# Patient Record
Sex: FEMALE | Race: WHITE | Employment: UNEMPLOYED | URBAN - METROPOLITAN AREA
[De-identification: names, ages, dates, MRNs, and addresses within clinical notes are randomized per-mention and may not be internally consistent; named-entity substitution may affect disease eponyms.]

---

## 2017-12-25 ENCOUNTER — APPOINTMENT (OUTPATIENT)
Dept: GENERAL RADIOLOGY | Age: 47
End: 2017-12-25
Attending: EMERGENCY MEDICINE
Payer: SELF-PAY

## 2017-12-25 ENCOUNTER — HOSPITAL ENCOUNTER (EMERGENCY)
Age: 47
Discharge: HOME | End: 2017-12-25
Attending: EMERGENCY MEDICINE
Payer: SELF-PAY

## 2017-12-25 VITALS
WEIGHT: 165 LBS | SYSTOLIC BLOOD PRESSURE: 135 MMHG | HEIGHT: 61 IN | RESPIRATION RATE: 18 BRPM | OXYGEN SATURATION: 97 % | HEART RATE: 69 BPM | BODY MASS INDEX: 31.15 KG/M2 | TEMPERATURE: 98 F | DIASTOLIC BLOOD PRESSURE: 95 MMHG

## 2017-12-25 DIAGNOSIS — J20.9 ACUTE BRONCHITIS, UNSPECIFIED ORGANISM: Primary | ICD-10-CM

## 2017-12-25 LAB
APPEARANCE UR: CLEAR
BILIRUB UR QL: NEGATIVE
COLOR UR: YELLOW
GLUCOSE UR STRIP.AUTO-MCNC: NEGATIVE MG/DL
HCG UR QL: NEGATIVE
HGB UR QL STRIP: NEGATIVE
KETONES UR QL STRIP.AUTO: NEGATIVE MG/DL
LEUKOCYTE ESTERASE UR QL STRIP.AUTO: NEGATIVE
NITRITE UR QL STRIP.AUTO: NEGATIVE
PH UR STRIP: 7 [PH] (ref 5–8)
PROT UR STRIP-MCNC: NEGATIVE MG/DL
SP GR UR REFRACTOMETRY: 1.01 (ref 1–1.03)
UROBILINOGEN UR QL STRIP.AUTO: 0.2 EU/DL (ref 0.2–1)

## 2017-12-25 PROCEDURE — 71020 XR CHEST PA LAT: CPT

## 2017-12-25 PROCEDURE — 81025 URINE PREGNANCY TEST: CPT | Performed by: PHYSICIAN ASSISTANT

## 2017-12-25 PROCEDURE — 74011000250 HC RX REV CODE- 250: Performed by: PHYSICIAN ASSISTANT

## 2017-12-25 PROCEDURE — 94640 AIRWAY INHALATION TREATMENT: CPT

## 2017-12-25 PROCEDURE — 74011636637 HC RX REV CODE- 636/637: Performed by: PHYSICIAN ASSISTANT

## 2017-12-25 PROCEDURE — 74011250637 HC RX REV CODE- 250/637: Performed by: PHYSICIAN ASSISTANT

## 2017-12-25 PROCEDURE — 99283 EMERGENCY DEPT VISIT LOW MDM: CPT

## 2017-12-25 PROCEDURE — 81003 URINALYSIS AUTO W/O SCOPE: CPT | Performed by: PHYSICIAN ASSISTANT

## 2017-12-25 PROCEDURE — 77030029684 HC NEB SM VOL KT MONA -A

## 2017-12-25 RX ORDER — IPRATROPIUM BROMIDE AND ALBUTEROL SULFATE 2.5; .5 MG/3ML; MG/3ML
3 SOLUTION RESPIRATORY (INHALATION) ONCE
Status: COMPLETED | OUTPATIENT
Start: 2017-12-25 | End: 2017-12-25

## 2017-12-25 RX ORDER — ALBUTEROL SULFATE 90 UG/1
1 AEROSOL, METERED RESPIRATORY (INHALATION)
Status: COMPLETED | OUTPATIENT
Start: 2017-12-25 | End: 2017-12-25

## 2017-12-25 RX ORDER — AZITHROMYCIN 250 MG/1
500 TABLET, FILM COATED ORAL
Status: COMPLETED | OUTPATIENT
Start: 2017-12-25 | End: 2017-12-25

## 2017-12-25 RX ORDER — AZITHROMYCIN 250 MG/1
TABLET, FILM COATED ORAL
Qty: 4 TAB | Refills: 0 | Status: SHIPPED | OUTPATIENT
Start: 2017-12-25 | End: 2018-05-09

## 2017-12-25 RX ORDER — PREDNISONE 20 MG/1
60 TABLET ORAL
Status: COMPLETED | OUTPATIENT
Start: 2017-12-25 | End: 2017-12-25

## 2017-12-25 RX ORDER — ONDANSETRON 4 MG/1
4 TABLET, ORALLY DISINTEGRATING ORAL
Status: COMPLETED | OUTPATIENT
Start: 2017-12-25 | End: 2017-12-25

## 2017-12-25 RX ORDER — HYDROCODONE POLISTIREX AND CHLORPHENIRAMINE POLISTIREX 10; 8 MG/5ML; MG/5ML
5 SUSPENSION, EXTENDED RELEASE ORAL
Qty: 115 ML | Refills: 0 | Status: SHIPPED | OUTPATIENT
Start: 2017-12-25 | End: 2018-05-04

## 2017-12-25 RX ADMIN — IPRATROPIUM BROMIDE AND ALBUTEROL SULFATE 3 ML: .5; 3 SOLUTION RESPIRATORY (INHALATION) at 16:05

## 2017-12-25 RX ADMIN — AZITHROMYCIN 500 MG: 250 TABLET, FILM COATED ORAL at 16:05

## 2017-12-25 RX ADMIN — ONDANSETRON 4 MG: 4 TABLET, ORALLY DISINTEGRATING ORAL at 14:29

## 2017-12-25 RX ADMIN — IPRATROPIUM BROMIDE AND ALBUTEROL SULFATE 3 ML: 2.5; .5 SOLUTION RESPIRATORY (INHALATION) at 14:21

## 2017-12-25 RX ADMIN — PREDNISONE 60 MG: 20 TABLET ORAL at 14:21

## 2017-12-25 RX ADMIN — ALBUTEROL SULFATE 1 PUFF: 90 AEROSOL, METERED RESPIRATORY (INHALATION) at 16:05

## 2017-12-25 NOTE — ED PROVIDER NOTES
Patient is a 56 y.o. female presenting with cough. The history is provided by the patient. No  was used. Cough   This is a new problem. Episode onset: 10 days ago. The problem occurs constantly. The problem has not changed since onset. The cough is productive of sputum (no blood). There has been no fever. Associated symptoms include myalgias and wheezing. Pertinent negatives include no chest pain, no chills, no sweats, no weight loss, no eye redness, no ear congestion, no ear pain, no headaches, no rhinorrhea, no sore throat, no shortness of breath, no nausea, no vomiting and no confusion. She has tried nothing for the symptoms. Risk factors: no RF  She is not a smoker. Her past medical history is significant for pneumonia and asthma. Her past medical history does not include bronchitis, bronchiectasis, COPD, emphysema, cancer, heart failure or CHF. 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 No pertinent family history. Social History     Social History    Marital status:      Spouse name: N/A    Number of children: N/A    Years of education: N/A     Occupational History    Not on file.      Social History Main Topics    Smoking status: Passive Smoke Exposure - Never Smoker     Years: 6.00    Smokeless tobacco: Never Used      Comment: last attempt to quit 1/1/2003    Alcohol use 0.5 oz/week     1 Cans of beer per week    Drug use: No    Sexual activity: Not on file     Other Topics Concern    Not on file     Social History Narrative         ALLERGIES: Apple and Cephalexin    Review of Systems   Constitutional: Negative for chills and weight loss. HENT: Negative for ear pain, rhinorrhea and sore throat. Eyes: Negative for redness. Respiratory: Positive for cough and wheezing. Negative for shortness of breath. Cardiovascular: Negative for chest pain. Gastrointestinal: Negative for nausea and vomiting. Musculoskeletal: Positive for myalgias. Neurological: Negative for headaches. Psychiatric/Behavioral: Negative for confusion. Vitals:    12/25/17 1403   BP: (!) 135/95   Pulse: 69   Resp: 18   Temp: 98 °F (36.7 °C)   SpO2: 97%   Weight: 74.8 kg (165 lb)   Height: 5' 1\" (1.549 m)            Physical Exam   Constitutional: She is oriented to person, place, and time. She appears well-developed and well-nourished. No distress. HENT:   Head: Normocephalic and atraumatic. Mouth/Throat: Oropharynx is clear and moist.   Eyes: Conjunctivae are normal.   Neck: Normal range of motion. Neck supple. Cardiovascular: Normal rate, regular rhythm, normal heart sounds and intact distal pulses. Pulmonary/Chest: Effort normal. No respiratory distress. She has wheezes. She exhibits no tenderness. Occasional cough, no resp distress, no retractions, no flaring   Abdominal: Soft. She exhibits no distension. There is no tenderness. Musculoskeletal: Normal range of motion. Neurological: She is alert and oriented to person, place, and time. No cranial nerve deficit. Coordination normal.   Skin: Skin is warm and dry. No rash noted. She is not diaphoretic. Psychiatric: She has a normal mood and affect. Nursing note and vitals reviewed. MDM  Number of Diagnoses or Management Options  Acute bronchitis, unspecified organism:   Diagnosis management comments: CXR reviewed by self. NAP appreciated however will d/c home with azithromycin, inhaler, tussonex and free clinic f/u. No resp distress, VSS, lungs improved after duoneb tx.   No indication for further work up or admission. Amount and/or Complexity of Data Reviewed  Tests in the radiology section of CPT®: ordered and reviewed      ED Course       Procedures  Medications ordered:   Medications   albuterol-ipratropium (DUO-NEB) 2.5 MG-0.5 MG/3 ML (3 mL Nebulization Given 12/25/17 1421)   predniSONE (DELTASONE) tablet 60 mg (60 mg Oral Given 12/25/17 1421)   ondansetron (ZOFRAN ODT) tablet 4 mg (4 mg Oral Given 12/25/17 1429)   albuterol-ipratropium (DUO-NEB) 2.5 MG-0.5 MG/3 ML (3 mL Nebulization Given 12/25/17 1605)   azithromycin (ZITHROMAX) tablet 500 mg (500 mg Oral Given 12/25/17 1605)   albuterol (PROVENTIL HFA, VENTOLIN HFA, PROAIR HFA) inhaler 1 Puff (1 Puff Inhalation Given 12/25/17 1605)         Lab findings:  Recent Results (from the past 12 hour(s))   URINALYSIS W/ RFLX MICROSCOPIC    Collection Time: 12/25/17  2:15 PM   Result Value Ref Range    Color YELLOW      Appearance CLEAR      Specific gravity 1.013 1.005 - 1.030      pH (UA) 7.0 5.0 - 8.0      Protein NEGATIVE  NEG mg/dL    Glucose NEGATIVE  NEG mg/dL    Ketone NEGATIVE  NEG mg/dL    Bilirubin NEGATIVE  NEG      Blood NEGATIVE  NEG      Urobilinogen 0.2 0.2 - 1.0 EU/dL    Nitrites NEGATIVE  NEG      Leukocyte Esterase NEGATIVE  NEG     HCG URINE, QL    Collection Time: 12/25/17  2:15 PM   Result Value Ref Range    HCG urine, Ql. NEGATIVE  NEG               Dispo:  Patient was dc in stable condition. Return to the ER if you are unable to obtain referral as directed.        Cong Lacey 's  results have been reviewed with her. She has been counseled regarding her diagnosis, treatment, and plan. She verbally conveys understanding and agreement of the signs, symptoms, diagnosis, treatment and prognosis and additionally agrees to follow up as discussed. She also agrees with the care-plan and conveys that all of her questions have been answered.   I have also provided discharge instructions for her that include: educational information regarding their diagnosis and treatment, and list of reasons why they would want to return to the ED prior to their follow-up appointment, should her condition change. Roldan Wright PA-C    Diagnosis:   1. Acute bronchitis, unspecified organism        Follow-up Information     Follow up With Details Comments [facility] Schedule an appointment as soon as possible for a visit in 1 week  [address] [facility] [address]    [facility] EMERGENCY DEPT  As needed, If symptoms worsen [address] [place] [id number] [phone]           Patient's Medications   Start Taking    AZITHROMYCIN (ZITHROMAX Z-MEDHAT) 250 MG TABLET    Take 1st dose 24 hours after initial dose that was given in the ER. Then take 1 tablet daily until finished. CHLORPHENIRAMINE-HYDROCODONE (TUSSIONEX PENNKINETIC ER) 10-8 MG/5 ML SUSPENSION    Take 5 mL by mouth nightly as needed for Cough. Max Daily Amount: 5 mL. Indications: Cough   Continue Taking    SERTRALINE (ZOLOFT) 100 MG TABLET    Take  by mouth daily. These Medications have changed    No medications on file   Stop Taking    ACYCLOVIR (ZOVIRAX) 800 MG TABLET    Take 800 mg by mouth two (2) times a day. ALPRAZOLAM (XANAX) 1 MG TABLET    Take 1 mg by mouth nightly as needed. AMOXICILLIN 500 MG TAB    Take  by mouth. CYCLOBENZAPRINE (FLEXERIL) 10 MG TABLET    Take  by mouth three (3) times daily as needed. HYDROCODONE-ACETAMINOPHEN (VICODIN) 5-500 MG PER TABLET    Take  by mouth. INTRAUTERINE DEVICE, IUD, IUD    by IntraUTERine route. ONDANSETRON HCL (ZOFRAN) 4 MG TABLET    Take 4 mg by mouth every eight (8) hours as needed. OXYCODONE-ACETAMINOPHEN (PERCOCET) 5-325 MG PER TABLET    Take 1 Tab by mouth every four (4) hours as needed. PREDNISONE (DELTASONE) 10 MG TABLET    Take  by mouth daily (with breakfast).       PROMETHAZINE (PHENERGAN) 12.5 MG TABLET    Take  by mouth every six (6) hours as needed.

## 2017-12-25 NOTE — ED TRIAGE NOTES
Pt cough chest congestion times 2 weeks, pt aao*4 no s/s of acute cardiac or respiratory distress, pt ambulatory strong steady gait

## 2017-12-25 NOTE — ED NOTES
PT resting in bed, family at bedside, no needs at this time, safety intact, will continue to monitor

## 2017-12-25 NOTE — DISCHARGE INSTRUCTIONS
Bronchitis: Care Instructions  Your Care Instructions    Bronchitis is inflammation of the bronchial tubes, which carry air to the lungs. The tubes swell and produce mucus, or phlegm. The mucus and inflamed bronchial tubes make you cough. You may have trouble breathing. Most cases of bronchitis are caused by viruses like those that cause colds. Antibiotics usually do not help and they may be harmful. Bronchitis usually develops rapidly and lasts about 2 to 3 weeks in otherwise healthy people. Follow-up care is a key part of your treatment and safety. Be sure to make and go to all appointments, and call your doctor if you are having problems. It's also a good idea to know your test results and keep a list of the medicines you take. How can you care for yourself at home? · Take all medicines exactly as prescribed. Call your doctor if you think you are having a problem with your medicine. · Get some extra rest.  · Take an over-the-counter pain medicine, such as acetaminophen (Tylenol), ibuprofen (Advil, Motrin), or naproxen (Aleve) to reduce fever and relieve body aches. Read and follow all instructions on the label. · Do not take two or more pain medicines at the same time unless the doctor told you to. Many pain medicines have acetaminophen, which is Tylenol. Too much acetaminophen (Tylenol) can be harmful. · Take an over-the-counter cough medicine that contains dextromethorphan to help quiet a dry, hacking cough so that you can sleep. Avoid cough medicines that have more than one active ingredient. Read and follow all instructions on the label. · Breathe moist air from a humidifier, hot shower, or sink filled with hot water. The heat and moisture will thin mucus so you can cough it out. · Do not smoke. Smoking can make bronchitis worse. If you need help quitting, talk to your doctor about stop-smoking programs and medicines. These can increase your chances of quitting for good.   When should you call for help? Call 911 anytime you think you may need emergency care. For example, call if:  ? · You have severe trouble breathing. ?Call your doctor now or seek immediate medical care if:  ? · You have new or worse trouble breathing. ? · You cough up dark brown or bloody mucus (sputum). ? · You have a new or higher fever. ? · You have a new rash. ? Watch closely for changes in your health, and be sure to contact your doctor if:  ? · You cough more deeply or more often, especially if you notice more mucus or a change in the color of your mucus. ? · You are not getting better as expected. Where can you learn more? Go to http://steven-alexandru.info/. Enter H333 in the search box to learn more about \"Bronchitis: Care Instructions. \"  Current as of: May 12, 2017  Content Version: 11.4  © 9532-7295 [employer]. Care instructions adapted under license by [employer] (which disclaims liability or warranty for this information). If you have questions about a medical condition or this instruction, always ask your healthcare professional. [address] any warranty or liability for your use of this information.

## 2018-01-18 ENCOUNTER — APPOINTMENT (OUTPATIENT)
Dept: GENERAL RADIOLOGY | Age: 48
End: 2018-01-18
Attending: PHYSICIAN ASSISTANT
Payer: SELF-PAY

## 2018-01-18 ENCOUNTER — HOSPITAL ENCOUNTER (EMERGENCY)
Age: 48
Discharge: HOME | End: 2018-01-18
Attending: EMERGENCY MEDICINE
Payer: SELF-PAY

## 2018-01-18 VITALS
RESPIRATION RATE: 19 BRPM | DIASTOLIC BLOOD PRESSURE: 101 MMHG | BODY MASS INDEX: 35.75 KG/M2 | HEIGHT: 62 IN | HEART RATE: 85 BPM | SYSTOLIC BLOOD PRESSURE: 150 MMHG | WEIGHT: 194.25 LBS | OXYGEN SATURATION: 97 % | TEMPERATURE: 98.3 F

## 2018-01-18 DIAGNOSIS — M54.31 SCIATICA OF RIGHT SIDE: Primary | ICD-10-CM

## 2018-01-18 LAB
APPEARANCE UR: CLEAR
BILIRUB UR QL: NEGATIVE
COLOR UR: YELLOW
GLUCOSE UR STRIP.AUTO-MCNC: NEGATIVE MG/DL
HGB UR QL STRIP: NEGATIVE
KETONES UR QL STRIP.AUTO: NEGATIVE MG/DL
LEUKOCYTE ESTERASE UR QL STRIP.AUTO: NEGATIVE
NITRITE UR QL STRIP.AUTO: NEGATIVE
PH UR STRIP: 6 [PH] (ref 5–8)
PROT UR STRIP-MCNC: NEGATIVE MG/DL
SP GR UR REFRACTOMETRY: 1.02 (ref 1–1.03)
UROBILINOGEN UR QL STRIP.AUTO: 0.2 EU/DL (ref 0.2–1)

## 2018-01-18 PROCEDURE — 72110 X-RAY EXAM L-2 SPINE 4/>VWS: CPT

## 2018-01-18 PROCEDURE — 99284 EMERGENCY DEPT VISIT MOD MDM: CPT

## 2018-01-18 PROCEDURE — 81003 URINALYSIS AUTO W/O SCOPE: CPT | Performed by: PHYSICIAN ASSISTANT

## 2018-01-18 PROCEDURE — 74011636637 HC RX REV CODE- 636/637: Performed by: PHYSICIAN ASSISTANT

## 2018-01-18 PROCEDURE — 74011250637 HC RX REV CODE- 250/637: Performed by: PHYSICIAN ASSISTANT

## 2018-01-18 RX ORDER — PREDNISONE 20 MG/1
60 TABLET ORAL
Status: COMPLETED | OUTPATIENT
Start: 2018-01-18 | End: 2018-01-18

## 2018-01-18 RX ORDER — METHYLPREDNISOLONE 4 MG/1
TABLET ORAL
Qty: 1 DOSE PACK | Refills: 0 | Status: SHIPPED | OUTPATIENT
Start: 2018-01-18 | End: 2018-05-09

## 2018-01-18 RX ORDER — DIAZEPAM 10 MG/1
5 TABLET ORAL EVERY 8 HOURS
Qty: 15 TAB | Refills: 0 | Status: SHIPPED | OUTPATIENT
Start: 2018-01-18 | End: 2018-01-23

## 2018-01-18 RX ORDER — DIAZEPAM 5 MG/1
5 TABLET ORAL
Status: COMPLETED | OUTPATIENT
Start: 2018-01-18 | End: 2018-01-18

## 2018-01-18 RX ORDER — HYDROCODONE BITARTRATE AND ACETAMINOPHEN 5; 325 MG/1; MG/1
1 TABLET ORAL
Status: COMPLETED | OUTPATIENT
Start: 2018-01-18 | End: 2018-01-18

## 2018-01-18 RX ORDER — ONDANSETRON 4 MG/1
4 TABLET, ORALLY DISINTEGRATING ORAL
Status: DISCONTINUED | OUTPATIENT
Start: 2018-01-18 | End: 2018-01-18 | Stop reason: HOSPADM

## 2018-01-18 RX ORDER — HYDROCODONE BITARTRATE AND ACETAMINOPHEN 5; 325 MG/1; MG/1
TABLET ORAL
Qty: 12 TAB | Refills: 0 | Status: SHIPPED | OUTPATIENT
Start: 2018-01-18 | End: 2018-01-30

## 2018-01-18 RX ADMIN — DIAZEPAM 5 MG: 5 TABLET ORAL at 18:23

## 2018-01-18 RX ADMIN — PREDNISONE 60 MG: 20 TABLET ORAL at 18:23

## 2018-01-18 RX ADMIN — HYDROCODONE BITARTRATE AND ACETAMINOPHEN 1 TABLET: 5; 325 TABLET ORAL at 18:23

## 2018-01-18 RX ADMIN — ONDANSETRON 4 MG: 4 TABLET, ORALLY DISINTEGRATING ORAL at 18:23

## 2018-01-18 NOTE — ED PROVIDER NOTES
EMERGENCY DEPARTMENT HISTORY AND PHYSICAL EXAM    6:00 PM      Date: 1/18/2018  Patient Name: Brian Clay    History of Presenting Illness     Chief Complaint   Patient presents with    Back Pain    Rash         History Provided By: Patient    Chief Complaint: Back pain  Duration:  Hours  Timing:  Acute on chronic  Location: Right sided lower back  Quality: Sharp and shooting  Severity: 8 out of 10  Modifying Factors: Pain worsens with getting up and sitting down  Associated Symptoms: denies any other associated signs or symptoms      Additional History (Context):     Brian Clay is a 56 y.o. female with a pertinent history of sciatica presents to the ED c/o acute on chronic, right sided lower back pain radiating down to right thigh, starting this morning. Pain worsens with getting up and sitting down. Took 800mg ibuprofen PTA with little to no relief. Pt denies vaginal d/c, vaginal bleeding, dysuria, hematuria, abd pain. Also c/o rash on mid lower back. States she has had shingles in the past, notes this rash is difference. No other acute symptoms or complaints were noted. PCP: None    Current Facility-Administered Medications   Medication Dose Route Frequency Provider Last Rate Last Dose    ondansetron (ZOFRAN ODT) tablet 4 mg  4 mg Oral Q8H PRN Connie Ibanez PA-C   4 mg at 01/18/18 1826     Current Outpatient Prescriptions   Medication Sig Dispense Refill    HYDROcodone-acetaminophen (NORCO) 5-325 mg per tablet Take 1-2 tablets PO every 4-6 hours as needed for pain control. If over the counter ibuprofen or acetaminophen was suggested, then only take the vicodin for pain not well controlled with the over the counter medication. 12 Tab 0    diazePAM (VALIUM) 10 mg tablet Take 0.5 Tabs by mouth every eight (8) hours for 5 days.  Max Daily Amount: 15 mg. 15 Tab 0    methylPREDNISolone (MEDROL, MEDHAT,) 4 mg tablet Per dose pack instructions 1 Dose Pack 0    azithromycin (ZITHROMAX Z-MEDHAT) 250 mg tablet Take 1st dose 24 hours after initial dose that was given in the ER. Then take 1 tablet daily until finished. 4 Tab 0    chlorpheniramine-HYDROcodone (TUSSIONEX PENNKINETIC ER) 10-8 mg/5 mL suspension Take 5 mL by mouth nightly as needed for Cough. Max Daily Amount: 5 mL. Indications: Cough 115 mL 0    sertraline (ZOLOFT) 100 mg tablet Take  by mouth daily. 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 No pertinent family history. Social History:  Social History   Substance Use Topics    Smoking status: Passive Smoke Exposure - Never Smoker     Years: 6.00    Smokeless tobacco: Never Used      Comment: last attempt to quit 1/1/2003    Alcohol use 0.5 oz/week     1 Cans of beer per week       Allergies: Allergies   Allergen Reactions    Apple Swelling    Cephalexin Hives         Review of Systems       Review of Systems   Constitutional: Negative for chills and fever. HENT: Negative. Negative for congestion and facial swelling. Eyes: Negative for discharge and redness. Respiratory: Negative for cough and shortness of breath. Cardiovascular: Negative for chest pain. Gastrointestinal: Negative for abdominal pain, nausea and vomiting. Endocrine: Negative. Genitourinary: Negative. Negative for dysuria, hematuria, vaginal bleeding and vaginal discharge. Musculoskeletal: Positive for back pain. Negative for gait problem and neck pain. Skin: Positive for rash. Negative for wound. Allergic/Immunologic: Negative. Neurological: Negative for dizziness, light-headedness and headaches. Hematological: Negative. Psychiatric/Behavioral: Negative. All other systems reviewed and are negative. Physical Exam     Visit Vitals    BP (!) 150/101 (BP 1 Location: Left arm, BP Patient Position: At rest)    Pulse 85    Temp 98.3 °F (36.8 °C)    Resp 19    Ht 5' 2\" (1.575 m)    Wt 88.1 kg (194 lb 4 oz)    SpO2 97%    BMI 35.53 kg/m2         Physical Exam  Nursing note and vitals reviewed. CONSTITUTIONAL: Alert, in no apparent distress; well-developed; well-nourished. HEAD:  Normocephalic, atraumatic. RESP: Chest clear, equal breath sounds. CV: S1 and S2 WNL; No murmurs, gallops or rubs. GI: Abdomen soft and non-tender. No masses or organomegaly. BACK:FROM, 5/5 strength, 2+DTR's, right lumbar paraspinal tenderness no erythema, no edema, no ecchymosis,(-) deformity, swelling, skin changes, midline tenderness or crepitus. (-) step offs. +SLR right. Full sensation to deep and light palpation bilaterally. (-) foot drop  UPPER EXT:  Normal inspection. LOWER EXT: Normal inspection  NEURO:  strength 5/5 and sym, sensation intact. SKIN: No rashes; Normal for age and stage. PSYCH:  Alert and oriented, normal affect. Diagnostic Study Results     Labs -  Recent Results (from the past 12 hour(s))   URINALYSIS W/ RFLX MICROSCOPIC    Collection Time: 01/18/18  6:25 PM   Result Value Ref Range    Color YELLOW      Appearance CLEAR      Specific gravity 1.016 1.005 - 1.030      pH (UA) 6.0 5.0 - 8.0      Protein NEGATIVE  NEG mg/dL    Glucose NEGATIVE  NEG mg/dL    Ketone NEGATIVE  NEG mg/dL    Bilirubin NEGATIVE  NEG      Blood NEGATIVE  NEG      Urobilinogen 0.2 0.2 - 1.0 EU/dL    Nitrites NEGATIVE  NEG      Leukocyte Esterase NEGATIVE  NEG         Radiologic Studies -   XR SPINE LUMB MIN 4 V   Final Result  IMPRESSION:  Mild degenerative disc disease from L2 through S1.  As read by the radiologist.       Medical Decision Making   I am the first provider for this patient. I reviewed the vital signs, available nursing notes, past medical history, past surgical history, family history and social history. Vital Signs-Reviewed the patient's vital signs. Pulse Oximetry Analysis -  97% on room air     Records Reviewed: Nursing Notes and Old Medical Records (Time of Review: 6:00 PM)    ED Course: Progress Notes, Reevaluation, and Consults:      Provider Notes (Medical Decision Making): Red flags for low back pain including history of HIV, unexplained weight loss, unexplained fevers, inability control bowel or bladder, prolonged steroid use, IV drug use, age greater than 76, history of cancer were all addressed and all are negative. S/s c/w sciatica. Xray NAP, ua negative for blood or infection. Pt has significant resolution of pain with PO meds (valium, norco and prednisone). Will give as Rx and refer to [facility] clinic to be seen by ortho. BP reading elevated while in ED with no previous or mentioned hx. Pt is made aware and will f/u with PCP. She understands risks of prolonged elevated BP including end organ disease. Pamla Schirmer, PA-C     Procedures:     Diagnosis     Clinical Impression:   1. Sciatica of right side    2. Elevated BP    Disposition: Discharged     Follow-up Information     None           Patient's Medications   Start Taking    DIAZEPAM (VALIUM) 10 MG TABLET    Take 0.5 Tabs by mouth every eight (8) hours for 5 days. Max Daily Amount: 15 mg. HYDROCODONE-ACETAMINOPHEN (NORCO) 5-325 MG PER TABLET    Take 1-2 tablets PO every 4-6 hours as needed for pain control. If over the counter ibuprofen or acetaminophen was suggested, then only take the vicodin for pain not well controlled with the over the counter medication.     METHYLPREDNISOLONE (MEDROL, MEDHAT,) 4 MG TABLET    Per dose pack instructions   Continue Taking    AZITHROMYCIN (ZITHROMAX Z-MEDHAT) 250 MG TABLET    Take 1st dose 24 hours after initial dose that was given in the ER. Then take 1 tablet daily until finished. CHLORPHENIRAMINE-HYDROCODONE (TUSSIONEX PENNKINETIC ER) 10-8 MG/5 ML SUSPENSION    Take 5 mL by mouth nightly as needed for Cough. Max Daily Amount: 5 mL. Indications: Cough    SERTRALINE (ZOLOFT) 100 MG TABLET    Take  by mouth daily. These Medications have changed    No medications on file   Stop Taking    No medications on file     _______________________________    Attestations:  Scribe Attestation     Nasario Soulier acting as a scribe for and in the presence of Tray Kelly    January 18, 2018 at 6:24 PM       Provider Attestation:      I personally performed the services described in the documentation, reviewed the documentation, as recorded by the scribe in my presence, and it accurately and completely records my words and actions.  January 18, 2018 at 6:24 PM - Sarah Younger PA-C    _______________________________

## 2018-01-19 NOTE — DISCHARGE INSTRUCTIONS
Back Pain: Care Instructions  Your Care Instructions    Back pain has many possible causes. It is often related to problems with muscles and ligaments of the back. It may also be related to problems with the nerves, discs, or bones of the back. Moving, lifting, standing, sitting, or sleeping in an awkward way can strain the back. Sometimes you don't notice the injury until later. Arthritis is another common cause of back pain. Although it may hurt a lot, back pain usually improves on its own within several weeks. Most people recover in 12 weeks or less. Using good home treatment and being careful not to stress your back can help you feel better sooner. Follow-up care is a key part of your treatment and safety. Be sure to make and go to all appointments, and call your doctor if you are having problems. It's also a good idea to know your test results and keep a list of the medicines you take. How can you care for yourself at home? · Sit or lie in positions that are most comfortable and reduce your pain. Try one of these positions when you lie down:  ¨ Lie on your back with your knees bent and supported by large pillows. ¨ Lie on the floor with your legs on the seat of a sofa or chair. Denice Pert on your side with your knees and hips bent and a pillow between your legs. ¨ Lie on your stomach if it does not make pain worse. · Do not sit up in bed, and avoid soft couches and twisted positions. Bed rest can help relieve pain at first, but it delays healing. Avoid bed rest after the first day of back pain. · Change positions every 30 minutes. If you must sit for long periods of time, take breaks from sitting. Get up and walk around, or lie in a comfortable position. · Try using a heating pad on a low or medium setting for 15 to 20 minutes every 2 or 3 hours. Try a warm shower in place of one session with the heating pad. · You can also try an ice pack for 10 to 15 minutes every 2 to 3 hours.  Put a thin cloth between the ice pack and your skin. · Take pain medicines exactly as directed. ¨ If the doctor gave you a prescription medicine for pain, take it as prescribed. ¨ If you are not taking a prescription pain medicine, ask your doctor if you can take an over-the-counter medicine. · Take short walks several times a day. You can start with 5 to 10 minutes, 3 or 4 times a day, and work up to longer walks. Walk on level surfaces and avoid hills and stairs until your back is better. · Return to work and other activities as soon as you can. Continued rest without activity is usually not good for your back. · To prevent future back pain, do exercises to stretch and strengthen your back and stomach. Learn how to use good posture, safe lifting techniques, and proper body mechanics. When should you call for help? Call your doctor now or seek immediate medical care if:  ? · You have new or worsening numbness in your legs. ? · You have new or worsening weakness in your legs. (This could make it hard to stand up.)   ? · You lose control of your bladder or bowels. ? Watch closely for changes in your health, and be sure to contact your doctor if:  ? · Your pain gets worse. ? · You are not getting better after 2 weeks. Where can you learn more? Go to http://steven-alexandru.info/. Enter W756 in the search box to learn more about \"Back Pain: Care Instructions. \"  Current as of: March 21, 2017  Content Version: 11.4  © 7103-9288 [employer]. Care instructions adapted under license by [employer] (which disclaims liability or warranty for this information). If you have questions about a medical condition or this instruction, always ask your healthcare professional. [place] [address] any warranty or liability for your use of this information.          Sciatica: Exercises  Your Care Instructions  Here are some examples of typical rehabilitation exercises for your condition. Start each exercise slowly. Ease off the exercise if you start to have pain. Your doctor or physical therapist will tell you when you can start these exercises and which ones will work best for you. When you are not being active, find a comfortable position for rest. Some people are comfortable on the floor or a medium-firm bed with a small pillow under their head and another under their knees. Some people prefer to lie on their side with a pillow between their knees. Don't stay in one position for too long. Take short walks (10 to 20 minutes) every 2 to 3 hours. Avoid slopes, hills, and stairs until you feel better. Walk only distances you can manage without pain, especially leg pain. How to do the exercises  Back stretches    1. Get down on your hands and knees on the floor. 2. Relax your head and allow it to droop. Round your back up toward the ceiling until you feel a nice stretch in your upper, middle, and lower back. Hold this stretch for as long as it feels comfortable, or about 15 to 30 seconds. 3. Return to the starting position with a flat back while you are on your hands and knees. 4. Let your back sway by pressing your stomach toward the floor. Lift your buttocks toward the ceiling. 5. Hold this position for 15 to 30 seconds. 6. Repeat 2 to 4 times. Follow-up care is a key part of your treatment and safety. Be sure to make and go to all appointments, and call your doctor if you are having problems. It's also a good idea to know your test results and keep a list of the medicines you take. Where can you learn more? Go to http://steven-alexandru.info/. Enter T758 in the search box to learn more about \"Sciatica: Exercises. \"  Current as of: March 21, 2017  Content Version: 11.4  © 4382-8941 [employer]. Care instructions adapted under license by [employer] (which disclaims liability or warranty for this information).  If you have questions about a medical condition or this instruction, always ask your healthcare professional. [address] any warranty or liability for your use of this information. Sciatica: Care Instructions  Your Care Instructions    Sciatica (say \"kkl-LO-zg-kuh\") is an irritation of one of the sciatic nerves, which come from the spinal cord in the lower back. The sciatic nerves and their branches extend down through the buttock to the foot. Sciatica can develop when an injured disc in the back presses against a spinal nerve root. Its main symptom is pain, numbness, or weakness that is often worse in the leg or foot than in the back. Sciatica often will improve and go away with time. Early treatment usually includes medicines and exercises to relieve pain. Follow-up care is a key part of your treatment and safety. Be sure to make and go to all appointments, and call your doctor if you are having problems. It's also a good idea to know your test results and keep a list of the medicines you take. How can you care for yourself at home? · Take pain medicines exactly as directed. ¨ If the doctor gave you a prescription medicine for pain, take it as prescribed. ¨ If you are not taking a prescription pain medicine, ask your doctor if you can take an over-the-counter medicine. · Use heat or ice to relieve pain. ¨ To apply heat, put a warm water bottle, heating pad set on low, or warm cloth on your back. Do not go to sleep with a heating pad on your skin. ¨ To use ice, put ice or a cold pack on the area for 10 to 20 minutes at a time. Put a thin cloth between the ice and your skin. · Avoid sitting if possible, unless it feels better than standing. · Alternate lying down with short walks. Increase your walking distance as you are able to without making your symptoms worse. · Do not do anything that makes your symptoms worse. When should you call for help?   Call 911 anytime you think you may need emergency care. For example, call if:  · You are unable to move a leg at all. Call your doctor now or seek immediate medical care if:  · You have new or worse symptoms in your legs or buttocks. Symptoms may include:  ¨ Numbness or tingling. ¨ Weakness. ¨ Pain. · You lose bladder or bowel control. Watch closely for changes in your health, and be sure to contact your doctor if:  · You are not getting better as expected. Where can you learn more? Go to http://steven-alexandru.info/. Enter [phone] in the search box to learn more about \"Sciatica: Care Instructions. \"  Current as of: March 21, 2017  Content Version: 11.4  © 9819-4196 [employer]. Care instructions adapted under license by [employer] (which disclaims liability or warranty for this information). If you have questions about a medical condition or this instruction, always ask your healthcare professional. [place] [address] any warranty or liability for your use of this information.

## 2018-01-30 ENCOUNTER — HOSPITAL ENCOUNTER (EMERGENCY)
Age: 48
Discharge: HOME | End: 2018-01-30
Attending: EMERGENCY MEDICINE
Payer: SELF-PAY

## 2018-01-30 VITALS
RESPIRATION RATE: 20 BRPM | SYSTOLIC BLOOD PRESSURE: 154 MMHG | HEART RATE: 78 BPM | TEMPERATURE: 98.4 F | WEIGHT: 190 LBS | OXYGEN SATURATION: 98 % | DIASTOLIC BLOOD PRESSURE: 83 MMHG | BODY MASS INDEX: 34.75 KG/M2

## 2018-01-30 DIAGNOSIS — M79.7 FIBROMYALGIA: ICD-10-CM

## 2018-01-30 DIAGNOSIS — B02.9 HERPES ZOSTER WITHOUT COMPLICATION: Primary | ICD-10-CM

## 2018-01-30 PROCEDURE — 99283 EMERGENCY DEPT VISIT LOW MDM: CPT

## 2018-01-30 PROCEDURE — 74011250637 HC RX REV CODE- 250/637: Performed by: EMERGENCY MEDICINE

## 2018-01-30 RX ORDER — HYDROCODONE BITARTRATE AND ACETAMINOPHEN 5; 325 MG/1; MG/1
TABLET ORAL
Qty: 12 TAB | Refills: 0 | Status: SHIPPED | OUTPATIENT
Start: 2018-01-30 | End: 2018-05-09

## 2018-01-30 RX ORDER — DIAZEPAM 10 MG/1
5 TABLET ORAL EVERY 8 HOURS
Qty: 15 TAB | Refills: 0 | Status: SHIPPED | OUTPATIENT
Start: 2018-01-30 | End: 2018-02-04

## 2018-01-30 RX ORDER — DIAZEPAM 5 MG/1
10 TABLET ORAL
Status: COMPLETED | OUTPATIENT
Start: 2018-01-30 | End: 2018-01-30

## 2018-01-30 RX ORDER — HYDROCODONE BITARTRATE AND ACETAMINOPHEN 5; 325 MG/1; MG/1
1 TABLET ORAL
Status: COMPLETED | OUTPATIENT
Start: 2018-01-30 | End: 2018-01-30

## 2018-01-30 RX ORDER — ACYCLOVIR 800 MG/1
800 TABLET ORAL
Qty: 35 TAB | Refills: 0 | Status: SHIPPED | OUTPATIENT
Start: 2018-01-30 | End: 2018-02-06

## 2018-01-30 RX ADMIN — DIAZEPAM 10 MG: 5 TABLET ORAL at 16:00

## 2018-01-30 RX ADMIN — HYDROCODONE BITARTRATE AND ACETAMINOPHEN 1 TABLET: 5; 325 TABLET ORAL at 15:58

## 2018-01-30 NOTE — DISCHARGE INSTRUCTIONS
Fibromyalgia: Care Instructions  Your Care Instructions    Fibromyalgia is a painful condition that is not completely understood by medical experts. The cause of fibromyalgia is not known. It can make you feel tired and ache all over. It causes tender spots at specific points of the body that hurt only when you press on them. You may have trouble sleeping, as well as other symptoms. These problems can upset your work and home life. Symptoms tend to come and go, although they may never go away completely. Fibromyalgia does not harm your muscles, joints, or organs. Follow-up care is a key part of your treatment and safety. Be sure to make and go to all appointments, and call your doctor if you are having problems. It's also a good idea to know your test results and keep a list of the medicines you take. How can you care for yourself at home? · Exercise often. Walk, swim, or bike to help with pain and sleep problems and to make you feel better. · Try to get a good night's sleep. Go to bed and get up at the same time each day, whether you feel rested or not. Make sure you have a good mattress and pillow. · Reduce stress. Avoid things that cause you stress, if you can. If not, work at making them less stressful. Learn to use biofeedback, guided imagery, meditation, or other methods to relax. · Make healthy changes. Eat a balanced diet, quit smoking, and limit alcohol and caffeine. · Use a heating pad set on low or take warm baths or showers for pain. Using cold packs for up to 20 minutes at a time can also relieve pain. Put a thin cloth between the cold pack and your skin. A gentle massage might help too. · Be safe with medicines. Take your medicines exactly as prescribed. Call your doctor if you think you are having a problem with your medicine. Your doctor may talk to you about taking antidepressant medicines. These medicines may improve sleep, relieve pain, and in some cases treat depression.   · Learn about fibromyalgia. This makes coping easier. Then, take an active role in your treatment. · Think about joining a support group with others who have fibromyalgia to learn more and get support. When should you call for help? Watch closely for changes in your health, and be sure to contact your doctor if:  ? · You feel sad, helpless, or hopeless; lose interest in things you used to enjoy; or have other symptoms of depression. ? · Your fibromyalgia symptoms get worse. Where can you learn more? Go to http://steven-alexandru.info/. Enter V003 in the search box to learn more about \"Fibromyalgia: Care Instructions. \"  Current as of: October 14, 2016  Content Version: 11.4  © 1970-1155 [employer]. Care instructions adapted under license by [employer] (which disclaims liability or warranty for this information). If you have questions about a medical condition or this instruction, always ask your healthcare professional. [place] [address] any warranty or liability for your use of this information. Shingles: Care Instructions  Your Care Instructions    Shingles (herpes zoster) causes pain and a blistered rash. The rash can appear anywhere on the body but will be on only one side of the body, the left or right. It will be in a band, a strip, or a small area. The pain can be very severe. Shingles can also cause tingling or itching in the area of the rash. The blisters scab over after a few days and heal in 2 to 4 weeks. Medicines can help you feel better and may help prevent more serious problems caused by shingles. Shingles is caused by the same virus that causes chickenpox. When you have chickenpox, the virus gets into your nerve roots and stays there (becomes dormant) long after you get over the chickenpox. If the virus becomes active again, it can cause shingles. Follow-up care is a key part of your treatment and safety.  Be sure to make and go to all appointments, and call your doctor if you are having problems. It's also a good idea to know your test results and keep a list of the medicines you take. How can you care for yourself at home? · Be safe with medicines. Take your medicines exactly as prescribed. Call your doctor if you think you are having a problem with your medicine. Antiviral medicine helps you get better faster. · Try not to scratch or pick at the blisters. They will crust over and fall off on their own if you leave them alone. · Put cool, wet cloths on the area to relieve pain and itching. You can also use calamine lotion. Try not to use so much lotion that it cakes and is hard to get off. · Put cornstarch or baking soda on the sores to help dry them out so they heal faster. · Do not use thick ointment, such as petroleum jelly, on the sores. This will keep them from drying and healing. · To help remove loose crusts, soak them in tap water. This can help decrease oozing, and dry and soothe the skin. · Take an over-the-counter pain medicine, such as acetaminophen (Tylenol), ibuprofen (Advil, Motrin), or naproxen (Aleve). Read and follow all instructions on the label. · Avoid close contact with people until the blisters have healed. It is very important for you to avoid contact with anyone who has never had chickenpox or the chickenpox vaccine. Pregnant women, young babies, and anyone else who has a hard time fighting infection (such as someone with HIV, diabetes, or cancer) is especially at risk. When should you call for help? Call your doctor now or seek immediate medical care if:  ? · You have a new or higher fever. ? · You have a severe headache and a stiff neck. ? · You lose the ability to think clearly. ? · The rash spreads to your forehead, nose, eyes, or eyelids. ? · You have eye pain, or your vision gets worse. ? · You have new pain in your face, or you cannot move the muscles in your face.    ? · Blisters spread to new parts of your body. ? Watch closely for changes in your health, and be sure to contact your doctor if:  ? · The rash has not healed after 2 to 4 weeks. ? · You still have pain after the rash has healed. Where can you learn more? Go to http://steven-alexandru.info/. Desmond Creed in the search box to learn more about \"Shingles: Care Instructions. \"  Current as of: March 3, 2017  Content Version: 11.4  © 4324-4239 [employer]. Care instructions adapted under license by [employer] (which disclaims liability or warranty for this information). If you have questions about a medical condition or this instruction, always ask your healthcare professional. [address] any warranty or liability for your use of this information.

## 2018-01-30 NOTE — ED PROVIDER NOTES
EMERGENCY DEPARTMENT HISTORY AND PHYSICAL EXAM    3:32 PM      Date: 1/30/2018  Patient Name: Jennie Metzger    History of Presenting Illness     Chief Complaint   Patient presents with    Rash         History Provided By: Patient    Chief Complaint: Shingles out  Break  Duration:  Days  Timing:  Worsening  Location: lower back and buttocks  Quality: Burning  Severity: 9 out of 10  Modifying Factors:  Her normal medications usually help   Associated Symptoms: denies any other associated signs or symptoms      Additional History (Context): Jennie Metzger is a 56 y.o. 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 The pt says she was seen on the 18th for sciatic pain that was treated but she says shortly later the rash came up and pain came back. Says she has not had flare up in a while. The pt says she normally treated with acyclovir. She usually goes to [facility] for her medications. The pt denies CP, SOB, fever, chills, swelling, weakness, and numbness. The pt had no other complaints or concerns in the ED. PCP: None    Current Facility-Administered Medications   Medication Dose Route Frequency Provider Last Rate Last Dose    HYDROcodone-acetaminophen (NORCO) 5-325 mg per tablet 1 Tab  1 Tab Oral NOW Adan Stone DO        diazePAM (VALIUM) tablet 10 mg  10 mg Oral NOW Jessy Stone DO         Current Outpatient Prescriptions   Medication Sig Dispense Refill    acyclovir (ZOVIRAX) 800 mg tablet Take 1 Tab by mouth five (5) times daily for 7 days. 35 Tab 0    HYDROcodone-acetaminophen (NORCO) 5-325 mg per tablet Take 1-2 tablets PO every 4-6 hours as needed for pain control. If over the counter ibuprofen or acetaminophen was suggested, then only take the vicodin for pain not well controlled with the over the counter medication. 12 Tab 0    diazePAM (VALIUM) 10 mg tablet Take 0.5 Tabs by mouth every eight (8) hours for 5 days.  Max Daily Amount: 15 mg. 15 Tab 0    methylPREDNISolone (MEDROL, MEDHAT,) 4 mg tablet Per dose pack instructions 1 Dose Pack 0    azithromycin (ZITHROMAX Z-MEDHAT) 250 mg tablet Take 1st dose 24 hours after initial dose that was given in the ER. Then take 1 tablet daily until finished. 4 Tab 0    chlorpheniramine-HYDROcodone (TUSSIONEX PENNKINETIC ER) 10-8 mg/5 mL suspension Take 5 mL by mouth nightly as needed for Cough. Max Daily Amount: 5 mL. Indications: Cough 115 mL 0    sertraline (ZOLOFT) 100 mg tablet Take  by mouth daily. Past History     Past Medical History:  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    Pain, upper back     Psychiatric disorder     depression     Tattoo     Unspecified deficiency anemia 1999       Past Surgical History:  Past Surgical History:   Procedure Laterality Date    HX APPENDECTOMY  1995    HX CHOLECYSTECTOMY  2002       Family History:  No family history on file. Social History:  Social History   Substance Use Topics    Smoking status: Passive Smoke Exposure - Never Smoker     Years: 6.00    Smokeless tobacco: Never Used      Comment: last attempt to quit 1/1/2003    Alcohol use 0.5 oz/week     1 Cans of beer per week       Allergies: Allergies   Allergen Reactions    Apple Swelling    Cephalexin Hives         Review of Systems       Review of Systems   Constitutional: Negative. Negative for chills, diaphoresis and fever. HENT: Negative. Negative for congestion, rhinorrhea and sore throat. Eyes: Negative. Negative for pain, discharge and redness. Respiratory: Negative. Negative for cough, chest tightness, shortness of breath and wheezing. Cardiovascular: Negative. Negative for chest pain. Gastrointestinal: Negative.   Negative for abdominal pain, constipation, diarrhea, nausea and vomiting. Genitourinary: Negative. Negative for dysuria, flank pain, frequency, hematuria and urgency. Musculoskeletal: Negative. Negative for back pain and neck pain. Skin: Positive for color change and rash. Rash is painful   Neurological: Negative. Negative for syncope, weakness, numbness and headaches. Psychiatric/Behavioral: Negative. All other systems reviewed and are negative. Physical Exam     Visit Vitals    /83 (BP 1 Location: Left arm, BP Patient Position: At rest)    Pulse 78    Temp 98.4 °F (36.9 °C)    Resp 20    Wt 86.2 kg (190 lb)    SpO2 98%    BMI 34.75 kg/m2         Physical Exam   Constitutional: She appears well-developed and well-nourished. Non-toxic appearance. She does not have a sickly appearance. She does not appear ill. No distress. HENT:   Head: Normocephalic and atraumatic. Mouth/Throat: Oropharynx is clear and moist. No oropharyngeal exudate. Eyes: Conjunctivae and EOM are normal. Pupils are equal, round, and reactive to light. No scleral icterus. Neck: Normal range of motion. Neck supple. No hepatojugular reflux and no JVD present. No tracheal deviation present. No thyromegaly present. Cardiovascular: Normal rate, regular rhythm, S1 normal, S2 normal, normal heart sounds, intact distal pulses and normal pulses. Exam reveals no gallop, no S3 and no S4. No murmur heard. Pulses:       Radial pulses are 2+ on the right side, and 2+ on the left side. Dorsalis pedis pulses are 2+ on the right side, and 2+ on the left side. Pulmonary/Chest: Effort normal and breath sounds normal. No respiratory distress. She has no decreased breath sounds. She has no wheezes. She has no rhonchi. She has no rales. Abdominal: Soft. Normal appearance and bowel sounds are normal. She exhibits no distension and no mass. There is no hepatosplenomegaly. There is no tenderness.  There is no rigidity, no rebound, no guarding, no CVA tenderness, no tenderness at McBurney's point and negative Loyd's sign. Musculoskeletal: Normal range of motion. Back:    Lymphadenopathy:        Head (right side): No submental, no submandibular, no preauricular and no occipital adenopathy present. Head (left side): No submental, no submandibular, no preauricular and no occipital adenopathy present. She has no cervical adenopathy. Right: No supraclavicular adenopathy present. Left: No supraclavicular adenopathy present. Neurological: She is alert. She has normal strength and normal reflexes. She is not disoriented. No cranial nerve deficit or sensory deficit. Coordination and gait normal. GCS eye subscore is 4. GCS verbal subscore is 5. GCS motor subscore is 6. Grossly intact    Skin: Skin is warm, dry and intact. No rash noted. She is not diaphoretic. Psychiatric: She has a normal mood and affect. Her speech is normal and behavior is normal. Judgment and thought content normal. Cognition and memory are normal.   Nursing note and vitals reviewed. Diagnostic Study Results     Labs -  No results found for this or any previous visit (from the past 12 hour(s)). Radiologic Studies -   No orders to display         Medical Decision Making   I am the first provider for this patient. I reviewed the vital signs, available nursing notes, past medical history, past surgical history, family history and social history. Vital Signs-Reviewed the patient's vital signs. Records Reviewed: Nursing Notes and Old Medical Records (Time of Review: 3:32 PM)    ED Course: Progress Notes, Reevaluation, and Consults:    Labs essentially normal.  3:32 PM 1/30/2018    Provider Notes (Medical Decision Making):  MDM  Number of Diagnoses or Management Options  Fibromyalgia:   Herpes zoster without complication:       Diagnosis       I have reassessed the patient. Patient is feeling better after receiving treatment.   Patient will be prescribed zovirax, valium, and norco.  Patient was discharged in stable condition. Patient is to return to emergency department if any new or worsening condition. Clinical Impression:   1. Herpes zoster without complication    2. Fibromyalgia        Disposition: D/C    Follow-up Information     Follow up With Details Comments [facility] Call follow up. [address] [address]  [phone]    [facility] EMERGENCY DEPT  If symptoms worsen, As needed [address]  [phone]           _______________________________    Attestations:  Scribe Attestation     Kathi Gottlieb acting as a scribe for and in the presence of Mya Cook DO      January 30, 2018 at 3:32 PM       Provider Attestation:      I personally performed the services described in the documentation, reviewed the documentation, as recorded by the scribe in my presence, and it accurately and completely records my words and actions.  January 30, 2018 at 3:32 PM - Mya Cook DO    _______________________________

## 2018-04-23 ENCOUNTER — HOSPITAL ENCOUNTER (EMERGENCY)
Age: 48
Discharge: HOME | End: 2018-04-24
Attending: EMERGENCY MEDICINE
Payer: SUBSIDIZED

## 2018-04-23 VITALS
DIASTOLIC BLOOD PRESSURE: 94 MMHG | HEART RATE: 79 BPM | RESPIRATION RATE: 16 BRPM | SYSTOLIC BLOOD PRESSURE: 150 MMHG | WEIGHT: 185 LBS | BODY MASS INDEX: 33.84 KG/M2 | TEMPERATURE: 98 F

## 2018-04-23 DIAGNOSIS — B02.9 HERPES ZOSTER WITHOUT COMPLICATION: Primary | ICD-10-CM

## 2018-04-23 DIAGNOSIS — R03.0 ELEVATED BLOOD PRESSURE READING: ICD-10-CM

## 2018-04-23 PROCEDURE — 99283 EMERGENCY DEPT VISIT LOW MDM: CPT

## 2018-04-23 RX ORDER — ACYCLOVIR 200 MG/1
400 CAPSULE ORAL
Status: COMPLETED | OUTPATIENT
Start: 2018-04-23 | End: 2018-04-24

## 2018-04-23 RX ORDER — HYDROCODONE BITARTRATE AND ACETAMINOPHEN 5; 325 MG/1; MG/1
1 TABLET ORAL
Status: COMPLETED | OUTPATIENT
Start: 2018-04-23 | End: 2018-04-24

## 2018-04-23 RX ORDER — HYDROCODONE BITARTRATE AND ACETAMINOPHEN 5; 325 MG/1; MG/1
TABLET ORAL
Qty: 6 TAB | Refills: 0 | Status: SHIPPED | OUTPATIENT
Start: 2018-04-23 | End: 2018-05-09

## 2018-04-23 RX ORDER — METHYLPREDNISOLONE 4 MG/1
TABLET ORAL
Qty: 1 DOSE PACK | Refills: 0 | Status: SHIPPED | OUTPATIENT
Start: 2018-04-23 | End: 2018-05-09

## 2018-04-23 RX ORDER — ACYCLOVIR 400 MG/1
400 TABLET ORAL 3 TIMES DAILY
Qty: 15 TAB | Refills: 0 | Status: SHIPPED | OUTPATIENT
Start: 2018-04-23 | End: 2018-04-28

## 2018-04-24 PROCEDURE — 74011250637 HC RX REV CODE- 250/637: Performed by: EMERGENCY MEDICINE

## 2018-04-24 RX ADMIN — ACYCLOVIR 400 MG: 200 CAPSULE ORAL at 00:23

## 2018-04-24 RX ADMIN — HYDROCODONE BITARTRATE AND ACETAMINOPHEN 1 TABLET: 5; 325 TABLET ORAL at 00:19

## 2018-04-24 NOTE — ED PROVIDER NOTES
EMERGENCY DEPARTMENT HISTORY AND PHYSICAL EXAM    8:42 PM      Date: 4/23/2018  Patient Name: Jose Alfredo Mario    History of Presenting Illness     Chief Complaint   Patient presents with    Skin Problem         History Provided By: Patient    Chief Complaint: singles flare up (rash, buttocks pain)  Duration: 1 Days  Timing:  Acute  Location: right buttocks  Quality: Sharp  Severity: Severe  Modifying Factors: none  Associated Symptoms: denies any other associated signs or symptoms      Additional History (Context): Jose Alfredo Mario is a 56 y.o. female with asthma and shingles who presents with 1 day of acute onset of shingles flare up (severe sharp pain in right buttocks, rash). Pt reports that she is having a flare up of her shingles. It is in the same place and looks and feels the same as past episodes. Pt does not currently have a PCP to manage her medications for this due to losing her insurance. No other concerns or symptoms at this time. PCP: None    Current Outpatient Prescriptions   Medication Sig Dispense Refill    acyclovir (ZOVIRAX) 400 mg tablet Take 1 Tab by mouth three (3) times daily for 5 days. 15 Tab 0    HYDROcodone-acetaminophen (NORCO) 5-325 mg per tablet Take 1-2 tablets PO every 4-6 hours as needed for pain control. If over the counter ibuprofen or acetaminophen was suggested, then only take the vicodin for pain not well controlled with the over the counter medication. 6 Tab 0    methylPREDNISolone (MEDROL, MEDHAT,) 4 mg tablet Per dose pack instructions 1 Dose Pack 0    HYDROcodone-acetaminophen (NORCO) 5-325 mg per tablet Take 1-2 tablets PO every 4-6 hours as needed for pain control. If over the counter ibuprofen or acetaminophen was suggested, then only take the vicodin for pain not well controlled with the over the counter medication.  12 Tab 0    methylPREDNISolone (MEDROL, MEDHAT,) 4 mg tablet Per dose pack instructions 1 Dose Pack 0    azithromycin (ZITHROMAX Z-MEDHAT) 250 mg tablet Take 1st dose 24 hours after initial dose that was given in the ER. Then take 1 tablet daily until finished. 4 Tab 0    chlorpheniramine-HYDROcodone (TUSSIONEX PENNKINETIC ER) 10-8 mg/5 mL suspension Take 5 mL by mouth nightly as needed for Cough. Max Daily Amount: 5 mL. Indications: Cough 115 mL 0    sertraline (ZOLOFT) 100 mg tablet Take  by mouth daily. 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 No pertinent family history. Social History:  Social History   Substance Use Topics    Smoking status: Passive Smoke Exposure - Never Smoker     Years: 6.00    Smokeless tobacco: Never Used      Comment: last attempt to quit 1/1/2003    Alcohol use 0.5 oz/week     1 Cans of beer per week       Allergies: Allergies   Allergen Reactions    Apple Swelling    Cephalexin Hives         Review of Systems       Review of Systems   Musculoskeletal:        Positive for right buttocks pain   Skin: Positive for rash (right buttocks). All other systems reviewed and are negative. Physical Exam     Visit Vitals    BP (!) 150/94 (BP 1 Location: Left arm)    Pulse 79    Temp 98 °F (36.7 °C)    Resp 16    Wt 83.9 kg (185 lb)    BMI 33.84 kg/m2         Physical Exam   Constitutional: She is oriented to person, place, and time. She appears well-developed. HENT:   Head: Normocephalic and atraumatic. Eyes: Pupils are equal, round, and reactive to light. Neck: No JVD present. No tracheal deviation present.  No thyromegaly present. Cardiovascular: Normal rate, regular rhythm and normal heart sounds. Exam reveals no gallop and no friction rub. No murmur heard. Pulmonary/Chest: Effort normal and breath sounds normal. No stridor. No respiratory distress. She has no wheezes. She has no rales. She exhibits no tenderness. Abdominal: Soft. She exhibits no distension and no mass. There is no tenderness. There is no rebound and no guarding. Musculoskeletal: She exhibits no edema or tenderness. Lymphadenopathy:     She has no cervical adenopathy. Neurological: She is alert and oriented to person, place, and time. Skin: Skin is warm and dry. Rash noted. No erythema. No pallor. Raised tender vesicles to right buttocks, linear pattern. No weepage, excoriations. Psychiatric: She has a normal mood and affect. Her behavior is normal. Thought content normal.   Nursing note and vitals reviewed. Diagnostic Study Results     Labs -  No results found for this or any previous visit (from the past 12 hour(s)). Radiologic Studies -   No orders to display         Medical Decision Making   I am the first provider for this patient. I reviewed the vital signs, available nursing notes, past medical history, past surgical history, family history and social history. Vital Signs-Reviewed the patient's vital signs. Records Reviewed: Nursing Notes (Time of Review: 8:42 PM)    ED Course: Progress Notes, Reevaluation, and Consults:      Provider Notes (Medical Decision Making): herpes outbreak; treat. Diagnosis     Clinical Impression:   1. Herpes zoster without complication    2.  Elevated blood pressure reading        Disposition: home    Follow-up Information     Follow up With Details Comments Chauncey Mcelroy Schedule an appointment as soon as possible for a visit in 1 week As needed [place]    [facility] EMERGENCY DEPT  If symptoms worsen return immediately [address]  [phone]           Patient's Medications   Start Taking    ACYCLOVIR (ZOVIRAX) 400 MG TABLET    Take 1 Tab by mouth three (3) times daily for 5 days. HYDROCODONE-ACETAMINOPHEN (NORCO) 5-325 MG PER TABLET    Take 1-2 tablets PO every 4-6 hours as needed for pain control. If over the counter ibuprofen or acetaminophen was suggested, then only take the vicodin for pain not well controlled with the over the counter medication. METHYLPREDNISOLONE (MEDROL, MEDHAT,) 4 MG TABLET    Per dose pack instructions   Continue Taking    AZITHROMYCIN (ZITHROMAX Z-MEDHAT) 250 MG TABLET    Take 1st dose 24 hours after initial dose that was given in the ER. Then take 1 tablet daily until finished. CHLORPHENIRAMINE-HYDROCODONE (TUSSIONEX PENNKINETIC ER) 10-8 MG/5 ML SUSPENSION    Take 5 mL by mouth nightly as needed for Cough. Max Daily Amount: 5 mL. Indications: Cough    HYDROCODONE-ACETAMINOPHEN (NORCO) 5-325 MG PER TABLET    Take 1-2 tablets PO every 4-6 hours as needed for pain control. If over the counter ibuprofen or acetaminophen was suggested, then only take the vicodin for pain not well controlled with the over the counter medication. METHYLPREDNISOLONE (MEDROL, MEDHAT,) 4 MG TABLET    Per dose pack instructions    SERTRALINE (ZOLOFT) 100 MG TABLET    Take  by mouth daily. These Medications have changed    No medications on file   Stop Taking    No medications on file     _______________________________    Attestations:  [address] [place] [address] acting as a scribe for and in the presence of Yesy Burris PA-C      April 23, 2018 at 8:42 PM       Provider Attestation:      I personally performed the services described in the documentation, reviewed the documentation, as recorded by the scribe in my presence, and it accurately and completely records my words and actions.  April 23, 2018 at 8:42 PM - Yesy Burris PA-C _______________________________

## 2018-04-24 NOTE — DISCHARGE INSTRUCTIONS
Elevated Blood Pressure: Care Instructions  Your Care Instructions    Blood pressure is a measure of how hard the blood pushes against the walls of your arteries. It's normal for blood pressure to go up and down throughout the day. But if it stays up over time, you have high blood pressure. Two numbers tell you your blood pressure. The first number is the systolic pressure. It shows how hard the blood pushes when your heart is pumping. The second number is the diastolic pressure. It shows how hard the blood pushes between heartbeats, when your heart is relaxed and filling with blood. An ideal blood pressure in adults is less than 120/80 (say \"120 over 80\"). High blood pressure is 140/90 or higher. You have high blood pressure if your top number is 140 or higher or your bottom number is 90 or higher, or both. The main test for high blood pressure is simple, fast, and painless. To diagnose high blood pressure, your doctor will test your blood pressure at different times. After testing your blood pressure, your doctor may ask you to test it again when you are home. If you are diagnosed with high blood pressure, you can work with your doctor to make a long-term plan to manage it. Follow-up care is a key part of your treatment and safety. Be sure to make and go to all appointments, and call your doctor if you are having problems. It's also a good idea to know your test results and keep a list of the medicines you take. How can you care for yourself at home? · Do not smoke. Smoking increases your risk for heart attack and stroke. If you need help quitting, talk to your doctor about stop-smoking programs and medicines. These can increase your chances of quitting for good. · Stay at a healthy weight. · Try to limit how much sodium you eat to less than 2,300 milligrams (mg) a day. Your doctor may ask you to try to eat less than 1,500 mg a day. · Be physically active.  Get at least 30 minutes of exercise on most days of the week. Walking is a good choice. You also may want to do other activities, such as running, swimming, cycling, or playing tennis or team sports. · Avoid or limit alcohol. Talk to your doctor about whether you can drink any alcohol. · Eat plenty of fruits, vegetables, and low-fat dairy products. Eat less saturated and total fats. · Learn how to check your blood pressure at home. When should you call for help? Call your doctor now or seek immediate medical care if:  ? · Your blood pressure is much higher than normal (such as 180/110 or higher). ? · You think high blood pressure is causing symptoms such as:  ¨ Severe headache. ¨ Blurry vision. ? Watch closely for changes in your health, and be sure to contact your doctor if:  ? · You do not get better as expected. Where can you learn more? Go to http://stevenBirstalexandru.info/. Enter L558 in the search box to learn more about \"Elevated Blood Pressure: Care Instructions. \"  Current as of: September 21, 2016  Content Version: 11.4  © 3233-1729 [employer]. Care instructions adapted under license by [employer] (which disclaims liability or warranty for this information). If you have questions about a medical condition or this instruction, always ask your healthcare professional. [address] any warranty or liability for your use of this information. Shingles: Care Instructions  Your Care Instructions    Shingles (herpes zoster) causes pain and a blistered rash. The rash can appear anywhere on the body but will be on only one side of the body, the left or right. It will be in a band, a strip, or a small area. The pain can be very severe. Shingles can also cause tingling or itching in the area of the rash. The blisters scab over after a few days and heal in 2 to 4 weeks. Medicines can help you feel better and may help prevent more serious problems caused by shingles.   Shingles is caused by the same virus that causes chickenpox. When you have chickenpox, the virus gets into your nerve roots and stays there (becomes dormant) long after you get over the chickenpox. If the virus becomes active again, it can cause shingles. Follow-up care is a key part of your treatment and safety. Be sure to make and go to all appointments, and call your doctor if you are having problems. It's also a good idea to know your test results and keep a list of the medicines you take. How can you care for yourself at home? · Be safe with medicines. Take your medicines exactly as prescribed. Call your doctor if you think you are having a problem with your medicine. Antiviral medicine helps you get better faster. · Try not to scratch or pick at the blisters. They will crust over and fall off on their own if you leave them alone. · Put cool, wet cloths on the area to relieve pain and itching. You can also use calamine lotion. Try not to use so much lotion that it cakes and is hard to get off. · Put cornstarch or baking soda on the sores to help dry them out so they heal faster. · Do not use thick ointment, such as petroleum jelly, on the sores. This will keep them from drying and healing. · To help remove loose crusts, soak them in tap water. This can help decrease oozing, and dry and soothe the skin. · Take an over-the-counter pain medicine, such as acetaminophen (Tylenol), ibuprofen (Advil, Motrin), or naproxen (Aleve). Read and follow all instructions on the label. · Avoid close contact with people until the blisters have healed. It is very important for you to avoid contact with anyone who has never had chickenpox or the chickenpox vaccine. Pregnant women, young babies, and anyone else who has a hard time fighting infection (such as someone with HIV, diabetes, or cancer) is especially at risk. When should you call for help?   Call your doctor now or seek immediate medical care if:  ? · You have a new or higher fever. ? · You have a severe headache and a stiff neck. ? · You lose the ability to think clearly. ? · The rash spreads to your forehead, nose, eyes, or eyelids. ? · You have eye pain, or your vision gets worse. ? · You have new pain in your face, or you cannot move the muscles in your face. ? · Blisters spread to new parts of your body. ? Watch closely for changes in your health, and be sure to contact your doctor if:  ? · The rash has not healed after 2 to 4 weeks. ? · You still have pain after the rash has healed. Where can you learn more? Go to http://steven-alexandru.info/. Kandice Walker in the search box to learn more about \"Shingles: Care Instructions. \"  Current as of: March 3, 2017  Content Version: 11.4  © 7334-3262 [employer]. Care instructions adapted under license by [employer] (which disclaims liability or warranty for this information). If you have questions about a medical condition or this instruction, always ask your healthcare professional. [address] any warranty or liability for your use of this information.

## 2018-05-04 ENCOUNTER — HOSPITAL ENCOUNTER (EMERGENCY)
Age: 48
Discharge: HOME | End: 2018-05-04
Attending: EMERGENCY MEDICINE
Payer: SUBSIDIZED

## 2018-05-04 ENCOUNTER — APPOINTMENT (OUTPATIENT)
Dept: GENERAL RADIOLOGY | Age: 48
End: 2018-05-04
Attending: PHYSICIAN ASSISTANT
Payer: SUBSIDIZED

## 2018-05-04 VITALS
HEART RATE: 72 BPM | SYSTOLIC BLOOD PRESSURE: 150 MMHG | OXYGEN SATURATION: 98 % | BODY MASS INDEX: 34.58 KG/M2 | TEMPERATURE: 97 F | HEIGHT: 62 IN | WEIGHT: 187.9 LBS | RESPIRATION RATE: 16 BRPM | DIASTOLIC BLOOD PRESSURE: 92 MMHG

## 2018-05-04 DIAGNOSIS — R05.9 COUGH: ICD-10-CM

## 2018-05-04 DIAGNOSIS — M54.6 THORACIC BACK PAIN, UNSPECIFIED BACK PAIN LATERALITY, UNSPECIFIED CHRONICITY: Primary | ICD-10-CM

## 2018-05-04 DIAGNOSIS — R10.9 FLANK PAIN: ICD-10-CM

## 2018-05-04 LAB
APPEARANCE UR: CLEAR
BILIRUB UR QL: NEGATIVE
COLOR UR: YELLOW
GLUCOSE UR STRIP.AUTO-MCNC: NEGATIVE MG/DL
HGB UR QL STRIP: NEGATIVE
KETONES UR QL STRIP.AUTO: NEGATIVE MG/DL
LEUKOCYTE ESTERASE UR QL STRIP.AUTO: NEGATIVE
NITRITE UR QL STRIP.AUTO: NEGATIVE
PH UR STRIP: 5 [PH] (ref 5–8)
PROT UR STRIP-MCNC: NEGATIVE MG/DL
SP GR UR REFRACTOMETRY: 1.02 (ref 1–1.03)
UROBILINOGEN UR QL STRIP.AUTO: 0.2 EU/DL (ref 0.2–1)

## 2018-05-04 PROCEDURE — 99283 EMERGENCY DEPT VISIT LOW MDM: CPT

## 2018-05-04 PROCEDURE — 71046 X-RAY EXAM CHEST 2 VIEWS: CPT

## 2018-05-04 PROCEDURE — 74011250637 HC RX REV CODE- 250/637: Performed by: PHYSICIAN ASSISTANT

## 2018-05-04 PROCEDURE — 81003 URINALYSIS AUTO W/O SCOPE: CPT

## 2018-05-04 RX ORDER — CYCLOBENZAPRINE HCL 10 MG
5 TABLET ORAL
Status: COMPLETED | OUTPATIENT
Start: 2018-05-04 | End: 2018-05-04

## 2018-05-04 RX ORDER — NAPROXEN 500 MG/1
500 TABLET ORAL 2 TIMES DAILY WITH MEALS
Qty: 20 TAB | Refills: 0 | Status: SHIPPED | OUTPATIENT
Start: 2018-05-04 | End: 2018-05-14

## 2018-05-04 RX ORDER — CYCLOBENZAPRINE HCL 10 MG
10 TABLET ORAL
Qty: 30 TAB | Refills: 0 | Status: SHIPPED | OUTPATIENT
Start: 2018-05-04 | End: 2018-05-18 | Stop reason: ALTCHOICE

## 2018-05-04 RX ORDER — BENZONATATE 100 MG/1
100 CAPSULE ORAL
Qty: 30 CAP | Refills: 0 | Status: SHIPPED | OUTPATIENT
Start: 2018-05-04 | End: 2018-05-11

## 2018-05-04 RX ORDER — ONDANSETRON 4 MG/1
4 TABLET, FILM COATED ORAL
Qty: 20 TAB | Refills: 0 | Status: SHIPPED | OUTPATIENT
Start: 2018-05-04 | End: 2018-05-18 | Stop reason: ALTCHOICE

## 2018-05-04 RX ORDER — HYDROCODONE BITARTRATE AND ACETAMINOPHEN 5; 325 MG/1; MG/1
1 TABLET ORAL
Status: COMPLETED | OUTPATIENT
Start: 2018-05-04 | End: 2018-05-04

## 2018-05-04 RX ORDER — HYDROCODONE BITARTRATE AND ACETAMINOPHEN 5; 325 MG/1; MG/1
1 TABLET ORAL
Qty: 10 TAB | Refills: 0 | Status: SHIPPED | OUTPATIENT
Start: 2018-05-04 | End: 2018-05-18 | Stop reason: ALTCHOICE

## 2018-05-04 RX ADMIN — CYCLOBENZAPRINE HYDROCHLORIDE 10 MG: 10 TABLET, FILM COATED ORAL at 10:55

## 2018-05-04 RX ADMIN — HYDROCODONE BITARTRATE AND ACETAMINOPHEN 1 TABLET: 5; 325 TABLET ORAL at 10:55

## 2018-05-04 NOTE — DISCHARGE INSTRUCTIONS
Learning About How to Have a Healthy Back  What causes back pain? Back pain is often caused by overuse, strain, or injury. For example, people often hurt their backs playing sports or working in the yard, being jolted in a car accident, or lifting something too heavy. Aging plays a part too. Your bones and muscles tend to lose strength as you age, which makes injury more likely. The spongy discs between the bones of the spine (vertebrae) may suffer from wear and tear and no longer provide enough cushion between the bones. A disc that bulges or breaks open (herniated disc) can press on nerves, causing back pain. In some people, back pain is the result of arthritis, broken vertebrae caused by bone loss (osteoporosis), illness, or a spine problem. Although most people have back pain at one time or another, there are steps you can take to make it less likely. How can you have a healthy back? Reduce stress on your back through good posture  Slumping or slouching alone may not cause low back pain. But after the back has been strained or injured, bad posture can make pain worse. · Sleep in a position that maintains your back's normal curves and on a mattress that feels comfortable. Sleep on your side with a pillow between your knees, or sleep on your back with a pillow under your knees. These positions can reduce strain on your back. · Stand and sit up straight. \"Good posture\" generally means your ears, shoulders, and hips are in a straight line. · If you must stand for a long time, put one foot on a stool, ledge, or box. Switch feet every now and then. · Sit in a chair that is low enough to let you place both feet flat on the floor with both knees nearly level with your hips. If your chair or desk is too high, use a footrest to raise your knees. Place a small pillow, a rolled-up towel, or a lumbar roll in the curve of your back if you need extra support.   · Try a kneeling chair, which helps tilt your hips forward. This takes pressure off your lower back. · Try sitting on an exercise ball. It can rock from side to side, which helps keep your back loose. · When driving, keep your knees nearly level with your hips. Sit straight, and drive with both hands on the steering wheel. Your arms should be in a slightly bent position. Reduce stress on your back through careful lifting  · Squat down, bending at the hips and knees only. If you need to, put one knee to the floor and extend your other knee in front of you, bent at a right angle (half kneeling). · Press your chest straight forward. This helps keep your upper back straight while keeping a slight arch in your low back. · Hold the load as close to your body as possible, at the level of your belly button (navel). · Use your feet to change direction, taking small steps. · Lead with your hips as you change direction. Keep your shoulders in line with your hips as you move. · Set down your load carefully, squatting with your knees and hips only. Exercise and stretch your back  · Do some exercise on most days of the week, if your doctor says it is okay. You can walk, run, swim, or cycle. · Stretch your back muscles. Here are a few exercises to try:  Brian Ridges on your back, and gently pull one bent knee to your chest. Put that foot back on the floor, and then pull the other knee to your chest.  ¨ Do pelvic tilts. Lie on your back with your knees bent. Tighten your stomach muscles. Pull your belly button (navel) in and up toward your ribs. You should feel like your back is pressing to the floor and your hips and pelvis are slightly lifting off the floor. Hold for 6 seconds while breathing smoothly. ¨ Sit with your back flat against a wall. · Keep your core muscles strong. The muscles of your back, belly (abdomen), and buttocks support your spine. ¨ Pull in your belly and imagine pulling your navel toward your spine. Hold this for 6 seconds, then relax.  Remember to keep breathing normally as you tense your muscles. ¨ Do curl-ups. Always do them with your knees bent. Keep your low back on the floor, and curl your shoulders toward your knees using a smooth, slow motion. Keep your arms folded across your chest. If this bothers your neck, try putting your hands behind your neck (not your head), with your elbows spread apart. ¨ Lie on your back with your knees bent and your feet flat on the floor. Tighten your belly muscles, and then push with your feet and raise your buttocks up a few inches. Hold this position 6 seconds as you continue to breathe normally, then lower yourself slowly to the floor. Repeat 8 to 12 times. ¨ If you like group exercise, try Pilates or yoga. These classes have poses that strengthen the core muscles. Lead a healthy lifestyle  · Stay at a healthy weight to avoid strain on your back. · Do not smoke. Smoking increases the risk of osteoporosis, which weakens the spine. If you need help quitting, talk to your doctor about stop-smoking programs and medicines. These can increase your chances of quitting for good. Where can you learn more? Go to http://stevenMaptiaalexandru.info/. Enter L315 in the search box to learn more about \"Learning About How to Have a Healthy Back. \"  Current as of: March 21, 2017  Content Version: 11.4  © 3790-5755 [employer]. Care instructions adapted under license by [employer] (which disclaims liability or warranty for this information). If you have questions about a medical condition or this instruction, always ask your healthcare professional. [place] [address] any warranty or liability for your use of this information. Learning About Relief for Back Pain  What is back tension and strain? Back strain happens when you overstretch, or pull, a muscle in your back. You may hurt your back in an accident or when you exercise or lift something.   Most back pain will get better with rest and time. You can take care of yourself at home to help your back heal.  What can you do first to relieve back pain? When you first feel back pain, try these steps:  · Walk. Take a short walk (10 to 20 minutes) on a level surface (no slopes, hills, or stairs) every 2 to 3 hours. Walk only distances you can manage without pain, especially leg pain. · Relax. Find a comfortable position for rest. Some people are comfortable on the floor or a medium-firm bed with a small pillow under their head and another under their knees. Some people prefer to lie on their side with a pillow between their knees. Don't stay in one position for too long. · Try heat or ice. Try using a heating pad on a low or medium setting, or take a warm shower, for 15 to 20 minutes every 2 to 3 hours. Or you can buy single-use heat wraps that last up to 8 hours. You can also try an ice pack for 10 to 15 minutes every 2 to 3 hours. You can use an ice pack or a bag of frozen vegetables wrapped in a thin towel. There is not strong evidence that either heat or ice will help, but you can try them to see if they help. You may also want to try switching between heat and cold. · Take pain medicine exactly as directed. ¨ If the doctor gave you a prescription medicine for pain, take it as prescribed. ¨ If you are not taking a prescription pain medicine, ask your doctor if you can take an over-the-counter medicine. What else can you do? · Stretch and exercise. Exercises that increase flexibility may relieve your pain and make it easier for your muscles to keep your spine in a good, neutral position. And don't forget to keep walking. · Do self-massage. You can use self-massage to unwind after work or school or to energize yourself in the morning. You can easily massage your feet, hands, or neck. Self-massage works best if you are in comfortable clothes and are sitting or lying in a comfortable position.  Use oil or lotion to massage bare skin.  · Reduce stress. Back pain can lead to a vicious [place]: Distress about the pain tenses the muscles in your back, which in turn causes more pain. Learn how to relax your mind and your muscles to lower your stress. Where can you learn more? Go to http://steven-alexandru.info/. Enter L922 in the search box to learn more about \"Learning About Relief for Back Pain. \"  Current as of: March 21, 2017  Content Version: 11.4  © 3382-2810 [employer]. Care instructions adapted under license by [employer] (which disclaims liability or warranty for this information). If you have questions about a medical condition or this instruction, always ask your healthcare professional. [place] [address] any warranty or liability for your use of this information. Back Pain, Emergency or Urgent Symptoms: Care Instructions  Your Care Instructions    Many people have back pain at one time or another. In most cases, pain gets better with self-care that includes over-the-counter pain medicine, ice, heat, and exercises. Unless you have symptoms of a severe injury or heart attack, you may be able to give yourself a few days before you call a doctor. But some back problems are very serious. Do not ignore symptoms that need to be checked right away. Follow-up care is a key part of your treatment and safety. Be sure to make and go to all appointments, and call your doctor if you are having problems. It's also a good idea to know your test results and keep a list of the medicines you take. How can you care for yourself at home? · Sit or lie in positions that are most comfortable and that reduce your pain. Try one of these positions when you lie down:  ¨ Lie on your back with your knees bent and supported by large pillows. ¨ Lie on the floor with your legs on the seat of a sofa or chair.   Arn Closs on your side with your knees and hips bent and a pillow between your legs.  Michelle Turner on your stomach if it does not make pain worse. · Do not sit up in bed, and avoid soft couches and twisted positions. Bed rest can help relieve pain at first, but it delays healing. Avoid bed rest after the first day. · Change positions every 30 minutes. If you must sit for long periods of time, take breaks from sitting. Get up and walk around, or lie flat. · Try using a heating pad on a low or medium setting, for 15 to 20 minutes every 2 or 3 hours. Try a warm shower in place of one session with the heating pad. You can also buy single-use heat wraps that last up to 8 hours. You can also try ice or cold packs on your back for 10 to 20 minutes at a time, several times a day. (Put a thin cloth between the ice pack and your skin.) This reduces pain and makes it easier to be active and exercise. · Take pain medicines exactly as directed. ¨ If the doctor gave you a prescription medicine for pain, take it as prescribed. ¨ If you are not taking a prescription pain medicine, ask your doctor if you can take an over-the-counter medicine. When should you call for help? Call 911 anytime you think you may need emergency care. For example, call if:  ? · You are unable to move a leg at all. ? · You have back pain with severe belly pain. ? · You have symptoms of a heart attack. These may include:  ¨ Chest pain or pressure, or a strange feeling in the chest.  ¨ Sweating. ¨ Shortness of breath. ¨ Nausea or vomiting. ¨ Pain, pressure, or a strange feeling in the back, neck, jaw, or upper belly or in one or both shoulders or arms. ¨ Lightheadedness or sudden weakness. ¨ A fast or irregular heartbeat. After you call 911, the  may tell you to chew 1 adult-strength or 2 to 4 low-dose aspirin. Wait for an ambulance. Do not try to drive yourself. ?Call your doctor now or seek immediate medical care if:  ? · You have new or worse symptoms in your arms, legs, chest, belly, or buttocks.  Symptoms may include:  ¨ Numbness or tingling. ¨ Weakness. ¨ Pain. ? · You lose bladder or bowel control. ? · You have back pain and:  ¨ You have injured your back while lifting or doing some other activity. Call if the pain is severe, has not gone away after 1 or 2 days, and you cannot do your normal daily activities. ¨ You have had a back injury before that needed treatment. ¨ Your pain has lasted longer than 4 weeks. ¨ You have had weight loss you cannot explain. ¨ You are age 48 or older. ¨ You have cancer now or have had it before. ? Watch closely for changes in your health, and be sure to contact your doctor if you are not getting better as expected. Where can you learn more? Go to http://steven-alexandru.info/. Enter D834 in the search box to learn more about \"Back Pain, Emergency or Urgent Symptoms: Care Instructions. \"  Current as of: March 20, 2017  Content Version: 11.4  © 3103-7484 [employer]. Care instructions adapted under license by [employer] (which disclaims liability or warranty for this information). If you have questions about a medical condition or this instruction, always ask your healthcare professional. [place] [address] any warranty or liability for your use of this information. Back Stretches: Exercises  Your Care Instructions  Here are some examples of exercises for stretching your back. Start each exercise slowly. Ease off the exercise if you start to have pain. Your doctor or physical therapist will tell you when you can start these exercises and which ones will work best for you. How to do the exercises  Overhead stretch    1. Stand comfortably with your feet shoulder-width apart. 2. Looking straight ahead, raise both arms over your head and reach toward the ceiling. Do not allow your head to tilt back. 3. Hold for 15 to 30 seconds, then lower your arms to your sides. 4. Repeat 2 to 4 times.   Side stretch    1. Stand comfortably with your feet shoulder-width apart. 2. Raise one arm over your head, and then lean to the other side. 3. Slide your hand down your leg as you let the weight of your arm gently stretch your side muscles. Hold for 15 to 30 seconds. 4. Repeat 2 to 4 times on each side. Press-up    1. Lie on your stomach, supporting your body with your forearms. 2. Press your elbows down into the floor to raise your upper back. As you do this, relax your stomach muscles and allow your back to arch without using your back muscles. As your press up, do not let your hips or pelvis come off the floor. 3. Hold for 15 to 30 seconds, then relax. 4. Repeat 2 to 4 times. Relax and rest    1. Lie on your back with a rolled towel under your neck and a pillow under your knees. Extend your arms comfortably to your sides. 2. Relax and breathe normally. 3. Remain in this position for about 10 minutes. 4. If you can, do this 2 or 3 times each day. Follow-up care is a key part of your treatment and safety. Be sure to make and go to all appointments, and call your doctor if you are having problems. It's also a good idea to know your test results and keep a list of the medicines you take. Where can you learn more? Go to http://steven-alexandru.info/. Enter U930 in the search box to learn more about \"Back Stretches: Exercises. \"  Current as of: March 21, 2017  Content Version: 11.4  © 6312-5767 [employer]. Care instructions adapted under license by [employer] (which disclaims liability or warranty for this information). If you have questions about a medical condition or this instruction, always ask your healthcare professional. [address] any warranty or liability for your use of this information.

## 2018-05-04 NOTE — ED PROVIDER NOTES
EMERGENCY DEPARTMENT HISTORY AND PHYSICAL EXAM    Date: 5/4/2018  Patient Name: Grover Guzman    History of Presenting Illness     Chief Complaint   Patient presents with    Shoulder Pain    Nausea    Cough         History Provided By: Patient    Chief Complaint: Back pain, nausea, cough and flank pain   Duration: 2 months   Timing:  Constant  Location: Thoracic back pain, mid flank pain, chest   Quality: Aching  Severity: Severe  Modifying Factors: None   Associated Symptoms: none       Additional History (Context): Grover Guzman is a 56 y.o. female with a history of upper back pain, C Diff, MRSA, anxiety, HSV-2, depression, fibromyalgia who presents with multiple complaints. Reports she has been without insurance for the past two years and feels \"I have so much going on, I am afraid its all creeping up on me\". Reports cough for the past week with white sputum. Reports bilateral flank pain without urinary symptoms. Reports upper back pain. Denies trauma or injury. Has tried nothing for this at home. Reports intermittent nausea for the past two months. Pt denies any fevers or chills, headache, dizziness or light headedness, ENT issues, CP or discomfort, SOB, diaphoresis, melena/hematochezia, dysuria, hematuria, frequency, focal weakness/numbness/tingling, or rash. Patient has no other complaints at this time. PCP: None    Current Outpatient Prescriptions   Medication Sig Dispense Refill    HYDROcodone-acetaminophen (NORCO) 5-325 mg per tablet Take 1-2 tablets PO every 4-6 hours as needed for pain control. If over the counter ibuprofen or acetaminophen was suggested, then only take the vicodin for pain not well controlled with the over the counter medication. 6 Tab 0    methylPREDNISolone (MEDROL, MEDHAT,) 4 mg tablet Per dose pack instructions 1 Dose Pack 0    HYDROcodone-acetaminophen (NORCO) 5-325 mg per tablet Take 1-2 tablets PO every 4-6 hours as needed for pain control.   If over the counter ibuprofen or acetaminophen was suggested, then only take the vicodin for pain not well controlled with the over the counter medication. 12 Tab 0    methylPREDNISolone (MEDROL, MEDHAT,) 4 mg tablet Per dose pack instructions 1 Dose Pack 0    azithromycin (ZITHROMAX Z-MEDHAT) 250 mg tablet Take 1st dose 24 hours after initial dose that was given in the ER. Then take 1 tablet daily until finished. 4 Tab 0    chlorpheniramine-HYDROcodone (TUSSIONEX PENNKINETIC ER) 10-8 mg/5 mL suspension Take 5 mL by mouth nightly as needed for Cough. Max Daily Amount: 5 mL. Indications: Cough 115 mL 0    sertraline (ZOLOFT) 100 mg tablet Take  by mouth daily. Past History     Past Medical History:  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    Pain, upper back     Psychiatric disorder     depression     Tattoo     Unspecified deficiency anemia 1999       Past Surgical History:  Past Surgical History:   Procedure Laterality Date    HX APPENDECTOMY  1995    HX CHOLECYSTECTOMY  2002       Family History:  No family history on file. Social History:  Social History   Substance Use Topics    Smoking status: Passive Smoke Exposure - Never Smoker     Years: 6.00    Smokeless tobacco: Never Used      Comment: last attempt to quit 1/1/2003    Alcohol use 0.5 oz/week     1 Cans of beer per week       Allergies: Allergies   Allergen Reactions    Apple Swelling    Cephalexin Hives         Review of Systems   Review of Systems   Constitutional: Negative for chills and fever. HENT: Negative for congestion, rhinorrhea and sore throat. Respiratory: Positive for cough. Negative for shortness of breath. Cardiovascular: Negative for chest pain. Gastrointestinal: Positive for nausea. Negative for abdominal pain, blood in stool, constipation, diarrhea and vomiting. Genitourinary: Positive for flank pain. Negative for dysuria, frequency, hematuria and urgency. Musculoskeletal: Positive for back pain. Negative for myalgias. Skin: Negative for rash and wound. Neurological: Negative for dizziness and headaches. All other systems reviewed and are negative. All Other Systems Negative  Physical Exam     Vitals:    05/04/18 1015   BP: (!) 150/92   Pulse: 72   Resp: 16   Temp: 97 °F (36.1 °C)   SpO2: 98%   Weight: 85.2 kg (187 lb 14.4 oz)   Height: 5' 2\" (1.575 m)     Physical Exam   Constitutional: She is oriented to person, place, and time. She appears well-developed and well-nourished. No distress. HENT:   Head: Normocephalic and atraumatic. Eyes: Conjunctivae are normal.   Neck: Normal range of motion. Neck supple. Cardiovascular: Normal rate, regular rhythm and normal heart sounds. Pulmonary/Chest: Effort normal and breath sounds normal. No accessory muscle usage. No respiratory distress. She has no decreased breath sounds. She has no wheezes. She has no rhonchi. She has no rales. She exhibits no tenderness. Abdominal: Soft. Bowel sounds are normal. She exhibits no distension. There is no tenderness. There is no rigidity, no rebound, no guarding, no CVA tenderness, no tenderness at McBurney's point and negative Loyd's sign. Musculoskeletal: She exhibits no edema or deformity. Thoracic back: She exhibits tenderness. She exhibits normal range of motion, no bony tenderness, no swelling, no edema, no deformity, no laceration and no pain. Thoracic paraspinal tenderness on the left side. No bony tenderness    Neurological: She is alert and oriented to person, place, and time. She has normal reflexes. Skin: Skin is warm and dry. She is not diaphoretic. Psychiatric: She has a normal mood and affect. Her behavior is normal.   Nursing note and vitals reviewed.         Diagnostic Study Results     Labs -     Recent Results (from the past 12 hour(s)) URINALYSIS W/ RFLX MICROSCOPIC    Collection Time: 05/04/18 10:59 AM   Result Value Ref Range    Color YELLOW      Appearance CLEAR      Specific gravity 1.020 1.005 - 1.030      pH (UA) 5.0 5.0 - 8.0      Protein NEGATIVE  NEG mg/dL    Glucose NEGATIVE  NEG mg/dL    Ketone NEGATIVE  NEG mg/dL    Bilirubin NEGATIVE  NEG      Blood NEGATIVE  NEG      Urobilinogen 0.2 0.2 - 1.0 EU/dL    Nitrites NEGATIVE  NEG      Leukocyte Esterase NEGATIVE  NEG         Radiologic Studies -   XR CHEST PA LAT   Final Result        CT Results  (Last 48 hours)    None        CXR Results  (Last 48 hours)    None            Medical Decision Making   I am the first provider for this patient. I reviewed the vital signs, available nursing notes, past medical history, past surgical history, family history and social history. Vital Signs-Reviewed the patient's vital signs. Pulse Oximetry Analysis -  100 % RA    Records Reviewed: Nursing Notes and Old Medical Records    Procedures: none   Procedures    Provider Notes (Medical Decision Making):     Differential: uti, pyelonephritis, anxiety, pneumonia, URI, bronchitis    Plan: Will consult , order chest xray, and ua. 12:10 PM  Patient feeling much better, states she is ready to go home. Have shared reassuring results with patient she is relieved. Strongly advised patient to follow up with  for PCP assistance. Patient agrees with the plan and management and states all questions have been thoroughly answered and there are no more remaining questions. MED RECONCILIATION:  No current facility-administered medications for this encounter. Current Outpatient Prescriptions   Medication Sig    HYDROcodone-acetaminophen (NORCO) 5-325 mg per tablet Take 1-2 tablets PO every 4-6 hours as needed for pain control.   If over the counter ibuprofen or acetaminophen was suggested, then only take the vicodin for pain not well controlled with the over the counter medication.  methylPREDNISolone (MEDROL, MEDHAT,) 4 mg tablet Per dose pack instructions    HYDROcodone-acetaminophen (NORCO) 5-325 mg per tablet Take 1-2 tablets PO every 4-6 hours as needed for pain control. If over the counter ibuprofen or acetaminophen was suggested, then only take the vicodin for pain not well controlled with the over the counter medication.  methylPREDNISolone (MEDROL, MEDHAT,) 4 mg tablet Per dose pack instructions    azithromycin (ZITHROMAX Z-MEDHAT) 250 mg tablet Take 1st dose 24 hours after initial dose that was given in the ER. Then take 1 tablet daily until finished.  chlorpheniramine-HYDROcodone (TUSSIONEX PENNKINETIC ER) 10-8 mg/5 mL suspension Take 5 mL by mouth nightly as needed for Cough. Max Daily Amount: 5 mL. Indications: Cough    sertraline (ZOLOFT) 100 mg tablet Take  by mouth daily. Disposition:  Home     DISCHARGE NOTE:   Pt has been reexamined. Patient has no new complaints, changes, or physical findings. Care plan outlined and precautions discussed. Results of work up were reviewed with the patient. All medications were reviewed with the patient; will d/c home with pain meds, muscle relaxants, and antitussives. All of pt's questions and concerns were addressed. Patient was instructed and agrees to follow up with  , as well as to return to the ED upon further deterioration. Patient is ready to go home. Follow-up Information     Follow up With Details Comments Contact Info    [facility] EMERGENCY DEPT  As needed [address] [facility] [address] Call  [facility] [address] [phone]          Current Discharge Medication List      START taking these medications    Details   naproxen (NAPROSYN) 500 mg tablet Take 1 Tab by mouth two (2) times daily (with meals) for 10 days.   Qty: 20 Tab, Refills: 0    Associated Diagnoses: Thoracic back pain, unspecified back pain laterality, unspecified chronicity      !! HYDROcodone-acetaminophen (NORCO) 5-325 mg per tablet Take 1 Tab by mouth every four (4) hours as needed for Pain. Max Daily Amount: 6 Tabs. Qty: 10 Tab, Refills: 0    Associated Diagnoses: Thoracic back pain, unspecified back pain laterality, unspecified chronicity      benzonatate (TESSALON PERLES) 100 mg capsule Take 1 Cap by mouth three (3) times daily as needed for Cough for up to 7 days. Qty: 30 Cap, Refills: 0    Associated Diagnoses: Cough      cyclobenzaprine (FLEXERIL) 10 mg tablet Take 1 Tab by mouth three (3) times daily as needed for Muscle Spasm(s). Qty: 30 Tab, Refills: 0    Associated Diagnoses: Thoracic back pain, unspecified back pain laterality, unspecified chronicity       ! ! - Potential duplicate medications found. Please discuss with provider. CONTINUE these medications which have NOT CHANGED    Details   !! HYDROcodone-acetaminophen (NORCO) 5-325 mg per tablet Take 1-2 tablets PO every 4-6 hours as needed for pain control. If over the counter ibuprofen or acetaminophen was suggested, then only take the vicodin for pain not well controlled with the over the counter medication. Qty: 6 Tab, Refills: 0    Associated Diagnoses: Herpes zoster without complication      !! HYDROcodone-acetaminophen (NORCO) 5-325 mg per tablet Take 1-2 tablets PO every 4-6 hours as needed for pain control. If over the counter ibuprofen or acetaminophen was suggested, then only take the vicodin for pain not well controlled with the over the counter medication. Qty: 12 Tab, Refills: 0    Associated Diagnoses: Herpes zoster without complication       !! - Potential duplicate medications found. Please discuss with provider. Diagnosis     Clinical Impression:   1. Thoracic back pain, unspecified back pain laterality, unspecified chronicity    2. Flank pain    3.  Cough

## 2018-05-04 NOTE — ED TRIAGE NOTES
Patient complains of pain between the her shoulder blades . Patient also complains of nausea and a productive cough and white mucus. Patient also complains of flank pain.

## 2018-05-09 ENCOUNTER — HOSPITAL ENCOUNTER (EMERGENCY)
Age: 48
Discharge: HOME | End: 2018-05-09
Attending: EMERGENCY MEDICINE
Payer: SUBSIDIZED

## 2018-05-09 VITALS
TEMPERATURE: 98.8 F | OXYGEN SATURATION: 98 % | RESPIRATION RATE: 14 BRPM | HEART RATE: 88 BPM | WEIGHT: 187 LBS | BODY MASS INDEX: 34.41 KG/M2 | HEIGHT: 62 IN | DIASTOLIC BLOOD PRESSURE: 90 MMHG | SYSTOLIC BLOOD PRESSURE: 132 MMHG

## 2018-05-09 DIAGNOSIS — B02.9 HERPES ZOSTER WITHOUT COMPLICATION: Primary | ICD-10-CM

## 2018-05-09 DIAGNOSIS — M54.31 RIGHT SIDED SCIATICA: ICD-10-CM

## 2018-05-09 DIAGNOSIS — M54.6 THORACIC BACK PAIN, UNSPECIFIED BACK PAIN LATERALITY, UNSPECIFIED CHRONICITY: ICD-10-CM

## 2018-05-09 DIAGNOSIS — H10.89 OTHER CONJUNCTIVITIS OF RIGHT EYE: ICD-10-CM

## 2018-05-09 PROCEDURE — 99282 EMERGENCY DEPT VISIT SF MDM: CPT

## 2018-05-09 RX ORDER — POLYMYXIN B SULFATE AND TRIMETHOPRIM 1; 10000 MG/ML; [USP'U]/ML
1 SOLUTION OPHTHALMIC EVERY 4 HOURS
Qty: 5 ML | Refills: 0 | Status: SHIPPED | OUTPATIENT
Start: 2018-05-09 | End: 2018-05-16

## 2018-05-09 RX ORDER — ACYCLOVIR 800 MG/1
800 TABLET ORAL
Qty: 25 TAB | Refills: 0 | Status: SHIPPED | OUTPATIENT
Start: 2018-05-09 | End: 2018-05-14

## 2018-05-09 RX ORDER — PROPARACAINE HYDROCHLORIDE 5 MG/ML
2 SOLUTION/ DROPS OPHTHALMIC
Status: DISCONTINUED | OUTPATIENT
Start: 2018-05-09 | End: 2018-05-09 | Stop reason: HOSPADM

## 2018-05-09 NOTE — ED NOTES
The L.C. met with the client in the ER Department RW 4 to discuss their follow up options. The client received the [facility] application and the [address] [place] [address] Application for them and their spouse. The client stated they will make an attempt to return the applications by Monday 14, 2018 by 2 p.m.

## 2018-05-09 NOTE — ED TRIAGE NOTES
\"It feels like something is in my right eye. It's been tearing up since last night. It's very sensitive to light. \"

## 2018-05-09 NOTE — DISCHARGE INSTRUCTIONS
Pinkeye: Care Instructions  Your Care Instructions    Pinkeye is redness and swelling of the eye surface and the conjunctiva (the lining of the eyelid and the covering of the white part of the eye). Pinkeye is also called conjunctivitis. Pinkeye is often caused by infection with bacteria or a virus. Dry air, allergies, smoke, and chemicals are other common causes. Pinkeye often clears on its own in 7 to 10 days. Antibiotics only help if the pinkeye is caused by bacteria. Pinkeye caused by infection spreads easily. If an allergy or chemical is causing pinkeye, it will not go away unless you can avoid whatever is causing it. Follow-up care is a key part of your treatment and safety. Be sure to make and go to all appointments, and call your doctor if you are having problems. It's also a good idea to know your test results and keep a list of the medicines you take. How can you care for yourself at home? · Wash your hands often. Always wash them before and after you treat pinkeye or touch your eyes or face. · Use moist cotton or a clean, wet cloth to remove crust. Wipe from the inside corner of the eye to the outside. Use a clean part of the cloth for each wipe. · Put cold or warm wet cloths on your eye a few times a day if the eye hurts. · Do not wear contact lenses or eye makeup until the pinkeye is gone. Throw away any eye makeup you were using when you got pinkeye. Clean your contacts and storage case. If you wear disposable contacts, use a new pair when your eye has cleared and it is safe to wear contacts again. · If the doctor gave you antibiotic ointment or eyedrops, use them as directed. Use the medicine for as long as instructed, even if your eye starts looking better soon. Keep the bottle tip clean, and do not let it touch the eye area. · To put in eyedrops or ointment:  ¨ Tilt your head back, and pull your lower eyelid down with one finger.   ¨ Drop or squirt the medicine inside the lower lid.  ¨ Close your eye for 30 to 60 seconds to let the drops or ointment move around. ¨ Do not touch the ointment or dropper tip to your eyelashes or any other surface. · Do not share towels, pillows, or washcloths while you have pinkeye. When should you call for help? Call your doctor now or seek immediate medical care if:  ? · You have pain in your eye, not just irritation on the surface. ? · You have a change in vision or loss of vision. ? · You have an increase in discharge from the eye.   ? · Your eye has not started to improve or begins to get worse within 48 hours after you start using antibiotics. ? · Pinkeye lasts longer than 7 days. ? Watch closely for changes in your health, and be sure to contact your doctor if you have any problems. Where can you learn more? Go to http://steven-alexandru.info/. Enter Y392 in the search box to learn more about \"Pinkeye: Care Instructions. \"  Current as of: March 20, 2017  Content Version: 11.4  © 1838-2465 [employer]. Care instructions adapted under license by [employer] (which disclaims liability or warranty for this information). If you have questions about a medical condition or this instruction, always ask your healthcare professional. [address] any warranty or liability for your use of this information. Sciatica: Exercises  Your Care Instructions  Here are some examples of typical rehabilitation exercises for your condition. Start each exercise slowly. Ease off the exercise if you start to have pain. Your doctor or physical therapist will tell you when you can start these exercises and which ones will work best for you. When you are not being active, find a comfortable position for rest. Some people are comfortable on the floor or a medium-firm bed with a small pillow under their head and another under their knees.  Some people prefer to lie on their side with a pillow between their knees. Don't stay in one position for too long. Take short walks (10 to 20 minutes) every 2 to 3 hours. Avoid slopes, hills, and stairs until you feel better. Walk only distances you can manage without pain, especially leg pain. How to do the exercises  Back stretches    1. Get down on your hands and knees on the floor. 2. Relax your head and allow it to droop. Round your back up toward the ceiling until you feel a nice stretch in your upper, middle, and lower back. Hold this stretch for as long as it feels comfortable, or about 15 to 30 seconds. 3. Return to the starting position with a flat back while you are on your hands and knees. 4. Let your back sway by pressing your stomach toward the floor. Lift your buttocks toward the ceiling. 5. Hold this position for 15 to 30 seconds. 6. Repeat 2 to 4 times. Follow-up care is a key part of your treatment and safety. Be sure to make and go to all appointments, and call your doctor if you are having problems. It's also a good idea to know your test results and keep a list of the medicines you take. Where can you learn more? Go to http://steven-alexandru.info/. Enter S276 in the search box to learn more about \"Sciatica: Exercises. \"  Current as of: March 21, 2017  Content Version: 11.4  © 2629-1554 [employer]. Care instructions adapted under license by [employer] (which disclaims liability or warranty for this information). If you have questions about a medical condition or this instruction, always ask your healthcare professional. [place] [address] any warranty or liability for your use of this information. Sciatica: Care Instructions  Your Care Instructions    Sciatica (say \"dld-UH-sd-kuh\") is an irritation of one of the sciatic nerves, which come from the spinal cord in the lower back. The sciatic nerves and their branches extend down through the buttock to the foot.  Sciatica can develop when an injured disc in the back presses against a spinal nerve root. Its main symptom is pain, numbness, or weakness that is often worse in the leg or foot than in the back. Sciatica often will improve and go away with time. Early treatment usually includes medicines and exercises to relieve pain. Follow-up care is a key part of your treatment and safety. Be sure to make and go to all appointments, and call your doctor if you are having problems. It's also a good idea to know your test results and keep a list of the medicines you take. How can you care for yourself at home? · Take pain medicines exactly as directed. ¨ If the doctor gave you a prescription medicine for pain, take it as prescribed. ¨ If you are not taking a prescription pain medicine, ask your doctor if you can take an over-the-counter medicine. · Use heat or ice to relieve pain. ¨ To apply heat, put a warm water bottle, heating pad set on low, or warm cloth on your back. Do not go to sleep with a heating pad on your skin. ¨ To use ice, put ice or a cold pack on the area for 10 to 20 minutes at a time. Put a thin cloth between the ice and your skin. · Avoid sitting if possible, unless it feels better than standing. · Alternate lying down with short walks. Increase your walking distance as you are able to without making your symptoms worse. · Do not do anything that makes your symptoms worse. When should you call for help? Call 911 anytime you think you may need emergency care. For example, call if:  · You are unable to move a leg at all. Call your doctor now or seek immediate medical care if:  · You have new or worse symptoms in your legs or buttocks. Symptoms may include:  ¨ Numbness or tingling. ¨ Weakness. ¨ Pain. · You lose bladder or bowel control. Watch closely for changes in your health, and be sure to contact your doctor if:  · You are not getting better as expected. Where can you learn more?   Go to http://steven-alexandru.info/. Enter [phone] in the search box to learn more about \"Sciatica: Care Instructions. \"  Current as of: March 21, 2017  Content Version: 11.4  © 8483-7371 [employer]. Care instructions adapted under license by [employer] (which disclaims liability or warranty for this information). If you have questions about a medical condition or this instruction, always ask your healthcare professional. [address] any warranty or liability for your use of this information. Shingles: Care Instructions  Your Care Instructions    Shingles (herpes zoster) causes pain and a blistered rash. The rash can appear anywhere on the body but will be on only one side of the body, the left or right. It will be in a band, a strip, or a small area. The pain can be very severe. Shingles can also cause tingling or itching in the area of the rash. The blisters scab over after a few days and heal in 2 to 4 weeks. Medicines can help you feel better and may help prevent more serious problems caused by shingles. Shingles is caused by the same virus that causes chickenpox. When you have chickenpox, the virus gets into your nerve roots and stays there (becomes dormant) long after you get over the chickenpox. If the virus becomes active again, it can cause shingles. Follow-up care is a key part of your treatment and safety. Be sure to make and go to all appointments, and call your doctor if you are having problems. It's also a good idea to know your test results and keep a list of the medicines you take. How can you care for yourself at home? · Be safe with medicines. Take your medicines exactly as prescribed. Call your doctor if you think you are having a problem with your medicine. Antiviral medicine helps you get better faster. · Try not to scratch or pick at the blisters. They will crust over and fall off on their own if you leave them alone.   · Put cool, wet cloths on the area to relieve pain and itching. You can also use calamine lotion. Try not to use so much lotion that it cakes and is hard to get off. · Put cornstarch or baking soda on the sores to help dry them out so they heal faster. · Do not use thick ointment, such as petroleum jelly, on the sores. This will keep them from drying and healing. · To help remove loose crusts, soak them in tap water. This can help decrease oozing, and dry and soothe the skin. · Take an over-the-counter pain medicine, such as acetaminophen (Tylenol), ibuprofen (Advil, Motrin), or naproxen (Aleve). Read and follow all instructions on the label. · Avoid close contact with people until the blisters have healed. It is very important for you to avoid contact with anyone who has never had chickenpox or the chickenpox vaccine. Pregnant women, young babies, and anyone else who has a hard time fighting infection (such as someone with HIV, diabetes, or cancer) is especially at risk. When should you call for help? Call your doctor now or seek immediate medical care if:  ? · You have a new or higher fever. ? · You have a severe headache and a stiff neck. ? · You lose the ability to think clearly. ? · The rash spreads to your forehead, nose, eyes, or eyelids. ? · You have eye pain, or your vision gets worse. ? · You have new pain in your face, or you cannot move the muscles in your face. ? · Blisters spread to new parts of your body. ? Watch closely for changes in your health, and be sure to contact your doctor if:  ? · The rash has not healed after 2 to 4 weeks. ? · You still have pain after the rash has healed. Where can you learn more? Go to http://steven-alexandru.info/.  Session in the search box to learn more about \"Shingles: Care Instructions. \"  Current as of: March 3, 2017  Content Version: 11.4  © 9536-6497 [employer]  Care instructions adapted under license by [employer] (which disclaims liability or warranty for this information). If you have questions about a medical condition or this instruction, always ask your healthcare professional. [address] any warranty or liability for your use of this information.

## 2018-05-09 NOTE — ED PROVIDER NOTES
EMERGENCY DEPARTMENT HISTORY AND PHYSICAL EXAM    2:14 PM      Date: 5/9/2018  Patient Name: Dirk Stinson    History of Presenting Illness     Chief Complaint   Patient presents with    Eye Pain         History Provided By: Patient    Chief Complaint: Eye Pain   Duration: 1 Days  Timing:  Constant and Worsening  Location: Right eye   Quality: Burning and Itchiness  Severity: 7 out of 10  Modifying Factors: None   Associated Symptoms: eye drainage      Additional History (Context): Dirk Stinson is a 56 y.o. female with hx of asthma, anxiety, endometrial ablation and shingles presenting to the ED with c/o constant, worsening right eye pain that began 1 day ago. Pt report she woke up this morning and her eye was closed shut. States she feels burning and itchy sensation and \"its like there are any granule of sand in eye\". Denies any CP, SOB, fever, chills, abdominal pain, nausea, vomiting, diarrhea or urinary sx. Associated sx include clear eye drainage. Severity is 7/10. Pt also reports right flank pain, which is consistent with her shingle flare-ups. Notes last flare-up was about 2 weeks ago, states she is usually does not get this many flare-ups after taking all medications as prescribed. She was given a 5-day course of Acyclovir, only 400 mg, she does not think it was sufficient as she usually takes 800 mg tablets. Reports a sharp pain that shoots down from her right buttocks down her leg. No other sx or complaints given at this time.      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 No pertinent family history. Social History:  Social History   Substance Use Topics    Smoking status: Passive Smoke Exposure - Never Smoker     Years: 6.00    Smokeless tobacco: Never Used      Comment: last attempt to quit 1/1/2003    Alcohol use 0.5 oz/week     1 Cans of beer per week       Allergies: Allergies   Allergen Reactions    Apple Swelling    Cephalexin Hives         Review of Systems       Review of Systems   Constitutional: Negative for fever. HENT: Negative for sore throat. Eyes: Positive for pain (Right eye pain). Negative for redness. Respiratory: Negative for shortness of breath and wheezing. Gastrointestinal: Negative for abdominal pain and nausea. Genitourinary: Positive for flank pain (right flank pain). Negative for dysuria. Musculoskeletal: Negative for neck stiffness. Skin: Positive for rash (Shingle flare-up on right side). Negative for pallor. Neurological: Negative for headaches. Hematological: Does not bruise/bleed easily. All other systems reviewed and are negative. Physical Exam     Visit Vitals    /90 (BP 1 Location: Left arm, BP Patient Position: At rest)    Pulse 88    Temp 98.8 °F (37.1 °C)    Resp 14    Ht 5' 2\" (1.575 m)    Wt 84.8 kg (187 lb)    SpO2 98%    BMI 34.2 kg/m2         Physical Exam   Constitutional: She is oriented to person, place, and time. She appears well-developed and well-nourished. No distress. HENT:   Head: Normocephalic and atraumatic. Mouth/Throat: Oropharynx is clear and moist.   Eyes: EOM are normal. Pupils are equal, round, and reactive to light. No scleral icterus.    Right eye scleral injection   Photo sensitivity   No vesicles around the skin of eye orbit  No signs of orbital cellulitis   No pain with eye movement   Unable to do visual acuity because pt had contact lens is out   Visual fields are intact   No sign of zoster ophthalmacus or cellulitis   Neck: Normal range of motion. Neck supple. Cardiovascular: Intact distal pulses. Pulmonary/Chest: Effort normal. No respiratory distress. Musculoskeletal: Normal range of motion. She exhibits tenderness. She exhibits no edema. Right sided gluteal tenderness, very sensitive. Consistent with sciatic nerve irritation   Neurological: She is alert and oriented to person, place, and time. No cranial nerve deficit. She exhibits normal muscle tone. Coordination normal.   Sensation intact  Strength 5/5  Ambulating    Skin: Skin is warm and dry. She is not diaphoretic. Right butt cheek area with cluster of vesicles, consistent with shingles   Some areas are drying up, but still appears to have active shingles, likely under-treated with 400 mg Acyclovir for 5 days   Psychiatric: Her behavior is normal.   Nursing note and vitals reviewed. Diagnostic Study Results     Labs -  No results found for this or any previous visit (from the past 12 hour(s)). Radiologic Studies -   No orders to display         Medical Decision Making   I am the first provider for this patient. I reviewed the vital signs, available nursing notes, past medical history, past surgical history, family history and social history. Vital Signs-Reviewed the patient's vital signs.     Pulse Oximetry Analysis -  98% on room air, stable     Cardiac Monitor:  Rate: 88  Rhythm:  Normal Sinus Rhythm     Records Reviewed: Nursing Notes and Old Medical Records (Time of Review: 2:14 PM)    Provider Notes (Medical Decision Making): ddx pink eye, FB, no sign of zoster ophthalmacus or cellucitis, has hx sciatica with consistent sx's of that and no sign of cord compression    Do eye exam and consult     MDM    Medications   fluorescein (FLU-SANG) 0.6 mg ophthalmic strip 1 Strip (not administered)   proparacaine (OPTHAINE) 0.5 % ophthalmic solution 2 Drop (not administered)       Procedures: Eye Stain    Date/Time: 5/9/2018 2:29 PM    Performed by: attending        Corneal abrasion was not present on eyelid eversion. Cornea is clear. Anterior chamber is clear. Patient tolerance: Patient tolerated the procedure well with no immediate complications  My total time at bedside, performing this procedure was 1-15 minutes. Comments: Scleral injection to the right eye. No foreign body noted on eye lid inversion. ED Course: Progress Notes, Reevaluation, and Consults:    2:54 PM: Consulted  and is talking to the pt now.  will get pt connected with [facility] and they can refer pt for further evaluation by ophthalmologist and ortho. Told pt there is possibly a foreign body or other etiology in eye therefore she needs ophthalmologist f/u. Also discussed with pt the need to stop using her contact lens and to keep it out for 1 week while she is taking abx. I have reassessed the patient. I have discussed the workup, results and plan with the patient and patient is in agreement. Patient has no new complaitn. Patient will be prescribed naphconA, polytrim, acyclovir. She has naprosyn and flexeril at home and just had course norco, Patient was discharge in stable condition. Patient was given outpatient follow up. Patient is to return to emergency department if any new or worsening condition. Diagnosis     Clinical Impression:   1. Herpes zoster without complication    2. Right sided sciatica    3.  Other conjunctivitis of right eye        Disposition: Discharge     Follow-up Information     Follow up With Details Comments   [address] Call in 2 days  [address] Call in 2 days  [address] [id number] [phone]    [facility] EMERGENCY DEPT  As needed, If symptoms worsen [phone] [facility] Patient's Medications   Start Taking    ACYCLOVIR (ZOVIRAX) 800 MG TABLET    Take 1 Tab by mouth five (5) times daily for 5 days. Indications: herpes zoster    NAPHAZOLINE-PHENIRAMINE (NAPHCON-A) 0.025-0.3 % OPHTHALMIC SOLUTION    Administer 2 Drops to right eye three (3) times daily as needed. Indications: OCULAR ITCHING, Ocular Redness    TRIMETHOPRIM-POLYMYXIN B (POLYTRIM) OPHTHALMIC SOLUTION    Administer 1 Drop to right eye every four (4) hours for 7 days. Indications: conjunctivitis   Continue Taking    BENZONATATE (TESSALON PERLES) 100 MG CAPSULE    Take 1 Cap by mouth three (3) times daily as needed for Cough for up to 7 days. CYCLOBENZAPRINE (FLEXERIL) 10 MG TABLET    Take 1 Tab by mouth three (3) times daily as needed for Muscle Spasm(s). HYDROCODONE-ACETAMINOPHEN (NORCO) 5-325 MG PER TABLET    Take 1 Tab by mouth every four (4) hours as needed for Pain. Max Daily Amount: 6 Tabs. NAPROXEN (NAPROSYN) 500 MG TABLET    Take 1 Tab by mouth two (2) times daily (with meals) for 10 days. ONDANSETRON HCL (ZOFRAN) 4 MG TABLET    Take 1 Tab by mouth every eight (8) hours as needed for Nausea. SERTRALINE (ZOLOFT) 100 MG TABLET    Take  by mouth daily. These Medications have changed    No medications on file   Stop Taking    AZITHROMYCIN (ZITHROMAX Z-MEDHAT) 250 MG TABLET    Take 1st dose 24 hours after initial dose that was given in the ER. Then take 1 tablet daily until finished. HYDROCODONE-ACETAMINOPHEN (NORCO) 5-325 MG PER TABLET    Take 1-2 tablets PO every 4-6 hours as needed for pain control. If over the counter ibuprofen or acetaminophen was suggested, then only take the vicodin for pain not well controlled with the over the counter medication. HYDROCODONE-ACETAMINOPHEN (NORCO) 5-325 MG PER TABLET    Take 1-2 tablets PO every 4-6 hours as needed for pain control.   If over the counter ibuprofen or acetaminophen was suggested, then only take the vicodin for pain not well controlled with the over the counter medication. METHYLPREDNISOLONE (MEDROL, MEDHAT,) 4 MG TABLET    Per dose pack instructions    METHYLPREDNISOLONE (MEDROL, MEDHAT,) 4 MG TABLET    Per dose pack instructions     _______________________________    Attestations:  Scribe Attestation     Cristy Kelsey acting as a scribe for and in the presence of Thomas Wagner DO      May 09, 2018 at 2:20 PM       Provider Attestation:      I personally performed the services described in the documentation, reviewed the documentation, as recorded by the scribe in my presence, and it accurately and completely records my words and actions.  May 09, 2018 at 2:20 PM - Thomas Wagner DO    _______________________________

## 2018-05-18 ENCOUNTER — OFFICE VISIT (OUTPATIENT)
Dept: FAMILY MEDICINE CLINIC | Age: 48
End: 2018-05-18

## 2018-05-18 ENCOUNTER — HOSPITAL ENCOUNTER (OUTPATIENT)
Dept: LAB | Age: 48
Discharge: HOME | End: 2018-05-18
Payer: SUBSIDIZED

## 2018-05-18 VITALS
HEIGHT: 62 IN | SYSTOLIC BLOOD PRESSURE: 118 MMHG | HEART RATE: 87 BPM | OXYGEN SATURATION: 100 % | WEIGHT: 185.2 LBS | TEMPERATURE: 98.2 F | RESPIRATION RATE: 18 BRPM | DIASTOLIC BLOOD PRESSURE: 70 MMHG | BODY MASS INDEX: 34.08 KG/M2

## 2018-05-18 DIAGNOSIS — R61 EXCESSIVE SWEATING: ICD-10-CM

## 2018-05-18 DIAGNOSIS — R14.0 ABDOMINAL BLOATING: ICD-10-CM

## 2018-05-18 DIAGNOSIS — Z13.83 SCREENING FOR CARDIOVASCULAR, RESPIRATORY, AND GENITOURINARY DISEASES: ICD-10-CM

## 2018-05-18 DIAGNOSIS — R05.3 CHRONIC COUGH: ICD-10-CM

## 2018-05-18 DIAGNOSIS — Z13.6 SCREENING FOR CARDIOVASCULAR, RESPIRATORY, AND GENITOURINARY DISEASES: ICD-10-CM

## 2018-05-18 DIAGNOSIS — Z13.89 SCREENING FOR CARDIOVASCULAR, RESPIRATORY, AND GENITOURINARY DISEASES: ICD-10-CM

## 2018-05-18 DIAGNOSIS — R10.84 GENERALIZED ABDOMINAL PAIN: ICD-10-CM

## 2018-05-18 DIAGNOSIS — Z13.89 ENCOUNTER FOR SURVEILLANCE OF ABNORMAL NEVI: ICD-10-CM

## 2018-05-18 DIAGNOSIS — R14.0 ABDOMINAL BLOATING: Primary | ICD-10-CM

## 2018-05-18 DIAGNOSIS — F32.9 SINGLE CURRENT EPISODE OF MAJOR DEPRESSIVE DISORDER, UNSPECIFIED DEPRESSION EPISODE SEVERITY: ICD-10-CM

## 2018-05-18 LAB
ALBUMIN SERPL-MCNC: 4.4 G/DL (ref 3.4–5)
ALBUMIN/GLOB SERPL: 1.1 {RATIO} (ref 0.8–1.7)
ALP SERPL-CCNC: 62 U/L (ref 45–117)
ALT SERPL-CCNC: 37 U/L (ref 13–56)
ANION GAP SERPL CALC-SCNC: 9 MMOL/L (ref 3–18)
AST SERPL-CCNC: 20 U/L (ref 15–37)
BASOPHILS # BLD: 0 K/UL (ref 0–0.06)
BASOPHILS NFR BLD: 0 % (ref 0–2)
BILIRUB SERPL-MCNC: 0.3 MG/DL (ref 0.2–1)
BUN SERPL-MCNC: 13 MG/DL (ref 7–18)
BUN/CREAT SERPL: 15 (ref 12–20)
CALCIUM SERPL-MCNC: 9.7 MG/DL (ref 8.5–10.1)
CHLORIDE SERPL-SCNC: 104 MMOL/L (ref 100–108)
CHOLEST SERPL-MCNC: 252 MG/DL
CO2 SERPL-SCNC: 26 MMOL/L (ref 21–32)
CREAT SERPL-MCNC: 0.86 MG/DL (ref 0.6–1.3)
DIFFERENTIAL METHOD BLD: NORMAL
EOSINOPHIL # BLD: 0.1 K/UL (ref 0–0.4)
EOSINOPHIL NFR BLD: 1 % (ref 0–5)
ERYTHROCYTE [DISTWIDTH] IN BLOOD BY AUTOMATED COUNT: 12.2 % (ref 11.6–14.5)
GLOBULIN SER CALC-MCNC: 4 G/DL (ref 2–4)
GLUCOSE SERPL-MCNC: 88 MG/DL (ref 74–99)
HCT VFR BLD AUTO: 41.9 % (ref 35–45)
HDLC SERPL-MCNC: 46 MG/DL (ref 40–60)
HDLC SERPL: 5.5 {RATIO} (ref 0–5)
HGB BLD-MCNC: 14 G/DL (ref 12–16)
LDLC SERPL CALC-MCNC: 168.8 MG/DL (ref 0–100)
LIPID PROFILE,FLP: ABNORMAL
LYMPHOCYTES # BLD: 2.9 K/UL (ref 0.9–3.6)
LYMPHOCYTES NFR BLD: 32 % (ref 21–52)
MCH RBC QN AUTO: 31.4 PG (ref 24–34)
MCHC RBC AUTO-ENTMCNC: 33.4 G/DL (ref 31–37)
MCV RBC AUTO: 93.9 FL (ref 74–97)
MONOCYTES # BLD: 0.5 K/UL (ref 0.05–1.2)
MONOCYTES NFR BLD: 6 % (ref 3–10)
NEUTS SEG # BLD: 5.6 K/UL (ref 1.8–8)
NEUTS SEG NFR BLD: 61 % (ref 40–73)
PLATELET # BLD AUTO: 288 K/UL (ref 135–420)
PMV BLD AUTO: 10.5 FL (ref 9.2–11.8)
POTASSIUM SERPL-SCNC: 4.7 MMOL/L (ref 3.5–5.5)
PROT SERPL-MCNC: 8.4 G/DL (ref 6.4–8.2)
RBC # BLD AUTO: 4.46 M/UL (ref 4.2–5.3)
SODIUM SERPL-SCNC: 139 MMOL/L (ref 136–145)
T4 FREE SERPL-MCNC: 0.9 NG/DL (ref 0.7–1.5)
TRIGL SERPL-MCNC: 186 MG/DL (ref ?–150)
TSH SERPL DL<=0.05 MIU/L-ACNC: 1.86 UIU/ML (ref 0.36–3.74)
VLDLC SERPL CALC-MCNC: 37.2 MG/DL
WBC # BLD AUTO: 9.1 K/UL (ref 4.6–13.2)

## 2018-05-18 PROCEDURE — 85025 COMPLETE CBC W/AUTO DIFF WBC: CPT | Performed by: NURSE PRACTITIONER

## 2018-05-18 PROCEDURE — 80053 COMPREHEN METABOLIC PANEL: CPT | Performed by: NURSE PRACTITIONER

## 2018-05-18 PROCEDURE — 36415 COLL VENOUS BLD VENIPUNCTURE: CPT | Performed by: NURSE PRACTITIONER

## 2018-05-18 PROCEDURE — 80061 LIPID PANEL: CPT | Performed by: NURSE PRACTITIONER

## 2018-05-18 PROCEDURE — 84443 ASSAY THYROID STIM HORMONE: CPT | Performed by: NURSE PRACTITIONER

## 2018-05-18 PROCEDURE — 84439 ASSAY OF FREE THYROXINE: CPT | Performed by: NURSE PRACTITIONER

## 2018-05-18 RX ORDER — SERTRALINE HYDROCHLORIDE 100 MG/1
200 TABLET, FILM COATED ORAL DAILY
Qty: 60 TAB | Refills: 3 | Status: SHIPPED | OUTPATIENT
Start: 2018-05-18 | End: 2018-08-20 | Stop reason: SDUPTHER

## 2018-05-18 RX ORDER — GABAPENTIN 800 MG/1
800 TABLET ORAL 3 TIMES DAILY
Qty: 90 TAB | Refills: 3 | Status: SHIPPED | OUTPATIENT
Start: 2018-05-18 | End: 2018-08-20 | Stop reason: SDUPTHER

## 2018-05-18 RX ORDER — GABAPENTIN 800 MG/1
TABLET ORAL 3 TIMES DAILY
COMMUNITY
End: 2018-05-18 | Stop reason: SDUPTHER

## 2018-05-18 NOTE — PROGRESS NOTES
HISTORY OF PRESENT ILLNESS    Chad Ortez is a 56y.o. year old female comes in today to be evaluated and treated for:  Recurrent rash    Patient reports she has been having recurrent episodes of shingles. Patient states she is having recurrent swelling to bilateral arms, neck and growing, recurrent enlarged lymph nodes. Reports she does have recurrent shingles. Comments she has been having abd pain and bloating which is different from the pain she has had in the past from her usual IBS with diarrhea symptoms. States she also does have a constant cough since Nov. 2017. Reports she is having worsening of her left shoulder pain which is now radiating across to her right shoulder and downward to her mid thoracic area. Reports she is unable to walk 3 blocks without being short of breath. Reports in Feb her leg gave out and feels she doesn't have any feeling between her knee and ankle, reports it feels numb but if she presses on area she will have pain. Comments she will cough throughout the night which will wake her up, she will feel like she is choking. Comment she does have intermittent white sputum production with coughing. Patient reports she is nervous about the change in her normal pain given her brother had lymphoma in the past and comments he had similar symptoms and she is scared because she doesn't know what is going on. Allergies   Allergen Reactions    Apple Swelling    Cephalexin Hives     Current Outpatient Prescriptions   Medication Sig Dispense Refill    gabapentin (NEURONTIN) 800 mg tablet Take  by mouth three (3) times daily.  naphazoline-pheniramine (NAPHCON-A) 0.025-0.3 % ophthalmic solution Administer 2 Drops to right eye three (3) times daily as needed. Indications: OCULAR ITCHING, Ocular Redness 1 Bottle 0    HYDROcodone-acetaminophen (NORCO) 5-325 mg per tablet Take 1 Tab by mouth every four (4) hours as needed for Pain. Max Daily Amount: 6 Tabs.  10 Tab 0    cyclobenzaprine (FLEXERIL) 10 mg tablet Take 1 Tab by mouth three (3) times daily as needed for Muscle Spasm(s). 30 Tab 0    sertraline (ZOLOFT) 100 mg tablet Take 200 mg by mouth daily.  ondansetron hcl (ZOFRAN) 4 mg tablet Take 1 Tab by mouth every eight (8) hours as needed for Nausea. 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    Pain, upper back     Psychiatric disorder     depression     Tattoo     Unspecified deficiency anemia 1999       ROS:  Review of Systems - General ROS: negative for - chills or fever  Respiratory ROS: positive for - cough and shortness of breath  Cardiovascular ROS: no chest pain or dyspnea on exertion  Gastrointestinal ROS: positive for - abdominal pain and gas/bloating  Musculoskeletal ROS: positive for - lower back pain      Objective:  Visit Vitals    /70 (BP 1 Location: Right arm, BP Patient Position: Sitting)    Pulse 87    Temp 98.2 °F (36.8 °C) (Oral)    Resp 18    Ht 5' 2\" (1.575 m)    Wt 185 lb 3.2 oz (84 kg)    SpO2 100%    BMI 33.87 kg/m2     General appearance - alert, well appearing, and in no distress  Neck - supple, no significant adenopathy  Chest - clear to auscultation, no wheezes, rales or rhonchi, symmetric air entry  Heart - normal rate, regular rhythm, normal S1, S2, no murmurs, rubs, clicks or gallops  Abdomen: soft, non-tender. Bowel sounds normal. No masses,  no organomegaly, generalized tenderness  Extremities: extremities normal, atraumatic, no cyanosis or edema          Assessment/Plan:     ICD-10-CM ICD-9-CM    1. Abdominal bloating R14.0 787.3 CBC WITH AUTOMATED DIFF      METABOLIC PANEL, COMPREHENSIVE   2. Excessive sweating R61 780.8 T4, FREE      TSH 3RD GENERATION   3. Chronic cough R05 786.2 CT CHEST ABD PELV W WO CONT      CANCELED: CT CHEST W WO CONT   4.  Single current episode of major depressive disorder, unspecified depression episode severity F32.9 296.20 sertraline (ZOLOFT) 100 mg tablet   5. Screening for cardiovascular, respiratory, and genitourinary diseases Z13.6 V81.2 LIPID PANEL    Z13.89 V81.6     Z13.83 V81.4    6. Encounter for surveillance of abnormal nevi Z13.89 V49.89 REFERRAL TO DERMATOLOGY   7. Generalized abdominal pain R10.84 789.07 CT CHEST ABD PELV W WO CONT     Orders Placed This Encounter    CT CHEST ABD PELV W WO CONT     Standing Status:   Future     Standing Expiration Date:   5/19/2019     Order Specific Question:   Is Patient Allergic to Contrast Dye? Answer:   No     Order Specific Question:   Is Patient Pregnant? Answer:   No     Order Specific Question:   STAT Creatinine as indicated     Answer:   Yes     Order Specific Question: This order utilizes IV contrast.  What additional contrast is needed? Answer:   Per Radiologist Protocol    CBC WITH AUTOMATED DIFF     Standing Status:   Future     Standing Expiration Date:   4/46/7197    METABOLIC PANEL, COMPREHENSIVE     Standing Status:   Future     Standing Expiration Date:   8/16/2018    LIPID PANEL     Standing Status:   Future     Standing Expiration Date:   8/16/2018    T4, FREE     Standing Status:   Future     Standing Expiration Date:   8/16/2018    TSH 3RD GENERATION     Standing Status:   Future     Standing Expiration Date:   8/16/2018    REFERRAL TO DERMATOLOGY     Referral Priority:   Routine     Referral Type:   Consultation     Referral Reason:   Specialty Services Required     Referral Location:   [address] [address] [facility]     Referred to Provider:   Ofe Solomon MD    sertraline (ZOLOFT) 100 mg tablet     Sig: Take 2 Tabs by mouth daily. Dispense:  60 Tab     Refill:  3     I have discussed the diagnosis with the patient and the intended plan as seen in the above orders.   The patient has received an after-visit summary and questions were answered concerning future plans. I have discussed medication side effects and warnings with the patient as well. Patient agreeable with above plan and verbalizes understanding. Follow-up Disposition:  Return in about 4 weeks (around 6/15/2018) for abd bloating/lab/imaging results.

## 2018-05-18 NOTE — PROGRESS NOTES
Chief Complaint   Patient presents with   [address]     Patient is here today as a New Patient. D/C on 5/09/18 due to shingles. Pt sts that she would like to have blood work sts that is concern with natacha pain. Pt sts last pap 4 yrs and mammogram 4 yrs ago.

## 2018-05-18 NOTE — PATIENT INSTRUCTIONS

## 2018-05-18 NOTE — MR AVS SNAPSHOT
[address] [facility] 
 
 
 [address] [address] [address] [facility] [address] 
[phone] Patient: Nat Drivers MRN: [id number] :1970 Visit Information Date & Time Provider Department Dept. Phone Encounter #  
 2018 11:00 AM Kely Davalos NP [facility] [phone] [id number] Follow-up Instructions Return in about 4 weeks (around 6/15/2018) for abd bloating/lab/imaging results. Upcoming Health Maintenance Date Due  
 PAP AKA CERVICAL CYTOLOGY 1992 DTaP/Tdap/Td series (1 - Tdap) 2019* Influenza Age 5 to Adult 2018 *Topic was postponed. The date shown is not the original due date. Allergies as of 2018  Review Complete On: 2018 By: Kely Davalos NP Severity Noted Reaction Type Reactions Apple  2012    Swelling Cephalexin  2012    Hives Current Immunizations  Never Reviewed No immunizations on file. Not reviewed this visit You Were Diagnosed With   
  
 Codes Comments Abdominal bloating    -  Primary ICD-10-CM: R14.0 ICD-9-CM: 787.3 Excessive sweating     ICD-10-CM: R61 
ICD-9-CM: 780.8 Chronic cough     ICD-10-CM: R05 ICD-9-CM: 786.2 Single current episode of major depressive disorder, unspecified depression episode severity     ICD-10-CM: F32.9 ICD-9-CM: 296.20 Screening for cardiovascular, respiratory, and genitourinary diseases     ICD-10-CM: Z13.6, Z13.89, Z13.83 ICD-9-CM: V81.2, V81.6, V81.4 Encounter for surveillance of abnormal nevi     ICD-10-CM: Z13.89 ICD-9-CM: V49.89 Vitals BP Pulse Temp Resp Height(growth percentile) Weight(growth percentile) 118/70 (BP 1 Location: Right arm, BP Patient Position: Sitting) 87 98.2 °F (36.8 °C) (Oral) 18 5' 2\" (1.575 m) 185 lb 3.2 oz (84 kg) SpO2 BMI Smoking Status 100% 33.87 kg/m2 Passive Smoke Exposure - Never Smoker Vitals History BMI and BSA Data Body Mass Index Body Surface Area  
 33.87 kg/m 2 1.92 m 2 Preferred Pharmacy Pharmacy Name Phone [address] [place] [address]. [phone] Your Updated Medication List  
  
   
This list is accurate as of 5/18/18 11:51 AM.  Always use your most recent med list.  
  
  
  
  
 gabapentin 800 mg tablet Commonly known as:  NEURONTIN Take  by mouth three (3) times daily. naphazoline-pheniramine 0.025-0.3 % ophthalmic solution Commonly known as:  Sherra Miles Administer 2 Drops to right eye three (3) times daily as needed. Indications: OCULAR ITCHING, Ocular Redness  
  
 sertraline 100 mg tablet Commonly known as:  ZOLOFT Take 2 Tabs by mouth daily. Prescriptions Sent to Pharmacy Refills  
 sertraline (ZOLOFT) 100 mg tablet 3 Sig: Take 2 Tabs by mouth daily. Class: Normal  
 Pharmacy: [address] . Ph #: [phone] Route: Oral  
  
We Performed the Following REFERRAL TO DERMATOLOGY [REF19 Custom] Follow-up Instructions Return in about 4 weeks (around 6/15/2018) for abd bloating/lab/imaging results. To-Do List   
 05/18/2018 Lab:  CBC WITH AUTOMATED DIFF   
  
 05/18/2018 Imaging:  CT CHEST W WO CONT   
  
 05/18/2018 Lab:  LIPID PANEL   
  
 05/18/2018 Lab:  METABOLIC PANEL, COMPREHENSIVE   
  
 05/18/2018 Lab:  T4, FREE   
  
 05/18/2018 Lab:  TSH 3RD GENERATION Referral Information Referral ID Referred By Referred To  
  
 [id number] [facility] [address] [address] [address] Phone: [phone] Fax: [phone] Visits Status Start Date End Date 1 New Request 5/18/18 5/18/19  If your referral has a status of pending review or denied, additional information will be sent to support the outcome of this decision. Patient Instructions A Healthy Lifestyle: Care Instructions Your Care Instructions A healthy lifestyle can help you feel good, stay at a healthy weight, and have plenty of energy for both work and play. A healthy lifestyle is something you can share with your whole family. A healthy lifestyle also can lower your risk for serious health problems, such as high blood pressure, heart disease, and diabetes. You can follow a few steps listed below to improve your health and the health of your family. Follow-up care is a key part of your treatment and safety. Be sure to make and go to all appointments, and call your doctor if you are having problems. It's also a good idea to know your test results and keep a list of the medicines you take. How can you care for yourself at home? · Do not eat too much sugar, fat, or fast foods. You can still have dessert and treats now and then. The goal is moderation. · Start small to improve your eating habits. Pay attention to portion sizes, drink less juice and soda pop, and eat more fruits and vegetables. ¨ Eat a healthy amount of food. A 3-ounce serving of meat, for example, is about the size of a deck of cards. Fill the rest of your plate with vegetables and whole grains. ¨ Limit the amount of soda and sports drinks you have every day. Drink more water when you are thirsty. ¨ Eat at least 5 servings of fruits and vegetables every day. It may seem like a lot, but it is not hard to reach this goal. A serving or helping is 1 piece of fruit, 1 cup of vegetables, or 2 cups of leafy, raw vegetables. Have an apple or some carrot sticks as an afternoon snack instead of a candy bar. Try to have fruits and/or vegetables at every meal. 
· Make exercise part of your daily routine. You may want to start with simple activities, such as walking, bicycling, or slow swimming.  Try to be active 30 to 60 minutes every day. You do not need to do all 30 to 60 minutes all at once. For example, you can exercise 3 times a day for 10 or 20 minutes. Moderate exercise is safe for most people, but it is always a good idea to talk to your doctor before starting an exercise program. 
· Keep moving. Tessie Etienne the lawn, work in the garden, or [employer]. Take the stairs instead of the elevator at work. · If you smoke, quit. People who smoke have an increased risk for heart attack, stroke, cancer, and other lung illnesses. Quitting is hard, but there are ways to boost your chance of quitting tobacco for good. ¨ Use nicotine gum, patches, or lozenges. ¨ Ask your doctor about stop-smoking programs and medicines. ¨ Keep trying. In addition to reducing your risk of diseases in the future, you will notice some benefits soon after you stop using tobacco. If you have shortness of breath or asthma symptoms, they will likely get better within a few weeks after you quit. · Limit how much alcohol you drink. Moderate amounts of alcohol (up to 2 drinks a day for men, 1 drink a day for women) are okay. But drinking too much can lead to liver problems, high blood pressure, and other health problems. Family health If you have a family, there are many things you can do together to improve your health. · Eat meals together as a family as often as possible. · Eat healthy foods. This includes fruits, vegetables, lean meats and dairy, and whole grains. · Include your family in your fitness plan. Most people think of activities such as jogging or tennis as the way to fitness, but there are many ways you and your family can be more active. Anything that makes you breathe hard and gets your heart pumping is exercise. Here are some tips: 
¨ Walk to do errands or to take your child to school or the bus. ¨ Go for a family bike ride after dinner instead of watching TV. Where can you learn more? Go to http://steven-alexandru.info/. Enter K243 in the search box to learn more about \"A Healthy Lifestyle: Care Instructions. \" Current as of: May 12, 2017 Content Version: 11.4 © 5960-0487 Healthwise, Incorporated. Care instructions adapted under license by [employer] (which disclaims liability or warranty for this information). If you have questions about a medical condition or this instruction, always ask your healthcare professional. [place] [address] any warranty or liability for your use of this information. Introducing [facility] & HE[facility] SERVICES! Reji Alcantar introduces LynxFit for Google Glass patient portal. Now you can access parts of your medical record, email your doctor's office, and request medication refills online. 1. In your internet browser, go to [email or web address] 2. Click on the First Time User? Click Here link in the Sign In box. You will see the New Member Sign Up page. 3. Enter your LynxFit for Google Glass Access Code exactly as it appears below. You will not need to use this code after youve completed the sign-up process. If you do not sign up before the expiration date, you must request a new code. · LynxFit for Google Glass Access Code: [email or web address] Expires: 7/22/2018  8:41 PM 
 
4. Enter the last four digits of your Social Security Number (xxxx) and Date of Birth (mm/dd/yyyy) as indicated and click Submit. You will be taken to the next sign-up page. 5. Create a LynxFit for Google Glass ID. This will be your LynxFit for Google Glass login ID and cannot be changed, so think of one that is secure and easy to remember. 6. Create a LynxFit for Google Glass password. You can change your password at any time. 7. Enter your Password Reset Question and Answer. This can be used at a later time if you forget your password. 8. Enter your e-mail address. You will receive e-mail notification when new information is available in [address] 9. Click Sign Up.  You can now view and download portions of your medical record. 10. Click the Download Summary menu link to download a portable copy of your medical information. If you have questions, please visit the Frequently Asked Questions section of the Moonshoot website. Remember, Moonshoot is NOT to be used for urgent needs. For medical emergencies, dial 911. Now available from your iPhone and Android! Please provide this summary of care documentation to your next provider. Your primary care clinician is listed as NONE. If you have any questions after today's visit, please call [phone].

## 2018-05-19 ENCOUNTER — HOSPITAL ENCOUNTER (OUTPATIENT)
Dept: CT IMAGING | Age: 48
Discharge: HOME | End: 2018-05-19
Attending: NURSE PRACTITIONER
Payer: SUBSIDIZED

## 2018-05-19 DIAGNOSIS — R05.3 CHRONIC COUGH: ICD-10-CM

## 2018-05-19 DIAGNOSIS — R10.84 GENERALIZED ABDOMINAL PAIN: ICD-10-CM

## 2018-05-19 PROCEDURE — 71260 CT THORAX DX C+: CPT

## 2018-05-19 PROCEDURE — 74011636320 HC RX REV CODE- 636/320: Performed by: NURSE PRACTITIONER

## 2018-05-19 RX ADMIN — IOPAMIDOL 100 ML: 612 INJECTION, SOLUTION INTRAVENOUS at 08:21

## 2018-05-30 ENCOUNTER — OFFICE VISIT (OUTPATIENT)
Dept: FAMILY MEDICINE CLINIC | Age: 48
End: 2018-05-30

## 2018-05-30 VITALS
DIASTOLIC BLOOD PRESSURE: 78 MMHG | HEIGHT: 62 IN | OXYGEN SATURATION: 99 % | BODY MASS INDEX: 35.37 KG/M2 | HEART RATE: 83 BPM | WEIGHT: 192.2 LBS | SYSTOLIC BLOOD PRESSURE: 114 MMHG | RESPIRATION RATE: 18 BRPM | TEMPERATURE: 98.9 F

## 2018-05-30 DIAGNOSIS — G89.29 CHRONIC BILATERAL LOW BACK PAIN WITH RIGHT-SIDED SCIATICA: ICD-10-CM

## 2018-05-30 DIAGNOSIS — R14.0 ABDOMINAL BLOATING: Primary | ICD-10-CM

## 2018-05-30 DIAGNOSIS — Z83.3 FAMILY HISTORY OF DIABETES MELLITUS IN GRANDMOTHER: ICD-10-CM

## 2018-05-30 DIAGNOSIS — Z91.038 HISTORY OF ANAPHYLACTIC SHOCK DUE TO INSECT STING: ICD-10-CM

## 2018-05-30 DIAGNOSIS — E66.9 OBESITY, CLASS II, BMI 35-39.9: ICD-10-CM

## 2018-05-30 DIAGNOSIS — E78.00 PURE HYPERCHOLESTEROLEMIA: ICD-10-CM

## 2018-05-30 DIAGNOSIS — M54.41 CHRONIC BILATERAL LOW BACK PAIN WITH RIGHT-SIDED SCIATICA: ICD-10-CM

## 2018-05-30 LAB — HBA1C MFR BLD HPLC: 5.6 %

## 2018-05-30 RX ORDER — LOVASTATIN 20 MG/1
20 TABLET ORAL
Qty: 30 TAB | Refills: 2 | Status: SHIPPED | OUTPATIENT
Start: 2018-05-30 | End: 2018-08-20 | Stop reason: SDUPTHER

## 2018-05-30 RX ORDER — ACETAMINOPHEN AND CODEINE PHOSPHATE 300; 30 MG/1; MG/1
1 TABLET ORAL
Qty: 30 TAB | Refills: 0 | Status: SHIPPED | OUTPATIENT
Start: 2018-05-30 | End: 2018-06-15 | Stop reason: SDUPTHER

## 2018-05-30 RX ORDER — METHOCARBAMOL 500 MG/1
TABLET, FILM COATED ORAL
Qty: 60 TAB | Refills: 0 | Status: SHIPPED | OUTPATIENT
Start: 2018-05-30 | End: 2018-06-15 | Stop reason: SDUPTHER

## 2018-05-30 NOTE — PATIENT INSTRUCTIONS

## 2018-05-30 NOTE — PROGRESS NOTES
Chief Complaint   Patient presents with    Results       Patient is here today for lab results. Pt sts that she is still in allot of pain from her lower pain that radiates into her groin area and abdomen. 1. Have you been to the ER, urgent care clinic since your last visit? Hospitalized since your last visit? No    2. Have you seen or consulted any other health care providers outside of the [address] [place] [address] since your last visit? Include any pap smears or colon screening.  No

## 2018-05-30 NOTE — MR AVS SNAPSHOT
[facility] 
 
 
 [address] [facility] [place] [place] [address] [address] [address] 
[phone] Patient: Khoa Hernandes MRN: [id number] :1970 Visit Information Date & Time Provider Department Dept. Phone Encounter #  
 2018 10:15 AM Yolanda An, [address] [id number] Follow-up Instructions Return if symptoms worsen or fail to improve, for keep scheduled appt on 6/15/18. Your Appointments 6/15/2018  9:30 AM  
Office Visit with Yolanda An NP [facility] ([facility]) Appt Note: 4 wk f/u for abd bloating/lab/imaging results. [address] [facility] [place] [address]  
[phone]  
  
   
 [address] Upcoming Health Maintenance Date Due  
 PAP AKA CERVICAL CYTOLOGY 1992 DTaP/Tdap/Td series (1 - Tdap) 2019* Influenza Age 5 to Adult 2018 *Topic was postponed. The date shown is not the original due date. Allergies as of 2018  Review Complete On: 2018 By: Aileen Greer LPN Severity Noted Reaction Type Reactions Apple  2012    Swelling Cephalexin  2012    Hives Wasps [Hymenoptera Allergenic Extract]  2018    Hives Current Immunizations  Never Reviewed No immunizations on file. Not reviewed this visit You Were Diagnosed With   
  
 Codes Comments Abdominal bloating    -  Primary ICD-10-CM: R14.0 ICD-9-CM: 657. 3 Chronic bilateral low back pain with right-sided sciatica     ICD-10-CM: M54.41, G89.29 ICD-9-CM: 724.2, 724.3, 338.29 Pure hypercholesterolemia     ICD-10-CM: E78.00 ICD-9-CM: 272.0 Family history of diabetes mellitus in grandmother     ICD-10-CM: Z80.1 ICD-9-CM: V18.0 Obesity, Class II, BMI 35-39.9     ICD-10-CM: E66.9 ICD-9-CM: 278.00 Vitals BP Pulse Temp Resp Height(growth percentile) Weight(growth percentile) 114/78 (BP 1 Location: Left arm, BP Patient Position: Sitting) 83 98.9 °F (37.2 °C) (Oral) 18 5' 2\" (1.575 m) 192 lb 3.2 oz (87.2 kg) SpO2 BMI OB Status Smoking Status 99% 35.15 kg/m2 Ablation Passive Smoke Exposure - Never Smoker Vitals History BMI and BSA Data Body Mass Index Body Surface Area  
 35.15 kg/m 2 1.95 m 2 Preferred Pharmacy Pharmacy Name Phone [address] [place] [address]. [phone] Your Updated Medication List  
  
   
This list is accurate as of 18 11:07 AM.  Always use your most recent med list.  
  
  
  
  
 acetaminophen-codeine 300-30 mg per tablet Commonly known as:  TYLENOL #3 Take 1 Tab by mouth every four (4) hours as needed for Pain. Max Daily Amount: 6 Tabs.  
  
 gabapentin 800 mg tablet Commonly known as:  NEURONTIN Take 1 Tab by mouth three (3) times daily. lovastatin 20 mg tablet Commonly known as:  MEVACOR Take 1 Tab by mouth nightly. methocarbamol 500 mg tablet Commonly known as:  ROBAXIN  
1-2 tabs by mouth every 6 hours as needed, not to exceed 8 tablets in 24 hours  
  
 sertraline 100 mg tablet Commonly known as:  ZOLOFT Take 2 Tabs by mouth daily. Prescriptions Printed Refills  
 acetaminophen-codeine (TYLENOL #3) 300-30 mg per tablet 0 Sig: Take 1 Tab by mouth every four (4) hours as needed for Pain. Max Daily Amount: 6 Tabs. Class: Print Route: Oral  
  
Prescriptions Sent to Pharmacy Refills  
 lovastatin (MEVACOR) 20 mg tablet 2 Sig: Take 1 Tab by mouth nightly. Class: Normal  
 Pharmacy: [address] [facility] [place] [address]. Ph #: [phone] Route: Oral  
 methocarbamol (ROBAXIN) 500 mg tablet 0 Si-2 tabs by mouth every 6 hours as needed, not to exceed 8 tablets in 24 hours  Class: Normal  
 Pharmacy: [facility] [address]  #: [phone] We Performed the Following AMB POC HEMOGLOBIN A1C [61078 CPT(R)] REFERRAL TO GASTROENTEROLOGY [DTY39 Custom] REFERRAL TO ORTHOPEDICS [QCT921 Custom] Follow-up Instructions Return if symptoms worsen or fail to improve, for keep scheduled appt on 6/15/18. To-Do List   
 05/30/2018 Imaging:  MRI LUMB SPINE W CONT Referral Information Referral ID Referred By Referred To  
  
 [id number] [facility], . Staffa Leopolda 49, MD [address] Phone: [phone] Fax: [phone] Visits Status Start Date End Date 1 New Request 5/30/18 5/30/19 If your referral has a status of pending review or denied, additional information will be sent to support the outcome of this decision. Referral ID Referred By Referred To  
 [id number] Monty Torres Not Available Visits Status Start Date End Date 1 New Request 5/30/18 5/30/19 If your referral has a status of pending review or denied, additional information will be sent to support the outcome of this decision. Patient Instructions DASH Diet: Care Instructions Your Care Instructions The DASH diet is an eating plan that can help lower your blood pressure. DASH stands for Dietary Approaches to Stop Hypertension. Hypertension is high blood pressure. The DASH diet focuses on eating foods that are high in calcium, potassium, and magnesium. These nutrients can lower blood pressure. The foods that are highest in these nutrients are fruits, vegetables, low-fat dairy products, nuts, seeds, and legumes. But taking calcium, potassium, and magnesium supplements instead of eating foods that are high in those nutrients does not have the same effect. The DASH diet also includes whole grains, fish, and poultry. The DASH diet is one of several lifestyle changes your doctor may recommend to lower your high blood pressure. Your doctor may also want you to decrease the amount of sodium in your diet. Lowering sodium while following the DASH diet can lower blood pressure even further than just the DASH diet alone. Follow-up care is a key part of your treatment and safety. Be sure to make and go to all appointments, and call your doctor if you are having problems. It's also a good idea to know your test results and keep a list of the medicines you take. How can you care for yourself at home? Following the DASH diet · Eat 4 to 5 servings of fruit each day. A serving is 1 medium-sized piece of fruit, ½ cup chopped or canned fruit, 1/4 cup dried fruit, or 4 ounces (½ cup) of fruit juice. Choose fruit more often than fruit juice. · Eat 4 to 5 servings of vegetables each day. A serving is 1 cup of lettuce or raw leafy vegetables, ½ cup of chopped or cooked vegetables, or 4 ounces (½ cup) of vegetable juice. Choose vegetables more often than vegetable juice. · Get 2 to 3 servings of low-fat and fat-free dairy each day. A serving is 8 ounces of milk, 1 cup of yogurt, or 1 ½ ounces of cheese. · Eat 6 to 8 servings of grains each day. A serving is 1 slice of bread, 1 ounce of dry cereal, or ½ cup of cooked rice, pasta, or cooked cereal. Try to choose whole-grain products as much as possible. · Limit lean meat, poultry, and fish to 2 servings each day. A serving is 3 ounces, about the size of a deck of cards. · Eat 4 to 5 servings of nuts, seeds, and legumes (cooked dried beans, lentils, and split peas) each week. A serving is 1/3 cup of nuts, 2 tablespoons of seeds, or ½ cup of cooked beans or peas. · Limit fats and oils to 2 to 3 servings each day. A serving is 1 teaspoon of vegetable oil or 2 tablespoons of salad dressing. · Limit sweets and added sugars to 5 servings or less a week.  A serving is 1 tablespoon jelly or jam, ½ cup sorbet, or 1 cup of lemonade. · Eat less than 2,300 milligrams (mg) of sodium a day. If you limit your sodium to 1,500 mg a day, you can lower your blood pressure even more. Tips for success · Start small. Do not try to make dramatic changes to your diet all at once. You might feel that you are missing out on your favorite foods and then be more likely to not follow the plan. Make small changes, and stick with them. Once those changes become habit, add a few more changes. · Try some of the following: ¨ Make it a goal to eat a fruit or vegetable at every meal and at snacks. This will make it easy to get the recommended amount of fruits and vegetables each day. ¨ Try yogurt topped with fruit and nuts for a snack or healthy dessert. ¨ Add lettuce, tomato, cucumber, and onion to sandwiches. ¨ Combine a ready-made pizza crust with low-fat mozzarella cheese and lots of vegetable toppings. Try using tomatoes, squash, spinach, broccoli, carrots, cauliflower, and onions. ¨ Have a variety of cut-up vegetables with a low-fat dip as an appetizer instead of chips and dip. ¨ Sprinkle sunflower seeds or chopped almonds over salads. Or try adding chopped walnuts or almonds to cooked vegetables. ¨ Try some vegetarian meals using beans and peas. Add garbanzo or kidney beans to salads. Make burritos and tacos with mashed colbert beans or black beans. Where can you learn more? Go to http://steven-alexandru.info/. Enter J681 in the search box to learn more about \"DASH Diet: Care Instructions. \" Current as of: September 21, 2016 Content Version: 11.4 © 4954-0239 FreshBooks. Care instructions adapted under license by [employer] (which disclaims liability or warranty for this information).  If you have questions about a medical condition or this instruction, always ask your healthcare professional. William Hunter[employer] Incorporated disclaims any warranty or liability for your use of this information. Introducing [facility] & HEALTH SERVICES! Dear Meg Olivo: Thank you for requesting a [facility] account. Our records indicate that you already have an active [facility] account. You can access your account anytime at [email or web address] Did you know that you can access your hospital and ER discharge instructions at any time in [facility]? You can also review all of your test results from your hospital stay or ER visit. Additional Information If you have questions, please visit the Frequently Asked Questions section of the [facility] website at [email or web address] Remember, [facility] is NOT to be used for urgent needs. For medical emergencies, dial 911. Now available from your iPhone and Android! Please provide this summary of care documentation to your next provider. Your primary care clinician is listed as Chanel Valencia. If you have any questions after today's visit, please call [phone].

## 2018-06-01 ENCOUNTER — OFFICE VISIT (OUTPATIENT)
Dept: ORTHOPEDIC SURGERY | Age: 48
End: 2018-06-01

## 2018-06-01 VITALS
HEART RATE: 79 BPM | WEIGHT: 189.4 LBS | DIASTOLIC BLOOD PRESSURE: 82 MMHG | TEMPERATURE: 98 F | OXYGEN SATURATION: 99 % | BODY MASS INDEX: 34.85 KG/M2 | SYSTOLIC BLOOD PRESSURE: 136 MMHG | HEIGHT: 62 IN | RESPIRATION RATE: 16 BRPM

## 2018-06-01 DIAGNOSIS — M62.838 MUSCLE SPASM: ICD-10-CM

## 2018-06-01 DIAGNOSIS — Z86.19 HISTORY OF SHINGLES: ICD-10-CM

## 2018-06-01 DIAGNOSIS — M51.36 DDD (DEGENERATIVE DISC DISEASE), LUMBAR: ICD-10-CM

## 2018-06-01 DIAGNOSIS — M47.816 LUMBAR FACET ARTHROPATHY: ICD-10-CM

## 2018-06-01 DIAGNOSIS — M25.551 RIGHT HIP PAIN: Primary | ICD-10-CM

## 2018-06-01 NOTE — PATIENT INSTRUCTIONS

## 2018-06-01 NOTE — MR AVS SNAPSHOT
[address] [facility] [facility] [address] [facility] [address] [phone] Patient: Khoa Hernandes MRN: [id number] :1970 Visit Information Date & Time Provider Department Dept. Phone Encounter #  
 2018  3:00 PM Kamari Combs MD [facility] [phone] Follow-up Instructions Return in about 4 weeks (around 2018) for Diagnostic Test follow up. Routing History Your Appointments 6/15/2018  9:30 AM  
Office Visit with Yolanda An NP [facility] ([facility]anchal) Appt Note: 4 wk f/u for abd bloating/lab/imaging results. [address] [id number] [phone]  
  
   
 [address],  [address] Upcoming Health Maintenance Date Due  
 PAP AKA CERVICAL CYTOLOGY 1992 DTaP/Tdap/Td series (1 - Tdap) 2019* Influenza Age 5 to Adult 2018 *Topic was postponed. The date shown is not the original due date. Allergies as of 2018  Review Complete On: 2018 By: Kamari Combs MD  
  
 Severity Noted Reaction Type Reactions Apple High 2012    Anaphylaxis Wasps [Hymenoptera Allergenic Extract] High 2018    Anaphylaxis Cephalexin  2012    Hives Current Immunizations  Never Reviewed No immunizations on file. Not reviewed this visit You Were Diagnosed With   
  
 Codes Comments Right hip pain    -  Primary ICD-10-CM: M25.551 ICD-9-CM: 719.45   
 DDD (degenerative disc disease), lumbar     ICD-10-CM: M51.36 
ICD-9-CM: 722.52 Lumbar facet arthropathy (HCC)     ICD-10-CM: M46.96 
ICD-9-CM: 721.3 Muscle spasm     ICD-10-CM: R11.459 ICD-9-CM: 728.85 History of shingles     ICD-10-CM: Z86.19 ICD-9-CM: V12.09 Vitals BP Pulse Temp Resp Height(growth percentile) Weight(growth percentile) 136/82 79 98 °F (36.7 °C) (Oral) 16 5' 2\" (1.575 m) 189 lb 6.4 oz (85.9 kg) SpO2 BMI OB Status Smoking Status 99% 34.64 kg/m2 Ablation Passive Smoke Exposure - Never Smoker BMI and BSA Data Body Mass Index Body Surface Area  
 34.64 kg/m 2 1.94 m 2 Preferred Pharmacy Pharmacy Name Phone [address] [place] [address]. [phone] Your Updated Medication List  
  
   
This list is accurate as of 6/1/18  4:44 PM.  Always use your most recent med list.  
  
  
  
  
 acetaminophen-codeine 300-30 mg per tablet Commonly known as:  TYLENOL #3 Take 1 Tab by mouth every four (4) hours as needed for Pain. Max Daily Amount: 6 Tabs.  
  
 gabapentin 800 mg tablet Commonly known as:  NEURONTIN Take 1 Tab by mouth three (3) times daily. lovastatin 20 mg tablet Commonly known as:  MEVACOR Take 1 Tab by mouth nightly. methocarbamol 500 mg tablet Commonly known as:  ROBAXIN  
1-2 tabs by mouth every 6 hours as needed, not to exceed 8 tablets in 24 hours  
  
 sertraline 100 mg tablet Commonly known as:  ZOLOFT Take 2 Tabs by mouth daily. We Performed the Following AMB POC X-RAY RADEX HIP UNI WITH PELVIS 2-3 VIEWS [09133 CPT(R)] Follow-up Instructions Return in about 4 weeks (around 6/29/2018) for Diagnostic Test follow up. Patient Instructions Low Back Pain: Exercises Your Care Instructions Here are some examples of typical rehabilitation exercises for your condition. Start each exercise slowly. Ease off the exercise if you start to have pain. Your doctor or physical therapist will tell you when you can start these exercises and which ones will work best for you. How to do the exercises Press-up 1. Lie on your stomach, supporting your body with your forearms. 2. Press your elbows down into the floor to raise your upper back.  As you do this, relax your stomach muscles and allow your back to arch without using your back muscles. As your press up, do not let your hips or pelvis come off the floor. 3. Hold for 15 to 30 seconds, then relax. 4. Repeat 2 to 4 times. Alternate arm and leg (bird dog) exercise Do this exercise slowly. Try to keep your body straight at all times, and do not let one hip drop lower than the other. 1. Start on the floor, on your hands and knees. 2. Tighten your belly muscles. 3. Raise one leg off the floor, and hold it straight out behind you. Be careful not to let your hip drop down, because that will twist your trunk. 4. Hold for about 6 seconds, then lower your leg and switch to the other leg. 5. Repeat 8 to 12 times on each leg. 6. Over time, work up to holding for 10 to 30 seconds each time. 7. If you feel stable and secure with your leg raised, try raising the opposite arm straight out in front of you at the same time. Knee-to-chest exercise 1. Lie on your back with your knees bent and your feet flat on the floor. 2. Bring one knee to your chest, keeping the other foot flat on the floor (or keeping the other leg straight, whichever feels better on your lower back). 3. Keep your lower back pressed to the floor. Hold for at least 15 to 30 seconds. 4. Relax, and lower the knee to the starting position. 5. Repeat with the other leg. Repeat 2 to 4 times with each leg. 6. To get more stretch, put your other leg flat on the floor while pulling your knee to your chest. 
Curl-ups 1. Lie on the floor on your back with your knees bent at a 90-degree angle. Your feet should be flat on the floor, about 12 inches from your buttocks. 2. Cross your arms over your chest. If this bothers your neck, try putting your hands behind your neck (not your head), with your elbows spread apart. 3. Slowly tighten your belly muscles and raise your shoulder blades off the floor. 4. Keep your head in line with your body, and do not press your chin to your chest. 
5. Hold this position for 1 or 2 seconds, then slowly lower yourself back down to the floor. 6. Repeat 8 to 12 times. Pelvic tilt exercise 1. Lie on your back with your knees bent. 2. \"Brace\" your stomach. This means to tighten your muscles by pulling in and imagining your belly button moving toward your spine. You should feel like your back is pressing to the floor and your hips and pelvis are rocking back. 3. Hold for about 6 seconds while you breathe smoothly. 4. Repeat 8 to 12 times. Heel dig bridging 1. Lie on your back with both knees bent and your ankles bent so that only your heels are digging into the floor. Your knees should be bent about 90 degrees. 2. Then push your heels into the floor, squeeze your buttocks, and lift your hips off the floor until your shoulders, hips, and knees are all in a straight line. 3. Hold for about 6 seconds as you continue to breathe normally, and then slowly lower your hips back down to the floor and rest for up to 10 seconds. 4. Do 8 to 12 repetitions. Hamstring stretch in doorway 1. Lie on your back in a doorway, with one leg through the open door. 2. Slide your leg up the wall to straighten your knee. You should feel a gentle stretch down the back of your leg. 3. Hold the stretch for at least 15 to 30 seconds. Do not arch your back, point your toes, or bend either knee. Keep one heel touching the floor and the other heel touching the wall. 4. Repeat with your other leg. 5. Do 2 to 4 times for each leg. Hip flexor stretch 1. Kneel on the floor with one knee bent and one leg behind you. Place your forward knee over your foot. Keep your other knee touching the floor. 2. Slowly push your hips forward until you feel a stretch in the upper thigh of your rear leg. 3. Hold the stretch for at least 15 to 30 seconds. Repeat with your other leg. 4. Do 2 to 4 times on each side. Wall sit 1. Stand with your back 10 to 12 inches away from a wall. 2. Lean into the wall until your back is flat against it. 3. Slowly slide down until your knees are slightly bent, pressing your lower back into the wall. 4. Hold for about 6 seconds, then slide back up the wall. 5. Repeat 8 to 12 times. Follow-up care is a key part of your treatment and safety. Be sure to make and go to all appointments, and call your doctor if you are having problems. It's also a good idea to know your test results and keep a list of the medicines you take. Where can you learn more? Go to http://steven-alexandru.info/. Enter K661 in the search box to learn more about \"Low Back Pain: Exercises. \" Current as of: March 21, 2017 Content Version: 11.4 © 2495-3750 [employer]. Care instructions adapted under license by [employer] (which disclaims liability or warranty for this information). If you have questions about a medical condition or this instruction, always ask your healthcare professional. [place] [address] any warranty or liability for your use of this information. Introducing [facility]! Dear Chad Ortez: Thank you for requesting a [employer] account. Our records indicate that you already have an active [employer] account. You can access your account anytime at [email or web address] Did you know that you can access your hospital and ER discharge instructions at any time in [employer]? You can also review all of your test results from your hospital stay or ER visit. Additional Information If you have questions, please visit the Frequently Asked Questions section of the [employer] website at [email or web address] Remember, [employer] is NOT to be used for urgent needs. For medical emergencies, dial 911. Now available from your iPhone and Android! Please provide this summary of care documentation to your next provider. Your primary care clinician is listed as Kaylen Sheldon. If you have any questions after today's visit, please call [phone].

## 2018-06-01 NOTE — PROGRESS NOTES
[facility]  [address] [place], [address]  Phone: [phone]  Fax: [phone]    NEW PATIENT  Pt's YOB: 1970    ASSESSMENT   Diagnoses and all orders for this visit:    1. Right hip pain  -     [28588] Hip Uni with Pelvis 2-3 Views    2. DDD (degenerative disc disease), lumbar    3. Lumbar facet arthropathy ([address]    4. Muscle spasm    5. History of shingles         IMPRESSION AND PLAN:  55 yo F presents today as a new patient with c/o chronic low back pain, worse in the past 9 months. X-ray evidence reveals significant facet arthropathy. 1) Pt was given information on low back pain exercises. 2) Recommended she continue with her ongoing follow up for her recurrent shingles episodes. I am hesitant to prescribe any steroids until that workup is complete given possible immunological ramifications. 3) MRI lumbar spine  4) Recommended physical therapy with aquatherapy  5) Ms. Gemma Reese has a reminder for a \"due or due soon\" health maintenance. I have asked that she contact her primary care provider, Trae Gore NP, for follow-up on this health maintenance. 6)  demonstrated consistency with prescribing. 7) Pt will follow-up in 1 weeks or sooner if needed. HISTORY OF PRESENT ILLNESS:  Jemima Nayak is a 56 y.o. female with history of chronic low back which has been ongoing for years but with worsening pain x 9 months. Pt presents to the office today as a new patient. She reports chronic recurrences of shingles on a chronic basis and is on acyclovir on a chronic basis. Her pain interferes with her sleep and normal activities and radiates to the level of her knee and into her groin. She takes 800mg gabapentin TID and has worsening pain despite this regimen. She has had no relief with Tylenol #3. She also reports an episode of her L leg \"giving out\" suddenly when jogging recently.      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 Social History Main Topics    Smoking status: Passive Smoke Exposure - Never Smoker     Years: 6.00    Smokeless tobacco: Never Used      Comment: last attempt to quit 1/1/2003    Alcohol use 0.5 oz/week     1 Cans of beer per week    Drug use: No    Sexual activity: Yes     Partners: Male     Other Topics Concern    Not on file     Social History Narrative       Current Outpatient Prescriptions   Medication Sig Dispense Refill    lovastatin (MEVACOR) 20 mg tablet Take 1 Tab by mouth nightly. 30 Tab 2    acetaminophen-codeine (TYLENOL #3) 300-30 mg per tablet Take 1 Tab by mouth every four (4) hours as needed for Pain. Max Daily Amount: 6 Tabs. 30 Tab 0    sertraline (ZOLOFT) 100 mg tablet Take 2 Tabs by mouth daily. 60 Tab 3    gabapentin (NEURONTIN) 800 mg tablet Take 1 Tab by mouth three (3) times daily. 90 Tab 3    EPINEPHrine (EPIPEN) 0.3 mg/0.3 mL injection 0.3 mL by IntraMUSCular route once as needed for up to 1 dose. Indications: Anaphylaxis 1 Syringe 2    methocarbamol (ROBAXIN) 500 mg tablet 1-2 tabs by mouth every 6 hours as needed, not to exceed 8 tablets in 24 hours 60 Tab 0       Allergies   Allergen Reactions    Apple Anaphylaxis    Wasps [Hymenoptera Allergenic Extract] Anaphylaxis    Cephalexin Hives       REVIEW OF SYSTEMS    Constitutional: Negative for fever, chills, or weight change. Respiratory: Negative for cough or shortness of breath.      Cardiovascular: Negative for chest pain or palpitations. Gastrointestinal: Negative for acid reflux, change in bowel habits, or constipation. Genitourinary: Negative for dysuria and flank pain. Musculoskeletal: Positive for low back pain. Skin: Negative for rash. Neurological: Negative for headaches, dizziness, or numbness. Endo/Heme/Allergies: Negative for increased bruising. Psychiatric/Behavioral: Negative for difficulty with sleep. PHYSICAL EXAMINATION  Visit Vitals    /82    Pulse 79    Temp 98 °F (36.7 °C) (Oral)    Resp 16    Ht 5' 2\" (1.575 m)    Wt 189 lb 6.4 oz (85.9 kg)    SpO2 99%    BMI 34.64 kg/m2       Constitutional: Awake, alert, and in no acute distress. HEENT: Normocephalic. Atraumatic. Oropharynx is moist and clear. PERRL. EOMI. Sclerae are nonicteric  Heart: Regular rate and rhythm  Lungs: Clear to auscultation bilaterally  Abdomen: Soft and nontender. Bowel sounds are present  Neurological: 1+ symmetrical DTRs in the upper extremities. 1+ symmetrical DTRs in the lower extremities. Sensation to light touch is intact. Negative Katey's sign bilaterally. Skin: warm, dry, and intact. Musculoskeletal: Pain with axial loading of the lumbar spine. No pain with internal or external rotation of her hips. SLR negative bilaterally. Biceps  Triceps Deltoids Wrist Ext Wrist Flex Hand Intrin   Right +4/5 +4/5 +4/5 +4/5 +4/5 +4/5   Left +4/5 +4/5 +4/5 +4/5 +4/5 +4/5      Hip Flex  Quads Hamstrings Ankle DF EHL Ankle PF   Right +4/5 +4/5 +4/5 +4/5 +4/5 +4/5   Left +4/5 +4/5 +4/5 +4/5 +4/5 +4/5     IMAGING:    AP pelvis and R frogleg hip in office today reviewed and demonstrate:     Mild joint space narrowing of the R hip joint. No obvious bony pathology. Lumbar spine plain films of 1/18/2018 personally reviewed with the patient and demonstrated:    COMPARISON: Chest x-ray December 25, 2017.     FINDINGS:     Five views obtained. The T12 vertebral body does not carry ribs.  The vertebra  are normal in height and alignment. The disc spaces are mildly narrowed from L2  through S1 with small ventral spurs. The pedicles and pars are intact. There is  no evidence of fracture or dislocation. Mineralization is normal.     IMPRESSION:     Mild degenerative disc disease from L2 through S1    Written by Zaida Leggett, as dictated by Chelsea Valentin MD.  I, Dr. Chelsea Valentin confirm that all documentation is accurate.

## 2018-06-02 RX ORDER — EPINEPHRINE 0.3 MG/.3ML
0.3 INJECTION SUBCUTANEOUS
Qty: 1 SYRINGE | Refills: 2 | Status: SHIPPED | OUTPATIENT
Start: 2018-06-02 | End: 2019-01-04 | Stop reason: SDUPTHER

## 2018-06-15 ENCOUNTER — OFFICE VISIT (OUTPATIENT)
Dept: FAMILY MEDICINE CLINIC | Age: 48
End: 2018-06-15

## 2018-06-15 ENCOUNTER — PATIENT MESSAGE (OUTPATIENT)
Dept: FAMILY MEDICINE CLINIC | Age: 48
End: 2018-06-15

## 2018-06-15 VITALS
WEIGHT: 188.6 LBS | HEIGHT: 62 IN | OXYGEN SATURATION: 98 % | BODY MASS INDEX: 34.71 KG/M2 | TEMPERATURE: 98.1 F | DIASTOLIC BLOOD PRESSURE: 74 MMHG | RESPIRATION RATE: 16 BRPM | SYSTOLIC BLOOD PRESSURE: 114 MMHG | HEART RATE: 82 BPM

## 2018-06-15 DIAGNOSIS — M51.36 DDD (DEGENERATIVE DISC DISEASE), LUMBAR: Primary | ICD-10-CM

## 2018-06-15 DIAGNOSIS — M47.816 LUMBAR FACET ARTHROPATHY: ICD-10-CM

## 2018-06-15 DIAGNOSIS — M79.7 FIBROMYALGIA: ICD-10-CM

## 2018-06-15 DIAGNOSIS — M54.41 CHRONIC BILATERAL LOW BACK PAIN WITH RIGHT-SIDED SCIATICA: ICD-10-CM

## 2018-06-15 DIAGNOSIS — G89.29 CHRONIC BILATERAL LOW BACK PAIN WITH RIGHT-SIDED SCIATICA: ICD-10-CM

## 2018-06-15 RX ORDER — ACETAMINOPHEN AND CODEINE PHOSPHATE 300; 30 MG/1; MG/1
1 TABLET ORAL
Qty: 30 TAB | Refills: 0 | Status: SHIPPED | OUTPATIENT
Start: 2018-06-15 | End: 2018-07-06 | Stop reason: SDUPTHER

## 2018-06-15 RX ORDER — METHOCARBAMOL 500 MG/1
TABLET, FILM COATED ORAL
Qty: 60 TAB | Refills: 0 | Status: SHIPPED | OUTPATIENT
Start: 2018-06-15 | End: 2018-06-15 | Stop reason: SDUPTHER

## 2018-06-15 RX ORDER — METHOCARBAMOL 500 MG/1
TABLET, FILM COATED ORAL
Qty: 60 TAB | Refills: 0 | Status: SHIPPED | OUTPATIENT
Start: 2018-06-15 | End: 2018-06-18

## 2018-06-15 NOTE — PROGRESS NOTES
HISTORY OF PRESENT ILLNESS    Randa Price is a 56y.o. year old female comes in today to be evaluated and treated for:  Back pain     States pain has not gotten worse since last visit. States pain continues to radiate to right knee. Patient reports has been seen by Dr. Aarti Jones. Recommended to have PT with aquatherapy. Scheduled for f/u in 1 week. Patient scheduled her MRI today. Reports she taking tylenol #3, gabapentin with improvement, takes strictly prn, requesting refill today. Comments she not been able to receive her robaxin as of yet the pharmacy did not have medication in stock. Requesting printed rx today. Allergies   Allergen Reactions    Apple Anaphylaxis    Wasps [Hymenoptera Allergenic Extract] Anaphylaxis    Cephalexin Hives     Current Outpatient Prescriptions   Medication Sig Dispense Refill    EPINEPHrine (EPIPEN) 0.3 mg/0.3 mL injection 0.3 mL by IntraMUSCular route once as needed for up to 1 dose. Indications: Anaphylaxis 1 Syringe 2    lovastatin (MEVACOR) 20 mg tablet Take 1 Tab by mouth nightly. 30 Tab 2    acetaminophen-codeine (TYLENOL #3) 300-30 mg per tablet Take 1 Tab by mouth every four (4) hours as needed for Pain. Max Daily Amount: 6 Tabs. 30 Tab 0    sertraline (ZOLOFT) 100 mg tablet Take 2 Tabs by mouth daily. 60 Tab 3    gabapentin (NEURONTIN) 800 mg tablet Take 1 Tab by mouth three (3) times daily.  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    Pain, upper back     Psychiatric disorder     depression     Tattoo     Unspecified deficiency anemia 1999       ROS:  Review of Systems - General ROS: negative for - chills, fatigue or fever  Respiratory ROS: no cough, shortness of breath, or wheezing  Cardiovascular ROS: no chest pain or dyspnea on exertion  Musculoskeletal ROS: positive for - lower back pain        Objective:  Visit Vitals    /74 (BP 1 Location: Left arm, BP Patient Position: Sitting)    Pulse 82    Temp 98.1 °F (36.7 °C) (Oral)    Resp 16    Ht 5' 2\" (1.575 m)    Wt 188 lb 9.6 oz (85.5 kg)    SpO2 98%    BMI 34.5 kg/m2     General appearance - alert, well appearing, and in no distress  Neck - supple, no significant adenopathy  Chest - clear to auscultation, no wheezes, rales or rhonchi, symmetric air entry  Heart - normal rate, regular rhythm, normal S1, S2, no murmurs, rubs, clicks or gallops  Back: tenderness to bilateral lumbar paravertebral muscles, R>L, negative straight leg raise bilaterally          Assessment/Plan:     ICD-10-CM ICD-9-CM    1. DDD (degenerative disc disease), lumbar M51.36 722.52    2. Fibromyalgia M79.7 729.1    3. Lumbar facet arthropathy (HCC) M46.96 721.3    4. Chronic bilateral low back pain with right-sided sciatica M54.41 724.2 acetaminophen-codeine (TYLENOL #3) 300-30 mg per tablet    G89.29 724.3 methocarbamol (ROBAXIN) 500 mg tablet     338.29 DISCONTINUED: methocarbamol (ROBAXIN) 500 mg tablet   single refill granted for tylenol #3  Back exercises with exercise ball provided and reviewed   reviewed and there is no suspicious activity noted. Usage is consistent with prescribed medications. I have discussed the diagnosis with the patient and the intended plan as seen in the above orders. The patient has received an after-visit summary and questions were answered concerning future plans. I have discussed medication side effects and warnings with the patient as well. Patient agreeable with above plan and verbalizes understanding. Follow-up Disposition:  Return in about 2 months (around 8/15/2018) for hyperlipidemia .

## 2018-06-15 NOTE — PROGRESS NOTES
Chief Complaint   Patient presents with   Maribel Camacho Results     Patient is here today for F/U and results. Pt sts that she with to see the spine specialist.    1. Have you been to the ER, urgent care clinic since your last visit? Hospitalized since your last visit? No    2. Have you seen or consulted any other health care providers outside of the [address] [place] [address] since your last visit? Include any pap smears or colon screening.  No

## 2018-06-15 NOTE — PATIENT INSTRUCTIONS
Therapeutic Ball: Back Exercises  Your Care Instructions  Here are some examples of typical exercises for your condition. Start each exercise slowly. Ease off the exercise if you start to have pain. Your doctor or physical therapist will tell you when you can start these exercises and which ones will work best for you. To prepare, make sure that your ball is the right size for you. When inflated and firm, it should allow you to sit with your hips and knees bent at about a 90-degree angle (like the letter L). How to do the exercises  Seated position on ball    1. Use this exercise to get used to moving on the ball and to find your best sitting position. 2. Sit comfortably on the ball with your feet about hip-width apart. If you feel unsteady, rest your hands on the ball near your hips. 3. As you do this exercise, try to keep your shoulders and upper body relaxed and still. 4. Using your stomach and back muscles to move your pelvis, roll the ball forward. This will round your back. 5. Still using your stomach and back muscles, roll the ball back. You will arch your back. 6. Repeat this rounding-arching motion a few times. 7. Stop in between the two positions, where your back is not rounded or arched. This is called your neutral position. Pelvic rotation    1. Sit tall on the ball. 2. Slowly rotate your hips in a [place] pattern. Keep the movement focused at your hips. 3. Repeat, but [place] in the other direction. 4. Repeat 8 to 12 times. Postural sitting    1. Use this position to find a stable, relaxed posture on the ball. You can use this position as your starting point for other ball exercises. If you feel unsteady on the ball, start on a chair first.  2. Sit on a ball or chair, with your feet planted straight in front of you. 3. Imagine that a string at the top of your head is pulling you straight up. Think of yourself as 2 inches taller than you are.  4. Slightly tuck your chin.   5. Keep your shoulders back and relaxed. Knee extension    1. Sit tall on the ball with your feet planted in front of you, hip-width apart. As you do this exercise, avoid slumping your shoulders and arching your back. 2. Rest your hands on the ball near your hip or a steady object next to you. (If you feel very stable on the ball, rest your hands in your lap or at your side.)  3. Slowly straighten one leg at the knee. Slowly lower it back down. Repeat with the other leg. 4. Repeat this exercise 8 to 12 times. Roll-ups    1. Lie on your back with your knees bent, feet resting on the floor. 2. Lay the ball on your thighs. Rest your hands up high on the ball. 3. Raising your head and shoulder blades, roll the ball up your thighs. Exhale as you roll up. 4. If this is hard on your neck, gently support your lower head and upper neck with one hand. Don't use that hand to pull your head up. 5. Repeat 8 to 12 times. Ball curls    1. Lie on your back with your ankles resting on the ball, knees straight. 2. Use your legs to roll the exercise ball toward you. Allow your knees to bend and move closer to your chest.  3. Pause briefly, and then roll the ball to the starting position. Try to keep the ball rolling straight. You will feel the muscles in your lower belly working. 4. Repeat 8 to 12 times. Bridge with ball under legs    1. Lie on your back with your legs up, calves resting on the ball. For more challenge, rest your heels on the ball. 2. Look up at the ceiling, and keep your chin relaxed. You can place a small pillow under your head or neck for comfort. 3. With your arms by your side, press your hands onto the floor for stability. 4. Tighten your belly muscles by pulling in your belly button toward your spine. 5. Push your heels down toward the floor, squeeze your buttocks, and lift your hips off the floor until your shoulders, hips, and knees are all in a straight line. 6. Try to keep the ball steady.  Hold for about 6 seconds as you continue to breathe normally. 7. Slowly lower your hips back down to the floor. 8. Repeat 8 to 12 times. Ball curls with bridge    1. Start flat on your back with your ankles resting on the ball. 2. Look up at the ceiling, and keep your chin relaxed. You can place a small pillow under your head or neck for comfort. 3. With your arms by your side, press your hands onto the floor for stability. 4. Tighten your belly muscles by pulling in your belly button toward your spine. 5. Push your heels down toward the floor, squeeze your buttocks, and lift your hips off the floor until your shoulders, hips, and knees are all in a straight line. 6. While holding the bridge position, roll the ball toward you with your heels. Keep your hips as level as you can. 7. Pause briefly, and then roll the ball back out. Try to keep the ball rolling straight. You will feel the muscles in your lower belly working as you straighten your legs. 8. Lower your hips, and return to your starting position. 9. Repeat 8 to 12 times. 10. When you can keep your body and the ball steady throughout this exercise, you're ready for more challenge. Try keeping your hips raised while rolling the ball out, holding the bridge, and rolling back, a few times in a row. Praying alyssa    1. Kneel upright with the ball in front of you. 2. To start, clasp your hands together. Rest them on the ball in front of you. 3. As you do this exercise, keep your back and hips straight and tighten your belly and buttocks muscles. Keep your knees in place. 4. Press on the ball with your arms. Lean forward from the knees. This rolls the ball forward. You will bear most of your weight on your arms. 5. If your back starts to ache, you've gone too far. Pull back a bit. 6. Roll back to the start position. 7. Repeat 8 to 12 times. Walk-out plank on ball    1. Kneel over the ball. Place your hands on the floor in front of you.   2. Walk your hands forward until your legs are straight on the ball. This is the plank position. 3. When in plank position, hold your body straight and tighten your belly and buttocks muscles. Keep your chin slightly tucked. 4. Roll as far forward as you can without losing your balance or letting your hips drop. You may stop with the ball under your thighs, or even under your knees or shins. 5. Hold a few seconds, then walk your hands back and return to the start position. 6. Repeat 8 to 12 times. Push-up with thighs on ball    1. Kneel over the ball. Place your hands on the floor in front of you. 2. Walk your hands forward until your legs are straight on the ball. This is the plank position. 3. When in plank position, hold your body straight and tighten your belly and buttocks muscles. Keep your chin slightly tucked. 4. Roll as far forward as you can without losing your balance or letting your hips drop. You may stop with the ball under your thighs, or even under your knees or shins. 5. Bend your elbows. Slowly lower your body toward the ground as far as you can without losing your balance. 6. If your wrists hurt, try moving your hands a little farther apart so they're not right under your shoulders. 7. Slowly straighten your arms. 8. Do 8 to 12 of these push-ups. Wall squat with ball    1. Stand facing away from a wall. Place your feet about shoulder-width apart. 2. Place the ball between your middle back and the wall. Move your feet out in front of you so they are about a foot in front of your hips. 3. Keep your arms at your sides, or put your hands on your hips. 4. Slowly squat down as if you are going to sit in a chair, rolling your back over the ball as you squat. The ball should move with you but stay pressed into the wall. 5. Be sure that your knees do not go in front of your toes as you squat. 6. Hold for 6 seconds. 7. Slowly rise to your standing position. 8. Repeat 8 to 12 times.   Child's pose with ball    1. Kneeling upright with your back straight, rest your hands on the ball in front of you. 2. Breathe out as you bend at the hips, and roll the ball forward. Lower your chest toward the ground, and drop your hips back toward your heels. 3. To stretch your upper back and shoulders, hold this position for 15 to 30 seconds. 4. Repeat 2 to 4 times. Follow-up care is a key part of your treatment and safety. Be sure to make and go to all appointments, and call your doctor if you are having problems. It's also a good idea to know your test results and keep a list of the medicines you take. Where can you learn more? Go to http://steven-alexandru.info/. Enter Q515 in the search box to learn more about \"Therapeutic Ball: Back Exercises. \"  Current as of: March 21, 2017  Content Version: 11.4  © 4345-4552 Healthwise, Incorporated. Care instructions adapted under license by [employer] (which disclaims liability or warranty for this information). If you have questions about a medical condition or this instruction, always ask your healthcare professional. [place] [address] any warranty or liability for your use of this information.

## 2018-06-15 NOTE — MR AVS SNAPSHOT
Seferino Braxton 
 
 
 [address] [facility] [address] [address] 
[phone] Patient: La Nena Browne MRN: [id number] :1970 Visit Information Date & Time Provider Department Dept. Phone Encounter #  
 6/15/2018  9:30 AM [facility] [phone] [id number] Follow-up Instructions Return in about 2 months (around 8/15/2018) for hyperlipidemia . Your Appointments 7/10/2018 12:50 PM  
DIAG TEST F/U with Ron Purcell MD  
[address] [place] [address], [address] [facility]) Appt Note: mri f/u bring disk [address] [facility] [address]  
[phone]  
  
   
 [address] [facility] [address] Upcoming Health Maintenance Date Due  
 PAP AKA CERVICAL CYTOLOGY 1992 DTaP/Tdap/Td series (1 - Tdap) 2019* Influenza Age 5 to Adult 2018 *Topic was postponed. The date shown is not the original due date. Allergies as of 6/15/2018  Review Complete On: 6/15/2018 By: Zayda Ramírez NP Severity Noted Reaction Type Reactions Apple High 2012    Anaphylaxis Wasps [Hymenoptera Allergenic Extract] High 2018    Anaphylaxis Cephalexin  2012    Hives Current Immunizations  Never Reviewed No immunizations on file. Not reviewed this visit You Were Diagnosed With   
  
 Codes Comments DDD (degenerative disc disease), lumbar    -  Primary ICD-10-CM: M51.36 
ICD-9-CM: 722.52 Fibromyalgia     ICD-10-CM: M79.7 ICD-9-CM: 729.1 Lumbar facet arthropathy ([address]     ICD-10-CM: M46.96 
ICD-9-CM: 963. 3 Chronic bilateral low back pain with right-sided sciatica     ICD-10-CM: M54.41, G89.29 ICD-9-CM: 724.2, 724.3, 338.29 Vitals BP Pulse Temp Resp Height(growth percentile) Weight(growth percentile)  114/74 (BP 1 Location: Left arm, BP Patient Position: Sitting) 82 98.1 °F (36.7 °C) (Oral) 16 5' 2\" (1.575 m) 188 lb 9.6 oz (85.5 kg) SpO2 BMI OB Status Smoking Status 98% 34.5 kg/m2 Ablation Passive Smoke Exposure - Never Smoker Vitals History BMI and BSA Data Body Mass Index Body Surface Area 34.5 kg/m 2 1.93 m 2 Preferred Pharmacy Pharmacy Name Phone [address] [place] [address]. [phone] Your Updated Medication List  
  
   
This list is accurate as of 6/15/18 10:57 AM.  Always use your most recent med list.  
  
  
  
  
 acetaminophen-codeine 300-30 mg per tablet Commonly known as:  TYLENOL #3 Take 1 Tab by mouth every four (4) hours as needed for Pain. Max Daily Amount: 6 Tabs. EPINEPHrine 0.3 mg/0.3 mL injection Commonly known as:  EPIPEN  
0.3 mL by IntraMUSCular route once as needed for up to 1 dose. Indications: Anaphylaxis  
  
 gabapentin 800 mg tablet Commonly known as:  NEURONTIN Take 1 Tab by mouth three (3) times daily. lovastatin 20 mg tablet Commonly known as:  MEVACOR Take 1 Tab by mouth nightly. methocarbamol 500 mg tablet Commonly known as:  ROBAXIN  
1-2 tabs by mouth every 6 hours as needed, not to exceed 8 tablets in 24 hours  
  
 sertraline 100 mg tablet Commonly known as:  ZOLOFT Take 2 Tabs by mouth daily. Prescriptions Printed Refills  
 acetaminophen-codeine (TYLENOL #3) 300-30 mg per tablet 0 Sig: Take 1 Tab by mouth every four (4) hours as needed for Pain. Max Daily Amount: 6 Tabs. Class: Print Route: Oral  
 methocarbamol (ROBAXIN) 500 mg tablet 0 Si-2 tabs by mouth every 6 hours as needed, not to exceed 8 tablets in 24 hours Class: Print Follow-up Instructions Return in about 2 months (around 8/15/2018) for hyperlipidemia . To-Do List   
 2018 8:30 PM  
  Appointment with [facility] RM 1 at [facility] RAD MRI ([phone]) GENERAL INSTRUCTIONS  Bring information (ID card) if you have any medically implanted devices. You will be required to lie still while the procedure is being performed. Remove any jewelry (including body piercing, hairpins) prior to MRI. If you have had a creatinine level drawn within the past 30 days, please bring most recent results to your appt. Bring any films, CD's, and reports related to your study with you on the day of your exam.  This only includes studies done outside of [address] [place] [address], [facility], [place], and [facility]. Bring a complete list of all medications you are currently taking to include prescriptions, over-the-counter meds, herbals, vitamins & any dietary supplements. If you were given medications for claustrophobia or anxiety, please arrange to have someone drive you to your appointment. QUESTIONS  Notify the MRI Department if you have any questions concerning your study. [place] - [phone] [address] [phone] Patient Instructions Therapeutic Ball: Back Exercises Your Care Instructions Here are some examples of typical exercises for your condition. Start each exercise slowly. Ease off the exercise if you start to have pain. Your doctor or physical therapist will tell you when you can start these exercises and which ones will work best for you. To prepare, make sure that your ball is the right size for you. When inflated and firm, it should allow you to sit with your hips and knees bent at about a 90-degree angle (like the letter L). How to do the exercises Seated position on ball 1. Use this exercise to get used to moving on the ball and to find your best sitting position. 2. Sit comfortably on the ball with your feet about hip-width apart. If you feel unsteady, rest your hands on the ball near your hips. 3. As you do this exercise, try to keep your shoulders and upper body relaxed and still. 4. Using your stomach and back muscles to move your pelvis, roll the ball forward. This will round your back. 5. Still using your stomach and back muscles, roll the ball back. You will arch your back. 6. Repeat this rounding-arching motion a few times. 7. Stop in between the two positions, where your back is not rounded or arched. This is called your neutral position. Pelvic rotation 1. Sit tall on the ball. 2. Slowly rotate your hips in a [place] pattern. Keep the movement focused at your hips. 3. Repeat, but [place] in the other direction. 4. Repeat 8 to 12 times. Postural sitting 1. Use this position to find a stable, relaxed posture on the ball. You can use this position as your starting point for other ball exercises. If you feel unsteady on the ball, start on a chair first. 
2. Sit on a ball or chair, with your feet planted straight in front of you. 3. Imagine that a string at the top of your head is pulling you straight up. Think of yourself as 2 inches taller than you are. 
4. Slightly tuck your chin. 5. Keep your shoulders back and relaxed. Knee extension 1. Sit tall on the ball with your feet planted in front of you, hip-width apart. As you do this exercise, avoid slumping your shoulders and arching your back. 2. Rest your hands on the ball near your hip or a steady object next to you. (If you feel very stable on the ball, rest your hands in your lap or at your side.) 3. Slowly straighten one leg at the knee. Slowly lower it back down. Repeat with the other leg. 4. Repeat this exercise 8 to 12 times. Roll-ups 1. Lie on your back with your knees bent, feet resting on the floor. 2. Lay the ball on your thighs. Rest your hands up high on the ball. 3. Raising your head and shoulder blades, roll the ball up your thighs. Exhale as you roll up. 4. If this is hard on your neck, gently support your lower head and upper neck with one hand. Don't use that hand to pull your head up. 5. Repeat 8 to 12 times. Ball curls 1. Lie on your back with your ankles resting on the ball, knees straight. 2. Use your legs to roll the exercise ball toward you. Allow your knees to bend and move closer to your chest. 
3. Pause briefly, and then roll the ball to the starting position. Try to keep the ball rolling straight. You will feel the muscles in your lower belly working. 4. Repeat 8 to 12 times. Bridge with ball under legs 1. Lie on your back with your legs up, calves resting on the ball. For more challenge, rest your heels on the ball. 2. Look up at the ceiling, and keep your chin relaxed. You can place a small pillow under your head or neck for comfort. 3. With your arms by your side, press your hands onto the floor for stability. 4. Tighten your belly muscles by pulling in your belly button toward your spine. 5. Push your heels down toward the floor, squeeze your buttocks, and lift your hips off the floor until your shoulders, hips, and knees are all in a straight line. 6. Try to keep the ball steady. Hold for about 6 seconds as you continue to breathe normally. 7. Slowly lower your hips back down to the floor. 8. Repeat 8 to 12 times. Ball curls with bridge 1. Start flat on your back with your ankles resting on the ball. 2. Look up at the ceiling, and keep your chin relaxed. You can place a small pillow under your head or neck for comfort. 3. With your arms by your side, press your hands onto the floor for stability. 4. Tighten your belly muscles by pulling in your belly button toward your spine. 5. Push your heels down toward the floor, squeeze your buttocks, and lift your hips off the floor until your shoulders, hips, and knees are all in a straight line. 6. While holding the bridge position, roll the ball toward you with your heels. Keep your hips as level as you can. 7. Pause briefly, and then roll the ball back out.  Try to keep the ball rolling straight. You will feel the muscles in your lower belly working as you straighten your legs. 8. Lower your hips, and return to your starting position. 9. Repeat 8 to 12 times. 10. When you can keep your body and the ball steady throughout this exercise, you're ready for more challenge. Try keeping your hips raised while rolling the ball out, holding the bridge, and rolling back, a few times in a row. Praying alyssa 1. Kneel upright with the ball in front of you. 2. To start, clasp your hands together. Rest them on the ball in front of you. 3. As you do this exercise, keep your back and hips straight and tighten your belly and buttocks muscles. Keep your knees in place. 4. Press on the ball with your arms. Lean forward from the knees. This rolls the ball forward. You will bear most of your weight on your arms. 5. If your back starts to ache, you've gone too far. Pull back a bit. 6. Roll back to the start position. 7. Repeat 8 to 12 times. Walk-out plank on ball 1. Kneel over the ball. Place your hands on the floor in front of you. 2. Walk your hands forward until your legs are straight on the ball. This is the plank position. 3. When in plank position, hold your body straight and tighten your belly and buttocks muscles. Keep your chin slightly tucked. 4. Roll as far forward as you can without losing your balance or letting your hips drop. You may stop with the ball under your thighs, or even under your knees or shins. 5. Hold a few seconds, then walk your hands back and return to the start position. 6. Repeat 8 to 12 times. Push-up with thighs on ball 1. Kneel over the ball. Place your hands on the floor in front of you. 2. Walk your hands forward until your legs are straight on the ball. This is the plank position. 3. When in plank position, hold your body straight and tighten your belly and buttocks muscles. Keep your chin slightly tucked. 4. Roll as far forward as you can without losing your balance or letting your hips drop. You may stop with the ball under your thighs, or even under your knees or shins. 5. Bend your elbows. Slowly lower your body toward the ground as far as you can without losing your balance. 6. If your wrists hurt, try moving your hands a little farther apart so they're not right under your shoulders. 7. Slowly straighten your arms. 8. Do 8 to 12 of these push-ups. Wall squat with ball 1. Stand facing away from a wall. Place your feet about shoulder-width apart. 2. Place the ball between your middle back and the wall. Move your feet out in front of you so they are about a foot in front of your hips. 3. Keep your arms at your sides, or put your hands on your hips. 4. Slowly squat down as if you are going to sit in a chair, rolling your back over the ball as you squat. The ball should move with you but stay pressed into the wall. 5. Be sure that your knees do not go in front of your toes as you squat. 6. Hold for 6 seconds. 7. Slowly rise to your standing position. 8. Repeat 8 to 12 times. Child's pose with ball 1. Kneeling upright with your back straight, rest your hands on the ball in front of you. 2. Breathe out as you bend at the hips, and roll the ball forward. Lower your chest toward the ground, and drop your hips back toward your heels. 3. To stretch your upper back and shoulders, hold this position for 15 to 30 seconds. 4. Repeat 2 to 4 times. Follow-up care is a key part of your treatment and safety. Be sure to make and go to all appointments, and call your doctor if you are having problems. It's also a good idea to know your test results and keep a list of the medicines you take. Where can you learn more? Go to http://steven-alexandru.info/. Enter L290 in the search box to learn more about \"Therapeutic Ball: Back Exercises. \" Current as of: March 21, 2017 Content Version: 11.4 © 0970-7877 Healthwise, Incorporated. Care instructions adapted under license by [employer] (which disclaims liability or warranty for this information). If you have questions about a medical condition or this instruction, always ask your healthcare professional. [address] any warranty or liability for your use of this information. Introducing [facility]! Dear Lucy Cazares: Thank you for requesting a [facility] [employer] account. Our records indicate that you already have an active [employer] account. You can access your account anytime at [email or web address] Did you know that you can access your hospital and ER discharge instructions at any time in [employer]? You can also review all of your test results from your hospital stay or ER visit. Additional Information If you have questions, please visit the Frequently Asked Questions section of the [employer] website at [email or web address] Remember, [employer] is NOT to be used for urgent needs. For medical emergencies, dial 911. Now available from your iPhone and Android! Please provide this summary of care documentation to your next provider. Your primary care clinician is listed as Shoaib Parks. If you have any questions after today's visit, please call [phone].

## 2018-06-18 ENCOUNTER — TELEPHONE (OUTPATIENT)
Dept: FAMILY MEDICINE CLINIC | Age: 48
End: 2018-06-18

## 2018-06-18 RX ORDER — CHLORZOXAZONE 500 MG/1
500 TABLET ORAL
Qty: 60 TAB | Refills: 0 | Status: SHIPPED | OUTPATIENT
Start: 2018-06-18 | End: 2018-07-16

## 2018-06-18 NOTE — TELEPHONE ENCOUNTER
I called Pt back in regards to Rx. Informed Pt that another Rx Chlorzoxazone was sent to the pharmacy. And with the Good Rx elysia the medication should cost only  $23.26. LM on  for return call if needed.

## 2018-06-18 NOTE — TELEPHONE ENCOUNTER
Please advise patient we do not dispense medications, but I did send the rx for her epi pen to the pharmacy. Also I sent over the generic Parafon Forte(Chlorzoxazone) to the pharmacy. 60 tabs at [place] is $23.26 with [facility] elysia. Thanks!

## 2018-06-18 NOTE — TELEPHONE ENCOUNTER
Jinny Hernandez LPN 2/58/8170 9:43 PM EDT        ----- Message -----   From: Jerri Sotomayor   Sent: 6/15/2018 1:58 PM   To: [facility] Nurses  Subject: Prescription Question     Dear Arjun Williamson has told me they are having difficulty filling all prescriptions involving robaxin weather it's 500mg or 750mgs in all stores. The manufacturers of robaxin have had a hard time keeping up with THE demand for this particular drug. Ryan Kyle has suggested you try a different prescription for me at this time. Please just shoot me a message WHEN you decide what I should do next and I forgot to grab the really important epi-pen you ordered for me may I please come by the office next week and pick it up. Thank you again for all your help I'm truly grateful.  Very kindly, Mrs. Shawanda Lion

## 2018-06-18 NOTE — TELEPHONE ENCOUNTER
Awais Husain please advise. Pt sts that [address] has Rx for Robaxin on back order. I have called and the pharmacist sts they are not sure when they will get it in. Is there anything else you would like to give in its place. Please let me know. .    Thank you      ---- Message from Juan Carlos Elkins LPN sent at 2/74/6929  2:27 PM EDT -----  Regarding: FW: Prescription Question  Contact: [phone]      ----- Message -----     From: Estela Bello     Sent: 6/15/2018   1:58 PM       To: [facility]  Subject: Prescription Question                            Dear Carline Cummings has told me they are having difficulty filling all prescriptions involving robaxin weather it's 500mg or 750mgs in all stores. The manufacturers of robaxin have had a hard time keeping up with THE demand for this particular drug. Slime Savage has suggested you try a different prescription for me at this time. Please just shoot me a message WHEN you decide what I should do next and I forgot to grab the really important epi-pen you ordered for me may I please come by the office next week and pick it up. Thank you again for all your help I'm truly grateful.  Very kindly, Mrs. Derik Zavala

## 2018-06-22 ENCOUNTER — HOSPITAL ENCOUNTER (OUTPATIENT)
Dept: MRI IMAGING | Age: 48
Discharge: HOME | End: 2018-06-22
Attending: NURSE PRACTITIONER
Payer: SUBSIDIZED

## 2018-06-22 DIAGNOSIS — M54.41 CHRONIC BILATERAL LOW BACK PAIN WITH RIGHT-SIDED SCIATICA: ICD-10-CM

## 2018-06-22 DIAGNOSIS — G89.29 CHRONIC BILATERAL LOW BACK PAIN WITH RIGHT-SIDED SCIATICA: ICD-10-CM

## 2018-06-22 PROCEDURE — A9575 INJ GADOTERATE MEGLUMI 0.1ML: HCPCS | Performed by: NURSE PRACTITIONER

## 2018-06-22 PROCEDURE — 72158 MRI LUMBAR SPINE W/O & W/DYE: CPT

## 2018-06-22 PROCEDURE — 74011636320 HC RX REV CODE- 636/320: Performed by: NURSE PRACTITIONER

## 2018-06-22 RX ADMIN — GADOTERATE MEGLUMINE 16 ML: 376.9 INJECTION INTRAVENOUS at 22:17

## 2018-07-06 DIAGNOSIS — M54.41 CHRONIC BILATERAL LOW BACK PAIN WITH RIGHT-SIDED SCIATICA: ICD-10-CM

## 2018-07-06 DIAGNOSIS — G89.29 CHRONIC BILATERAL LOW BACK PAIN WITH RIGHT-SIDED SCIATICA: ICD-10-CM

## 2018-07-06 RX ORDER — ACETAMINOPHEN AND CODEINE PHOSPHATE 300; 30 MG/1; MG/1
1 TABLET ORAL
Qty: 30 TAB | Refills: 0 | Status: SHIPPED | OUTPATIENT
Start: 2018-07-06 | End: 2018-08-20 | Stop reason: SDUPTHER

## 2018-07-06 RX ORDER — NALOXONE HYDROCHLORIDE 4 MG/.1ML
SPRAY NASAL
Qty: 1 EACH | Refills: 0 | Status: SHIPPED | OUTPATIENT
Start: 2018-07-06 | End: 2018-12-11 | Stop reason: ALTCHOICE

## 2018-07-06 NOTE — PROGRESS NOTES
Informed patient current refill will be the last refill authorized.  reviewed and there is no suspicious activity noted. Usage is consistent with prescribed medications.

## 2018-07-16 ENCOUNTER — OFFICE VISIT (OUTPATIENT)
Dept: ORTHOPEDIC SURGERY | Age: 48
End: 2018-07-16

## 2018-07-16 VITALS
HEIGHT: 62 IN | RESPIRATION RATE: 18 BRPM | TEMPERATURE: 97.9 F | OXYGEN SATURATION: 99 % | BODY MASS INDEX: 34.04 KG/M2 | DIASTOLIC BLOOD PRESSURE: 71 MMHG | WEIGHT: 185 LBS | HEART RATE: 72 BPM | SYSTOLIC BLOOD PRESSURE: 115 MMHG

## 2018-07-16 DIAGNOSIS — M62.838 MUSCLE SPASM: ICD-10-CM

## 2018-07-16 DIAGNOSIS — M47.816 LUMBAR FACET ARTHROPATHY: ICD-10-CM

## 2018-07-16 DIAGNOSIS — M79.18 MYOFASCIAL PAIN: ICD-10-CM

## 2018-07-16 DIAGNOSIS — M48.062 SPINAL STENOSIS OF LUMBAR REGION WITH NEUROGENIC CLAUDICATION: Primary | ICD-10-CM

## 2018-07-16 RX ORDER — CYCLOBENZAPRINE HCL 10 MG
10 TABLET ORAL
Qty: 60 TAB | Refills: 2 | Status: SHIPPED | OUTPATIENT
Start: 2018-07-16 | End: 2018-08-20 | Stop reason: ALTCHOICE

## 2018-07-16 RX ORDER — MELOXICAM 15 MG/1
15 TABLET ORAL DAILY
Qty: 30 TAB | Refills: 2 | Status: SHIPPED | OUTPATIENT
Start: 2018-07-16 | End: 2018-12-11 | Stop reason: ALTCHOICE

## 2018-07-16 RX ORDER — KETOROLAC TROMETHAMINE 30 MG/ML
60 INJECTION, SOLUTION INTRAMUSCULAR; INTRAVENOUS ONCE
Qty: 2 ML | Refills: 0
Start: 2018-07-16 | End: 2018-07-16

## 2018-07-16 NOTE — MR AVS SNAPSHOT
[address] [facility] [address] 
[phone] Patient: Jaiden Salcido MRN: [id number] :1970 Visit Information Date & Time Provider Department Dept. Phone Encounter #  
 2018  1:15 PM Shaista Castaneda MD [facility] [phone] [id number] Follow-up Instructions Return in about 3 weeks (around 2018) for surgical consult with Dr. Meliza Gillis. Your Appointments 2018  8:45 AM  
Office Visit with Viktor Dawson NP [facility] ([facility]anchal) Appt Note: 2 mo f/u for hyperlipidemia [address] [address]  
[phone]  
  
   
 [address],  [address] Upcoming Health Maintenance Date Due  
 PAP AKA CERVICAL CYTOLOGY 1992 DTaP/Tdap/Td series (1 - Tdap) 2019* Influenza Age 5 to Adult 2018 *Topic was postponed. The date shown is not the original due date. Allergies as of 2018  Review Complete On: 2018 By: Shaista Castaneda MD  
  
 Severity Noted Reaction Type Reactions Apple High 2012    Anaphylaxis Wasps [Hymenoptera Allergenic Extract] High 2018    Anaphylaxis Cephalexin  2012    Hives Current Immunizations  Never Reviewed No immunizations on file. Not reviewed this visit You Were Diagnosed With   
  
 Codes Comments Spinal stenosis of lumbar region with neurogenic claudication    -  Primary ICD-10-CM: N02.851 
ICD-9-CM: 724.03 Lumbar facet arthropathy (HCC)     ICD-10-CM: M46.96 
ICD-9-CM: 721.3 Muscle spasm     ICD-10-CM: V86.537 ICD-9-CM: 728.85 Myofascial pain     ICD-10-CM: M79.1 ICD-9-CM: 729.1 Vitals BP Pulse Temp Resp Height(growth percentile) Weight(growth percentile) 115/71 72 97.9 °F (36.6 °C) 18 5' 2\" (1.575 m) 185 lb (83.9 kg) SpO2 BMI OB Status Smoking Status 99% 33.84 kg/m2 Ablation Passive Smoke Exposure - Never Smoker BMI and BSA Data Body Mass Index Body Surface Area  
 33.84 kg/m 2 1.92 m 2 Preferred Pharmacy Pharmacy Name Phone [address] [place] [address]. [phone] Your Updated Medication List  
  
   
This list is accurate as of 7/16/18  2:29 PM.  Always use your most recent med list.  
  
  
  
  
 acetaminophen-codeine 300-30 mg per tablet Commonly known as:  TYLENOL #3 Take 1 Tab by mouth every four (4) hours as needed for Pain. Max Daily Amount: 6 Tabs. cyclobenzaprine 10 mg tablet Commonly known as:  FLEXERIL Take 1 Tab by mouth three (3) times daily as needed for Muscle Spasm(s). EPINEPHrine 0.3 mg/0.3 mL injection Commonly known as:  EPIPEN  
0.3 mL by IntraMUSCular route once as needed for up to 1 dose. Indications: Anaphylaxis  
  
 gabapentin 800 mg tablet Commonly known as:  NEURONTIN Take 1 Tab by mouth three (3) times daily. lovastatin 20 mg tablet Commonly known as:  MEVACOR Take 1 Tab by mouth nightly. meloxicam 15 mg tablet Commonly known as:  MOBIC Take 1 Tab by mouth daily. naloxone 4 mg/actuation nasal spray Commonly known as:  [employer] Use 1 spray intranasally into 1 nostril. Use a new Narcan nasal spray for subsequent doses and administer into alternating nostrils. May repeat every 2 to 3 minutes as needed for opioid overdose symptoms. sertraline 100 mg tablet Commonly known as:  ZOLOFT Take 2 Tabs by mouth daily. Prescriptions Printed Refills  
 meloxicam (MOBIC) 15 mg tablet 2 Sig: Take 1 Tab by mouth daily. Class: Print Route: Oral  
 cyclobenzaprine (FLEXERIL) 10 mg tablet 2 Sig: Take 1 Tab by mouth three (3) times daily as needed for Muscle Spasm(s). Class: Print Route: Oral  
  
Follow-up Instructions Return in about 3 weeks (around 8/6/2018) for surgical consult with Dr. Jt Lantigua. Patient Instructions Low Back Arthritis: Exercises Your Care Instructions Here are some examples of typical rehabilitation exercises for your condition. Start each exercise slowly. Ease off the exercise if you start to have pain. Your doctor or physical therapist will tell you when you can start these exercises and which ones will work best for you. When you are not being active, find a comfortable position for rest. Some people are comfortable on the floor or a medium-firm bed with a small pillow under their head and another under their knees. Some people prefer to lie on their side with a pillow between their knees. Don't stay in one position for too long. Take short walks (10 to 20 minutes) every 2 to 3 hours. Avoid slopes, hills, and stairs until you feel better. Walk only distances you can manage without pain, especially leg pain. How to do the exercises Pelvic tilt 1. Lie on your back with your knees bent. 2. \"Brace\" your stomach-tighten your muscles by pulling in and imagining your belly button moving toward your spine. 3. Press your lower back into the floor. You should feel your hips and pelvis rock back. 4. Hold for 6 seconds while breathing smoothly. 5. Relax and allow your pelvis and hips to rock forward. 6. Repeat 8 to 12 times. Back stretches 1. Get down on your hands and knees on the floor. 2. Relax your head and allow it to droop. Round your back up toward the ceiling until you feel a nice stretch in your upper, middle, and lower back. Hold this stretch for as long as it feels comfortable, or about 15 to 30 seconds. 3. Return to the starting position with a flat back while you are on your hands and knees. 4. Let your back sway by pressing your stomach toward the floor. Lift your buttocks toward the ceiling. 5. Hold this position for 15 to 30 seconds. 6. Repeat 2 to 4 times. Follow-up care is a key part of your treatment and safety. Be sure to make and go to all appointments, and call your doctor if you are having problems. It's also a good idea to know your test results and keep a list of the medicines you take. Where can you learn more? Go to http://steven-alexandru.info/. Enter L895 in the search box to learn more about \"Low Back Arthritis: Exercises. \" Current as of: November 29, 2017 Content Version: 11.7 © 8651-2966 [employer]. Care instructions adapted under license by [employer] (which disclaims liability or warranty for this information). If you have questions about a medical condition or this instruction, always ask your healthcare professional. [address] any warranty or liability for your use of this information. Introducing [facility]! Dear Johanny Handley: Thank you for requesting a ThromboV[employer] account. Our records indicate that you already have an active ThromboVision account. You can access your account anytime at [email or web address] Did you know that you can access your hospital and ER discharge instructions at any time in ThromboVision? You can also review all of your test results from your hospital stay or ER visit. Additional Information If you have questions, please visit the Frequently Asked Questions section of the ThromboVision website at [email or web address] Remember, ThromboVision is NOT to be used for urgent needs. For medical emergencies, dial 911. Now available from your iPhone and Android! Please provide this summary of care documentation to your next provider. Your primary care clinician is listed as Viktor Dawson. If you have any questions after today's visit, please call [phone].

## 2018-07-16 NOTE — PROGRESS NOTES
[facility]  [address] [place] [address], [facility]  Phone: [phone]  Fax: [phone]    Pt's YOB: 1970    ASSESSMENT   Diagnoses and all orders for this visit:    1. Spinal stenosis of lumbar region with neurogenic claudication  -     meloxicam (MOBIC) 15 mg tablet; Take 1 Tab by mouth daily. -     cyclobenzaprine (FLEXERIL) 10 mg tablet; Take 1 Tab by mouth three (3) times daily as needed for Muscle Spasm(s). -     ketorolac tromethamine (TORADOL) 60 mg/2 mL soln; 2 mL by IntraMUSCular route once for 1 dose. -     KETOROLAC TROMETHAMINE INJ (Qty 4)  -     THER/PROPH/DIAG INJECTION, SUBCUT/IM    2. Lumbar facet arthropathy (HCC)  -     meloxicam (MOBIC) 15 mg tablet; Take 1 Tab by mouth daily. -     cyclobenzaprine (FLEXERIL) 10 mg tablet; Take 1 Tab by mouth three (3) times daily as needed for Muscle Spasm(s). -     ketorolac tromethamine (TORADOL) 60 mg/2 mL soln; 2 mL by IntraMUSCular route once for 1 dose. -     KETOROLAC TROMETHAMINE INJ (Qty 4)  -     THER/PROPH/DIAG INJECTION, SUBCUT/IM    3. Muscle spasm  -     meloxicam (MOBIC) 15 mg tablet; Take 1 Tab by mouth daily. -     cyclobenzaprine (FLEXERIL) 10 mg tablet; Take 1 Tab by mouth three (3) times daily as needed for Muscle Spasm(s). -     ketorolac tromethamine (TORADOL) 60 mg/2 mL soln; 2 mL by IntraMUSCular route once for 1 dose. -     KETOROLAC TROMETHAMINE INJ (Qty 4)  -     THER/PROPH/DIAG INJECTION, SUBCUT/IM    4. Myofascial pain  -     meloxicam (MOBIC) 15 mg tablet; Take 1 Tab by mouth daily. -     cyclobenzaprine (FLEXERIL) 10 mg tablet; Take 1 Tab by mouth three (3) times daily as needed for Muscle Spasm(s). -     ketorolac tromethamine (TORADOL) 60 mg/2 mL soln; 2 mL by IntraMUSCular route once for 1 dose. -     KETOROLAC TROMETHAMINE INJ (Qty 4)  -     THER/PROPH/DIAG INJECTION, SUBCUT/IM       IMPRESSION AND PLAN:  Maame Greenfield is a 56 y.o. female with history of chronic lumbar pain and spinal stenosis. Pt complains of progressive lower back pain over the last couple years. She notes that her pain is severe at night and sometimes brings her to tears. Pt takes Tylenol #3 3-4 days and Neurontin 800 mg.    1) Pt was given information on lumbar arthritis exercises. 2) Discussed treatment options with the Pt, including surgical intervention. 3) Pt will follow up with Dr. Jenaro Galarza for surgical evaluation regarding her lumbar spinal stenosis. 4) She was prescribed Flexeril 10 mg 1 tab BID-TID prn muscle spasm. 5) Pt was prescribed Mobic 15 mg 1 tab daily prn.  6) He received a Toradol injection in the office today. 7) Ms. Cynthia Valverde has a reminder for a \"due or due soon\" health maintenance. I have asked that she contact her primary care provider, Eric Hayden NP, for follow-up on this health maintenance. 8)  demonstrated consistency with prescribing. 9) Pt will follow-up with Dr. Jenaro Galarza. HISTORY OF PRESENT ILLNESS:  Genoveva Gonzalez is a 56 y.o. female with history of chronic lumbar pain. She presents to the office today for lumbar MRI follow up. Pt complains of progressive lower back pain over the last couple years. She reports that over the last 6 months her lower back pain \"has been ridiculous. \" Pt notes that 10 years ago she was very active but now her pain impedes her from being very active. She notes that her pain is severe at night and sometimes brings her to tears. Pt states that she experiences increased lower back pain after walking. She denies any change in bowel/bladder control. Pt notes that her legs occasionally give out on her and she has experienced 3 episodes where she completely lost all feeling in her left leg and notes that it felt as if it disappeared. She tried steroid injections in the past but they were not effective. Pt takes Tylenol #3 3-4 days a week and admits that the medication is not very effective.  She states that she has been taking Neurontin 800 mg for years due to recurring shingles. Pt takes ibuprofen and naproxen as needed and admits to acid reflux. She tolerated Flexeril in the past. Pt at this time desires to proceed with surgical intervention. Of note, patient is a nonsmoker. 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 Social History Main Topics    Smoking status: Passive Smoke Exposure - Never Smoker     Years: 6.00    Smokeless tobacco: Never Used      Comment: last attempt to quit 1/1/2003    Alcohol use 0.5 oz/week     1 Cans of beer per week    Drug use: No    Sexual activity: Yes     Partners: Male     Other Topics Concern    Not on file     Social History Narrative       Current Outpatient Prescriptions   Medication Sig Dispense Refill    meloxicam (MOBIC) 15 mg tablet Take 1 Tab by mouth daily. 30 Tab 2    cyclobenzaprine (FLEXERIL) 10 mg tablet Take 1 Tab by mouth three (3) times daily as needed for Muscle Spasm(s). 60 Tab 2    acetaminophen-codeine (TYLENOL #3) 300-30 mg per tablet Take 1 Tab by mouth every four (4) hours as needed for Pain. Max Daily Amount: 6 Tabs. 30 Tab 0    lovastatin (MEVACOR) 20 mg tablet Take 1 Tab by mouth nightly. 30 Tab 2    sertraline (ZOLOFT) 100 mg tablet Take 2 Tabs by mouth daily. 60 Tab 3    gabapentin (NEURONTIN) 800 mg tablet Take 1 Tab by mouth three (3) times daily.  90 Tab 3    naloxone (NARCAN) 4 mg/actuation nasal spray Use 1 spray intranasally into 1 nostril. Use a new Narcan nasal spray for subsequent doses and administer into alternating nostrils. May repeat every 2 to 3 minutes as needed for opioid overdose symptoms. 1 Each 0    EPINEPHrine (EPIPEN) 0.3 mg/0.3 mL injection 0.3 mL by IntraMUSCular route once as needed for up to 1 dose. Indications: Anaphylaxis 1 Syringe 2       Allergies   Allergen Reactions    Apple Anaphylaxis    Wasps [Hymenoptera Allergenic Extract] Anaphylaxis    Cephalexin Hives         REVIEW OF SYSTEMS    Constitutional: Negative for fever, chills, or weight change. Respiratory: Negative for cough or shortness of breath. Cardiovascular: Negative for chest pain or palpitations. Gastrointestinal: Positive for acid reflux. Negative for change in bowel habits or constipation. Genitourinary: Negative for dysuria and flank pain. Musculoskeletal: Positive for lumbar pain. Skin: Negative for rash. Neurological: Negative for headaches, dizziness, or numbness. Endo/Heme/Allergies: Negative for increased bruising. Psychiatric/Behavioral: Negative for difficulty with sleep. PHYSICAL EXAMINATION  Visit Vitals    /71    Pulse 72    Temp 97.9 °F (36.6 °C)    Resp 18    Ht 5' 2\" (1.575 m)    Wt 185 lb (83.9 kg)    SpO2 99%    BMI 33.84 kg/m2       Constitutional: Awake, alert, and in no acute distress. Neurological: 1+ symmetrical DTRs in the upper extremities. 1+ symmetrical DTRs in the lower extremities. Sensation to light touch is intact. Negative Katey's sign bilaterally. Skin: warm, dry, and intact. Musculoskeletal: Tenderness to palpation in the lower lumbar region. Pain with extension and axial loading. Improved with forward flexion. No pain with internal or external rotation of her hips. Negative straight leg raise bilaterally.       Biceps  Triceps Deltoids Wrist Ext Wrist Flex Hand Intrin   Right +4/5 +4/5 +4/5 +4/5 +4/5 +4/5   Left +4/5 +4/5 +4/5 +4/5 +4/5 +4/5      Hip Flex  Quads Hamstrings Ankle DF EHL Ankle PF   Right +4/5 +4/5 +4/5 +4/5 +4/5 +4/5   Left +4/5 +4/5 +4/5 +4/5 +4/5 +4/5     IMAGING:    Lumbar spine MRI from 06/22/2018 was personally reviewed with the patient and demonstrated:    Results from [place] encounter on 06/22/18   MRI LUMB SPINE W WO CONT   Narrative MR lumbar spine with and without contrast    HISTORY: Worsening back pain continues to radiate to right knee. COMPARISON: 1/18/2018 radiographs    TECHNIQUE: Lumbar spine scanned with axial and sagittal T1W scans, sagittal  STIR, axial and sagittal T2W scans, and with post gadolinium axial and sagittal  T1W scans. CONTRAST: 16 cc Dotarem administered intravenously. FINDINGS: Normal alignment and vertebral body heights. Nonpathologic marrow  signal. No suspicious disc or bone enhancement. The conus medullaris is normal  in signal and during at the T12-L1 disc level. No clumping or enhancement of the  cauda equina nerve roots. Paraspinal soft tissues are unremarkable. Spinal canal and neural foramen:      T12-L1: No significant degenerative disc disease. No central canal or foraminal  stenosis. L1-L2: No significant degenerative disc disease. No central canal or foraminal  stenosis. L2-L3: No significant degenerative disc disease. No central canal or foraminal  stenosis. L3-L4: No significant degenerative disc disease. Mild facet arthropathy and  thickening of ligamentum flavum. Mild central canal stenosis. No foraminal  stenosis. L4-L5: Mildly narrowed disc with moderately sized central disc protrusion. Mild  facet arthropathy and thickening of ligamentum flavum. Severe central canal  stenosis. Mild bilateral foraminal stenosis. No compression of the exiting nerve  roots. L5-S1: Enhancing posterior annular fissure and tiny central disc protrusion. Mild facet arthropathy. No central canal or foraminal stenosis.               Impression Impression:    Moderately sized central disc protrusion with facet arthropathy at L4-5  resulting in severe central canal stenosis. Enhancing annular fissure and tiny central disc protrusion at L5-S1 without  central canal stenosis. AP pelvis and R frogleg hip in office from 06/01/2018 reviewed and demonstrate:   Mild joint space narrowing of the R hip joint. No obvious bony pathology.         Lumbar spine plain films of 1/18/2018 personally reviewed with the patient and demonstrated:  COMPARISON: Chest x-ray December 25, 2017.      FINDINGS:      Five views obtained. The T12 vertebral body does not carry ribs. The vertebra  are normal in height and alignment.  The disc spaces are mildly narrowed from L2  through S1 with small ventral spurs. The pedicles and pars are intact. There is  no evidence of fracture or dislocation. Mineralization is normal.      IMPRESSION:      Mild degenerative disc disease from L2 through S1      Written by Ann Gillette, as dictated by Chemo Carlton MD.  I, Dr. Chemo Carlton confirm that all documentation is accurate.

## 2018-07-16 NOTE — PATIENT INSTRUCTIONS

## 2018-08-07 ENCOUNTER — TELEPHONE (OUTPATIENT)
Dept: ORTHOPEDIC SURGERY | Age: 48
End: 2018-08-07

## 2018-08-07 ENCOUNTER — OFFICE VISIT (OUTPATIENT)
Dept: ORTHOPEDIC SURGERY | Age: 48
End: 2018-08-07

## 2018-08-07 ENCOUNTER — DOCUMENTATION ONLY (OUTPATIENT)
Dept: ORTHOPEDIC SURGERY | Age: 48
End: 2018-08-07

## 2018-08-07 VITALS
HEART RATE: 73 BPM | TEMPERATURE: 98 F | SYSTOLIC BLOOD PRESSURE: 137 MMHG | DIASTOLIC BLOOD PRESSURE: 89 MMHG | WEIGHT: 189 LBS | RESPIRATION RATE: 16 BRPM | OXYGEN SATURATION: 99 % | BODY MASS INDEX: 34.78 KG/M2 | HEIGHT: 62 IN

## 2018-08-07 DIAGNOSIS — M51.26 HNP (HERNIATED NUCLEUS PULPOSUS), LUMBAR: Primary | ICD-10-CM

## 2018-08-07 DIAGNOSIS — M79.604 PAIN IN RIGHT LEG: ICD-10-CM

## 2018-08-07 DIAGNOSIS — B02.9 HERPES ZOSTER WITHOUT COMPLICATION: ICD-10-CM

## 2018-08-07 DIAGNOSIS — Z86.19 HISTORY OF SHINGLES: ICD-10-CM

## 2018-08-07 PROBLEM — M48.062 SPINAL STENOSIS, LUMBAR REGION WITH NEUROGENIC CLAUDICATION: Status: ACTIVE | Noted: 2018-08-07

## 2018-08-07 RX ORDER — METHOCARBAMOL 500 MG/1
500 TABLET, FILM COATED ORAL AS NEEDED
COMMUNITY
End: 2018-08-20 | Stop reason: SDUPTHER

## 2018-08-07 NOTE — PROGRESS NOTES
[address]  [address], [address]  [place], [address] [place] [address] Street  Phone: [phone]  Fax: [phone]  INITIAL CONSULTATION  Patient: Rodrigo Strange                MRN: [id number]       SSN: [id number]  YOB: 1970        AGE: 56 y.o. SEX: female  Body mass index is 34.57 kg/(m^2). PCP: Demarcus Butts NP  08/07/18    Chief Complaint   Patient presents with    Back Pain     Surgery Consult     Leg Pain    Hip Pain         HISTORY OF PRESENT ILLNESS, RADIOGRAPHS, and PLAN:         HISTORY OF PRESENT ILLNESS:  Ms. Fran Lantigua is seen today at the request of Dr. Wilber Amezquita and Winnie Carter, nurse practitioner. The patient is a 76-year-old female with a history of asthma. She has also had chronic back pain with chronic recurrent shingles affecting her right buttock and leg. She has been seen in the emergency room multiple times with the evident rash. She has been treated successfully with antivirals. It keeps coming back. She has never had a workup for it. She has been found to have an L4-L5 disc bulge with relative stenosis at L4-L5. Evidently, years ago she had some sort of injections done by Dr. Tenzin Cazares without improvement. She has no restless leg symptoms. She has some back pain. She has never had a nerve conduction study. PHYSICAL EXAMINATION:  Her exam is significant for normal strength and sensation, with no nerve tension signs. She has no instability. ASSESSMENT/PLAN:  I discussed the matter at length with her. I am concerned about why she is having these recurrent shingles at her age, affecting her right buttock and leg. She may need to be on chronic antivirals. While she does have a disc bulge at L4-L5, she has no nerve tension signs. She has this predominant right paracentral back pain and right buttock pain with this recurrent rash. Obviously, surgery is not going to do anything for viral neuritis that is recurrent.   She may need to be on chronic antivirals. She has had no physical therapy. She has had no other conservative treatments. I would like her evaluated by neurology to see what interventions they would recommend that may include an EMG by them. They may wish to put her on chronic medications. They may wish to do an evaluation to see if she is immunosuppressed as to the source of this chronic recurrent neuritis. I discussed this at length with her. We will try to obtain the records from Dr. Yefri Groves to see what injections she had in her back that were so unsuccessful for her. At this point, I am not convinced that there is a surgery to be offered to her. Again, she may have some measure of stenotic symptoms, but she has no nerve tension signs. We will see her back after her evaluation by neurology and after obtaining records from Dr. Yefri Groves.     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  cyclobenzaprine (FLEXERIL) 10 mg tablet Take 1 Tab by mouth three (3) times daily as needed for Muscle Spasm(s). 60 Tab 2    EPINEPHrine (EPIPEN) 0.3 mg/0.3 mL injection 0.3 mL by IntraMUSCular route once as needed for up to 1 dose. Indications: Anaphylaxis 1 Syringe 2    lovastatin (MEVACOR) 20 mg tablet Take 1 Tab by mouth nightly.  30 Tab 2    sertraline (ZOLOFT) 100 mg tablet Take 2 Tabs by mouth daily. 60 Tab 3    gabapentin (NEURONTIN) 800 mg tablet Take 1 Tab by mouth three (3) times daily. 90 Tab 3    meloxicam (MOBIC) 15 mg tablet Take 1 Tab by mouth daily. 30 Tab 2    acetaminophen-codeine (TYLENOL #3) 300-30 mg per tablet Take 1 Tab by mouth every four (4) hours as needed for Pain. Max Daily Amount: 6 Tabs. 30 Tab 0    naloxone (NARCAN) 4 mg/actuation nasal spray Use 1 spray intranasally into 1 nostril. Use a new Narcan nasal spray for subsequent doses and administer into alternating nostrils. May repeat every 2 to 3 minutes as needed for opioid overdose symptoms. 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      Social History Main Topics    Smoking status: Passive Smoke Exposure - Never Smoker     Years: 6.00    Smokeless tobacco: Never Used      Comment: last attempt to quit 1/1/2003    Alcohol use 0.5 oz/week     1 Cans of beer per week    Drug use: No    Sexual activity: Yes     Partners: Male     Other Topics Concern    Not on file     Social History Narrative           REVIEW OF SYSTEMS:   CONSTITUTIONAL SYMPTOMS: Negative. EYES:  Negative. EARS, NOSE, THROAT AND MOUTH:  Negative. CARDIOVASCULAR:  Negative. RESPIRATORY:  Negative. GENITOURINARY: Per HPI. GASTROINTESTINAL:  Per HPI. INTEGUMENTARY (SKIN AND/OR BREAST):  Negative. MUSCULOSKELETAL: Per HPI.   ENDOCRINE/RHEUMATOLOGIC:  Negative. NEUROLOGICAL:  Per HPI. HEMATOLOGIC/LYMPHATIC:  Negative. ALLERGIC/IMMUNOLOGIC:  Negative. PSYCHIATRIC:  Negative. PHYSICAL EXAMINATION:   Visit Vitals    /89    Pulse 73    Temp 98 °F (36.7 °C)    Resp 16    Ht 5' 2\" (1.575 m)    Wt 189 lb (85.7 kg)    SpO2 99%    BMI 34.57 kg/m2    PAIN SCALE: 7/10    CONSTITUTIONAL: The patient is in no apparent distress and is alert and oriented x 3. HEENT: Normocephalic. Hearing grossly intact. NECK: Supple and symmetric. no tenderness, or masses were felt. RESPIRATORY: No labored breathing. CARDIOVASCULAR: The carotid pulses were normal. Peripheral pulses were 2+. CHEST: Normal AP diameter and normal contour without any kyphoscoliosis. LYMPHATIC: No lymphadenopathy was appreciated in the neck, axillae or groin. SKIN:  Negative for scars, rashes, lesions, or ulcers on the right upper, right lower, left upper, left lower and trunk. NEUROLOGICAL: Alert and oriented x 3. Ambulation without assistive device. FWB. EXTREMITIES:  See musculoskeletal.  MUSCULOSKELETAL:   Head and Neck:  Negative for misalignment, asymmetry, crepitation, defects, tenderness masses or effusions.  Left Upper Extremity: Inspection, percussion and palpation preformed. Hymans sign is negative.  Right Upper Extremity: Inspection, percussion and palpation preformed. Hymans sign is negative.  Spine, Ribs and Pelvis: Right sided low back pain. Inspection, percussion and palpation preformed. Negative for misalignment, asymmetry, crepitation, defects, tenderness masses or effusions. + shopping cart sign.  Left Lower Extremity: Weakness.  Inspection, percussion and palpation preformed. Negative straight leg raise.  Right Lower Extremity:  Shooting pain. Inspection, percussion and palpation preformed. Negative straight leg raise. SPINE EXAM:       Lumbar spine: No rash, ecchymosis, or gross obliquity. No focal atrophy is noted. ASSESSMENT    ICD-10-CM ICD-9-CM    1. HNP (herniated nucleus pulposus), lumbar M51.26 722.10 methocarbamol (ROBAXIN) 500 mg tablet   2. Herpes zoster without complication D84.3 744.2    3. Pain in right leg M79.604 729.5 EMG ONE EXTREMITY LOWER RT      REFERRAL TO NEUROLOGY   4. History of shingles Z86.19 V12.09 REFERRAL TO NEUROLOGY       Written by Christiano Quiñones, as dictated by Lucho Boyd MD.    I, Dr. Lucho Boyd MD, confirm that all documentation is accurate.

## 2018-08-07 NOTE — TELEPHONE ENCOUNTER
Gabriela from Dr. Solo Nance office called stating Jay Amador called her asking what kind of procedure was performed on the patient. She states they did an SI joint injection on the patient on 07/09/12. She wanted Fina to be aware.

## 2018-08-20 ENCOUNTER — HOSPITAL ENCOUNTER (OUTPATIENT)
Dept: LAB | Age: 48
Discharge: HOME | End: 2018-08-20
Payer: SUBSIDIZED

## 2018-08-20 ENCOUNTER — OFFICE VISIT (OUTPATIENT)
Dept: FAMILY MEDICINE CLINIC | Age: 48
End: 2018-08-20

## 2018-08-20 VITALS
HEART RATE: 84 BPM | SYSTOLIC BLOOD PRESSURE: 119 MMHG | DIASTOLIC BLOOD PRESSURE: 81 MMHG | BODY MASS INDEX: 34.78 KG/M2 | TEMPERATURE: 98.6 F | WEIGHT: 189 LBS | HEIGHT: 62 IN | OXYGEN SATURATION: 98 % | RESPIRATION RATE: 20 BRPM

## 2018-08-20 DIAGNOSIS — Z79.899 LONG-TERM USE OF HIGH-RISK MEDICATION: ICD-10-CM

## 2018-08-20 DIAGNOSIS — M54.41 CHRONIC BILATERAL LOW BACK PAIN WITH RIGHT-SIDED SCIATICA: ICD-10-CM

## 2018-08-20 DIAGNOSIS — E78.00 PURE HYPERCHOLESTEROLEMIA: ICD-10-CM

## 2018-08-20 DIAGNOSIS — E78.00 PURE HYPERCHOLESTEROLEMIA: Primary | ICD-10-CM

## 2018-08-20 DIAGNOSIS — G89.29 CHRONIC BILATERAL LOW BACK PAIN WITH RIGHT-SIDED SCIATICA: ICD-10-CM

## 2018-08-20 DIAGNOSIS — M48.062 SPINAL STENOSIS, LUMBAR REGION WITH NEUROGENIC CLAUDICATION: ICD-10-CM

## 2018-08-20 DIAGNOSIS — F32.9 SINGLE CURRENT EPISODE OF MAJOR DEPRESSIVE DISORDER, UNSPECIFIED DEPRESSION EPISODE SEVERITY: ICD-10-CM

## 2018-08-20 DIAGNOSIS — M51.26 HNP (HERNIATED NUCLEUS PULPOSUS), LUMBAR: ICD-10-CM

## 2018-08-20 LAB
ALBUMIN SERPL-MCNC: 4.3 G/DL (ref 3.4–5)
ALBUMIN/GLOB SERPL: 1.2 {RATIO} (ref 0.8–1.7)
ALP SERPL-CCNC: 68 U/L (ref 45–117)
ALT SERPL-CCNC: 46 U/L (ref 13–56)
ANION GAP SERPL CALC-SCNC: 10 MMOL/L (ref 3–18)
AST SERPL-CCNC: 26 U/L (ref 15–37)
BILIRUB SERPL-MCNC: 0.3 MG/DL (ref 0.2–1)
BUN SERPL-MCNC: 11 MG/DL (ref 7–18)
BUN/CREAT SERPL: 13 (ref 12–20)
CALCIUM SERPL-MCNC: 9.3 MG/DL (ref 8.5–10.1)
CHLORIDE SERPL-SCNC: 102 MMOL/L (ref 100–108)
CHOLEST SERPL-MCNC: 215 MG/DL
CO2 SERPL-SCNC: 25 MMOL/L (ref 21–32)
CREAT SERPL-MCNC: 0.87 MG/DL (ref 0.6–1.3)
GLOBULIN SER CALC-MCNC: 3.6 G/DL (ref 2–4)
GLUCOSE SERPL-MCNC: 94 MG/DL (ref 74–99)
HDLC SERPL-MCNC: 49 MG/DL (ref 40–60)
HDLC SERPL: 4.4 {RATIO} (ref 0–5)
LDLC SERPL CALC-MCNC: 135 MG/DL (ref 0–100)
LIPID PROFILE,FLP: ABNORMAL
POTASSIUM SERPL-SCNC: 4.5 MMOL/L (ref 3.5–5.5)
PROT SERPL-MCNC: 7.9 G/DL (ref 6.4–8.2)
SODIUM SERPL-SCNC: 137 MMOL/L (ref 136–145)
TRIGL SERPL-MCNC: 155 MG/DL (ref ?–150)
VLDLC SERPL CALC-MCNC: 31 MG/DL

## 2018-08-20 PROCEDURE — 36415 COLL VENOUS BLD VENIPUNCTURE: CPT | Performed by: NURSE PRACTITIONER

## 2018-08-20 PROCEDURE — 80053 COMPREHEN METABOLIC PANEL: CPT | Performed by: NURSE PRACTITIONER

## 2018-08-20 PROCEDURE — 80061 LIPID PANEL: CPT | Performed by: NURSE PRACTITIONER

## 2018-08-20 RX ORDER — METHOCARBAMOL 500 MG/1
500-1000 TABLET, FILM COATED ORAL
Qty: 90 TAB | Refills: 1 | Status: SHIPPED | OUTPATIENT
Start: 2018-08-20 | End: 2018-12-11 | Stop reason: ALTCHOICE

## 2018-08-20 RX ORDER — SERTRALINE HYDROCHLORIDE 100 MG/1
200 TABLET, FILM COATED ORAL DAILY
Qty: 60 TAB | Refills: 3 | Status: SHIPPED | OUTPATIENT
Start: 2018-08-20 | End: 2018-09-25 | Stop reason: SDUPTHER

## 2018-08-20 RX ORDER — ACETAMINOPHEN AND CODEINE PHOSPHATE 300; 30 MG/1; MG/1
1 TABLET ORAL
Qty: 30 TAB | Refills: 0 | Status: SHIPPED | OUTPATIENT
Start: 2018-08-20 | End: 2018-12-11 | Stop reason: ALTCHOICE

## 2018-08-20 RX ORDER — GABAPENTIN 800 MG/1
800 TABLET ORAL 3 TIMES DAILY
Qty: 90 TAB | Refills: 3 | Status: SHIPPED | OUTPATIENT
Start: 2018-08-20 | End: 2018-09-25 | Stop reason: SDUPTHER

## 2018-08-20 RX ORDER — LOVASTATIN 20 MG/1
20 TABLET ORAL
Qty: 30 TAB | Refills: 3 | Status: SHIPPED | OUTPATIENT
Start: 2018-08-20 | End: 2018-09-25 | Stop reason: SDUPTHER

## 2018-08-20 NOTE — MR AVS SNAPSHOT
[address] [facility] 
 
 
 [address] [address] [address] [facility] [address] 
[phone] Patient: Robles Winslow MRN: [id number] :1970 Visit Information Date & Time Provider Department Dept. Phone Encounter #  
 2018  9:30 AM Natasha Garcia NP [facility] Primary Care [phone] [id number] Follow-up Instructions Return in about 4 months (around 2018) for HLD. Your Appointments 2018  9:20 AM  
EMG with Nisreen Truong MD  
[employer] [facility] [facility] Appt Note: EMG rt leg, Dr Brenda Santiago,   [facility] [address] [phone]  
  
   
 [place] [address]  
  
    
 9/10/2018  2:00 PM  
New Patient with Nisreen Truong MD  
[employer] [facility] Appt Note: NP, Dr Brenda Santiago, right leg pain, np pkt mld, id, ins, 30 early  [facility] [address] [facility] [address] [phone]  
  
   
 [facility] [address] Upcoming Health Maintenance Date Due  
 PAP AKA CERVICAL CYTOLOGY 1992 Influenza Age 5 to Adult 2018 DTaP/Tdap/Td series (1 - Tdap) 2019* *Topic was postponed. The date shown is not the original due date. Allergies as of 2018  Review Complete On: 2018 By: Natasha Garcia NP Severity Noted Reaction Type Reactions Apple High 2012    Anaphylaxis Wasps [Hymenoptera Allergenic Extract] High 2018    Anaphylaxis Cephalexin  2012    Hives Current Immunizations  Never Reviewed No immunizations on file. Not reviewed this visit You Were Diagnosed With   
  
 Codes Comments Pure hypercholesterolemia    -  Primary ICD-10-CM: E78.00 ICD-9-CM: 272.0 Single current episode of major depressive disorder, unspecified depression episode severity     ICD-10-CM: F32.9 ICD-9-CM: 296.20 Spinal stenosis, lumbar region with neurogenic claudication     ICD-10-CM: M48.062 
ICD-9-CM: 724.03   
 HNP (herniated nucleus pulposus), lumbar     ICD-10-CM: M51.26 
ICD-9-CM: 722.10 Vitals BP Pulse Temp Resp Height(growth percentile) Weight(growth percentile) 119/81 (BP 1 Location: Left arm, BP Patient Position: Sitting) 84 98.6 °F (37 °C) (Oral) 20 5' 2\" (1.575 m) 189 lb (85.7 kg) SpO2 BMI OB Status Smoking Status 98% 34.57 kg/m2 Ablation Passive Smoke Exposure - Never Smoker BMI and BSA Data Body Mass Index Body Surface Area 34.57 kg/m 2 1.94 m 2 Preferred Pharmacy Pharmacy Name Phone [email or web address] [phone] Your Updated Medication List  
  
   
This list is accurate as of 8/20/18 10:53 AM.  Always use your most recent med list.  
  
  
  
  
 acetaminophen-codeine 300-30 mg per tablet Commonly known as:  TYLENOL #3 Take 1 Tab by mouth every four (4) hours as needed for Pain. Max Daily Amount: 6 Tabs. EPINEPHrine 0.3 mg/0.3 mL injection Commonly known as:  EPIPEN  
0.3 mL by IntraMUSCular route once as needed for up to 1 dose. Indications: Anaphylaxis  
  
 gabapentin 800 mg tablet Commonly known as:  NEURONTIN Take 1 Tab by mouth three (3) times daily. lovastatin 20 mg tablet Commonly known as:  MEVACOR Take 1 Tab by mouth nightly. meloxicam 15 mg tablet Commonly known as:  MOBIC Take 1 Tab by mouth daily. methocarbamol 500 mg tablet Commonly known as:  ROBAXIN Take 1-2 Tabs by mouth four (4) times daily as needed. naloxone 4 mg/actuation nasal spray Commonly known as:  [employer] Use 1 spray intranasally into 1 nostril. Use a new Narcan nasal spray for subsequent doses and administer into alternating nostrils. May repeat every 2 to 3 minutes as needed for opioid overdose symptoms. sertraline 100 mg tablet Commonly known as:  ZOLOFT  
 Take 2 Tabs by mouth daily. Prescriptions Sent to Pharmacy Refills  
 gabapentin (NEURONTIN) 800 mg tablet 3 Sig: Take 1 Tab by mouth three (3) times daily. Class: Normal  
 Pharmacy: [facility] . Ph #: [phone] Route: Oral  
 sertraline (ZOLOFT) 100 mg tablet 3 Sig: Take 2 Tabs by mouth daily. Class: Normal  
 [facility] . Ph #: [phone] Route: Oral  
 lovastatin (MEVACOR) 20 mg tablet 3 Sig: Take 1 Tab by mouth nightly. Class: Normal  
 Pharmacy: [facility] . Ph #: [phone] Route: Oral  
 methocarbamol (ROBAXIN) 500 mg tablet 1 Sig: Take 1-2 Tabs by mouth four (4) times daily as needed. Class: Normal  
 Pharm[facility] [address] [address] [facility] . Ph #: [phone] Route: Oral  
  
Follow-up Instructions Return in about 4 months (around 12/20/2018) for HLD. To-Do List   
 08/20/2018 Lab:  LIPID PANEL   
  
 08/20/2018 Lab:  METABOLIC PANEL, COMPREHENSIVE Patient Instructions Hyperlipidemia: After Your Visit Your Care Instructions Hyperlipidemia is too much fat in your blood. The body has several kinds of fat, including cholesterol and triglycerides. Your body needs fat for many things, such as making new cells. But too much fat in your blood increases your chances of having a heart attack or stroke. You may be able to lower your cholesterol and triglycerides with a heart-healthy diet, exercise, and if needed, medicine. Your doctor may want you to try lifestyle changes first to see whether they lower the fat in your blood. You may need to take medicine if lifestyle changes do not lower the fat in your blood enough. Follow-up care is a key part of your treatment and safety.  Be sure to make and go to all appointments, and call your doctor if you are having problems. Its also a good idea to know your test results and keep a list of the medicines you take. How can you care for yourself at home? Take your medicines · Take your medicines exactly as prescribed. Call your doctor if you think you are having a problem with your medicine. · If you take medicine to lower your cholesterol, go to follow-up visits. You will need to have blood tests. · Do not take large doses of niacin, which is a B vitamin, while taking medicine called statins. It may increase the chance of muscle pain and liver problems. · Talk to your doctor about avoiding grapefruit juice if you are taking statins. Grapefruit juice can raise the level of this medicine in your blood. This could increase side effects. Eat more fruits, vegetables, and fiber · Fruits and vegetables have lots of nutrients that help protect against heart disease, and they have littleif anyfat. Try to eat at least five servings a day. Dark green, deep orange, or yellow fruits and vegetables are healthy choices. · Keep carrots, celery, and other veggies handy for snacks. Buy fruit that is in season and store it where you can see it so that you will be tempted to eat it. Cook dishes that have a lot of veggies in them, such as stir-fries and soups. · Foods high in fiber may reduce your cholesterol and provide important vitamins and minerals. High-fiber foods include whole-grain cereals and breads, oatmeal, beans, brown rice, citrus fruits, and apples. · Buy whole-grain breads and cereals instead of white bread and pastries. Limit saturated fat · Read food labels and try to avoid saturated fat and trans fat. They increase your risk of heart disease. · Use olive or canola oil when you cook. Try cholesterol-lowering spreads, such as Benecol or Take Control. · Bake, broil, grill, or steam foods instead of frying them. · Limit the amount of high-fat meats you eat, including hot dogs and sausages. Cut out all visible fat when you prepare meat. · Eat fish, skinless poultry, and soy products such as tofu instead of high-fat meats. Soybeans may be especially good for your heart. Eat at least two servings of fish a week. Certain fish, such as salmon, contain omega-3 fatty acids, which may help reduce your risk of heart attack. · Choose low-fat or fat-free milk and dairy products. Get exercise, limit alcohol, and quit smoking · Get more exercise. Work with your doctor to set up an exercise program. Even if you can do only a small amount, exercise will help you get stronger, have more energy, and manage your weight and your stress. Walking is an easy way to get exercise. Gradually increase the amount you walk every day. Aim for at least 30 minutes on most days of the week. You also may want to swim, bike, or do other activities. · Limit alcohol to no more than 2 drinks a day for men and 1 drink a day for women. · Do not smoke. If you need help quitting, talk to your doctor about stop-smoking programs and medicines. These can increase your chances of quitting for good. When should you call for help? Call 911 anytime you think you may need emergency care. For example, call if: 
· You have symptoms of a heart attack. These may include: ¨ Chest pain or pressure, or a strange feeling in the chest. 
¨ Sweating. ¨ Shortness of breath. ¨ Nausea or vomiting. ¨ Pain, pressure, or a strange feeling in the back, neck, jaw, or upper belly or in one or both shoulders or arms. ¨ Lightheadedness or sudden weakness. ¨ A fast or irregular heartbeat. After you call 911, the  may tell you to chew 1 adult-strength or 2 to 4 low-dose aspirin. Wait for an ambulance. Do not try to drive yourself. · You have signs of a stroke.  These may include: 
¨ Sudden numbness, paralysis, or weakness in your face, arm, or leg, especially on only one side of your body. ¨ New problems with walking or balance. ¨ Sudden vision changes. ¨ Drooling or slurred speech. ¨ New problems speaking or understanding simple statements, or feeling confused. ¨ A sudden, severe headache that is different from past headaches. · You passed out (lost consciousness). Call your doctor now or seek immediate medical care if: 
· You have muscle pain or weakness. Watch closely for changes in your health, and be sure to contact your doctor if: 
· You are very tired. · You have an upset stomach, gas, constipation, or belly pain or cramps. Where can you learn more? Go to [employer] Enter C406 in the search box to learn more about \"Hyperlipidemia: After Your Visit. \"  
© 1685-4775 Healthwise, Incorporated. Care instructions adapted under license by [employer] (which disclaims liability or warranty for this information). This care instruction is for use with your licensed healthcare professional. If you have questions about a medical condition or this instruction, always ask your healthcare professional. [address] [place] [address] any warranty or liability for your use of this information. Content Version: 3.9.272952; Last Revised: October 13, 2011 Introducing [facility]! Dear Chandana Parikh: Thank you for requesting a [employer] account. Our records indicate that you already have an active [employer] account. You can access your account anytime at [email or web address] Did you know that you can access your hospital and ER discharge instructions at any time in [employer]? You can also review all of your test results from your hospital stay or ER visit. Additional Information If you have questions, please visit the Frequently Asked Questions section of the [employer] website at [email or web address] Remember, [employer] is NOT to be used for urgent needs.  For medical emergencies, dial 911. Now available from your iPhone and Android! Please provide this summary of care documentation to your next provider. Your primary care clinician is listed as Albina Councilman. If you have any questions after today's visit, please call [phone].

## 2018-08-20 NOTE — PATIENT INSTRUCTIONS
Hyperlipidemia: After Your Visit  Your Care Instructions  Hyperlipidemia is too much fat in your blood. The body has several kinds of fat, including cholesterol and triglycerides. Your body needs fat for many things, such as making new cells. But too much fat in your blood increases your chances of having a heart attack or stroke. You may be able to lower your cholesterol and triglycerides with a heart-healthy diet, exercise, and if needed, medicine. Your doctor may want you to try lifestyle changes first to see whether they lower the fat in your blood. You may need to take medicine if lifestyle changes do not lower the fat in your blood enough. Follow-up care is a key part of your treatment and safety. Be sure to make and go to all appointments, and call your doctor if you are having problems. Its also a good idea to know your test results and keep a list of the medicines you take. How can you care for yourself at home? Take your medicines  · Take your medicines exactly as prescribed. Call your doctor if you think you are having a problem with your medicine. · If you take medicine to lower your cholesterol, go to follow-up visits. You will need to have blood tests. · Do not take large doses of niacin, which is a B vitamin, while taking medicine called statins. It may increase the chance of muscle pain and liver problems. · Talk to your doctor about avoiding grapefruit juice if you are taking statins. Grapefruit juice can raise the level of this medicine in your blood. This could increase side effects. Eat more fruits, vegetables, and fiber  · Fruits and vegetables have lots of nutrients that help protect against heart disease, and they have little--if any--fat. Try to eat at least five servings a day. Dark green, deep orange, or yellow fruits and vegetables are healthy choices. · Keep carrots, celery, and other veggies handy for snacks.  Buy fruit that is in season and store it where you can see it so that you will be tempted to eat it. Cook dishes that have a lot of veggies in them, such as stir-fries and soups. · Foods high in fiber may reduce your cholesterol and provide important vitamins and minerals. High-fiber foods include whole-grain cereals and breads, oatmeal, beans, brown rice, citrus fruits, and apples. · Buy whole-grain breads and cereals instead of white bread and pastries. Limit saturated fat  · Read food labels and try to avoid saturated fat and trans fat. They increase your risk of heart disease. · Use olive or canola oil when you cook. Try cholesterol-lowering spreads, such as Benecol or Take Control. · Bake, broil, grill, or steam foods instead of frying them. · Limit the amount of high-fat meats you eat, including hot dogs and sausages. Cut out all visible fat when you prepare meat. · Eat fish, skinless poultry, and soy products such as tofu instead of high-fat meats. Soybeans may be especially good for your heart. Eat at least two servings of fish a week. Certain fish, such as salmon, contain omega-3 fatty acids, which may help reduce your risk of heart attack. · Choose low-fat or fat-free milk and dairy products. Get exercise, limit alcohol, and quit smoking  · Get more exercise. Work with your doctor to set up an exercise program. Even if you can do only a small amount, exercise will help you get stronger, have more energy, and manage your weight and your stress. Walking is an easy way to get exercise. Gradually increase the amount you walk every day. Aim for at least 30 minutes on most days of the week. You also may want to swim, bike, or do other activities. · Limit alcohol to no more than 2 drinks a day for men and 1 drink a day for women. · Do not smoke. If you need help quitting, talk to your doctor about stop-smoking programs and medicines. These can increase your chances of quitting for good. When should you call for help?   Call 911 anytime you think you may need emergency care. For example, call if:  · You have symptoms of a heart attack. These may include:  ¨ Chest pain or pressure, or a strange feeling in the chest.  ¨ Sweating. ¨ Shortness of breath. ¨ Nausea or vomiting. ¨ Pain, pressure, or a strange feeling in the back, neck, jaw, or upper belly or in one or both shoulders or arms. ¨ Lightheadedness or sudden weakness. ¨ A fast or irregular heartbeat. After you call 911, the  may tell you to chew 1 adult-strength or 2 to 4 low-dose aspirin. Wait for an ambulance. Do not try to drive yourself. · You have signs of a stroke. These may include:  ¨ Sudden numbness, paralysis, or weakness in your face, arm, or leg, especially on only one side of your body. ¨ New problems with walking or balance. ¨ Sudden vision changes. ¨ Drooling or slurred speech. ¨ New problems speaking or understanding simple statements, or feeling confused. ¨ A sudden, severe headache that is different from past headaches. · You passed out (lost consciousness). Call your doctor now or seek immediate medical care if:  · You have muscle pain or weakness. Watch closely for changes in your health, and be sure to contact your doctor if:  · You are very tired. · You have an upset stomach, gas, constipation, or belly pain or cramps. Where can you learn more? Go to SignNow.be  Enter C406 in the search box to learn more about \"Hyperlipidemia: After Your Visit. \"   © 2573-6891 [employer]. Care instructions adapted under license by [employer] (which disclaims liability or warranty for this information). This care instruction is for use with your licensed healthcare professional. If you have questions about a medical condition or this instruction, always ask your healthcare professional. [address] any warranty or liability for your use of this information.   Content Version: 3.6.373932; Last Revised: October 13, 2011

## 2018-08-20 NOTE — PROGRESS NOTES
Chief Complaint   Patient presents with    Cholesterol Problem     1. Have you been to the ER, urgent care clinic since your last visit? Hospitalized since your last visit? No    2. Have you seen or consulted any other health care providers outside of the [address] [place] [address] since your last visit? Include any pap smears or colon screening.  No

## 2018-08-20 NOTE — PROGRESS NOTES
Subjective:   Cong Lacey is a 56 y.o. female with Hypercholesterolemia presents for follow up. Hypercholesterolemia ROS: Medication:  Lovastatin. Cardiovascular ROS - taking medications as instructed, no medication side effects noted, no TIA's, no chest pain on exertion, no dyspnea on exertion, no swelling of ankles. Other symptoms and concerns: reports she has been seen by the spine specialist and also spine surgeon. Reports she has a pending appt for neurology and EMG for right leg pain and recurrent herpes zoster. Also requesting a refill on tylenol #3 until she is seen by the neurologist.  Comments was advised from spine specialist to obtain refills from PCP. Past medications tried and failed N/A. Current Outpatient Prescriptions   Medication Sig Dispense Refill    methocarbamol (ROBAXIN) 500 mg tablet Take 500 mg by mouth as needed.  meloxicam (MOBIC) 15 mg tablet Take 1 Tab by mouth daily. 30 Tab 2    cyclobenzaprine (FLEXERIL) 10 mg tablet Take 1 Tab by mouth three (3) times daily as needed for Muscle Spasm(s). 60 Tab 2    acetaminophen-codeine (TYLENOL #3) 300-30 mg per tablet Take 1 Tab by mouth every four (4) hours as needed for Pain. Max Daily Amount: 6 Tabs. 30 Tab 0    naloxone (NARCAN) 4 mg/actuation nasal spray Use 1 spray intranasally into 1 nostril. Use a new Narcan nasal spray for subsequent doses and administer into alternating nostrils. May repeat every 2 to 3 minutes as needed for opioid overdose symptoms. 1 Each 0    EPINEPHrine (EPIPEN) 0.3 mg/0.3 mL injection 0.3 mL by IntraMUSCular route once as needed for up to 1 dose. Indications: Anaphylaxis 1 Syringe 2    lovastatin (MEVACOR) 20 mg tablet Take 1 Tab by mouth nightly. 30 Tab 2    sertraline (ZOLOFT) 100 mg tablet Take 2 Tabs by mouth daily. 60 Tab 3    gabapentin (NEURONTIN) 800 mg tablet Take 1 Tab by mouth three (3) times daily.  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    Pain, upper back     Psychiatric disorder     depression     Tattoo     Unspecified deficiency anemia 1999     Family History   Problem Relation Age of Onset    Hypertension Mother     Arthritis-osteo Mother     GERD Father     Hypertension Father     Cancer Sister     MS Maternal Grandmother      Lab Results   Component Value Date/Time    Cholesterol, total 252 (H) 05/18/2018 12:08 PM    HDL Cholesterol 46 05/18/2018 12:08 PM    LDL, calculated 168.8 (H) 05/18/2018 12:08 PM    VLDL, calculated 37.2 05/18/2018 12:08 PM    Triglyceride 186 (H) 05/18/2018 12:08 PM    CHOL/HDL Ratio 5.5 (H) 05/18/2018 12:08 PM     Lab Results   Component Value Date/Time    Sodium 139 05/18/2018 12:08 PM    Potassium 4.7 05/18/2018 12:08 PM    Chloride 104 05/18/2018 12:08 PM    CO2 26 05/18/2018 12:08 PM    Anion gap 9 05/18/2018 12:08 PM    Glucose 88 05/18/2018 12:08 PM    BUN 13 05/18/2018 12:08 PM    Creatinine 0.86 05/18/2018 12:08 PM    BUN/Creatinine ratio 15 05/18/2018 12:08 PM    GFR est AA >60 05/18/2018 12:08 PM    GFR est non-AA >60 05/18/2018 12:08 PM    Calcium 9.7 05/18/2018 12:08 PM    Bilirubin, total 0.3 05/18/2018 12:08 PM    AST (SGOT) 20 05/18/2018 12:08 PM    Alk.  phosphatase 62 05/18/2018 12:08 PM    Protein, total 8.4 (H) 05/18/2018 12:08 PM    Albumin 4.4 05/18/2018 12:08 PM    Globulin 4.0 05/18/2018 12:08 PM    A-G Ratio 1.1 05/18/2018 12:08 PM    ALT (SGPT) 37 05/18/2018 12:08 PM     Lab Results   Component Value Date/Time    WBC 9.1 05/18/2018 12:08 PM    HGB 14.0 05/18/2018 12:08 PM    HCT 41.9 05/18/2018 12:08 PM    PLATELET 200 14/94/6738 12:08 PM    MCV 93.9 05/18/2018 12:08 PM     .  Wt Readings from Last 3 Encounters:   08/07/18 189 lb (85.7 kg)   07/16/18 185 lb (83.9 kg)   06/15/18 188 lb 9.6 oz (85.5 kg)       Objective:   Visit Vitals    /81 (BP 1 Location: Left arm, BP Patient Position: Sitting)    Pulse 84    Temp 98.6 °F (37 °C) (Oral)    Resp 20    Ht 5' 2\" (1.575 m)    Wt 189 lb (85.7 kg)    SpO2 98%    BMI 34.57 kg/m2     General appearance - alert, well appearing, and in no distress  Neck - supple, no significant adenopathy, carotids upstroke normal bilaterally, no bruits  Chest - clear to auscultation, no wheezes, rales or rhonchi, symmetric air entry  Heart - normal rate, regular rhythm, normal S1, S2, no murmurs, rubs, clicks or gallops  Extremities - peripheral pulses normal, no pedal edema, no clubbing or cyanosis      Assessment/Plan:      ICD-10-CM ICD-9-CM    1. Pure hypercholesterolemia E78.00 272.0 lovastatin (MEVACOR) 20 mg tablet      LIPID PANEL      METABOLIC PANEL, COMPREHENSIVE   2. Single current episode of major depressive disorder, unspecified depression episode severity F32.9 296.20 sertraline (ZOLOFT) 100 mg tablet   3. Spinal stenosis, lumbar region with neurogenic claudication M48.062 724.03    4. HNP (herniated nucleus pulposus), lumbar M51.26 722.10 methocarbamol (ROBAXIN) 500 mg tablet   5. Long-term use of high-risk medication Z79.899 V58.69    6. Chronic bilateral low back pain with right-sided sciatica M54.41 724.2 acetaminophen-codeine (TYLENOL #3) 300-30 mg per tablet    G89.29 724.3      338.29    I have reviewed the patients controlled substance prescription history, as maintained in the [facility]. There is no suspicious activity noted. Usage is consistent with current use of prescribed medications. Last took tylenol #3 2-3 weeks ago. Urine drug screen was not collected today. Accordingly, a salivary fluid specimen was obtained and forwarded to the laboratory for analysis.   Patient medication usage agreement for controlled substances signed see scanned copy  no significant medication side effects noted, control uncertain  I have discussed the diagnosis with the patient and the intended plan as seen in the above orders. The patient has received an after-visit summary and questions were answered concerning future plans. I have discussed medication side effects and warnings with the patient as well. Pt verbalizes understanding. Patient agreeable with above plan and verbalizes understanding. Follow-up Disposition:  Return in about 4 months (around 12/20/2018) for HLD.

## 2018-08-29 ENCOUNTER — OFFICE VISIT (OUTPATIENT)
Dept: NEUROLOGY | Age: 48
End: 2018-08-29

## 2018-08-29 VITALS — HEIGHT: 62 IN

## 2018-08-29 DIAGNOSIS — M79.2 NERVE PAIN: Primary | ICD-10-CM

## 2018-08-29 RX ORDER — ASPIRIN 81 MG/1
TABLET ORAL DAILY
COMMUNITY
End: 2019-06-27

## 2018-08-29 NOTE — LETTER
8/29/2018 10:28 AM 
 
Patient:  Casey Haq YOB: 1970 Date of Visit: 8/29/2018 Dear Angela Johnson, HEENA 
[address] [address] VIA In Basket Monique Snowden MD 
[address] [facility] [address] VIA In Basket 
 : Thank you for referring Ms. Indy Lane to me for evaluation/treatment. Below are the relevant portions of my assessment and plan of care. If you have questions, please do not hesitate to call me. I look forward to following Ms. Piter Salas along with you.  
 
 
 
Sincerely, 
 
 
Kristi Rodríguez MD

## 2018-08-29 NOTE — PROGRESS NOTES
[facility]  [address] [facility] [address], [address]  [phone]      [place] [address]    Neurophysiology Report      Patient: Myke Kincaid     ID: [id number] Physician: Charlene Cash. Aubrie Luo MD   Gender: Male Ref Phys: Dr. Rubia Thibodeaux   Handedness: Winn Joseph     Study Date: August 29, 2018         Patient History: This 52-year-old woman has chronic right hip and buttock pain. She also has had numerous episodes of right sided shingles. On brief exam she has intact strength and sensation.       Nerve Conduction Studies  Anti Sensory Summary Table     Stim Site NR Peak (ms) Norm Peak (ms) O-P Amp (µV) Norm O-P Amp Dist (cm) Fabrizio (m/s)   Left Sural Anti Sensory (Ankle)   Calf    3.4 <4.4 6.2 >6.0 14.0 51.9   Site 2    3.5  6.3      Right Sural Anti Sensory (Ankle)   Calf    3.1 <4.4 16.1 >6.0 14.0 60.9   Site 2    3.2  15.7        Motor Summary Table     Stim Site NR Onset (ms) Norm Onset (ms) O-P Amp (mV) Norm O-P Amp Dist (cm) Fabrizio (m/s) Norm Fabrizio (m/s)   Left Peroneal Motor (EDB)   Ankle    3.4 <6.5 5.4 >2.0 37.0 60.7 >44   Fibula (Head)    9.5  5.1  10.0 90.9    Pop Fossa    10.6  6.0       Right Peroneal Motor (EDB)   Ankle    3.8 <6.5 5.3 >2.0 41.0 71.9 >44   Fibula (Head)    9.5  2.2  10.0 90.9    Pop Fossa    10.6  5.0       Left Tibial Motor (Abd Stephens Brev)   Ankle    5.0 <5.8 7.8 >4.0 38.0 65.5 >41   Knee    10.8  6.1       Right Tibial Motor (Abd Stephens Brev)   Ankle    4.9 <5.8 10.8 >4.0 42.0 51.9 >41   Knee    13.0  5.1           H Reflex Studies     NR H-Lat (ms) Lat Norm (ms) L-R H-Lat (ms) L-R Lat Norm   Left Tibial (Gastroc)      27.58 <0.0 2.67 <2.0   Right Tibial (Gastroc)      30.25 <0.0 2.67 <2.0     EMG     Side Muscle Nerve Root Ins Act Fibs Psw Fasc Amp Dur Poly Recrt Int Citlalyen Ladarius Comment   Right AntTibialis Dp Br Fibular L4-5 Nml Nml Nml None Nml Nml 0 Nml Nml    Right MedGastroc   Nml Nml Nml None Nml Nml 0 Nml Nml    Right ExtHallLong Dp Br Fibular L5, S1 Nml Nml Nml None Nml Nml 0 Nml Nml    Right VastusMed Femoral L2-4 Nml Nml Nml None Nml Nml 0 Nml Nml    Right BicepsFemS Sciatic L5-S1 Nml Nml Nml None Nml Nml 0 Nml Nml    Right Lumbo Parasp Up Rami L1-2 Nml Nml Nml          Right Lumbo Parasp Mid Rami L3-4 Nml Nml Nml          Right Lumbo Parasp Low Rami L5-S1 Nml Nml Nml            NCS/EMG FINDINGS:     Evaluation of the Left peroneal motor, the Right peroneal motor, the Left tibial motor, the Right tibial motor, the Left sural sensory, and the Right sural sensory nerves were unremarkable.  H-reflex studies indicate that the Left tibial H-reflex has prolonged latency (27.58 ms).  The Right tibial H-reflex has prolonged latency (30.25 ms). INTERPRETATION: This was a slightly abnormal nerve conduction and EMG study showing it to be a decreased and/or absent right H reflex which would suggest a lesion in the right sural nerve proximally. No signs of denervation were appreciated. In reference to her recurrent shingles, it is rare in non-immunocompromise patients episodes of shingles. With this brief examination I do not see an etiology for immunosuppression, but will certainly comment on this during her full evaluation to her next week.      ___________________________  Mac Jackson.  Nuha Pisano MD          Waveforms:

## 2018-09-10 ENCOUNTER — OFFICE VISIT (OUTPATIENT)
Dept: NEUROLOGY | Age: 48
End: 2018-09-10

## 2018-09-10 VITALS
OXYGEN SATURATION: 98 % | TEMPERATURE: 98.3 F | HEIGHT: 62 IN | SYSTOLIC BLOOD PRESSURE: 94 MMHG | HEART RATE: 79 BPM | RESPIRATION RATE: 16 BRPM | WEIGHT: 182.8 LBS | DIASTOLIC BLOOD PRESSURE: 66 MMHG | BODY MASS INDEX: 33.64 KG/M2

## 2018-09-10 DIAGNOSIS — M79.2 NERVE PAIN: Primary | ICD-10-CM

## 2018-09-10 RX ORDER — RANITIDINE 150 MG/1
150 TABLET, FILM COATED ORAL 2 TIMES DAILY
COMMUNITY
End: 2019-06-27

## 2018-09-10 NOTE — PROGRESS NOTES
Robles Winslow is a 56 y.o., right handed female, with a history of hypercholesterolemia who comes in with episodes of recurrent shingles. She's had at least 20 episodes over the years of shingles on right hip region. These started about 20 years ago. She's had no less than 10 episodes in the last calendar year. She has had these episodes in spite of being treated with Acyclovir. At one time in her life she was on prophylactic Acyclovir which decreased the overall frequency of breakouts. She's been seen by a rheumatologist many years ago who thought she might have fibromyalgia. She's never been seen by immunology or infectious disease. She does have chronic right hip and back pain. It's characterized as a dull ache with exacerbations of burning. When the pain gets worse she'll get a breakout of shingles. The pain predates her breakouts by about 2-3 days. She denies much in the way of problems on the left, no breakouts on the left side. Social History; she's , lives with her . She denies alcohol or tobacco use. No illicit drugs. Not working currently. Family History; mother alive with hypertension. Father with hypertension. 4 siblings, brother with Hodgkins lymphoma, sister with breast cancer. Current Outpatient Prescriptions   Medication Sig Dispense Refill    raNITIdine (ZANTAC) 150 mg tablet Take 150 mg by mouth two (2) times a day.  aspirin delayed-release 81 mg tablet Take  by mouth daily.  gabapentin (NEURONTIN) 800 mg tablet Take 1 Tab by mouth three (3) times daily. 90 Tab 3    sertraline (ZOLOFT) 100 mg tablet Take 2 Tabs by mouth daily. 60 Tab 3    lovastatin (MEVACOR) 20 mg tablet Take 1 Tab by mouth nightly. 30 Tab 3    acetaminophen-codeine (TYLENOL #3) 300-30 mg per tablet Take 1 Tab by mouth every four (4) hours as needed for Pain. Max Daily Amount: 6 Tabs.  30 Tab 0    EPINEPHrine (EPIPEN) 0.3 mg/0.3 mL injection 0.3 mL by IntraMUSCular route once as needed for up to 1 dose. Indications: Anaphylaxis 1 Syringe 2    methocarbamol (ROBAXIN) 500 mg tablet Take 1-2 Tabs by mouth four (4) times daily as needed. 90 Tab 1    meloxicam (MOBIC) 15 mg tablet Take 1 Tab by mouth daily. 30 Tab 2    naloxone (NARCAN) 4 mg/actuation nasal spray Use 1 spray intranasally into 1 nostril. Use a new Narcan nasal spray for subsequent doses and administer into alternating nostrils. May repeat every 2 to 3 minutes as needed for opioid overdose symptoms.  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    Pain, upper back     Psychiatric disorder     depression     Tattoo     Unspecified deficiency anemia 1999       Past Surgical History:   Procedure Laterality Date    HX APPENDECTOMY  1995    HX CHOLECYSTECTOMY  2002    HX GYN  2018    Abalation    HX GYN      rt ovary removed    HX ORTHOPAEDIC      arthoscopic rt knee    HX ORTHOPAEDIC      left knee meniscis tear       Allergies   Allergen Reactions    Apple Anaphylaxis    Wasps [Hymenoptera Allergenic Extract] Anaphylaxis    Cephalexin Hives       Patient Active Problem List   Diagnosis Code    Fibromyalgia M79.7    HSV (herpes simplex virus) infection B00.9    Cervical pain M54.2    Nerve pain M79.2    Myofascial pain M79.1    Migraine G43.909    Allergic rhinitis due to pollen J30.1    Abnormal liver function K76.89    Depressive disorder, not elsewhere classified F32.9    Anxiety state, unspecified F41.1    Irritable bowel syndrome K58.9    Abnormal glandular Papanicolaou smear of cervix R87.619    History of shingles Z86.19    Spinal stenosis, lumbar region with neurogenic claudication M48.062         Review of Systems:   As above otherwise 11 point review of systems negative including;   Constitutional no fever or chills  Skin denies rash or itching  HENT  Denies tinnitus, hearing lose  Eyes denies diplopia vision lose  Respiratory denies shortness of breath  Cardiovascular denies chest pain, dyspnea on exertion  Gastrointestinal denies nausea, vomiting, diarrhea, constipation  Genitourinary denies incontinence  Musculoskeletal denies joint pain or swelling  Endocrine denies weight change  Hematology denies easy bruising or bleeding   Neurological as above in HPI      PHYSICAL EXAMINATION:      VITAL SIGNS:    Visit Vitals    BP 94/66 (BP 1 Location: Right arm, BP Patient Position: Sitting)    Pulse 79    Temp 98.3 °F (36.8 °C) (Oral)    Resp 16    Ht 5' 2\" (1.575 m)    Wt 82.9 kg (182 lb 12.8 oz)    SpO2 98%    BMI 33.43 kg/m2       GENERAL: The patient is well developed, well nourished, and in no apparent distress. EXTREMITIES: No clubbing, cyanosis, or edema is identified. Pulses 2+ and symmetrical.  Muscle tone is normal.  HEAD:   Ear, nose, and throat appear to be without trauma. The patient is normocephalic. NEUROLOGIC EXAMINATION    MENTAL STATUS: The patient is awake, alert, and oriented x 4. Fund of knowledge is adequate. Speech is fluent and memory appears to be intact, both long and short term. CRANIAL NERVES: II - Visual fields are full to confrontation. Funduscopic examination reveals flat disks bilaterally. Pupils are both 6 mm and briskly reactive to light and accommodation. III, IV, VI - Extraocular movements are intact and there is no nystagmus. V - Facial sensation is intact to pinprick and light touch. VII - Face is symmetrical.   VIII - Hearing is present. IX, X, XII- Palate rises symmetrically. Gag is present. Tongue is in the midline. XI - Shoulder shrugging and head turning intact  MOTOR:  The patient is 5/5 in all four limbs without any drift. Fine finger movements are symmetrical.  Isolated motor group testing reveals no focal abnormalities.   Tone is normal.  Sensory examination is intact to pinprick, light touch and position sense testing. Reflexes are 2+ and symmetrical, except she does have a decreased right ankle jerk. Plantars are down going. Cerebellar examination reveals no gross ataxia or dysmetria. Gait is normal and the patient can tandem walk without any difficulty. She has a rash over the right S1 region on her buttock. Final result (Exam End: 6/22/2018 10:30 PM) Reviewed    Result Notes   Notes Recorded by Gutierrez Whatley NP on 7/6/2018 at 7:42 AM  My chart message sent.            Study Result   MR lumbar spine with and without contrast     HISTORY: Worsening back pain continues to radiate to right knee.     COMPARISON: 1/18/2018 radiographs     TECHNIQUE: Lumbar spine scanned with axial and sagittal T1W scans, sagittal  STIR, axial and sagittal T2W scans, and with post gadolinium axial and sagittal  T1W scans.     CONTRAST: 16 cc Dotarem administered intravenously.     FINDINGS: Normal alignment and vertebral body heights. Nonpathologic marrow  signal. No suspicious disc or bone enhancement. The conus medullaris is normal  in signal and during at the T12-L1 disc level. No clumping or enhancement of the  cauda equina nerve roots. Paraspinal soft tissues are unremarkable.     Spinal canal and neural foramen:       T12-L1: No significant degenerative disc disease. No central canal or foraminal  stenosis.      L1-L2: No significant degenerative disc disease. No central canal or foraminal  stenosis.      L2-L3: No significant degenerative disc disease. No central canal or foraminal  stenosis.      L3-L4: No significant degenerative disc disease. Mild facet arthropathy and  thickening of ligamentum flavum. Mild central canal stenosis. No foraminal  stenosis.     L4-L5: Mildly narrowed disc with moderately sized central disc protrusion. Mild  facet arthropathy and thickening of ligamentum flavum. Severe central canal  stenosis. Mild bilateral foraminal stenosis. No compression of the exiting nerve  roots.      L5-S1: Enhancing posterior annular fissure and tiny central disc protrusion. Mild facet arthropathy. No central canal or foraminal stenosis.          IMPRESSION  Impression:     Moderately sized central disc protrusion with facet arthropathy at L4-5  resulting in severe central canal stenosis.     Enhancing annular fissure and tiny central disc protrusion at L5-S1 without  central canal stenosis.            I have reviewed the above imagines myself. CBC:   Lab Results   Component Value Date/Time    WBC 9.1 05/18/2018 12:08 PM    RBC 4.46 05/18/2018 12:08 PM    HGB 14.0 05/18/2018 12:08 PM    HCT 41.9 05/18/2018 12:08 PM    PLATELET 867 15/52/9572 12:08 PM     BMP:   Lab Results   Component Value Date/Time    Glucose 94 08/20/2018 11:22 AM    Sodium 137 08/20/2018 11:22 AM    Potassium 4.5 08/20/2018 11:22 AM    Chloride 102 08/20/2018 11:22 AM    CO2 25 08/20/2018 11:22 AM    BUN 11 08/20/2018 11:22 AM    Creatinine 0.87 08/20/2018 11:22 AM    Calcium 9.3 08/20/2018 11:22 AM     CMP:   Lab Results   Component Value Date/Time    Glucose 94 08/20/2018 11:22 AM    Sodium 137 08/20/2018 11:22 AM    Potassium 4.5 08/20/2018 11:22 AM    Chloride 102 08/20/2018 11:22 AM    CO2 25 08/20/2018 11:22 AM    BUN 11 08/20/2018 11:22 AM    Creatinine 0.87 08/20/2018 11:22 AM    Calcium 9.3 08/20/2018 11:22 AM    Anion gap 10 08/20/2018 11:22 AM    BUN/Creatinine ratio 13 08/20/2018 11:22 AM    Alk. phosphatase 68 08/20/2018 11:22 AM    Protein, total 7.9 08/20/2018 11:22 AM    Albumin 4.3 08/20/2018 11:22 AM    Globulin 3.6 08/20/2018 11:22 AM    A-G Ratio 1.2 08/20/2018 11:22 AM     Coagulation: No results found for: PTP, INR, APTT, PTTT  Cardiac markers: No results found for: CPK, CKND1, BOBBI       Impression: Recurrent back pain with associated shingles in the right S1 region in this patient who has risk factors including some lumbar spine disease on the MRI scan.   The amount of disease she has on the lumbar MRI scan is really minimal.  She has some spinal stenosis at the L4-5 region but it does not appear to be impinging upon the exiting S1 roots on the right side. Was really most curious is that she has exacerbations of her back pain right hip and buttock pain just before she gets her exacerbations of her shingles. I do not think this is related at all to her lumbar spine disease but instead is related to the recurrent zoster infection that she has. Plan: At this point I would suggest that she go on prophylactic acyclovir to see if we can stop her from getting the rash. At that point the exacerbations of pain she has right buttock waning, and the answer to the question is that shingles causing it and not lumbar spine disease. On the other hand she still continues to have intermittent pain on the right side even after the zoster rash has been stopped from coming on, then one could assume that the lumbar spine disease may be the causative agents. When asked that her primary care doctor initiate prophylactic care for her zoster rash. I will see her back in 3 months with really not much else to add. Incidentally she should probably see someone in immunology in order to better determine if she has some sort of immune suppression causing to have this recurrent rash. PLEASE NOTE:   This document has been produced using voice recognition software. Unrecognized errors in transcription may be present.

## 2018-09-10 NOTE — LETTER
9/10/2018 3:43 PM 
 
Patient:  Nati Newton YOB: 1970 Date of Visit: 9/10/2018 Dear Cr Laura, NP 
[address] [address] VIA In Basket Shonda Neely MD 
[address] [place] [address] VIA In Basket 
 : Thank you for referring Ms. Ruthy Cardoso to me for evaluation/treatment. Below are the relevant portions of my assessment and plan of care. If you have questions, please do not hesitate to call me. I look forward to following Ms. Parish Pate along with you.  
 
 
 
Sincerely, 
 
 
Triny Vazquez MD

## 2018-09-10 NOTE — PROGRESS NOTES
Chief Complaint   Patient presents with   [address]    Neurologic Problem     referred by Dr. Brooke Hemphill for right leg pain, EMG previously done by our office.

## 2018-09-25 ENCOUNTER — OFFICE VISIT (OUTPATIENT)
Dept: FAMILY MEDICINE CLINIC | Age: 48
End: 2018-09-25

## 2018-09-25 ENCOUNTER — HOSPITAL ENCOUNTER (OUTPATIENT)
Dept: LAB | Age: 48
Discharge: HOME | End: 2018-09-25
Payer: SUBSIDIZED

## 2018-09-25 VITALS
OXYGEN SATURATION: 98 % | BODY MASS INDEX: 33.31 KG/M2 | SYSTOLIC BLOOD PRESSURE: 116 MMHG | HEIGHT: 62 IN | RESPIRATION RATE: 20 BRPM | WEIGHT: 181 LBS | DIASTOLIC BLOOD PRESSURE: 80 MMHG | TEMPERATURE: 98.2 F | HEART RATE: 74 BPM

## 2018-09-25 DIAGNOSIS — Z86.19 HISTORY OF SHINGLES: ICD-10-CM

## 2018-09-25 DIAGNOSIS — Z79.899 ENCOUNTER FOR LONG-TERM (CURRENT) USE OF HIGH-RISK MEDICATION: ICD-10-CM

## 2018-09-25 DIAGNOSIS — G89.29 CHRONIC BILATERAL LOW BACK PAIN WITH RIGHT-SIDED SCIATICA: ICD-10-CM

## 2018-09-25 DIAGNOSIS — M54.41 CHRONIC BILATERAL LOW BACK PAIN WITH RIGHT-SIDED SCIATICA: ICD-10-CM

## 2018-09-25 DIAGNOSIS — Z23 ENCOUNTER FOR IMMUNIZATION: ICD-10-CM

## 2018-09-25 DIAGNOSIS — Z12.4 SCREENING FOR CERVICAL CANCER: Primary | ICD-10-CM

## 2018-09-25 DIAGNOSIS — Z01.419 WELL WOMAN EXAM WITH ROUTINE GYNECOLOGICAL EXAM: ICD-10-CM

## 2018-09-25 DIAGNOSIS — Z12.39 SCREENING FOR BREAST CANCER: ICD-10-CM

## 2018-09-25 PROCEDURE — G0480 DRUG TEST DEF 1-7 CLASSES: HCPCS | Performed by: NURSE PRACTITIONER

## 2018-09-25 PROCEDURE — 88142 CYTOPATH C/V THIN LAYER: CPT | Performed by: NURSE PRACTITIONER

## 2018-09-25 RX ORDER — ACYCLOVIR 200 MG/1
400 CAPSULE ORAL EVERY 12 HOURS
Qty: 120 CAP | Refills: 1 | Status: SHIPPED | OUTPATIENT
Start: 2018-09-25 | End: 2018-10-25

## 2018-09-25 RX ORDER — GABAPENTIN 800 MG/1
800 TABLET ORAL 3 TIMES DAILY
Qty: 90 TAB | Refills: 3 | Status: SHIPPED | OUTPATIENT
Start: 2018-09-25 | End: 2019-01-04 | Stop reason: SDUPTHER

## 2018-09-25 RX ORDER — SERTRALINE HYDROCHLORIDE 100 MG/1
200 TABLET, FILM COATED ORAL DAILY
Qty: 60 TAB | Refills: 3 | Status: SHIPPED | OUTPATIENT
Start: 2018-09-25 | End: 2019-03-02 | Stop reason: SDUPTHER

## 2018-09-25 RX ORDER — LOVASTATIN 20 MG/1
20 TABLET ORAL
Qty: 30 TAB | Refills: 3 | Status: SHIPPED | OUTPATIENT
Start: 2018-09-25 | End: 2019-06-27

## 2018-09-25 NOTE — MR AVS SNAPSHOT
Merrick Newsome 
 
 
 [address] [place], [place] [address] [address] [address] 
[phone] Patient: Herminio Gutierrez MRN: [id number] :1970 Visit Information Date & Time Provider Department Dept. Phone Encounter #  
 2018  9:40 AM Gali Carbone, [address] [place] [address] [phone] [id number] Follow-up Instructions Return in about 3 months (around 2018) for HLD. Your Appointments 2018  9:00 AM  
Office Visit with Gali Carbone NP [facility] ([facility]) Appt Note: 4 month follow up for hld  
 [address], [facility] [address] [address]  
[phone]  
  
   
 [address],  [address] Upcoming Health Maintenance Date Due  
 PAP AKA CERVICAL CYTOLOGY 1992 Influenza Age 5 to Adult 2018 DTaP/Tdap/Td series (1 - Tdap) 2019* *Topic was postponed. The date shown is not the original due date. Allergies as of 2018  Review Complete On: 2018 By: Gali Carbone NP Severity Noted Reaction Type Reactions Apple High 2012    Anaphylaxis Wasps [Hymenoptera Allergenic Extract] High 2018    Anaphylaxis Cephalexin  2012    Hives Current Immunizations  Never Reviewed Name Date Influenza Vaccine (Quad) PF 2018 Not reviewed this visit You Were Diagnosed With   
  
 Codes Comments Screening for cervical cancer    -  Primary ICD-10-CM: Z12.4 ICD-9-CM: V76.2 Screening for breast cancer     ICD-10-CM: Z12.31 
ICD-9-CM: V76.10 Well woman exam with routine gynecological exam     ICD-10-CM: M82.953 ICD-9-CM: V72.31 [V72.31] Encounter for immunization     ICD-10-CM: G11 ICD-9-CM: V03.89 History of shingles     ICD-10-CM: Z86.19 ICD-9-CM: V12.09 Vitals BP Pulse Temp Resp Height(growth percentile) Weight(growth percentile) 116/80 (BP 1 Location: Left arm, BP Patient Position: Sitting) 74 98.2 °F (36.8 °C) (Oral) 20 5' 2\" (1.575 m) 181 lb (82.1 kg) SpO2 BMI OB Status Smoking Status 98% 33.11 kg/m2 Ablation Passive Smoke Exposure - Never Smoker BMI and BSA Data Body Mass Index Body Surface Area  
 33.11 kg/m 2 1.9 m 2 Preferred Pharmacy Pharmacy Name Phone [facility] [facility] [phone] Your Updated Medication List  
  
   
This list is accurate as of 9/25/18 10:24 AM.  Always use your most recent med list.  
  
  
  
  
 acetaminophen-codeine 300-30 mg per tablet Commonly known as:  TYLENOL #3 Take 1 Tab by mouth every four (4) hours as needed for Pain. Max Daily Amount: 6 Tabs. acyclovir 200 mg capsule Commonly known as:  ZOVIRAX Take 2 Caps by mouth every twelve (12) hours every twelve (12) hours for 30 days. aspirin delayed-release 81 mg tablet Take  by mouth daily. EPINEPHrine 0.3 mg/0.3 mL injection Commonly known as:  EPIPEN  
0.3 mL by IntraMUSCular route once as needed for up to 1 dose. Indications: Anaphylaxis  
  
 gabapentin 800 mg tablet Commonly known as:  NEURONTIN Take 1 Tab by mouth three (3) times daily. lovastatin 20 mg tablet Commonly known as:  MEVACOR Take 1 Tab by mouth nightly. meloxicam 15 mg tablet Commonly known as:  MOBIC Take 1 Tab by mouth daily. methocarbamol 500 mg tablet Commonly known as:  ROBAXIN Take 1-2 Tabs by mouth four (4) times daily as needed. naloxone 4 mg/actuation nasal spray Commonly known as:  [employer] Use 1 spray intranasally into 1 nostril. Use a new Narcan nasal spray for subsequent doses and administer into alternating nostrils. May repeat every 2 to 3 minutes as needed for opioid overdose symptoms. sertraline 100 mg tablet Commonly known as:  ZOLOFT Take 2 Tabs by mouth daily. ZANTAC 150 mg tablet Generic drug:  raNITIdine Take 150 mg by mouth two (2) times a day. Prescriptions Sent to Pharmacy Refills  
 gabapentin (NEURONTIN) 800 mg tablet 3 Sig: Take 1 Tab by mouth three (3) times daily. Class: Normal  
 Pharmacy: [address] #: [phone] Route: Oral  
 sertraline (ZOLOFT) 100 mg tablet 3 Sig: Take 2 Tabs by mouth daily. Class: Normal  
 Pharmacy: [address] Ph #: [phone] Route: Oral  
 lovastatin (MEVACOR) 20 mg tablet 3 Sig: Take 1 Tab by mouth nightly. Class: Normal  
 Pharmacy: [address] #: [phone] Route: Oral  
 acyclovir (ZOVIRAX) 200 mg capsule 1 Sig: Take 2 Caps by mouth every twelve (12) hours every twelve (12) hours for 30 days. Class: Normal  
 Pharmacy: [address] #: [phone] Route: Oral  
  
We Performed the Following INFLUENZA VIRUS VAC QUAD,SPLIT,PRESV FREE SYRINGE IM [id number] CPT(R)] REFERRAL TO INFECTIOUS DISEASE [REF37 Custom] Comments:  
 Dr. Kassy Alvarez Follow-up Instructions Return in about 3 months (around 12/25/2018) for HLD. To-Do List   
 09/25/2018 Imaging:  [facility] SCREENING INCL CAD   
  
 09/25/2018 Pathology:  PAP, LB, RFX HPV EUEMB([id number]) Referral Information Referral ID Referred By Referred To  
  
 [id number] Meliton Sanchez Not Available Visits Status Start Date End Date 1 New Request 9/25/18 9/25/19 If your referral has a status of pending review or denied, additional information will be sent to support the outcome of this decision. Patient Instructions Well Visit, Ages 25 to 48: Care Instructions Your Care Instructions Physical exams can help you stay healthy.  Your doctor has checked your overall health and may have suggested ways to take good care of yourself. He or she also may have recommended tests. At home, you can help prevent illness with healthy eating, regular exercise, and other steps. Follow-up care is a key part of your treatment and safety. Be sure to make and go to all appointments, and call your doctor if you are having problems. It's also a good idea to know your test results and keep a list of the medicines you take. How can you care for yourself at home? · Reach and stay at a healthy weight. This will lower your risk for many problems, such as obesity, diabetes, heart disease, and high blood pressure. · Get at least 30 minutes of physical activity on most days of the week. Walking is a good choice. You also may want to do other activities, such as running, swimming, cycling, or playing tennis or team sports. Discuss any changes in your exercise program with your doctor. · Do not smoke or allow others to smoke around you. If you need help quitting, talk to your doctor about stop-smoking programs and medicines. These can increase your chances of quitting for good. · Talk to your doctor about whether you have any risk factors for sexually transmitted infections (STIs). Having one sex partner (who does not have STIs and does not have sex with anyone else) is a good way to avoid these infections. · Use birth control if you do not want to have children at this time. Talk with your doctor about the choices available and what might be best for you. · Protect your skin from too much sun. When you're outdoors from 10 a.m. to 4 p.m., stay in the shade or cover up with clothing and a hat with a wide brim. Wear sunglasses that block UV rays. Even when it's cloudy, put broad-spectrum sunscreen (SPF 30 or higher) on any exposed skin. · See a dentist one or two times a year for checkups and to have your teeth cleaned. · Wear a seat belt in the car. · Drink alcohol in moderation, if at all. That means no more than 2 drinks a day for men and 1 drink a day for women. Follow your doctor's advice about when to have certain tests. These tests can spot problems early. For everyone · Cholesterol. Have the fat (cholesterol) in your blood tested after age 21. Your doctor will tell you how often to have this done based on your age, family history, or other things that can increase your risk for heart disease. · Blood pressure. Have your blood pressure checked during a routine doctor visit. Your doctor will tell you how often to check your blood pressure based on your age, your blood pressure results, and other factors. · Vision. Talk with your doctor about how often to have a glaucoma test. 
· Diabetes. Ask your doctor whether you should have tests for diabetes. · Colon cancer. Have a test for colon cancer at age 48. You may have one of several tests. If you are younger than 48, you may need a test earlier if you have any risk factors. Risk factors include whether you already had a precancerous polyp removed from your colon or whether your parent, brother, sister, or child has had colon cancer. For women · Breast exam and mammogram. Talk to your doctor about when you should have a clinical breast exam and a mammogram. Medical experts differ on whether and how often women under 50 should have these tests. Your doctor can help you decide what is right for you. · Pap test and pelvic exam. Begin Pap tests at age 24. A Pap test is the best way to find cervical cancer. The test often is part of a pelvic exam. Ask how often to have this test. 
· Tests for sexually transmitted infections (STIs). Ask whether you should have tests for STIs. You may be at risk if you have sex with more than one person, especially if your partners do not wear condoms. For men · Tests for sexually transmitted infections (STIs).  Ask whether you should have tests for STIs. You may be at risk if you have sex with more than one person, especially if you do not wear a condom. · Testicular cancer exam. Ask your doctor whether you should check your testicles regularly. · Prostate exam. Talk to your doctor about whether you should have a blood test (called a PSA test) for prostate cancer. Experts differ on whether and when men should have this test. Some experts suggest it if you are older than 39 and are -American or have a father or brother who got prostate cancer when he was younger than 72. When should you call for help? Watch closely for changes in your health, and be sure to contact your doctor if you have any problems or symptoms that concern you. Where can you learn more? Go to http://tseven-alexandru.info/. Enter P072 in the search box to learn more about \"Well Visit, Ages 25 to 48: Care Instructions. \" Current as of: May 16, 2017 Content Version: 11.7 © 5502-8199 ki work. Care instructions adapted under license by [employer] (which disclaims liability or warranty for this information). If you have questions about a medical condition or this instruction, always ask your healthcare professional. [address] any warranty or liability for your use of this information. Introducing [facility]! Dear Nadia Moya: Thank you for requesting a [employer] account. Our records indicate that you already have an active Agentek account. You can access your account anytime at [email or web address] Did you know that you can access your hospital and ER discharge instructions at any time in [employer]? You can also review all of your test results from your hospital stay or ER visit. Additional Information If you have questions, please visit the Frequently Asked Questions section of the [employer] website at [email or web address] Remember, [employer] is NOT to be used for urgent needs. For medical emergencies, dial 911. Now available from your iPhone and Android! Please provide this summary of care documentation to your next provider. Your primary care clinician is listed as Kalpana Finney. If you have any questions after today's visit, please call [phone].

## 2018-09-25 NOTE — PROGRESS NOTES
1. Have you been to the ER, urgent care clinic since your last visit? Hospitalized since your last visit? No    2. Have you seen or consulted any other health care providers outside of the [address] [place] [address] since your last visit? Include any pap smears or colon screening.   No

## 2018-09-25 NOTE — PROGRESS NOTES
Subjective:   56 y.o. female for Well Woman Check. No LMP recorded. Patient has had an ablation. Social History: single partner, contraception - none. Pertinent past medical hstory: migraines, HLD, no history of HTN, DVT, CAD, DM, liver disease, or smoking. Patient Active Problem List   Diagnosis Code    Fibromyalgia M79.7    HSV (herpes simplex virus) infection B00.9    Cervical pain M54.2    Nerve pain M79.2    Myofascial pain M79.1    Migraine G43.909    Allergic rhinitis due to pollen J30.1    Abnormal liver function K76.89    Depressive disorder, not elsewhere classified F32.9    Anxiety state, unspecified F41.1    Irritable bowel syndrome K58.9    Abnormal glandular Papanicolaou smear of cervix R87.619    History of shingles Z86.19    Spinal stenosis, lumbar region with neurogenic claudication M48.062     Patient Active Problem List    Diagnosis Date Noted    History of shingles 08/07/2018    Spinal stenosis, lumbar region with neurogenic claudication 08/07/2018    Fibromyalgia 04/04/2012    HSV (herpes simplex virus) infection 04/04/2012    Cervical pain 04/04/2012    Nerve pain 04/04/2012    Myofascial pain 04/04/2012    Migraine 04/04/2012    Allergic rhinitis due to pollen 04/04/2012    Abnormal liver function 04/04/2012    Depressive disorder, not elsewhere classified 04/04/2012    Anxiety state, unspecified 04/04/2012    Irritable bowel syndrome 04/04/2012    Abnormal glandular Papanicolaou smear of cervix 04/04/2012     Current Outpatient Prescriptions   Medication Sig Dispense Refill    raNITIdine (ZANTAC) 150 mg tablet Take 150 mg by mouth two (2) times a day.  aspirin delayed-release 81 mg tablet Take  by mouth daily.  gabapentin (NEURONTIN) 800 mg tablet Take 1 Tab by mouth three (3) times daily. 90 Tab 3    sertraline (ZOLOFT) 100 mg tablet Take 2 Tabs by mouth daily. 60 Tab 3    lovastatin (MEVACOR) 20 mg tablet Take 1 Tab by mouth nightly.  30 Tab 3    methocarbamol (ROBAXIN) 500 mg tablet Take 1-2 Tabs by mouth four (4) times daily as needed. 90 Tab 1    acetaminophen-codeine (TYLENOL #3) 300-30 mg per tablet Take 1 Tab by mouth every four (4) hours as needed for Pain. Max Daily Amount: 6 Tabs. 30 Tab 0    naloxone (NARCAN) 4 mg/actuation nasal spray Use 1 spray intranasally into 1 nostril. Use a new Narcan nasal spray for subsequent doses and administer into alternating nostrils. May repeat every 2 to 3 minutes as needed for opioid overdose symptoms. 1 Each 0    EPINEPHrine (EPIPEN) 0.3 mg/0.3 mL injection 0.3 mL by IntraMUSCular route once as needed for up to 1 dose. Indications: Anaphylaxis 1 Syringe 2    meloxicam (MOBIC) 15 mg tablet Take 1 Tab by mouth daily. 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    Pain, upper back     Psychiatric disorder     depression     Tattoo     Unspecified deficiency anemia 1999     Past Surgical History:   Procedure Laterality Date    HX APPENDECTOMY  1995    HX CHOLECYSTECTOMY  2002    HX GYN  2018    Abalation    HX GYN      rt ovary removed    HX ORTHOPAEDIC      arthoscopic rt knee    HX ORTHOPAEDIC      left knee meniscis tear     Family History   Problem Relation Age of Onset    Hypertension Mother     Arthritis-osteo Mother     GERD Father     Hypertension Father     Cancer Sister     MS Maternal Grandmother      Social History   Substance Use Topics    Smoking status: Passive Smoke Exposure - Never Smoker     Years: 6.00    Smokeless tobacco: Never Used      Comment: last attempt to quit 1/1/2003    Alcohol use 0.5 oz/week     1 Cans of beer per week        ROS:  Feeling well.  No dyspnea or chest pain on exertion. No abdominal pain, change in bowel habits, black or bloody stools. No urinary tract symptoms. GYN ROS: normal menses, no abnormal bleeding, pelvic pain or discharge, no breast pain or new or enlarging lumps on self exam. No neurological complaints. Objective:     Visit Vitals    /80 (BP 1 Location: Left arm, BP Patient Position: Sitting)    Pulse 74    Temp 98.2 °F (36.8 °C) (Oral)    Resp 20    Ht 5' 2\" (1.575 m)    Wt 181 lb (82.1 kg)    SpO2 98%    BMI 33.11 kg/m2     The patient appears well, alert, oriented x 3, in no distress. ENT normal.  Neck supple. No adenopathy or thyromegaly. RICK. Lungs are clear, good air entry, no wheezes, rhonchi or rales. S1 and S2 normal, no murmurs, regular rate and rhythm. Abdomen soft without tenderness, guarding, mass or organomegaly. Extremities show no edema, normal peripheral pulses. Neurological is normal, no focal findings. BREAST EXAM: breasts appear normal, no suspicious masses, no skin or nipple changes or axillary nodes    PELVIC EXAM: VULVA: normal appearing vulva with no masses, tenderness or lesions, VAGINA: normal appearing vagina with normal color and discharge, no lesions, CERVIX: normal appearing cervix without discharge or lesions, UTERUS: uterus is normal size, shape, consistency and nontender, ADNEXA: normal adnexa in size, nontender and no masses, RECTAL: normal rectal, no masses, guaiac negative stool obtained, PAP: Pap smear done today    Assessment/Plan:   well woman  mammogram  pap smear  return annually or prn    ICD-10-CM ICD-9-CM    1. Screening for cervical cancer Z12.4 V76.2 PAP, LB, RFX HPV HGTQV([id number])   2. Screening for breast cancer Z12.31 V76.10 GERSON MAMMO BI SCREENING INCL CAD   3. Well woman exam with routine gynecological exam Z01.419 V72.31     [V72.31]   4. Encounter for immunization Z23 V03.89 INFLUENZA VIRUS VAC QUAD,SPLIT,PRESV FREE SYRINGE IM   5.  History of shingles Z86.19 V12.09 REFERRAL TO INFECTIOUS DISEASE   6. Chronic bilateral low back pain with right-sided sciatica M54.41 724.2     G89.29 724.3      338.29    7. Encounter for long-term (current) use of high-risk medication Z79.899 V58.69 COMPLIANCE DRUG SCREEN/PRESCRIPTION MONITORING     Orders Placed This Encounter    GERSON MAMMO BI SCREENING INCL CAD    INFLUENZA VIRUS VACCINE QUADRIVALENT, PRESERVATIVE FREE SYRINGE (72803)    COMPLIANCE DRUG SCREEN/PRESCRIPTION MONITORING    REFERRAL TO INFECTIOUS DISEASE    gabapentin (NEURONTIN) 800 mg tablet    sertraline (ZOLOFT) 100 mg tablet    lovastatin (MEVACOR) 20 mg tablet    acyclovir (ZOVIRAX) 200 mg capsule    PAP, LB, RFX HPV WHOSG([id number])   I have discussed the diagnosis with the patient and the intended plan as seen in the above orders. The patient has received an after-visit summary and questions were answered concerning future plans. I have discussed medication side effects and warnings with the patient as well. Patient agreeable with above plan and verbalizes understanding. Follow-up Disposition:  Return if symptoms worsen or fail to improve, for keep scheduled appt in Dec..

## 2018-09-25 NOTE — PATIENT INSTRUCTIONS

## 2018-09-29 LAB — DRUGS UR: NORMAL

## 2018-10-04 ENCOUNTER — TELEPHONE (OUTPATIENT)
Dept: FAMILY MEDICINE CLINIC | Age: 48
End: 2018-10-04

## 2018-11-05 ENCOUNTER — HOSPITAL ENCOUNTER (OUTPATIENT)
Dept: LAB | Age: 48
Discharge: HOME | End: 2018-11-05
Payer: SUBSIDIZED

## 2018-11-05 ENCOUNTER — OFFICE VISIT (OUTPATIENT)
Dept: INTERNAL MEDICINE CLINIC | Age: 48
End: 2018-11-05

## 2018-11-05 VITALS
RESPIRATION RATE: 14 BRPM | BODY MASS INDEX: 33.86 KG/M2 | HEIGHT: 62 IN | WEIGHT: 184 LBS | OXYGEN SATURATION: 98 % | HEART RATE: 71 BPM | TEMPERATURE: 99.2 F | SYSTOLIC BLOOD PRESSURE: 106 MMHG | DIASTOLIC BLOOD PRESSURE: 64 MMHG

## 2018-11-05 DIAGNOSIS — B02.29 POSTHERPETIC NEURALGIA: Primary | ICD-10-CM

## 2018-11-05 DIAGNOSIS — Z51.89 ENCOUNTER FOR MEDICATION ADJUSTMENT: ICD-10-CM

## 2018-11-05 DIAGNOSIS — B00.89 RECURRENT HERPES SIMPLEX VIRUS (HSV) INFECTION OF BUTTOCK: ICD-10-CM

## 2018-11-05 DIAGNOSIS — B02.29 POSTHERPETIC NEURALGIA: ICD-10-CM

## 2018-11-05 DIAGNOSIS — R05.3 CHRONIC COUGH: ICD-10-CM

## 2018-11-05 DIAGNOSIS — G89.4 CHRONIC PAIN SYNDROME: ICD-10-CM

## 2018-11-05 DIAGNOSIS — J30.89 ENVIRONMENTAL AND SEASONAL ALLERGIES: ICD-10-CM

## 2018-11-05 DIAGNOSIS — Z87.39 HISTORY OF FIBROMYALGIA: ICD-10-CM

## 2018-11-05 LAB — ERYTHROCYTE [SEDIMENTATION RATE] IN BLOOD: 8 MM/HR (ref 0–20)

## 2018-11-05 PROCEDURE — 85652 RBC SED RATE AUTOMATED: CPT | Performed by: INTERNAL MEDICINE

## 2018-11-05 PROCEDURE — 80074 ACUTE HEPATITIS PANEL: CPT | Performed by: INTERNAL MEDICINE

## 2018-11-05 PROCEDURE — 87389 HIV-1 AG W/HIV-1&-2 AB AG IA: CPT | Performed by: INTERNAL MEDICINE

## 2018-11-05 PROCEDURE — 86038 ANTINUCLEAR ANTIBODIES: CPT | Performed by: INTERNAL MEDICINE

## 2018-11-05 PROCEDURE — 86430 RHEUMATOID FACTOR TEST QUAL: CPT | Performed by: INTERNAL MEDICINE

## 2018-11-05 NOTE — PROGRESS NOTES
ROOM # 11    Sarah Webb presents today for   Chief Complaint   Patient presents with   392Maggy Muir preferred language for health care discussion is english/other. Is someone accompanying this pt? No    Is the patient using any DME equipment during OV? No    Depression Screening:  No flowsheet data found. Learning Assessment:  Learning Assessment 5/18/2018   PRIMARY LEARNER Patient   HIGHEST LEVEL OF EDUCATION - PRIMARY LEARNER  SOME COLLEGE   BARRIERS PRIMARY LEARNER NONE   PRIMARY LANGUAGE ENGLISH   LEARNER PREFERENCE PRIMARY DEMONSTRATION   ANSWERED BY self   RELATIONSHIP SELF       Abuse Screening:  Abuse Screening Questionnaire 5/18/2018   Do you ever feel afraid of your partner? N   Are you in a relationship with someone who physically or mentally threatens you? N   Is it safe for you to go home? Y       Fall Risk  No flowsheet data found. Visit Vitals  /64 (BP 1 Location: Right arm, BP Patient Position: Sitting)   Pulse 71   Temp 99.2 °F (37.3 °C) (Oral)   Resp 14   Ht 5' 2\" (1.575 m)   Wt 184 lb (83.5 kg)   SpO2 98%   BMI 33.65 kg/m²       Health Maintenance reviewed and discussed per provider. Yes    Sarah Webb is due for There are no preventive care reminders to display for this patient. Please order/place referral if appropriate. Advance Directive:  1. Do you have an advance directive in place? Patient Reply: No    2. If not, would you like material regarding how to put one in place? Patient Reply: No    Coordination of Care:  1. Have you been to the ER, urgent care clinic since your last visit? Hospitalized since your last visit? No    2. Have you seen or consulted any other health care providers outside of the [address] [place] [address] since your last visit? Include any pap smears or colon screening.  No

## 2018-11-05 NOTE — PROGRESS NOTES
HPI     Tara Palmer is a 56 y.o. female with relevant past medical history of  Relatively young patient with reported history of MRSA in her eye, mold infection in her lungs, c. 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 Diagnosed after multiple episodes by biopsy. Episodes recur at least once a month for the past year she says. She reports associated chronic neuropathic pain in the area. She was found recently as well on lumbar MRI (6/22/18) to have severe lumbar spinal stenosis L4-L5 from disc protrusion. Following with orthopedic surgery. Patient is currently taking acyclovir 200 mg PO BID. Takes gabapentin 800 mg TID for pain, reports it takes the edge off, but does not control pain entirely. The patient does admit to being very active and being under a lot of stress, but not more than the usual for many years now. She does not have insurance. She has been seen by allergy before who found the patient was allergic to multiple environmental triggers and recommended \"allergy shots\" but the patient could not afford them. She was seen by rheumatology in the past and was told she had fibromyalgia. She reports that for the past year her hands often seem stiff and get swollen/puffy and tender all the way to her wrists. Denies any history of local trauma. Has also noted large wheals on her arms at times. She reports h/o chronic cough, and denies h/o smoking. She says she sometimes she has wheezing and SOB, but denies any h/o childhood asthma. She attributes symptoms to allergies. She also has h/o GERD. Denies any known FH of autoimmune disorders, but MGM had MS. Found to have + marijuana on drug screening test. Used to be on tylenol 3. Also noted to have multiple lesions suggestive of seborrheic keratosis. ROS  As above included in HPI.   Otherwise 11 point review of systems negative including constitutional, skin, HENT, eyes, respiratory, cardiovascular, gastrointestinal, genitourinary, musculoskeletal, endocrine, hematologic, allergy, and neurologic. 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    Pain, upper back     Psychiatric disorder     depression     Tattoo     Unspecified deficiency anemia 1999     Past Surgical History:   Procedure Laterality Date    HX APPENDECTOMY  1995    HX CHOLECYSTECTOMY  2002    HX GYN  2018    Abalation    HX GYN      rt ovary removed    HX ORTHOPAEDIC      arthoscopic rt knee    HX ORTHOPAEDIC      left knee meniscis tear        Family History  Family History   Problem Relation Age of Onset    Hypertension Mother     Arthritis-osteo Mother     GERD Father     Hypertension Father     Cancer Sister     MS Maternal Grandmother        Social History  She  reports that she is a non-smoker but has been exposed to tobacco smoke. She has been exposed to tobacco smoke for the past 6.00 years. she has never used smokeless tobacco.   Social History     Substance and Sexual Activity   Alcohol Use Yes    Alcohol/week: 0.5 oz    Types: 1 Cans of beer per week       Immunization History  Immunization History   Administered Date(s) Administered    Influenza Vaccine (Quad) PF 09/25/2018       Allergies  Allergies   Allergen Reactions    Apple Anaphylaxis    Wasps [Hymenoptera Allergenic Extract] Anaphylaxis    Cephalexin Hives       Medications  Current Outpatient Medications   Medication Sig    gabapentin (NEURONTIN) 800 mg tablet Take 1 Tab by mouth three (3) times daily.  sertraline (ZOLOFT) 100 mg tablet Take 2 Tabs by mouth daily.  lovastatin (MEVACOR) 20 mg tablet Take 1 Tab by mouth nightly.  raNITIdine (ZANTAC) 150 mg tablet Take 150 mg by mouth two (2) times a day.     aspirin delayed-release 81 mg tablet Take  by mouth daily.  EPINEPHrine (EPIPEN) 0.3 mg/0.3 mL injection 0.3 mL by IntraMUSCular route once as needed for up to 1 dose. Indications: Anaphylaxis    methocarbamol (ROBAXIN) 500 mg tablet Take 1-2 Tabs by mouth four (4) times daily as needed.  acetaminophen-codeine (TYLENOL #3) 300-30 mg per tablet Take 1 Tab by mouth every four (4) hours as needed for Pain. Max Daily Amount: 6 Tabs.  meloxicam (MOBIC) 15 mg tablet Take 1 Tab by mouth daily.  naloxone (NARCAN) 4 mg/actuation nasal spray Use 1 spray intranasally into 1 nostril. Use a new Narcan nasal spray for subsequent doses and administer into alternating nostrils. May repeat every 2 to 3 minutes as needed for opioid overdose symptoms. No current facility-administered medications for this visit. Visit Vitals  /64 (BP 1 Location: Right arm, BP Patient Position: Sitting)   Pulse 71   Temp 99.2 °F (37.3 °C) (Oral)   Resp 14   Ht 5' 2\" (1.575 m)   Wt 184 lb (83.5 kg)   LMP  (LMP Unknown)   SpO2 98%   BMI 33.65 kg/m²     Body mass index is 33.65 kg/m². Physical Exam   Constitutional: She is oriented to person, place, and time and well-developed, well-nourished, and in no distress. No distress. HENT:   Head: Normocephalic and atraumatic. Right Ear: External ear normal.   Left Ear: External ear normal.   Mouth/Throat: Oropharynx is clear and moist.   Eyes: Conjunctivae are normal. Pupils are equal, round, and reactive to light. Scleral icterus is present. Neck: Normal range of motion. Neck supple. No JVD present. No thyromegaly present. Cardiovascular: Normal rate, regular rhythm and normal heart sounds. Pulmonary/Chest: Effort normal.   Abdominal: Soft. Musculoskeletal: Normal range of motion. She exhibits no edema, tenderness or deformity. Lymphadenopathy:     She has no cervical adenopathy. Neurological: She is alert and oriented to person, place, and time. Skin: Skin is warm.  She is not diaphoretic. Skin is clammy   Noted multiple lesions in trunk consistent with seborrheic keratosis  Left eye dark discoloration in lower aspect (per patient since birth)  Left lower hip/buttock 4x3 cm brown elliptoid maculo papular birth landon         REVIEW OF DATA    Labs  No visits with results within 1 Month(s) from this visit. Latest known visit with results is:   Hospital Outpatient Visit on 09/25/2018   Component Date Value Ref Range Status    Summary 09/25/2018 FINAL   Final         CT Results (most recent):  Results from Hospital Encounter encounter on 05/19/18   CT CHEST ABD PELV W CONT    Narrative CT chest abdomen and pelvis with oral and IV contrast    HISTORY: Bloating, swollen lymph nodes in arms, hands, legs x1 month. Concentrate cough. Abdominal pain. TECHNIQUE: CT dose reduction was achieved through use of a standardized protocol  tailored for this examination and automatic exposure control for dose  modulation. . Multiplanar thin section imaging performed. Comparisons: None    CT CHEST: Thyroid: Negative    LUNGS: Clear    HEART: Negative. No pericardial fluid. PLEURA: No effusion. Lymph nodes: No adenopathy    CT ABDOMEN: Liver: Homogeneous  Pancreas, spleen: Negative  Biliary: No biliary dilatation. Prior cholecystectomy. Adrenals: Negative  Kidneys: Negative    GI: No obstruction or inflammatory change. Caliber of small and large bowel is  normal.    Aorta, IVC: Negative  Lymph nodes: Negative    CT PELVIS: Uterus midline. No adnexal masses, adenopathy, no free fluid. Bladder  and rectum unremarkable. Bones: Negative      Impression IMPRESSION:    No acute findings. No findings which would explain abdominal bloating. No  enlarged lymph nodes. XR Results (most recent):  Results from Hospital Encounter encounter on 05/04/18   XR CHEST PA LAT    Narrative Chest PA and lateral    INDICATION: Pain    COMPARISON: 12/17    FINDINGS:  Two views of the chest were obtained. The lungs are clear. Cardiac silhouette is  normal. Pulmonary vasculature is unremarkable. Osseous structures are intact. Impression IMPRESSION:  No acute cardiopulmonary disease. No significant interval change. CT   All Micro Results     Procedure Component Value Units Date/Time    QUANTIFERON-TB GOLD PLUS [[id number]] Collected:  11/05/18 1429    Order Status:  No result Specimen:  Blood                 DIAGNOSIS AND PLAN  Patient Active Problem List   Diagnosis Code    Fibromyalgia M79.7    HSV (herpes simplex virus) infection B00.9    Cervical pain M54.2    Nerve pain M79.2    Myofascial pain M79.18    Migraine G43.909    Allergic rhinitis due to pollen J30.1    Abnormal liver function K76.89    Depressive disorder, not elsewhere classified F32.9    Anxiety state, unspecified F41.1    Irritable bowel syndrome K58.9    Abnormal glandular Papanicolaou smear of cervix R87.619    History of shingles Z86.19    Spinal stenosis, lumbar region with neurogenic claudication M48.062     1. Postherpetic neuralgia  2. Recurrent herpes simplex virus (HSV) infection of buttock  3. Chronic pain syndrome  4. Chronic cough  5. Environmental and seasonal allergies  6. History of fibromyalgia  Relatively young patient with reported history of MRSA in her eye, mold infection in her lungs, c. 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 Diagnosed after multiple episodes by biopsy. Episodes recur at least once a month for the past year she says. She reports associated chronic neuropathic pain in the area. She was found recently as well on lumbar MRI (6/22/18) to have severe lumbar spinal stenosis L4-L5 from disc protrusion. Following with orthopedic surgery. Patient is currently taking acyclovir 200 mg PO BID. Takes gabapentin 800 mg TID for pain, reports it takes the edge off, but does not control pain entirely.  The patient does admit to being very active and being under a lot of stress, but not more than the usual for many years now. She does not have insurance. She has been seen by allergy before who found the patient was allergic to multiple environmental triggers and recommended \"allergy shots\" but the patient could not afford them. She was seen by rheumatology in the past and was told she had fibromyalgia. She reports that for the past year her hands often seem stiff and get swollen/puffy and tender all the way to her wrists. Denies any history of local trauma. Has also noted large wheals on her arms at times. She reports h/o chronic cough, and denies h/o smoking. She says she sometimes she has wheezing and SOB, but denies any h/o childhood asthma. She attributes symptoms to allergies. She also has h/o GERD. Denies any known FH of autoimmune disorders, but MGM had MS. Found to have + marijuana on drug screening test. Used to be on tylenol 3. Also noted to have multiple lesions suggestive of seborrheic keratosis. Plan  Recurrent VZV infection. - Will screen for hepatitis, HIV, TB.   - Will screen for autoimmune disorders and refer to rheum for evaluation given joint pain in hands and small joints as well as lower back. RF, PILY, ESR ordered  - Consider adding elavil at night in addition to gabapentin for postherpetic neuralgia. - Could consider increasing acyclovir to 400 mg PO BID  - Patient may benefit from Shingrix vaccination.   - Will contact patient with lab results. Follow up in a few weeks. 7. Encounter for medication adjustment   Considering elavil for postherpetic pain, will obtain baseline EKG first.  - AMB POC EKG ROUTINE W/ 12 LEADS, INTER & REP         Follow-up Disposition: Not on File     Yisel Martin MD

## 2018-11-06 ENCOUNTER — TELEPHONE (OUTPATIENT)
Dept: INTERNAL MEDICINE CLINIC | Age: 48
End: 2018-11-06

## 2018-11-06 LAB
ANA SER QL: NEGATIVE
HAV IGM SER QL: NEGATIVE
HBV CORE IGM SER QL: NEGATIVE
HBV SURFACE AG SER QL: 0.22 INDEX
HBV SURFACE AG SER QL: NEGATIVE
HCV AB SER IA-ACNC: 0.08 INDEX
HCV AB SERPL QL IA: NEGATIVE
HCV COMMENT,HCGAC: NORMAL
HIV 1+2 AB+HIV1 P24 AG SERPL QL IA: NONREACTIVE
HIV12 RESULT COMMENT, HHIVC: NORMAL
RHEUMATOID FACT SER QL LA: NEGATIVE
SP1: NORMAL
SP2: NORMAL
SP3: NORMAL

## 2018-11-06 NOTE — TELEPHONE ENCOUNTER
FYI-Quantiferon gold was ordered- [employer] switched to a 4 tube system last month- they are not processing 3 tube per Jaron Mayor #  [phone]

## 2018-11-19 ENCOUNTER — OFFICE VISIT (OUTPATIENT)
Dept: FAMILY MEDICINE CLINIC | Age: 48
End: 2018-11-19

## 2018-11-19 ENCOUNTER — HOSPITAL ENCOUNTER (OUTPATIENT)
Dept: LAB | Age: 48
Discharge: HOME | End: 2018-11-19
Payer: SUBSIDIZED

## 2018-11-19 VITALS
HEART RATE: 74 BPM | WEIGHT: 178.2 LBS | DIASTOLIC BLOOD PRESSURE: 78 MMHG | RESPIRATION RATE: 16 BRPM | OXYGEN SATURATION: 99 % | TEMPERATURE: 97.9 F | SYSTOLIC BLOOD PRESSURE: 122 MMHG | BODY MASS INDEX: 32.79 KG/M2 | HEIGHT: 62 IN

## 2018-11-19 DIAGNOSIS — K21.9 GASTROESOPHAGEAL REFLUX DISEASE, ESOPHAGITIS PRESENCE NOT SPECIFIED: ICD-10-CM

## 2018-11-19 DIAGNOSIS — E66.9 OBESITY, CLASS I, BMI 30-34.9: ICD-10-CM

## 2018-11-19 DIAGNOSIS — N76.0 BACTERIAL VAGINOSIS: Primary | ICD-10-CM

## 2018-11-19 DIAGNOSIS — N76.0 BACTERIAL VAGINOSIS: ICD-10-CM

## 2018-11-19 DIAGNOSIS — B96.89 BACTERIAL VAGINOSIS: ICD-10-CM

## 2018-11-19 DIAGNOSIS — R21 RASH: ICD-10-CM

## 2018-11-19 DIAGNOSIS — B96.89 BACTERIAL VAGINOSIS: Primary | ICD-10-CM

## 2018-11-19 PROCEDURE — 87798 DETECT AGENT NOS DNA AMP: CPT

## 2018-11-19 PROCEDURE — 85670 THROMBIN TIME PLASMA: CPT

## 2018-11-19 RX ORDER — CYCLOBENZAPRINE HCL 10 MG
10 TABLET ORAL
Qty: 60 TAB | Refills: 0 | Status: SHIPPED | OUTPATIENT
Start: 2018-11-19 | End: 2018-12-11 | Stop reason: ALTCHOICE

## 2018-11-19 RX ORDER — OMEPRAZOLE 20 MG/1
20 CAPSULE, DELAYED RELEASE ORAL 2 TIMES DAILY
Qty: 60 CAP | Refills: 1 | Status: SHIPPED | OUTPATIENT
Start: 2018-11-19 | End: 2019-03-08 | Stop reason: SDUPTHER

## 2018-11-19 RX ORDER — CLINDAMYCIN HYDROCHLORIDE 300 MG/1
300 CAPSULE ORAL 2 TIMES DAILY
Qty: 14 CAP | Refills: 0 | Status: SHIPPED | OUTPATIENT
Start: 2018-11-19 | End: 2018-11-26

## 2018-11-19 NOTE — PROGRESS NOTES
HISTORY OF PRESENT ILLNESS    Nadia Moya is a 56y.o. year old female comes in today to be evaluated and treated for:  Recurrent shingles, repeat pelvic for trichomonas and facial rash    Patient's PAP revealed trichomonas. Patient called in to repeat pelvic exam and wet prep. Patient denies vaginal discharge or odor. Also expresses concerns regarding erythematous rash she has developed around her eyes. States rash is itchy. Denies changes in vision, drainage or redness. Comments she is currently having a shingles flare to right lower back. Requesting flexeril for muscle spasms. Allergies   Allergen Reactions    Apple Anaphylaxis    Wasps [Hymenoptera Allergenic Extract] Anaphylaxis    Cephalexin Hives     Current Outpatient Medications   Medication Sig Dispense Refill    gabapentin (NEURONTIN) 800 mg tablet Take 1 Tab by mouth three (3) times daily. 90 Tab 3    sertraline (ZOLOFT) 100 mg tablet Take 2 Tabs by mouth daily. 60 Tab 3    lovastatin (MEVACOR) 20 mg tablet Take 1 Tab by mouth nightly. 30 Tab 3    raNITIdine (ZANTAC) 150 mg tablet Take 150 mg by mouth two (2) times a day.  aspirin delayed-release 81 mg tablet Take  by mouth daily.  EPINEPHrine (EPIPEN) 0.3 mg/0.3 mL injection 0.3 mL by IntraMUSCular route once as needed for up to 1 dose. Indications: Anaphylaxis 1 Syringe 2    methocarbamol (ROBAXIN) 500 mg tablet Take 1-2 Tabs by mouth four (4) times daily as needed. 90 Tab 1    acetaminophen-codeine (TYLENOL #3) 300-30 mg per tablet Take 1 Tab by mouth every four (4) hours as needed for Pain. Max Daily Amount: 6 Tabs. 30 Tab 0    meloxicam (MOBIC) 15 mg tablet Take 1 Tab by mouth daily. 30 Tab 2    naloxone (NARCAN) 4 mg/actuation nasal spray Use 1 spray intranasally into 1 nostril. Use a new Narcan nasal spray for subsequent doses and administer into alternating nostrils. May repeat every 2 to 3 minutes as needed for opioid overdose symptoms.  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    Pain, upper back     Psychiatric disorder     depression     Tattoo     Unspecified deficiency anemia 1999       ROS:  Review of Systems - General ROS: negative  Genito-Urinary ROS: negative for - vulvar/vaginal symptoms  Dermatological ROS: positive for - rash right lower back  Ophthalmic ROS: positive for - erythematous rash to bilateral upper/lower lids          Objective:  Visit Vitals  /78 (BP 1 Location: Left arm)   Pulse 74   Temp 97.9 °F (36.6 °C) (Oral)   Resp 16   Ht 5' 2\" (1.575 m)   Wt 178 lb 3.2 oz (80.8 kg)   LMP  (LMP Unknown)   SpO2 99%   BMI 32.59 kg/m²     General appearance - alert, well appearing, and in no distress  Neck - supple, no significant adenopathy  Chest - clear to auscultation, no wheezes, rales or rhonchi, symmetric air entry  Heart - normal rate, regular rhythm, normal S1, S2, no murmurs, rubs, clicks or gallops  Eyes: positive findings: eyelids/periorbital: erythematous dry macular rash, no drainage  Skin: very small erythematous patch to right lower back superior to gluteal cleft  Pelvic exam: VULVA: normal appearing vulva with no masses, tenderness or lesions, VAGINA: normal appearing vagina with normal color and discharge, no lesions, vaginal discharge - white and scant, CERVIX: normal appearing cervix without discharge or lesions, UTERUS: uterus is normal size, shape, consistency and nontender, ADNEXA: no masses, WET MOUNT done - results: clue cells. Assessment/Plan:     ICD-10-CM ICD-9-CM    1. Bacterial vaginosis N76.0 616.10 clindamycin (CLEOCIN) 300 mg capsule    B96.89 041.9 NUSWAB VAGINITIS PLUS   2. Obesity, Class I, BMI 30-34.9 E66.9 278.00    3.  Rash R21 782.1 LUPUS ANTICOAGULANT COMP PANEL   4. Gastroesophageal reflux disease, esophagitis presence not specified K21.9 530.81 omeprazole (PRILOSEC) 20 mg capsule     Orders Placed This Encounter    LUPUS ANTICOAGULANT COMP PANEL    NUSWAB VAGINITIS PLUS    clindamycin (CLEOCIN) 300 mg capsule    cyclobenzaprine (FLEXERIL) 10 mg tablet    omeprazole (PRILOSEC) 20 mg capsule   increased may take gabapentin 2 tabs at night for increased pain  I have discussed the diagnosis with the patient and the intended plan as seen in the above orders. The patient has received an after-visit summary and questions were answered concerning future plans. I have discussed medication side effects and warnings with the patient as well. Patient agreeable with above plan and verbalizes understanding. Follow-up Disposition:  Return if symptoms worsen or fail to improve, for keep scheduled follow up.

## 2018-11-19 NOTE — PATIENT INSTRUCTIONS
Bacterial Vaginosis: Care Instructions  Your Care Instructions    Bacterial vaginosis is a type of vaginal infection. It is caused by excess growth of certain bacteria that are normally found in the vagina. Symptoms can include itching, swelling, pain when you urinate or have sex, and a gray or yellow discharge with a \"fishy\" odor. It is not considered an infection that is spread through sexual contact. Although symptoms can be annoying and uncomfortable, bacterial vaginosis does not usually cause other health problems. However, if you have it while you are pregnant, it can cause complications. While the infection may go away on its own, most doctors use antibiotics to treat it. You may have been prescribed pills or vaginal cream. With treatment, bacterial vaginosis usually clears up in 5 to 7 days. Follow-up care is a key part of your treatment and safety. Be sure to make and go to all appointments, and call your doctor if you are having problems. It's also a good idea to know your test results and keep a list of the medicines you take. How can you care for yourself at home? · Take your antibiotics as directed. Do not stop taking them just because you feel better. You need to take the full course of antibiotics. · Do not eat or drink anything that contains alcohol if you are taking metronidazole (Flagyl). · Keep using your medicine if you start your period. Use pads instead of tampons while using a vaginal cream or suppository. Tampons can absorb the medicine. · Wear loose cotton clothing. Do not wear nylon and other materials that hold body heat and moisture close to the skin. · Do not scratch. Relieve itching with a cold pack or a cool bath. · Do not wash your vaginal area more than once a day. Use plain water or a mild, unscented soap. Do not douche. When should you call for help?   Watch closely for changes in your health, and be sure to contact your doctor if:    · You have unexpected vaginal bleeding.     · You have a fever.     · You have new or increased pain in your vagina or pelvis.     · You are not getting better after 1 week.     · Your symptoms return after you finish the course of your medicine. Where can you learn more? Go to http://steven-alexandru.info/. Tyra Abbott in the search box to learn more about \"Bacterial Vaginosis: Care Instructions. \"  Current as of: May 15, 2018  Content Version: 11.8  © 1705-8104 Healthwise, [employer]. Care instructions adapted under license by [employer] (which disclaims liability or warranty for this information). If you have questions about a medical condition or this instruction, always ask your healthcare professional. [address] any warranty or liability for your use of this information.

## 2018-11-19 NOTE — PROGRESS NOTES
Pt is here for pelvic exam  Pt c/o rash on face intermittently x several months. 1. Have you been to the ER, urgent care clinic since your last visit? Hospitalized since your last visit? No    2. Have you seen or consulted any other health care providers outside of the [address] [place] [address] since your last visit? Include any pap smears or colon screening.  No

## 2018-11-22 LAB
INTERPRETATION, 117893: NORMAL
LA NT DPL PPP: 45.6 SEC (ref 0–55)
LA NT DPL/LA NT HPL PPP-RTO: 0.94 RATIO (ref 0–1.4)
SCREEN APTT: 35 SEC (ref 0–51.9)
SCREEN DRVVT: 34.6 SEC (ref 0–47)
THROMBIN TIME: 17.4 SEC (ref 0–23)

## 2018-11-23 LAB
A VAGINAE DNA VAG QL NAA+PROBE: ABNORMAL SCORE
BVAB2 DNA VAG QL NAA+PROBE: ABNORMAL SCORE
C ALBICANS DNA VAG QL NAA+PROBE: NEGATIVE
C GLABRATA DNA VAG QL NAA+PROBE: NEGATIVE
C TRACH RRNA SPEC QL NAA+PROBE: NEGATIVE
MEGA1 DNA VAG QL NAA+PROBE: ABNORMAL SCORE
N GONORRHOEA RRNA SPEC QL NAA+PROBE: NEGATIVE
SPECIMEN SOURCE: ABNORMAL
T VAGINALIS RRNA SPEC QL NAA+PROBE: POSITIVE

## 2018-11-26 RX ORDER — METRONIDAZOLE 500 MG/1
2000 TABLET ORAL ONCE
Qty: 4 TAB | Refills: 0 | Status: SHIPPED | OUTPATIENT
Start: 2018-11-26 | End: 2018-11-26

## 2018-11-26 NOTE — PROGRESS NOTES
Please advise patient I received her culture results. Her culture returned positive for trichomas. I have sent over flagyl 500mg to take 4 tabs x1 dose.   Thanks, JASON HobbsC

## 2018-11-28 RX ORDER — PREDNISONE 5 MG/1
TABLET ORAL
Qty: 30 TAB | Refills: 0 | Status: SHIPPED | OUTPATIENT
Start: 2018-11-28 | End: 2018-12-11

## 2018-11-28 NOTE — PROGRESS NOTES
Pt made aware of positive trichomonas lab but states she does not understand because Amber Guzman had put her on clindamycin on her last visit & told Pt that she did not see any signs of trich. Pt would like a call back from Braxton Anguiano NP to explain why now the test showed positive.

## 2018-11-28 NOTE — PROGRESS NOTES
Spoke with patient and discussed positive culture. Also informed to contact Dr. Neeta Keys to discuss possible surgical options. Also informed will send over prednisone taper to help with severe pain she is currently experiencing. Patient verbalizes understanding and agreeable with plan.

## 2018-12-07 ENCOUNTER — OFFICE VISIT (OUTPATIENT)
Dept: ORTHOPEDIC SURGERY | Age: 48
End: 2018-12-07

## 2018-12-07 VITALS
DIASTOLIC BLOOD PRESSURE: 86 MMHG | BODY MASS INDEX: 32.63 KG/M2 | SYSTOLIC BLOOD PRESSURE: 118 MMHG | RESPIRATION RATE: 19 BRPM | HEART RATE: 82 BPM | WEIGHT: 178.4 LBS | TEMPERATURE: 98.2 F

## 2018-12-07 DIAGNOSIS — Z86.19 HISTORY OF SHINGLES: ICD-10-CM

## 2018-12-07 DIAGNOSIS — M48.062 SPINAL STENOSIS OF LUMBAR REGION WITH NEUROGENIC CLAUDICATION: Primary | ICD-10-CM

## 2018-12-07 DIAGNOSIS — M54.50 LUMBAR SPINE PAIN: ICD-10-CM

## 2018-12-07 NOTE — PATIENT INSTRUCTIONS
Deciding About Surgery for Lumbar Spinal Stenosis  Deciding About Surgery for Lumbar Spinal Stenosis  What is lumbar spinal stenosis? Lumbar spinal stenosis is the narrowing of the spinal canal in the lower back. This occurs when bone and other tissues grow inside the openings in the spinal bones. This can squeeze the nerves that branch out from the spinal cord. The squeezing can cause pain, numbness, or weakness. It happens most often in the legs, feet, or buttocks. You may choose to have surgery to ease your symptoms. Or you may try other treatments instead. These include medicines, exercise, and physical therapy. What are key points about this decision? · If your symptoms are mild to moderate, then medicine, physical therapy, and exercise may be all you need. · You may want surgery if you have tried other treatment for a while and your symptoms are still so bad that you can't do your normal activities. · Your symptoms may come back after a few years. You may need surgery again. · Surgery will most likely help leg pain. But it may not help back pain as much. Why might you choose to have surgery? · Surgery can relieve pain. It can also improve how well you can walk. · You have tried other treatment for a while and your symptoms are still so bad that you can't do your normal activities. · Without surgery, your symptoms may still bother you. If symptoms are very painful, they most often will not improve on their own. · Your doctor may advise surgery if you can't control your bladder or bowels as well as you used to. Surgery is also an option if you notice sudden changes in how well you can walk or you are more clumsy than before. Why might you choose not to have surgery? · You may try other treatments to help your symptoms. These include medicine, exercise, and physical therapy. These other treatments work best for people with mild to moderate symptoms.   · Risks from surgery include nerve injury and tissue tears. And you could have chronic pain, trouble passing urine, and a spine that is not stable. · All surgery has some risks. These include bleeding, infection, and risks from anesthesia. · You may not be able to return to all of your normal activities for many months. · Your symptoms may come back in a few years. You may need surgery again. Your decision  Thinking about the facts and your feelings can help you make a decision that is right for you. Be sure you understand the benefits and risks of your options, and think about what else you need to do before you make the decision. Where can you learn more? Go to http://steven-alexandru.info/. Enter E562 in the search box to learn more about \"Deciding About Surgery for Lumbar Spinal Stenosis. \"  Current as of: November 29, 2017  Content Version: 11.8  © 0883-7539 Healthwise[employer]. Care instructions adapted under license by [employer] (which disclaims liability or warranty for this information). If you have questions about a medical condition or this instruction, always ask your healthcare professional. [address] any warranty or liability for your use of this information.

## 2018-12-07 NOTE — PROGRESS NOTES
[address]  [address], [address]  [place], [address] [place] [address] Street  Phone: [phone]  Fax: [phone]  PROGRESS NOTE  Patient: Iwona Gaines                MRN: [id number]       SSN: [id number]  YOB: 1970        AGE: 56 y.o. SEX: female  Body mass index is 32.63 kg/m². PCP: Amber Macias NP  12/07/18    Chief Complaint   Patient presents with    Back Pain     fu EMG       HISTORY OF PRESENT ILLNESS, RADIOGRAPHS, and PLAN:     HISTORY OF PRESENT ILLNESS:  Ms. Josie Winter returns today. She continues to have back pain and right paracentral back pain bee stinging in her buttock. I had her seen by Dr. Margarita Xie who agreed that there maybe a measure of shingles to this and she has been placed on chronic acyclovir which has not really helped her much, though rash has been witnessed. He and other doctors have considered that there maybe a dual pathology present here both for stenosis and some sort of viral shingles pathology present. She returns today asking if we could consider a surgical intervention; something to break the cycle of pain that she has been having. ASSESSMENT/PLAN:  I sat down with her and went over the entire issue in exhaustive detail, the nature of her possible shingles, the nature of her stenosis, her disc protrusion, her pain, her back pain, her radicular components of it to the degree that she has it. The nature of surgery which in my mind if we were to consider would be a right-sided hemilaminotomy decompression discectomy. The possibility of surgery could, as any surgery, make her pathology worse as well as better. Lack of true instability in her back. We went over the details and answered her questions; misgivings. She clearly understands the ramifications and wishes to proceed.   After looking at the numerous evaluations she has been through and all the things she has been subjected to, I think it is not unreasonable to consider a simple decompression for stenosis with disc protrusion at this L4-5 segment. We will proceed with an L4-5 laminotomy once the appropriate approvals and clearances take place. 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    Pain, upper back     Psychiatric disorder     depression     Tattoo     Unspecified deficiency anemia 1999       Family History   Problem Relation Age of Onset    Hypertension Mother     Arthritis-osteo Mother     GERD Father     Hypertension Father     Cancer Sister     MS Maternal Grandmother        Current Outpatient Medications   Medication Sig Dispense Refill    predniSONE (DELTASONE) 5 mg tablet 5 tabs x2 days; 4 tabs x2 days; 3 tabs x2 days; 2 tabs x2 days; 1 tab x2 days 30 Tab 0    cyclobenzaprine (FLEXERIL) 10 mg tablet Take 1 Tab by mouth three (3) times daily as needed for Muscle Spasm(s). 60 Tab 0    omeprazole (PRILOSEC) 20 mg capsule Take 1 Cap by mouth two (2) times a day. 60 Cap 1    gabapentin (NEURONTIN) 800 mg tablet Take 1 Tab by mouth three (3) times daily. 90 Tab 3    sertraline (ZOLOFT) 100 mg tablet Take 2 Tabs by mouth daily. 60 Tab 3    lovastatin (MEVACOR) 20 mg tablet Take 1 Tab by mouth nightly. 30 Tab 3    raNITIdine (ZANTAC) 150 mg tablet Take 150 mg by mouth two (2) times a day.  aspirin delayed-release 81 mg tablet Take  by mouth daily.  methocarbamol (ROBAXIN) 500 mg tablet Take 1-2 Tabs by mouth four (4) times daily as needed. 90 Tab 1    meloxicam (MOBIC) 15 mg tablet Take 1 Tab by mouth daily. 30 Tab 2    naloxone (NARCAN) 4 mg/actuation nasal spray Use 1 spray intranasally into 1 nostril. Use a new Narcan nasal spray for subsequent doses and administer into alternating nostrils.  May repeat every 2 to 3 minutes as needed for opioid overdose symptoms. 1 Each 0    EPINEPHrine (EPIPEN) 0.3 mg/0.3 mL injection 0.3 mL by IntraMUSCular route once as needed for up to 1 dose. Indications: Anaphylaxis 1 Syringe 2    acetaminophen-codeine (TYLENOL #3) 300-30 mg per tablet Take 1 Tab by mouth every four (4) hours as needed for Pain. Max Daily Amount: 6 Tabs. 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    Pain, upper back     Psychiatric disorder     depression     Tattoo     Unspecified deficiency anemia 1999       Social History     Socioeconomic History    Marital status:      Spouse name: Not on file    Number of children: Not on file    Years of education: Not on file    Highest education level: Not on file   Social Needs    Financial resource strain: Not on file    Food insecurity - worry: Not on file    Food insecurity - inability: Not on file   [employer] needs - medical: Not on file   [employer] needs - non-medical: Not on file   Occupational History    Not on file   Tobacco Use    Smoking status: Passive Smoke Exposure - Never Smoker    Smokeless tobacco: Never Used    Tobacco comment: last attempt to quit 1/1/2003   Substance and Sexual Activity    Alcohol use:  Yes     Alcohol/week: 0.5 oz     Types: 1 Cans of beer per week    Drug use: No    Sexual activity: Yes     Partners: Male   Other Topics Concern    Not on file Social History Narrative    Not on file         REVIEW OF SYSTEMS:   CONSTITUTIONAL SYMPTOMS:  Negative. EYES:  Negative. EARS, NOSE, THROAT AND MOUTH:  Negative. CARDIOVASCULAR:  Negative. RESPIRATORY:  Negative. GENITOURINARY: Per HPI. GASTROINTESTINAL:  Per HPI. INTEGUMENTARY (SKIN AND/OR BREAST):  Negative. MUSCULOSKELETAL: Per HPI.   ENDOCRINE/RHEUMATOLOGIC:  Negative. NEUROLOGICAL:  Per HPI. HEMATOLOGIC/LYMPHATIC:  Negative. ALLERGIC/IMMUNOLOGIC:  Negative. PSYCHIATRIC:  Negative. PHYSICAL EXAMINATION:   Visit Vitals  /86 (BP 1 Location: Left arm, BP Patient Position: Sitting)   Pulse 82   Temp 98.2 °F (36.8 °C) (Oral)   Resp 19   Wt 178 lb 6.4 oz (80.9 kg)   BMI 32.63 kg/m²    PAIN SCALE: 5/10    CONSTITUTIONAL: The patient is in no apparent distress and is alert and oriented x 3. HEENT: Normocephalic. Hearing grossly intact. NECK: Supple and symmetric. no tenderness, or masses were felt. RESPIRATORY: No labored breathing. CARDIOVASCULAR: The carotid pulses were normal. Peripheral pulses were 2+. CHEST: Normal AP diameter and normal contour without any kyphoscoliosis. LYMPHATIC: No lymphadenopathy was appreciated in the neck, axillae or groin. SKIN:  Negative for scars, rashes, lesions, or ulcers on the right upper, right lower, left upper, left lower and trunk. NEUROLOGICAL: Alert and oriented x 3. Ambulation without assistive device. FWB. EXTREMITIES: See musculoskeletal.  MUSCULOSKELETAL:   Head and Neck:  Negative for misalignment, asymmetry, crepitation, defects, tenderness masses or effusions.  Left Upper Extremity: Inspection, percussion and palpation performed. Hymans sign is negative.  Right Upper Extremity: Inspection, percussion and palpation performed. Hymans sign is negative.  Spine, Ribs and Pelvis: Low back pain. Inspection, percussion and palpation performed.  Negative for misalignment, asymmetry, crepitation, defects, tenderness masses or effusions.  Left Lower Extremity: Inspection, percussion and palpation performed. Negative straight leg raise.  Right Lower Extremity: Buttock pain. Inspection, percussion and palpation performed. Negative straight leg raise. SPINE EXAM:       Lumbar spine: No rash, ecchymosis, or gross obliquity. No focal atrophy is noted. ASSESSMENT    ICD-10-CM ICD-9-CM    1. Spinal stenosis of lumbar region with neurogenic claudication M48.062 724.03 AMB POC XRAY, SPINE, LUMBOSACRAL; 4+   2. History of shingles Z86.19 V12.09    3. Lumbar spine pain M54.5 724.2 AMB POC XRAY, SPINE, LUMBOSACRAL; 4+       Written by Norma Oliver, as dictated by Mary Mccurdy MD.    I, Dr. Mary Mccurdy MD, confirm that all documentation is accurate.

## 2018-12-10 ENCOUNTER — HOSPITAL ENCOUNTER (OUTPATIENT)
Dept: PREADMISSION TESTING | Age: 48
Discharge: HOME | End: 2018-12-10
Payer: SUBSIDIZED

## 2018-12-10 LAB
ANION GAP SERPL CALC-SCNC: 7 MMOL/L (ref 3–18)
BASOPHILS # BLD: 0 K/UL (ref 0–0.1)
BASOPHILS NFR BLD: 0 % (ref 0–2)
BUN SERPL-MCNC: 17 MG/DL (ref 7–18)
BUN/CREAT SERPL: 19 (ref 12–20)
CALCIUM SERPL-MCNC: 9.4 MG/DL (ref 8.5–10.1)
CHLORIDE SERPL-SCNC: 102 MMOL/L (ref 100–108)
CO2 SERPL-SCNC: 30 MMOL/L (ref 21–32)
CREAT SERPL-MCNC: 0.88 MG/DL (ref 0.6–1.3)
DIFFERENTIAL METHOD BLD: NORMAL
EOSINOPHIL # BLD: 0.1 K/UL (ref 0–0.4)
EOSINOPHIL NFR BLD: 2 % (ref 0–5)
ERYTHROCYTE [DISTWIDTH] IN BLOOD BY AUTOMATED COUNT: 12.3 % (ref 11.6–14.5)
GLUCOSE SERPL-MCNC: 96 MG/DL (ref 74–99)
HCT VFR BLD AUTO: 39.6 % (ref 35–45)
HGB BLD-MCNC: 13 G/DL (ref 12–16)
LYMPHOCYTES # BLD: 3 K/UL (ref 0.9–3.6)
LYMPHOCYTES NFR BLD: 36 % (ref 21–52)
MCH RBC QN AUTO: 30.2 PG (ref 24–34)
MCHC RBC AUTO-ENTMCNC: 32.8 G/DL (ref 31–37)
MCV RBC AUTO: 91.9 FL (ref 74–97)
MONOCYTES # BLD: 0.6 K/UL (ref 0.05–1.2)
MONOCYTES NFR BLD: 7 % (ref 3–10)
NEUTS SEG # BLD: 4.5 K/UL (ref 1.8–8)
NEUTS SEG NFR BLD: 55 % (ref 40–73)
PLATELET # BLD AUTO: 299 K/UL (ref 135–420)
PMV BLD AUTO: 9.6 FL (ref 9.2–11.8)
POTASSIUM SERPL-SCNC: 4.6 MMOL/L (ref 3.5–5.5)
RBC # BLD AUTO: 4.31 M/UL (ref 4.2–5.3)
SODIUM SERPL-SCNC: 139 MMOL/L (ref 136–145)
WBC # BLD AUTO: 8.2 K/UL (ref 4.6–13.2)

## 2018-12-10 PROCEDURE — 80048 BASIC METABOLIC PNL TOTAL CA: CPT

## 2018-12-10 PROCEDURE — 85025 COMPLETE CBC W/AUTO DIFF WBC: CPT

## 2018-12-10 PROCEDURE — 36415 COLL VENOUS BLD VENIPUNCTURE: CPT

## 2018-12-11 ENCOUNTER — OFFICE VISIT (OUTPATIENT)
Dept: FAMILY MEDICINE CLINIC | Age: 48
End: 2018-12-11

## 2018-12-11 VITALS
HEART RATE: 86 BPM | SYSTOLIC BLOOD PRESSURE: 114 MMHG | RESPIRATION RATE: 16 BRPM | BODY MASS INDEX: 32.83 KG/M2 | HEIGHT: 62 IN | DIASTOLIC BLOOD PRESSURE: 77 MMHG | WEIGHT: 178.4 LBS | TEMPERATURE: 98.1 F | OXYGEN SATURATION: 98 %

## 2018-12-11 DIAGNOSIS — M48.062 SPINAL STENOSIS, LUMBAR REGION WITH NEUROGENIC CLAUDICATION: Primary | ICD-10-CM

## 2018-12-11 DIAGNOSIS — E78.00 PURE HYPERCHOLESTEROLEMIA: ICD-10-CM

## 2018-12-11 DIAGNOSIS — M79.7 FIBROMYALGIA: ICD-10-CM

## 2018-12-11 DIAGNOSIS — K21.9 GASTROESOPHAGEAL REFLUX DISEASE, ESOPHAGITIS PRESENCE NOT SPECIFIED: ICD-10-CM

## 2018-12-11 DIAGNOSIS — E66.9 OBESITY, CLASS I, BMI 30-34.9: ICD-10-CM

## 2018-12-11 DIAGNOSIS — R94.31 ABNORMAL EKG: ICD-10-CM

## 2018-12-11 DIAGNOSIS — Z01.818 PRE-OPERATIVE GENERAL PHYSICAL EXAMINATION: ICD-10-CM

## 2018-12-11 RX ORDER — CYCLOBENZAPRINE HCL 10 MG
TABLET ORAL
COMMUNITY
End: 2018-12-24 | Stop reason: SDUPTHER

## 2018-12-11 NOTE — PROGRESS NOTES
Pt is here for preop exam by Dr Ben Rodriguez, Dec. 17th.     1. Have you been to the ER, urgent care clinic since your last visit? Hospitalized since your last visit? No    2. Have you seen or consulted any other health care providers outside of the [address] [place] [address] since your last visit? Include any pap smears or colon screening.  No

## 2018-12-11 NOTE — PROGRESS NOTES
Preoperative Evaluation    Date of Exam: 2018    Patricia Sosa is a 56 y.o. female (:1970) who presents for preoperative evaluation. Procedure/Surgery: L4-L5 laminotomy  Date of Procedure/Surgery: pending surgery for 18  Surgeon: Dr. Derrick Jeffrey: Jasson   Primary Physician: Alondra Nash NP  Latex Allergy: no    Patient has a long standing history of lower back radiating to her right buttock/lower extremity. She is scheduled to have a laminotomy. Denies any worsening of pain. Reports was instructed to stop all her medications with the exception of zoloft, prilosec, and gabapentin. Problem List:     Patient Active Problem List    Diagnosis Date Noted    History of shingles 2018    Spinal stenosis, lumbar region with neurogenic claudication 2018    Fibromyalgia 2012    HSV (herpes simplex virus) infection 2012    Cervical pain 2012    Nerve pain 2012    Myofascial pain 2012    Migraine 2012    Allergic rhinitis due to pollen 2012    Abnormal liver function 2012    Depressive disorder, not elsewhere classified 2012    Anxiety state, unspecified 2012    Irritable bowel syndrome 2012    Abnormal glandular Papanicolaou smear of cervix 2012     Medical History:     Past Medical History:   Diagnosis Date    Anxiety     Asthma     Chest pain     Clostridium difficile colitis 2007    Dry mouth     Esophageal reflux     Fatigue     HSV-2 infection 2003    Pain, upper back     Psychiatric disorder     depression     Tattoo     Unspecified deficiency anemia      Allergies:      Allergies   Allergen Reactions    Apple Anaphylaxis    Wasps [Hymenoptera Allergenic Extract] Anaphylaxis    Cephalexin Hives      Medications:     Current Outpatient Medications   Medication Sig    omeprazole (PRILOSEC) 20 mg capsule Take 1 Cap by mouth two (2) times a day.  gabapentin (NEURONTIN) 800 mg tablet Take 1 Tab by mouth three (3) times daily.  sertraline (ZOLOFT) 100 mg tablet Take 2 Tabs by mouth daily.  raNITIdine (ZANTAC) 150 mg tablet Take 150 mg by mouth two (2) times a day.  EPINEPHrine (EPIPEN) 0.3 mg/0.3 mL injection 0.3 mL by IntraMUSCular route once as needed for up to 1 dose. Indications: Anaphylaxis    predniSONE (DELTASONE) 5 mg tablet 5 tabs x2 days; 4 tabs x2 days; 3 tabs x2 days; 2 tabs x2 days; 1 tab x2 days    cyclobenzaprine (FLEXERIL) 10 mg tablet Take 1 Tab by mouth three (3) times daily as needed for Muscle Spasm(s).  lovastatin (MEVACOR) 20 mg tablet Take 1 Tab by mouth nightly.  aspirin delayed-release 81 mg tablet Take  by mouth daily.  methocarbamol (ROBAXIN) 500 mg tablet Take 1-2 Tabs by mouth four (4) times daily as needed.  acetaminophen-codeine (TYLENOL #3) 300-30 mg per tablet Take 1 Tab by mouth every four (4) hours as needed for Pain. Max Daily Amount: 6 Tabs.  meloxicam (MOBIC) 15 mg tablet Take 1 Tab by mouth daily.  naloxone (NARCAN) 4 mg/actuation nasal spray Use 1 spray intranasally into 1 nostril. Use a new Narcan nasal spray for subsequent doses and administer into alternating nostrils. May repeat every 2 to 3 minutes as needed for opioid overdose symptoms. No current facility-administered medications for this visit.       Surgical History:     Past Surgical History:   Procedure Laterality Date    HX APPENDECTOMY  1995    HX CHOLECYSTECTOMY  2002    HX GYN  2018    Abalation    HX GYN      rt ovary removed    HX ORTHOPAEDIC      arthoscopic rt knee    HX ORTHOPAEDIC      left knee meniscis tear     Social History:     Social History     Socioeconomic History    Marital status:      Spouse name: Not on file    Number of children: Not on file    Years of education: Not on file    Highest education level: Not on file   Tobacco Use    Smoking status: Passive Smoke Exposure - Never Smoker    Smokeless tobacco: Never Used    Tobacco comment: last attempt to quit 1/1/2003   Substance and Sexual Activity    Alcohol use: Yes     Alcohol/week: 0.5 oz     Types: 1 Cans of beer per week    Drug use: No    Sexual activity: Yes     Partners: Male     Recent use of: No recent use of aspirin (ASA), NSAIDS or steroids. Has not taken since Friday    Tetanus up to date: tetanus status unknown to the patient      Anesthesia Complications: None  History of abnormal bleeding : None  History of Blood Transfusions: no  Health Care Directive or Living Will: no    REVIEW OF SYSTEMS:  Review of Systems   Constitutional: Negative for fever. Respiratory: Negative for shortness of breath. Cardiovascular: Negative for chest pain. Musculoskeletal: Positive for back pain. Neurological: Positive for tingling (right leg). Negative for dizziness and headaches. EXAM:   Visit Vitals  /77 (BP 1 Location: Left arm)   Pulse 86   Temp 98.1 °F (36.7 °C) (Oral)   Resp 16   Ht 5' 2\" (1.575 m)   Wt 178 lb 6.4 oz (80.9 kg)   SpO2 98%   BMI 32.63 kg/m²     Physical Exam   Constitutional: She is well-developed, well-nourished, and in no distress. No distress. Cardiovascular: Normal rate, regular rhythm and normal heart sounds. Exam reveals no gallop and no friction rub. No murmur heard. Pulmonary/Chest: Effort normal and breath sounds normal. She has no wheezes. She has no rhonchi. She has no rales. Abdominal: Soft. Bowel sounds are normal. There is no tenderness. Musculoskeletal:        Lumbar back: She exhibits tenderness (tenderness to lumbar paravertebral muscles. ). DIAGNOSTICS:   1. EKG: EKG FINDINGS - short DC syndrome and nonspecific t wave abnormality  2.  Labs:   Lab Results   Component Value Date/Time    WBC 8.2 12/10/2018 01:02 PM    HGB 13.0 12/10/2018 01:02 PM    HCT 39.6 12/10/2018 01:02 PM    PLATELET 383 81/44/9282 01:02 PM    MCV 91.9 12/10/2018 01:02 PM     Lab Results   Component Value Date/Time    Sodium 139 12/10/2018 01:02 PM    Potassium 4.6 12/10/2018 01:02 PM    Chloride 102 12/10/2018 01:02 PM    CO2 30 12/10/2018 01:02 PM    Anion gap 7 12/10/2018 01:02 PM    Glucose 96 12/10/2018 01:02 PM    BUN 17 12/10/2018 01:02 PM    Creatinine 0.88 12/10/2018 01:02 PM    BUN/Creatinine ratio 19 12/10/2018 01:02 PM    GFR est AA >60 12/10/2018 01:02 PM    GFR est non-AA >60 12/10/2018 01:02 PM    Calcium 9.4 12/10/2018 01:02 PM    Bilirubin, total 0.3 08/20/2018 11:22 AM    ALT (SGPT) 46 08/20/2018 11:22 AM    AST (SGOT) 26 08/20/2018 11:22 AM    Alk. phosphatase 68 08/20/2018 11:22 AM    Protein, total 7.9 08/20/2018 11:22 AM    Albumin 4.3 08/20/2018 11:22 AM    Globulin 3.6 08/20/2018 11:22 AM    A-G Ratio 1.2 08/20/2018 11:22 AM        IMPRESSION:     ICD-10-CM ICD-9-CM    1. Spinal stenosis, lumbar region with neurogenic claudication M48.062 724.03    2. Pre-operative general physical examination Z01.818 V72.83 AMB POC EKG ROUTINE W/ 12 LEADS, INTER & REP   3. Pure hypercholesterolemia E78.00 272.0    4. Fibromyalgia M79.7 729.1    5. Gastroesophageal reflux disease, esophagitis presence not specified K21.9 530.81    6. Obesity, Class I, BMI 30-34.9 E66.9 278.00    7. Abnormal EKG R94.31 794.31    Given short [place] syndrome will send to cardiology for further evaluation. Patient informed of findings and recommendations and verbalizes understanding. I have discussed the diagnosis with the patient and the intended plan as seen in the above orders. The patient has received an after-visit summary and questions were answered concerning future plans. I have discussed medication side effects and warnings with the patient as well. Patient agreeable with above plan and verbalizes understanding. Follow-up Disposition:  Return in about 6 weeks (around 1/22/2019) for HLD.       Nusrat Catalan NP   12/11/2018    Analy Francisco MD

## 2018-12-12 ENCOUNTER — OFFICE VISIT (OUTPATIENT)
Dept: CARDIOLOGY CLINIC | Age: 48
End: 2018-12-12

## 2018-12-12 VITALS
DIASTOLIC BLOOD PRESSURE: 76 MMHG | BODY MASS INDEX: 31.65 KG/M2 | WEIGHT: 172 LBS | HEIGHT: 62 IN | HEART RATE: 71 BPM | SYSTOLIC BLOOD PRESSURE: 120 MMHG

## 2018-12-12 DIAGNOSIS — Z01.810 PRE-OPERATIVE CARDIOVASCULAR EXAMINATION: ICD-10-CM

## 2018-12-12 DIAGNOSIS — R94.31 ABNORMAL EKG: Primary | ICD-10-CM

## 2018-12-12 DIAGNOSIS — E78.2 MIXED HYPERLIPIDEMIA: ICD-10-CM

## 2018-12-12 NOTE — PROGRESS NOTES
Patient seen and evaluated and medically cleared by cardiology (Dr. Michael Alonso) today 12/12/18. Patient medically cleared to proceed with scheduled surgery.     Elijio Jeans, NP-C Quinten Shirk, MD

## 2018-12-12 NOTE — LETTER
Sagrario Luna 1970 12/12/2018 Dear Renata Thompson NP I had the pleasure of evaluating  Ms. Obey Mcgill in office today. Below are the relevant portions of my assessment and plan of care. ICD-10-CM ICD-9-CM 1. Abnormal EKG R94.31 794.31 Borderline short [place] interval.  Asymptomatic. Normal TSH. No further cardiac workup required. 2. Pre-operative cardiovascular examination Z01.810 V72.81 Scheduled for back surgery. Stable cardiac status. Low risk from cardiac standpoint okay to proceed as planned 3. Mixed hyperlipidemia E78.2 272.2 Statin as per PCP. Follow-up with them Current Outpatient Medications Medication Sig Dispense Refill  cyclobenzaprine (FLEXERIL) 10 mg tablet Take  by mouth three (3) times daily as needed for Muscle Spasm(s).  omeprazole (PRILOSEC) 20 mg capsule Take 1 Cap by mouth two (2) times a day. 60 Cap 1  
 gabapentin (NEURONTIN) 800 mg tablet Take 1 Tab by mouth three (3) times daily. 90 Tab 3  
 sertraline (ZOLOFT) 100 mg tablet Take 2 Tabs by mouth daily. 60 Tab 3  
 lovastatin (MEVACOR) 20 mg tablet Take 1 Tab by mouth nightly. 30 Tab 3  
 raNITIdine (ZANTAC) 150 mg tablet Take 150 mg by mouth two (2) times a day.  aspirin delayed-release 81 mg tablet Take  by mouth daily.  EPINEPHrine (EPIPEN) 0.3 mg/0.3 mL injection 0.3 mL by IntraMUSCular route once as needed for up to 1 dose. Indications: Anaphylaxis 1 Syringe 2 No orders of the defined types were placed in this encounter. If you have questions, please do not hesitate to call me. I look forward to following Ms. Obey Mcgill along with you. Sincerely, Osvaldo Alston MD

## 2018-12-12 NOTE — PROGRESS NOTES
HISTORY OF PRESENT ILLNESS  Pool Luke is a 53 y.o. female. Patient is here for evaluation of abnormal EKG. She is scheduled for back surgery. She has no symptom. Denies any chest pain, palpitation, dizziness, or shortness of breath. She has history of hyperlipidemia. No history of hypertension, diabetes issues. New Patient   The history is provided by the patient. Pertinent negatives include no chest pain, no abdominal pain, no headaches and no shortness of breath. Review of Systems   Constitutional: Negative for chills and fever. HENT: Negative for nosebleeds. Eyes: Negative for blurred vision and double vision. Respiratory: Negative for cough, hemoptysis, sputum production, shortness of breath and wheezing. Cardiovascular: Negative for chest pain, palpitations, orthopnea, claudication, leg swelling and PND. Gastrointestinal: Negative for abdominal pain, heartburn, nausea and vomiting. Musculoskeletal: Positive for back pain. Negative for myalgias. Skin: Negative for rash. Neurological: Negative for dizziness, weakness and headaches. Endo/Heme/Allergies: Does not bruise/bleed easily.      Family History   Problem Relation Age of Onset    Hypertension Mother     Arthritis-osteo Mother     GERD Father     Hypertension Father     Cancer Sister     MS Maternal Grandmother        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    Pain, upper back     Psychiatric disorder     depression     Tattoo     Unspecified deficiency anemia 1999       Past Surgical History:   Procedure Laterality Date    HX APPENDECTOMY  1995    HX CHOLECYSTECTOMY  2002    HX GYN  2018    Abalation    HX GYN      rt ovary removed    HX ORTHOPAEDIC      arthoscopic rt knee    HX ORTHOPAEDIC      left knee meniscis tear       Social History     Tobacco Use    Smoking status: Passive Smoke Exposure - Never Smoker    Smokeless tobacco: Never Used    Tobacco comment: last attempt to quit 1/1/2003   Substance Use Topics    Alcohol use: Yes     Alcohol/week: 0.5 oz     Types: 1 Cans of beer per week       Allergies   Allergen Reactions    Apple Anaphylaxis    Wasps [Hymenoptera Allergenic Extract] Anaphylaxis    Cephalexin Hives       Prior to Admission medications    Medication Sig Start Date End Date Taking? Authorizing Provider   cyclobenzaprine (FLEXERIL) 10 mg tablet Take  by mouth three (3) times daily as needed for Muscle Spasm(s). Yes Provider, Historical   omeprazole (PRILOSEC) 20 mg capsule Take 1 Cap by mouth two (2) times a day. 11/19/18  Yes Juanpablo Beebe NP   gabapentin (NEURONTIN) 800 mg tablet Take 1 Tab by mouth three (3) times daily. 9/25/18  Yes Juanpablo Beebe NP   sertraline (ZOLOFT) 100 mg tablet Take 2 Tabs by mouth daily. 9/25/18  Yes Juanpablo Beebe NP   lovastatin (MEVACOR) 20 mg tablet Take 1 Tab by mouth nightly. 9/25/18  Yes Juanpablo Beebe NP   raNITIdine (ZANTAC) 150 mg tablet Take 150 mg by mouth two (2) times a day. Yes Provider, Historical   aspirin delayed-release 81 mg tablet Take  by mouth daily. Yes Provider, Historical   EPINEPHrine (EPIPEN) 0.3 mg/0.3 mL injection 0.3 mL by IntraMUSCular route once as needed for up to 1 dose. Indications: Anaphylaxis 6/2/18  Yes Juanpablo Beebe NP         Visit Vitals  /76   Pulse 71   Ht 5' 2\" (1.575 m)   Wt 78 kg (172 lb)   BMI 31.46 kg/m²       Physical Exam   Constitutional: She is oriented to person, place, and time. She appears well-developed and well-nourished. HENT:   Head: Normocephalic and atraumatic. Eyes: Conjunctivae are normal.   Neck: Neck supple. No JVD present. No tracheal deviation present. No thyromegaly present. Cardiovascular: Normal rate and regular rhythm. PMI is not displaced.  Exam reveals no gallop, no S3 and no decreased pulses. No murmur heard. Pulmonary/Chest: No respiratory distress. She has no wheezes. She has no rales. She exhibits no tenderness. Abdominal: Soft. There is no tenderness. Musculoskeletal: She exhibits no edema. Neurological: She is alert and oriented to person, place, and time. Skin: Skin is warm. Psychiatric: She has a normal mood and affect. Ms. Celina Paul has a reminder for a \"due or due soon\" health maintenance. I have asked that she contact her primary care provider for follow-up on this health maintenance. No flowsheet data found. Assessment         ICD-10-CM ICD-9-CM    1. Abnormal EKG R94.31 794.31     Borderline short [place] interval.  Asymptomatic. Normal TSH. No further cardiac workup required. 2. Pre-operative cardiovascular examination Z01.810 V72.81     Scheduled for back surgery. Stable cardiac status. Low risk from cardiac standpoint okay to proceed as planned   3. Mixed hyperlipidemia E78.2 272.2     Statin as per PCP. Follow-up with them     12/2018  Stable cardiac status. Borderline short [place] interval asymptomatic does not require any further cardiac investigation. Okay to proceed with back surgery with low cardiac risk  There are no discontinued medications. No orders of the defined types were placed in this encounter. Follow-up Disposition:  Return if symptoms worsen or fail to improve, for Cleared for planned surgery, low risk.

## 2018-12-12 NOTE — PROGRESS NOTES
1. Have you been to the ER, urgent care clinic since your last visit? Hospitalized since your last visit? no    2. Have you seen or consulted any other health care providers outside of the [address] [place] [address] since your last visit? Include any pap smears or colon screening.  Yes, pcp, dr. Bingham Muss

## 2018-12-12 NOTE — LETTER
2018 8:59 AM 
 
Dear Chanel Rocha: 
 
Re: Jena Dias : 1970 Ms. Cintia Polo is cleared from a cardiac standpoint with low risk for back surgery scheduled on 18. If you have any questions or any further assistance is needed please contact our office. Sincerely, Meliza Leavitt M.D.  
 
cc:  Nayla Zuleta NP

## 2018-12-13 ENCOUNTER — HOSPITAL ENCOUNTER (OUTPATIENT)
Dept: PREADMISSION TESTING | Age: 48
Discharge: HOME | End: 2018-12-13

## 2018-12-13 NOTE — PROGRESS NOTES
MS Mehran Crook and  attended the Cervical/Spine Surgery Mandatory Pre-Operative class on  12/13/2018. Ms Mehran Crook has decided with Dr. Michael Edmonds to have spine surgery to decrease their pain and improve their ability to move better. Topics discussed included surgery preparation, what to expect the day of surgery, medications, physical and occupational therapy, and discharge planning. It was discussed that this is considered an elective surgery and that prior to the surgery they need to make decisions such as arranging for help at home once they are discharged. She was given a Cervical/ Spine mandatory patient education notebook to take home. Patient educated to make sure to have any DME (front wheeled walker, shower chair or raised toilet set) in home before surgery for safety at home. Patient instructed to make sure to have a ride arranged to go home after surgery. Opportunity was given to ask questions and phone number of the Orthopa[facility]   was given for any questions or concerns that may arise later. Ms Mehran Crook identified Necia Curling and Meron Dumont as their /coaches through their surgical process. Malachi Rocha

## 2018-12-14 ENCOUNTER — ANESTHESIA EVENT (OUTPATIENT)
Dept: SURGERY | Age: 48
End: 2018-12-14
Payer: SUBSIDIZED

## 2018-12-17 ENCOUNTER — HOSPITAL ENCOUNTER (OUTPATIENT)
Age: 48
Setting detail: OBSERVATION
LOS: 1 days | Discharge: HOME | End: 2018-12-19
Attending: ORTHOPAEDIC SURGERY | Admitting: ORTHOPAEDIC SURGERY
Payer: SUBSIDIZED

## 2018-12-17 ENCOUNTER — APPOINTMENT (OUTPATIENT)
Dept: GENERAL RADIOLOGY | Age: 48
End: 2018-12-17
Attending: ORTHOPAEDIC SURGERY
Payer: SUBSIDIZED

## 2018-12-17 ENCOUNTER — ANESTHESIA (OUTPATIENT)
Dept: SURGERY | Age: 48
End: 2018-12-17
Payer: SUBSIDIZED

## 2018-12-17 DIAGNOSIS — Z98.890 S/P LAMINECTOMY: ICD-10-CM

## 2018-12-17 DIAGNOSIS — Z98.890 S/P LUMBAR LAMINECTOMY: Primary | ICD-10-CM

## 2018-12-17 PROBLEM — M51.26 HNP (HERNIATED NUCLEUS PULPOSUS), LUMBAR: Status: ACTIVE | Noted: 2018-12-17

## 2018-12-17 PROBLEM — M48.061 SPINAL STENOSIS OF LUMBAR REGION: Status: ACTIVE | Noted: 2018-12-17

## 2018-12-17 LAB — HCG UR QL: NEGATIVE

## 2018-12-17 PROCEDURE — 77030030163 HC BN WAX J&J -A: Performed by: ORTHOPAEDIC SURGERY

## 2018-12-17 PROCEDURE — 74011000272 HC RX REV CODE- 272: Performed by: ORTHOPAEDIC SURGERY

## 2018-12-17 PROCEDURE — 76210000016 HC OR PH I REC 1 TO 1.5 HR: Performed by: ORTHOPAEDIC SURGERY

## 2018-12-17 PROCEDURE — 74011000250 HC RX REV CODE- 250: Performed by: ORTHOPAEDIC SURGERY

## 2018-12-17 PROCEDURE — 74011250636 HC RX REV CODE- 250/636: Performed by: NURSE ANESTHETIST, CERTIFIED REGISTERED

## 2018-12-17 PROCEDURE — 77030012893: Performed by: ORTHOPAEDIC SURGERY

## 2018-12-17 PROCEDURE — 77030018836 HC SOL IRR NACL ICUM -A: Performed by: ORTHOPAEDIC SURGERY

## 2018-12-17 PROCEDURE — 81025 URINE PREGNANCY TEST: CPT

## 2018-12-17 PROCEDURE — 77030002933 HC SUT MCRYL J&J -A: Performed by: ORTHOPAEDIC SURGERY

## 2018-12-17 PROCEDURE — 99218 HC RM OBSERVATION: CPT

## 2018-12-17 PROCEDURE — 74011250636 HC RX REV CODE- 250/636

## 2018-12-17 PROCEDURE — 74011250637 HC RX REV CODE- 250/637: Performed by: NURSE ANESTHETIST, CERTIFIED REGISTERED

## 2018-12-17 PROCEDURE — 77030003029 HC SUT VCRL J&J -B: Performed by: ORTHOPAEDIC SURGERY

## 2018-12-17 PROCEDURE — 76060000033 HC ANESTHESIA 1 TO 1.5 HR: Performed by: ORTHOPAEDIC SURGERY

## 2018-12-17 PROCEDURE — 74011250636 HC RX REV CODE- 250/636: Performed by: ORTHOPAEDIC SURGERY

## 2018-12-17 PROCEDURE — 74011250636 HC RX REV CODE- 250/636: Performed by: NURSE PRACTITIONER

## 2018-12-17 PROCEDURE — 74011250637 HC RX REV CODE- 250/637: Performed by: ORTHOPAEDIC SURGERY

## 2018-12-17 PROCEDURE — 77030020782 HC GWN BAIR PAWS FLX 3M -B: Performed by: ORTHOPAEDIC SURGERY

## 2018-12-17 PROCEDURE — 74011250636 HC RX REV CODE- 250/636: Performed by: ANESTHESIOLOGY

## 2018-12-17 PROCEDURE — 74011000250 HC RX REV CODE- 250

## 2018-12-17 PROCEDURE — 77030032490 HC SLV COMPR SCD KNE COVD -B: Performed by: ORTHOPAEDIC SURGERY

## 2018-12-17 PROCEDURE — 77030004402 HC BUR NEUR STRY -C: Performed by: ORTHOPAEDIC SURGERY

## 2018-12-17 PROCEDURE — 76010000149 HC OR TIME 1 TO 1.5 HR: Performed by: ORTHOPAEDIC SURGERY

## 2018-12-17 PROCEDURE — 74011250637 HC RX REV CODE- 250/637: Performed by: ANESTHESIOLOGY

## 2018-12-17 RX ORDER — GABAPENTIN 400 MG/1
800 CAPSULE ORAL 3 TIMES DAILY
Status: DISCONTINUED | OUTPATIENT
Start: 2018-12-17 | End: 2018-12-19 | Stop reason: HOSPADM

## 2018-12-17 RX ORDER — SERTRALINE HYDROCHLORIDE 50 MG/1
200 TABLET, FILM COATED ORAL DAILY
Status: DISCONTINUED | OUTPATIENT
Start: 2018-12-18 | End: 2018-12-19 | Stop reason: HOSPADM

## 2018-12-17 RX ORDER — DIPHENHYDRAMINE HCL 25 MG
25 CAPSULE ORAL
Status: DISCONTINUED | OUTPATIENT
Start: 2018-12-17 | End: 2018-12-19 | Stop reason: HOSPADM

## 2018-12-17 RX ORDER — BUPIVACAINE HYDROCHLORIDE 5 MG/ML
INJECTION, SOLUTION EPIDURAL; INTRACAUDAL AS NEEDED
Status: DISCONTINUED | OUTPATIENT
Start: 2018-12-17 | End: 2018-12-17 | Stop reason: HOSPADM

## 2018-12-17 RX ORDER — VANCOMYCIN HYDROCHLORIDE 1 G/20ML
INJECTION, POWDER, LYOPHILIZED, FOR SOLUTION INTRAVENOUS AS NEEDED
Status: DISCONTINUED | OUTPATIENT
Start: 2018-12-17 | End: 2018-12-17 | Stop reason: HOSPADM

## 2018-12-17 RX ORDER — ONDANSETRON 2 MG/ML
INJECTION INTRAMUSCULAR; INTRAVENOUS AS NEEDED
Status: DISCONTINUED | OUTPATIENT
Start: 2018-12-17 | End: 2018-12-17 | Stop reason: HOSPADM

## 2018-12-17 RX ORDER — CELECOXIB 400 MG/1
400 CAPSULE ORAL ONCE
Status: COMPLETED | OUTPATIENT
Start: 2018-12-17 | End: 2018-12-17

## 2018-12-17 RX ORDER — NALOXONE HYDROCHLORIDE 0.4 MG/ML
0.4 INJECTION, SOLUTION INTRAMUSCULAR; INTRAVENOUS; SUBCUTANEOUS AS NEEDED
Status: DISCONTINUED | OUTPATIENT
Start: 2018-12-17 | End: 2018-12-19 | Stop reason: HOSPADM

## 2018-12-17 RX ORDER — SIMVASTATIN 10 MG/1
10 TABLET, FILM COATED ORAL
Status: DISCONTINUED | OUTPATIENT
Start: 2018-12-17 | End: 2018-12-19 | Stop reason: HOSPADM

## 2018-12-17 RX ORDER — SODIUM CHLORIDE 0.9 % (FLUSH) 0.9 %
5-10 SYRINGE (ML) INJECTION AS NEEDED
Status: DISCONTINUED | OUTPATIENT
Start: 2018-12-17 | End: 2018-12-19 | Stop reason: HOSPADM

## 2018-12-17 RX ORDER — SODIUM CHLORIDE 0.9 % (FLUSH) 0.9 %
5-10 SYRINGE (ML) INJECTION AS NEEDED
Status: DISCONTINUED | OUTPATIENT
Start: 2018-12-17 | End: 2018-12-17

## 2018-12-17 RX ORDER — KETOROLAC TROMETHAMINE 30 MG/ML
30 INJECTION, SOLUTION INTRAMUSCULAR; INTRAVENOUS
Status: COMPLETED | OUTPATIENT
Start: 2018-12-17 | End: 2018-12-17

## 2018-12-17 RX ORDER — LORAZEPAM 2 MG/ML
1 INJECTION INTRAMUSCULAR
Status: DISCONTINUED | OUTPATIENT
Start: 2018-12-17 | End: 2018-12-18

## 2018-12-17 RX ORDER — VANCOMYCIN/0.9 % SOD CHLORIDE 1 G/100 ML
1000 PLASTIC BAG, INJECTION (ML) INTRAVENOUS EVERY 12 HOURS
Status: DISCONTINUED | OUTPATIENT
Start: 2018-12-18 | End: 2018-12-18

## 2018-12-17 RX ORDER — RANITIDINE 150 MG/1
150 TABLET, FILM COATED ORAL 2 TIMES DAILY
Status: DISCONTINUED | OUTPATIENT
Start: 2018-12-17 | End: 2018-12-19 | Stop reason: HOSPADM

## 2018-12-17 RX ORDER — LIDOCAINE HYDROCHLORIDE 20 MG/ML
INJECTION, SOLUTION EPIDURAL; INFILTRATION; INTRACAUDAL; PERINEURAL AS NEEDED
Status: DISCONTINUED | OUTPATIENT
Start: 2018-12-17 | End: 2018-12-17 | Stop reason: HOSPADM

## 2018-12-17 RX ORDER — PANTOPRAZOLE SODIUM 40 MG/1
40 TABLET, DELAYED RELEASE ORAL
Status: DISCONTINUED | OUTPATIENT
Start: 2018-12-18 | End: 2018-12-19 | Stop reason: HOSPADM

## 2018-12-17 RX ORDER — DEXAMETHASONE SODIUM PHOSPHATE 4 MG/ML
INJECTION, SOLUTION INTRA-ARTICULAR; INTRALESIONAL; INTRAMUSCULAR; INTRAVENOUS; SOFT TISSUE AS NEEDED
Status: DISCONTINUED | OUTPATIENT
Start: 2018-12-17 | End: 2018-12-17 | Stop reason: HOSPADM

## 2018-12-17 RX ORDER — HYDROMORPHONE HYDROCHLORIDE 2 MG/ML
INJECTION, SOLUTION INTRAMUSCULAR; INTRAVENOUS; SUBCUTANEOUS
Status: DISPENSED
Start: 2018-12-17 | End: 2018-12-18

## 2018-12-17 RX ORDER — SODIUM CHLORIDE, SODIUM LACTATE, POTASSIUM CHLORIDE, CALCIUM CHLORIDE 600; 310; 30; 20 MG/100ML; MG/100ML; MG/100ML; MG/100ML
75 INJECTION, SOLUTION INTRAVENOUS CONTINUOUS
Status: DISCONTINUED | OUTPATIENT
Start: 2018-12-17 | End: 2018-12-17 | Stop reason: HOSPADM

## 2018-12-17 RX ORDER — NEOSTIGMINE METHYLSULFATE 1 MG/ML
INJECTION INTRAVENOUS AS NEEDED
Status: DISCONTINUED | OUTPATIENT
Start: 2018-12-17 | End: 2018-12-17 | Stop reason: HOSPADM

## 2018-12-17 RX ORDER — VANCOMYCIN/0.9 % SOD CHLORIDE 1 G/100 ML
1 PLASTIC BAG, INJECTION (ML) INTRAVENOUS ONCE
Status: COMPLETED | OUTPATIENT
Start: 2018-12-17 | End: 2018-12-17

## 2018-12-17 RX ORDER — SUCCINYLCHOLINE CHLORIDE 20 MG/ML
INJECTION INTRAMUSCULAR; INTRAVENOUS AS NEEDED
Status: DISCONTINUED | OUTPATIENT
Start: 2018-12-17 | End: 2018-12-17 | Stop reason: HOSPADM

## 2018-12-17 RX ORDER — PROPOFOL 10 MG/ML
INJECTION, EMULSION INTRAVENOUS AS NEEDED
Status: DISCONTINUED | OUTPATIENT
Start: 2018-12-17 | End: 2018-12-17 | Stop reason: HOSPADM

## 2018-12-17 RX ORDER — SODIUM CHLORIDE 0.9 % (FLUSH) 0.9 %
5-10 SYRINGE (ML) INJECTION EVERY 8 HOURS
Status: DISCONTINUED | OUTPATIENT
Start: 2018-12-17 | End: 2018-12-17

## 2018-12-17 RX ORDER — DIAZEPAM 5 MG/1
5 TABLET ORAL
Status: DISCONTINUED | OUTPATIENT
Start: 2018-12-17 | End: 2018-12-18

## 2018-12-17 RX ORDER — ROCURONIUM BROMIDE 10 MG/ML
INJECTION, SOLUTION INTRAVENOUS AS NEEDED
Status: DISCONTINUED | OUTPATIENT
Start: 2018-12-17 | End: 2018-12-17 | Stop reason: HOSPADM

## 2018-12-17 RX ORDER — FAMOTIDINE 20 MG/1
20 TABLET, FILM COATED ORAL ONCE
Status: COMPLETED | OUTPATIENT
Start: 2018-12-17 | End: 2018-12-17

## 2018-12-17 RX ORDER — SODIUM CHLORIDE 0.9 % (FLUSH) 0.9 %
5-10 SYRINGE (ML) INJECTION EVERY 8 HOURS
Status: DISCONTINUED | OUTPATIENT
Start: 2018-12-17 | End: 2018-12-19 | Stop reason: HOSPADM

## 2018-12-17 RX ORDER — ACETAMINOPHEN 500 MG
1000 TABLET ORAL EVERY 6 HOURS
Status: DISCONTINUED | OUTPATIENT
Start: 2018-12-17 | End: 2018-12-19 | Stop reason: HOSPADM

## 2018-12-17 RX ORDER — NALOXONE HYDROCHLORIDE 0.4 MG/ML
0.2 INJECTION, SOLUTION INTRAMUSCULAR; INTRAVENOUS; SUBCUTANEOUS AS NEEDED
Status: DISCONTINUED | OUTPATIENT
Start: 2018-12-17 | End: 2018-12-17 | Stop reason: HOSPADM

## 2018-12-17 RX ORDER — FENTANYL CITRATE 50 UG/ML
INJECTION, SOLUTION INTRAMUSCULAR; INTRAVENOUS AS NEEDED
Status: DISCONTINUED | OUTPATIENT
Start: 2018-12-17 | End: 2018-12-17 | Stop reason: HOSPADM

## 2018-12-17 RX ORDER — SODIUM CHLORIDE 0.9 % (FLUSH) 0.9 %
5-10 SYRINGE (ML) INJECTION EVERY 8 HOURS
Status: DISCONTINUED | OUTPATIENT
Start: 2018-12-17 | End: 2018-12-17 | Stop reason: HOSPADM

## 2018-12-17 RX ORDER — MIDAZOLAM HYDROCHLORIDE 1 MG/ML
INJECTION, SOLUTION INTRAMUSCULAR; INTRAVENOUS AS NEEDED
Status: DISCONTINUED | OUTPATIENT
Start: 2018-12-17 | End: 2018-12-17 | Stop reason: HOSPADM

## 2018-12-17 RX ORDER — LOVASTATIN 20 MG/1
20 TABLET ORAL
Status: DISCONTINUED | OUTPATIENT
Start: 2018-12-17 | End: 2018-12-17 | Stop reason: CLARIF

## 2018-12-17 RX ORDER — OXYCODONE AND ACETAMINOPHEN 10; 325 MG/1; MG/1
1 TABLET ORAL
Qty: 20 TAB | Refills: 0 | Status: SHIPPED | OUTPATIENT
Start: 2018-12-17 | End: 2018-12-31 | Stop reason: DRUGHIGH

## 2018-12-17 RX ORDER — PREGABALIN 50 MG/1
50 CAPSULE ORAL ONCE
Status: COMPLETED | OUTPATIENT
Start: 2018-12-17 | End: 2018-12-17

## 2018-12-17 RX ORDER — VANCOMYCIN/0.9 % SOD CHLORIDE 1 G/100 ML
1 PLASTIC BAG, INJECTION (ML) INTRAVENOUS EVERY 12 HOURS
Status: DISCONTINUED | OUTPATIENT
Start: 2018-12-17 | End: 2018-12-17 | Stop reason: DRUGHIGH

## 2018-12-17 RX ORDER — SODIUM CHLORIDE, SODIUM LACTATE, POTASSIUM CHLORIDE, CALCIUM CHLORIDE 600; 310; 30; 20 MG/100ML; MG/100ML; MG/100ML; MG/100ML
75 INJECTION, SOLUTION INTRAVENOUS CONTINUOUS
Status: DISCONTINUED | OUTPATIENT
Start: 2018-12-18 | End: 2018-12-17 | Stop reason: HOSPADM

## 2018-12-17 RX ORDER — ONDANSETRON 2 MG/ML
4 INJECTION INTRAMUSCULAR; INTRAVENOUS
Status: DISCONTINUED | OUTPATIENT
Start: 2018-12-17 | End: 2018-12-19 | Stop reason: HOSPADM

## 2018-12-17 RX ORDER — SODIUM CHLORIDE 0.9 % (FLUSH) 0.9 %
5-10 SYRINGE (ML) INJECTION AS NEEDED
Status: DISCONTINUED | OUTPATIENT
Start: 2018-12-17 | End: 2018-12-17 | Stop reason: HOSPADM

## 2018-12-17 RX ORDER — GLYCOPYRROLATE 0.2 MG/ML
INJECTION INTRAMUSCULAR; INTRAVENOUS AS NEEDED
Status: DISCONTINUED | OUTPATIENT
Start: 2018-12-17 | End: 2018-12-17 | Stop reason: HOSPADM

## 2018-12-17 RX ORDER — MORPHINE SULFATE 4 MG/ML
1 INJECTION INTRAVENOUS
Status: DISCONTINUED | OUTPATIENT
Start: 2018-12-17 | End: 2018-12-18

## 2018-12-17 RX ORDER — HYDROMORPHONE HYDROCHLORIDE 2 MG/ML
0.5 INJECTION, SOLUTION INTRAMUSCULAR; INTRAVENOUS; SUBCUTANEOUS
Status: COMPLETED | OUTPATIENT
Start: 2018-12-17 | End: 2018-12-17

## 2018-12-17 RX ORDER — DIPHENHYDRAMINE HYDROCHLORIDE 50 MG/ML
12.5 INJECTION, SOLUTION INTRAMUSCULAR; INTRAVENOUS
Status: DISCONTINUED | OUTPATIENT
Start: 2018-12-17 | End: 2018-12-19 | Stop reason: HOSPADM

## 2018-12-17 RX ORDER — OXYCODONE HYDROCHLORIDE 5 MG/1
5-10 TABLET ORAL
Status: DISCONTINUED | OUTPATIENT
Start: 2018-12-17 | End: 2018-12-19 | Stop reason: HOSPADM

## 2018-12-17 RX ADMIN — GLYCOPYRROLATE 0.4 MG: 0.2 INJECTION INTRAMUSCULAR; INTRAVENOUS at 18:25

## 2018-12-17 RX ADMIN — SODIUM CHLORIDE, SODIUM LACTATE, POTASSIUM CHLORIDE, AND CALCIUM CHLORIDE 75 ML/HR: 600; 310; 30; 20 INJECTION, SOLUTION INTRAVENOUS at 15:30

## 2018-12-17 RX ADMIN — GABAPENTIN 800 MG: 400 CAPSULE ORAL at 21:41

## 2018-12-17 RX ADMIN — ROCURONIUM BROMIDE 5 MG: 10 INJECTION, SOLUTION INTRAVENOUS at 17:27

## 2018-12-17 RX ADMIN — DEXAMETHASONE SODIUM PHOSPHATE 4 MG: 4 INJECTION, SOLUTION INTRA-ARTICULAR; INTRALESIONAL; INTRAMUSCULAR; INTRAVENOUS; SOFT TISSUE at 17:27

## 2018-12-17 RX ADMIN — KETOROLAC TROMETHAMINE 30 MG: 30 INJECTION, SOLUTION INTRAMUSCULAR at 19:46

## 2018-12-17 RX ADMIN — DIAZEPAM 5 MG: 5 TABLET ORAL at 20:50

## 2018-12-17 RX ADMIN — CELECOXIB 400 MG: 400 CAPSULE ORAL at 15:30

## 2018-12-17 RX ADMIN — NEOSTIGMINE METHYLSULFATE 3 MG: 1 INJECTION INTRAVENOUS at 18:25

## 2018-12-17 RX ADMIN — VANCOMYCIN HYDROCHLORIDE 1000 MG: 10 INJECTION, POWDER, LYOPHILIZED, FOR SOLUTION INTRAVENOUS at 16:31

## 2018-12-17 RX ADMIN — FENTANYL CITRATE 100 MCG: 50 INJECTION, SOLUTION INTRAMUSCULAR; INTRAVENOUS at 17:27

## 2018-12-17 RX ADMIN — FAMOTIDINE 20 MG: 20 TABLET ORAL at 15:30

## 2018-12-17 RX ADMIN — MIDAZOLAM HYDROCHLORIDE 2 MG: 1 INJECTION, SOLUTION INTRAMUSCULAR; INTRAVENOUS at 17:22

## 2018-12-17 RX ADMIN — GLYCOPYRROLATE 0.2 MG: 0.2 INJECTION INTRAMUSCULAR; INTRAVENOUS at 17:22

## 2018-12-17 RX ADMIN — OXYCODONE HYDROCHLORIDE 10 MG: 5 TABLET ORAL at 20:51

## 2018-12-17 RX ADMIN — SIMVASTATIN 10 MG: 10 TABLET, FILM COATED ORAL at 21:41

## 2018-12-17 RX ADMIN — PREGABALIN 50 MG: 50 CAPSULE ORAL at 15:30

## 2018-12-17 RX ADMIN — HYDROMORPHONE HYDROCHLORIDE 0.5 MG: 2 INJECTION, SOLUTION INTRAMUSCULAR; INTRAVENOUS; SUBCUTANEOUS at 19:00

## 2018-12-17 RX ADMIN — MORPHINE SULFATE 1 MG: 4 INJECTION INTRAVENOUS at 23:33

## 2018-12-17 RX ADMIN — ROCURONIUM BROMIDE 25 MG: 10 INJECTION, SOLUTION INTRAVENOUS at 17:33

## 2018-12-17 RX ADMIN — RANITIDINE 150 MG: 150 TABLET ORAL at 20:50

## 2018-12-17 RX ADMIN — ACETAMINOPHEN 1000 MG: 500 TABLET ORAL at 20:49

## 2018-12-17 RX ADMIN — HYDROMORPHONE HYDROCHLORIDE 0.5 MG: 2 INJECTION, SOLUTION INTRAMUSCULAR; INTRAVENOUS; SUBCUTANEOUS at 19:10

## 2018-12-17 RX ADMIN — SUCCINYLCHOLINE CHLORIDE 140 MG: 20 INJECTION INTRAMUSCULAR; INTRAVENOUS at 17:27

## 2018-12-17 RX ADMIN — SODIUM CHLORIDE, SODIUM LACTATE, POTASSIUM CHLORIDE, AND CALCIUM CHLORIDE: 600; 310; 30; 20 INJECTION, SOLUTION INTRAVENOUS at 17:22

## 2018-12-17 RX ADMIN — VANCOMYCIN HYDROCHLORIDE 1 G: 10 INJECTION, POWDER, LYOPHILIZED, FOR SOLUTION INTRAVENOUS at 17:22

## 2018-12-17 RX ADMIN — HYDROMORPHONE HYDROCHLORIDE 0.5 MG: 2 INJECTION, SOLUTION INTRAMUSCULAR; INTRAVENOUS; SUBCUTANEOUS at 19:30

## 2018-12-17 RX ADMIN — ONDANSETRON 4 MG: 2 INJECTION INTRAMUSCULAR; INTRAVENOUS at 17:22

## 2018-12-17 RX ADMIN — LIDOCAINE HYDROCHLORIDE 100 MG: 20 INJECTION, SOLUTION EPIDURAL; INFILTRATION; INTRACAUDAL; PERINEURAL at 17:27

## 2018-12-17 RX ADMIN — PROPOFOL 200 MG: 10 INJECTION, EMULSION INTRAVENOUS at 17:27

## 2018-12-17 RX ADMIN — HYDROMORPHONE HYDROCHLORIDE 0.5 MG: 2 INJECTION, SOLUTION INTRAMUSCULAR; INTRAVENOUS; SUBCUTANEOUS at 18:45

## 2018-12-17 NOTE — ANESTHESIA PREPROCEDURE EVALUATION
Anesthetic History   No history of anesthetic complications            Review of Systems / Medical History  Patient summary reviewed and pertinent labs reviewed    Pulmonary  Within defined limits                 Neuro/Psych         Psychiatric history     Cardiovascular                  Exercise tolerance: >4 METS     GI/Hepatic/Renal     GERD: well controlled           Endo/Other        Arthritis     Other Findings              Physical Exam    Airway  Mallampati: II  TM Distance: 4 - 6 cm  Neck ROM: normal range of motion   Mouth opening: Diminished (comment)     Cardiovascular  Regular rate and rhythm,  S1 and S2 normal,  no murmur, click, rub, or gallop             Dental  No notable dental hx       Pulmonary  Breath sounds clear to auscultation               Abdominal  GI exam deferred       Other Findings            Anesthetic Plan    ASA: 2  Anesthesia type: general          Induction: Intravenous  Anesthetic plan and risks discussed with: Patient

## 2018-12-17 NOTE — INTERVAL H&P NOTE
H&P Update:  Alpa Gilbert was seen and examined. History and physical has been reviewed. The patient has been examined.  There have been no significant clinical changes since the completion of the originally dated History and Physical.    Signed By: Papito Ruvalcaba MD     December 17, 2018 4:37 PM

## 2018-12-17 NOTE — BRIEF OP NOTE
BRIEF OPERATIVE NOTE    Date of Procedure: 12/17/2018   Preoperative Diagnosis: HNP (herniated nucleus pulposus), lumbar [M51.26]  Postoperative Diagnosis: HNP (herniated nucleus pulposus), lumbar [M51.26]    Procedure(s):  RIGHT L4/5 LAMINECTOMY/C-ARM  Surgeon(s) and Role:     Everton Vasques MD - Primary         Surgical Assistant: 0    Surgical Staff:  Circ-1: Seth Centeno RN  Radiology Technician: Grayson Washington  Scrub Tech-1: Marsha Gomez  Surg Asst-1: Romario Melara  Event Time In Time Out   Incision Start 1749    Incision Close       Anesthesia: General   Estimated Blood Loss: 5  Specimens: * No specimens in log *   Findings: hnp   Complications: 0  Implants: * No implants in log *

## 2018-12-18 ENCOUNTER — HOME HEALTH ADMISSION (OUTPATIENT)
Dept: HOME HEALTH SERVICES | Facility: HOME HEALTH | Age: 48
End: 2018-12-18
Payer: SUBSIDIZED

## 2018-12-18 PROCEDURE — 74011250637 HC RX REV CODE- 250/637: Performed by: ORTHOPAEDIC SURGERY

## 2018-12-18 PROCEDURE — 97165 OT EVAL LOW COMPLEX 30 MIN: CPT

## 2018-12-18 PROCEDURE — 97116 GAIT TRAINING THERAPY: CPT

## 2018-12-18 PROCEDURE — 74011250636 HC RX REV CODE- 250/636: Performed by: ORTHOPAEDIC SURGERY

## 2018-12-18 PROCEDURE — G8978 MOBILITY CURRENT STATUS: HCPCS

## 2018-12-18 PROCEDURE — 99218 HC RM OBSERVATION: CPT

## 2018-12-18 PROCEDURE — G8979 MOBILITY GOAL STATUS: HCPCS

## 2018-12-18 PROCEDURE — G8987 SELF CARE CURRENT STATUS: HCPCS

## 2018-12-18 PROCEDURE — 74011250637 HC RX REV CODE- 250/637: Performed by: NURSE PRACTITIONER

## 2018-12-18 PROCEDURE — 97530 THERAPEUTIC ACTIVITIES: CPT

## 2018-12-18 PROCEDURE — 97161 PT EVAL LOW COMPLEX 20 MIN: CPT

## 2018-12-18 PROCEDURE — 97535 SELF CARE MNGMENT TRAINING: CPT

## 2018-12-18 PROCEDURE — G8988 SELF CARE GOAL STATUS: HCPCS

## 2018-12-18 PROCEDURE — G8989 SELF CARE D/C STATUS: HCPCS

## 2018-12-18 RX ORDER — KETOROLAC TROMETHAMINE 10 MG/1
10 TABLET, FILM COATED ORAL EVERY 6 HOURS
Status: DISCONTINUED | OUTPATIENT
Start: 2018-12-18 | End: 2018-12-19 | Stop reason: HOSPADM

## 2018-12-18 RX ORDER — DIAZEPAM 5 MG/1
5-10 TABLET ORAL
Status: DISCONTINUED | OUTPATIENT
Start: 2018-12-18 | End: 2018-12-19 | Stop reason: HOSPADM

## 2018-12-18 RX ADMIN — ACETAMINOPHEN 1000 MG: 500 TABLET ORAL at 03:11

## 2018-12-18 RX ADMIN — RANITIDINE 150 MG: 150 TABLET ORAL at 17:00

## 2018-12-18 RX ADMIN — DIAZEPAM 5 MG: 5 TABLET ORAL at 08:31

## 2018-12-18 RX ADMIN — Medication 10 ML: at 05:05

## 2018-12-18 RX ADMIN — ACETAMINOPHEN 1000 MG: 500 TABLET ORAL at 14:21

## 2018-12-18 RX ADMIN — RANITIDINE 150 MG: 150 TABLET ORAL at 08:32

## 2018-12-18 RX ADMIN — DIAZEPAM 5 MG: 5 TABLET ORAL at 11:28

## 2018-12-18 RX ADMIN — PANTOPRAZOLE SODIUM 40 MG: 40 TABLET, DELAYED RELEASE ORAL at 08:31

## 2018-12-18 RX ADMIN — GABAPENTIN 800 MG: 400 CAPSULE ORAL at 16:59

## 2018-12-18 RX ADMIN — SERTRALINE HYDROCHLORIDE 200 MG: 50 TABLET ORAL at 08:32

## 2018-12-18 RX ADMIN — KETOROLAC TROMETHAMINE 10 MG: 10 TABLET, FILM COATED ORAL at 18:22

## 2018-12-18 RX ADMIN — GABAPENTIN 800 MG: 400 CAPSULE ORAL at 21:06

## 2018-12-18 RX ADMIN — VANCOMYCIN HYDROCHLORIDE 1000 MG: 10 INJECTION, POWDER, LYOPHILIZED, FOR SOLUTION INTRAVENOUS at 05:02

## 2018-12-18 RX ADMIN — DIAZEPAM 10 MG: 5 TABLET ORAL at 16:59

## 2018-12-18 RX ADMIN — OXYCODONE HYDROCHLORIDE 10 MG: 5 TABLET ORAL at 05:23

## 2018-12-18 RX ADMIN — OXYCODONE HYDROCHLORIDE 10 MG: 5 TABLET ORAL at 22:36

## 2018-12-18 RX ADMIN — Medication 10 ML: at 22:38

## 2018-12-18 RX ADMIN — Medication 10 ML: at 17:01

## 2018-12-18 RX ADMIN — GABAPENTIN 800 MG: 400 CAPSULE ORAL at 08:32

## 2018-12-18 RX ADMIN — OXYCODONE HYDROCHLORIDE 10 MG: 5 TABLET ORAL at 14:21

## 2018-12-18 RX ADMIN — ACETAMINOPHEN 1000 MG: 500 TABLET ORAL at 08:32

## 2018-12-18 RX ADMIN — MORPHINE SULFATE 1 MG: 4 INJECTION INTRAVENOUS at 03:11

## 2018-12-18 RX ADMIN — SIMVASTATIN 10 MG: 10 TABLET, FILM COATED ORAL at 21:05

## 2018-12-18 RX ADMIN — PANTOPRAZOLE SODIUM 40 MG: 40 TABLET, DELAYED RELEASE ORAL at 16:59

## 2018-12-18 RX ADMIN — ACETAMINOPHEN 1000 MG: 500 TABLET ORAL at 21:06

## 2018-12-18 RX ADMIN — OXYCODONE HYDROCHLORIDE 10 MG: 5 TABLET ORAL at 19:14

## 2018-12-18 RX ADMIN — OXYCODONE HYDROCHLORIDE 10 MG: 5 TABLET ORAL at 09:54

## 2018-12-18 NOTE — OP NOTES
[facility] REPORT    Marni Dubose  MR#: [id number]  : 1970  ACCOUNT #: [de-identified]   DATE OF SERVICE: 2018    PREOPERATIVE DIAGNOSIS:  Herniated nucleus pulposus L4-5 on the right. POSTOPERATIVE DIAGNOSIS:  Herniated nucleus pulposus L4-5 on the right. PROCEDURE PERFORMED:  Right L4-5 hemilaminotomy diskectomy. SURGEON:  Mary Mccurdy MD    ASSISTANT:  0.     FINDINGS:  The patient had a moderate sized L4-L5 disk herniation on the right, causing compression to the right L5 root. OPERATION:  Following induction of endotracheal anesthesia, the patient was turned in prone position on spinal frame. The patient was prepped and draped in usual fashion. Midline incision was made. Paramedian incision was made in the lumbodorsal fascia on the right and subperiosteal excision done of L4-5 on the right. C-arm image verified the surgical level. A hemilaminotomy was done with resection of intraligamentum flavum. The L5 root was mobilized and the underlying discal mass clearly evident. A thin layer of ligament was incised and nuclear material expressed itself. Further nuclear annular material was removed into the disk space was flattened and stable. At this point, the nerve root was free and mobile and there was no evidence of stenosis. A pledgeted of Gelfoam and thrombin placed on the laminotomy site. After infiltrating the area with vancomycin powder, the lumbodorsal fascia was then closed with #1 Vicryl, subcutaneous tissues closed with 2-0 Vicryl and the skin closed with a 4-0 Monocryl subcuticular suture and Dermabond. A sterile occlusive dressing placed upon the wound. All counts were correct. ESTIMATED BLOOD LOSS:  5 mL    COMPLICATIONS:  No complications. SPECIMENS REMOVED:  None. IMPLANTS:  None.     ANESTHESIA:  General endotracheal.      Merleen Aschoff, MD       [address] / [facility]  D: 2018 18:23     T: 2018 20:55  JOB #: [id number]

## 2018-12-18 NOTE — PROGRESS NOTES
Problem: Self Care Deficits Care Plan (Adult)  Goal: *Acute Goals and Plan of Care (Insert Text)  Outcome: Resolved/Met Date Met: 12/18/18  Occupational Therapy EVALUATION/discharge    Patient: Harleen Suazo (89 y.o. female)  Date: 12/18/2018  Primary Diagnosis: HNP (herniated nucleus pulposus), lumbar [M51.26]  Procedure(s) (LRB):  RIGHT L4/5 LAMINECTOMY/C-ARM (Right) 1 Day Post-Op   Precautions: Falls       ASSESSMENT AND RECOMMENDATIONS:  Upon entering the room, pt was sidelying in bed with HOB elevated, alert, in pain but was willing to participate in therapy evaluation. Based on the objective data described below, the pt demonstrated she was motivated to care for herself as she required SBA to supervision in all self-care/ADL tasks (toileting, UB/LB dressing with AE, grooming/hygiene at sink level) during her initial evaluation. Pt was able to ambulate from bed to the bathroom using a RW with CGA and demonstrated Fair dynamic standing balance. Will defer to PT for functional balance and functional mobility tasks. Educated pt on the role of OT, log rolling to get in/out of bed, back/spinal precautions, and adaptive equipment (reacher, sock aid, long handled sponge, and long handled shoe horn) to assist during ADLs with pt verbalizing good understanding. Practiced with pt doffing sock with a reacher and donning sock with a sock aid. Recommended pt to have grab bars in her bathroom for added safety easing her way to her shower chair that they just received. Provided pt with a packet of back/spinal precautions with pt verbalizing she will read it. During the therapy session and after session, provided pt with the option of returning to bed, however pt wanted to sit in chair as she felt this would help her. Notified nursing about her pain level.     Pt has a supportive  at home to assist her PRN, along with a mother and sister who are occupational therapists to reinforce back/spinal precautions, if needed. Skilled occupational therapy is not indicated at this time. Discharge Recommendations: Home Health  Further Equipment Recommendations for Discharge: Grab bars for added safety     Barriers to Learning/Limitations: None  Compensate with: visual, verbal, tactile, kinesthetic cues/model     COMPLEXITY     Eval Complexity: History: LOW Complexity : Brief history review ; Examination: LOW Complexity : 1-3 performance deficits relating to physical, cognitive , or psychosocial skils that result in activity limitations and / or participation restrictions ; Decision Making:LOW Complexity : No comorbidities that affect functional and no verbal or physical assistance needed to complete eval tasks  Assessment: Low Complexity        G-CODES:     Self Care  Current  CI= 1-19%   Goal  CI= 1-19%   D/C  CI= 1-19%. The severity rating is based on the Level of Assistance required for Functional Mobility and ADLs. 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   Home Situation  One/Two Story Residence: Two story  # of Interior Steps: 15  Interior Rails: Right  Living Alone: No  Support Systems: Family member(s)  Current DME Used/Available at Home: Shower chair, Walker, rolling  Tub or Shower Type: Tub/Shower combination  [x]     Right hand dominant   []     Left hand dominant  Cognitive/Behavioral Status:  Neurologic State: Alert  Orientation Level: Oriented X4  Cognition: Appropriate decision making; Follows commands  Safety/Judgement: Awareness of environment    Skin: Visible skin appeared intact    Edema: None noted    Vision/Perceptual:    Acuity: Within Defined Limits      Coordination:  Coordination: Within functional limits(BUEs)  Fine Motor Skills-Upper: Left Intact; Right Intact      Balance:  Sitting: Intact  Standing: Impaired; With support  Standing - Static: Good  Standing - Dynamic : Fair    Strength:  Strength: Generally decreased, functional(BUEs)    Tone & Sensation:  Tone: Normal(BUEs)  Sensation: Impaired(RLE)    Range of Motion:  AROM: Generally decreased, functional(BUEs)    Functional Mobility and Transfers for ADLs:  Bed Mobility:  Rolling: Supervision  Supine to Sit: Supervision  Sit to Supine: Supervision  Scooting: Supervision  Transfers:  Sit to Stand: Supervision (with RW)  Bathroom Mobility: CGA (with rolling walker)    ADL Assessment:  Feeding: Supervision;Setup    Oral Facial Hygiene/Grooming: Supervision(standing at sink level)    Bathing: Stand-by assistance    Upper Body Dressing: Supervision    Lower Body Dressing: Stand-by assistance;Setup    Toileting: Stand by assistance;Setup    ADL Intervention:  Pt was able to ambulate to the bathroom from her bed with CGA using a RW. She presented with fair dynamic standing balance. Issued pt AE (reacher, sock aid, long handled shoe horn, and long handled sponge). Patient practiced LB dressing doff/donning socks while seated in recliner. Pt presents she was able to doff socks with a reacher and don socks with a sock aid. No assist was provided after initial VCs for safety/ease of performance. No LOB noted.     Cognitive Retraining  Safety/Judgement: Awareness of environment      Pain:  Pt reports 8/10 pain or discomfort prior to treatment. (Back pain that would transfer to her RLE)  Pt reports 8/10 pain or discomfort post treatment. (Notified nursing)    Activity Tolerance:   Fair    Please refer to the flowsheet for vital signs taken during this treatment. After treatment:   [x]  Patient left in distress sitting up in chair (notified nursing). []  Patient left in no apparent distress in bed  [x]  Call bell left within reach  [x]  Nursing notified  []  Caregiver present  []  Bed alarm activated    COMMUNICATION/EDUCATION:   Communication/Collaboration:  [x]      Home safety education was provided and the patient/caregiver indicated understanding. [x]      Patient/family have participated as able and agree with findings and recommendations. []      Patient is unable to participate in plan of care at this time.     Ignacia Haddad MS, OTR/L  Time Calculation: 32 mins

## 2018-12-18 NOTE — PROGRESS NOTES
Discharge planning:    Possible discharge today. Pt has been accepted to [facility]. Met with pt and spouse are agreeable to the transition plan today once cleared by PT. Transport has been arranged with spouse.   [facility]     Eugenia Ganser, BSN, RN  Pager # [phone]  Care Manager

## 2018-12-18 NOTE — ROUTINE PROCESS
Bedside and Verbal shift change report given to Cristian Ribeiro (oncoming nurse) by Kim Borrego RN   (offgoing nurse). Report included the following information SBAR, Kardex, Intake/Output, MAR and Recent Results.

## 2018-12-18 NOTE — PROGRESS NOTES
Problem: Mobility Impaired (Adult and Pediatric)  Goal: *Acute Goals and Plan of Care (Insert Text)  Physical Therapy Goals  Initiated 12/18/2018 and to be accomplished within 3 day(s)  1. Patient will move from supine to sit and sit to supine , scoot up and down and roll side to side in bed with supervision/set-up. 2.  Patient will transfer from bed to chair and chair to bed with supervision/set-up using the least restrictive device. 3.  Patient will perform sit to stand with supervision/set-up. 4.  Patient will ambulate with supervision/set-up for >150 feet with the least restrictive device. 5.  Patient will ascend/descend 10-12 stairs with 1 handrail(s) with minimal assistance/contact guard assist.  physical Therapy EVALUATION    Patient: Tiffany Clark (68 y.o. female)  Date: 12/18/2018  Primary Diagnosis: HNP (herniated nucleus pulposus), lumbar [M51.26]  Procedure(s) (LRB):  RIGHT L4/5 LAMINECTOMY/C-ARM (Right) 1 Day Post-Op   Precautions: Spinal     ASSESSMENT :  Patient is 52 yo F admitted to hospital for R L4-5 laminectomy and presents today alert and agreeable to therapy. Patient was educated on spinal precautions and performed log roll OOB with supervision. Patient was given demo with instruction on sit <> stand transfer and gait training and transferred to standing with supervision and ambulated 90ft with RW at [place] [address]. Patient demonstrated good compliance with precautions throughout session. At conclusion of session patient transferred to sitting in recliner and was left resting with call bell by the side and SCDs donned. Patient instructed to call for assistance if they needed to get up for any reason and denied need for further assistance. Patient demonstrates decreased strength, mobility, and endurance and will benefit from skilled intervention to address the above impairments.  Patient demos limitations 2/2 pain and was unable to complete further mobility including stair negotiation and was tearful during mobility. Notified RN. Patients rehabilitation potential is considered to be Good  Factors which may influence rehabilitation potential include:   []         None noted  [x]         Mental ability/status  [x]         Medical condition  [x]         Home/family situation and support systems  [x]         Safety awareness  [x]         Pain tolerance/management  []         Other:      PLAN :  Recommendations and Planned Interventions:  [x]           Bed Mobility Training             [x]    Neuromuscular Re-Education  [x]           Transfer Training                   []    Orthotic/Prosthetic Training  [x]           Gait Training                          []    Modalities  [x]           Therapeutic Exercises          []    Edema Management/Control  [x]           Therapeutic Activities            [x]    Patient and Family Training/Education  []           Other (comment):    Frequency/Duration: Patient will be followed by physical therapy 1-2 times per day/4-7 days per week to address goals. Discharge Recommendations: Home Health  Further Equipment Recommendations for Discharge: bedside commode, transfer bench and rolling walker     G-CODES     Mobility  Current  CI= 1-19%   Goal  CI= 1-19%. The severity rating is based on the Level of Assistance required for Functional Mobility and ADLs.        G-CODES     Eval Complexity: History: MEDIUM  Complexity : 1-2 comorbidities / personal factors will impact the outcome/ POC Exam:LOW Complexity : 1-2 Standardized tests and measures addressing body structure, function, activity limitation and / or participation in recreation  Presentation: LOW Complexity : Stable, uncomplicated  Clinical Decision Making:Low Complexity   Overall Complexity:LOW     SUBJECTIVE:   Patient stated I'm so mad at myself. I twisted the wrong way and I just hope I didn't do anything to mess it up. 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    Pain, upper back     Psychiatric disorder     depression     Tattoo     Unspecified deficiency anemia 1999     Past Surgical History:   Procedure Laterality Date    HX APPENDECTOMY  1995    HX CHOLECYSTECTOMY  2002    HX GYN  2018    Abalation    HX GYN      rt ovary removed    HX ORTHOPAEDIC      arthoscopic rt knee    HX ORTHOPAEDIC      left knee meniscis tear     Barriers to Learning/Limitations: yes;  physical  Compensate with: Visual Cues, Verbal Cues and Tactile Cues  Prior Level of Function/Home Situation: Patient lives in 2 story home with bed/bath on 2nd level and has 4STE with HR. Patient has RW, BSC, and SC. Home Situation  One/Two Story Residence: Two story  # of Interior Steps: 15  Interior Rails: Right  Living Alone: No  Support Systems: Family member(s)  Critical Behavior:   A&Ox4   Strength:    Strength: Generally decreased, functional(RLE 2/2 pain 4/5; LLE 5/5)   Tone & Sensation:   Tone: Normal(BLE)   Sensation: Impaired(RLE dimnished at L4)    Range Of Motion:  AROM: Within functional limits(BLE; within spinal precautions)   Functional Mobility:  Bed Mobility:  Rolling: Supervision  Supine to Sit: Supervision; Additional time   Scooting: Supervision; Additional time  Transfers:  Sit to Stand: Supervision  Stand to Sit: Supervision   Balance:   Sitting: Intact  Standing: Impaired; With support  Standing - Static: Good  Standing - Dynamic : Fair  Ambulation/Gait Training:  Distance (ft): 90 Feet (ft)  Assistive Device: Walker, rolling  Ambulation - Level of Assistance: Stand-by assistance   Base of Support: Widened  Speed/Ijeoma: Pace decreased (<100 feet/min); Slow  Interventions: Verbal cues; Visual/Demos  Pain:  Pt reports 8/10 pain or discomfort prior to treatment.    Pt reports 8/10 pain or discomfort post treatment. Activity Tolerance:   Patient tolerated activity fair; unable to complete stair training and further mobility due to increased pina. Please refer to the flowsheet for vital signs taken during this treatment. After treatment:   [x]         Patient left in no apparent distress sitting up in chair  []         Patient left in no apparent distress in bed  [x]         Call bell left within reach  [x]         Nursing notified (Anupam Seat)  [x]         Caregiver present  []         Bed alarm activated  []         SCDs in place to B LE     COMMUNICATION/EDUCATION:   [x]         Fall prevention education was provided and the patient/caregiver indicated understanding. [x]         Patient/family have participated as able in goal setting and plan of care. [x]         Patient/family agree to work toward stated goals and plan of care. []         Patient understands intent and goals of therapy, but is neutral about his/her participation. []         Patient is unable to participate in goal setting and plan of care.     Thank you for this referral.  Grady Brunner, PT   Time Calculation: 24 mins

## 2018-12-18 NOTE — PROGRESS NOTES
conducted an initial consultation and Spiritual Assessment for Thong Martinez, who is a 53 y.o.,female. Patients Primary Language is: [place]. According to the patients EMR [employer] Affiliation is: Marylen Bollman.     The reason the Patient came to the hospital is:   Patient Active Problem List    Diagnosis Date Noted    HNP (herniated nucleus pulposus), lumbar 12/17/2018    Spinal stenosis of lumbar region 12/17/2018    History of shingles 08/07/2018    Spinal stenosis, lumbar region with neurogenic claudication 08/07/2018    Fibromyalgia 04/04/2012    HSV (herpes simplex virus) infection 04/04/2012    Cervical pain 04/04/2012    Nerve pain 04/04/2012    Myofascial pain 04/04/2012    Migraine 04/04/2012    Allergic rhinitis due to pollen 04/04/2012    Abnormal liver function 04/04/2012    Depressive disorder, not elsewhere classified 04/04/2012    Anxiety state, unspecified 04/04/2012    Irritable bowel syndrome 04/04/2012    Abnormal glandular Papanicolaou smear of cervix 04/04/2012        The  provided the following Interventions:  Initiated a relationship of care and support. Explored issues of rocco, belief, spirituality and Mormon/ritual needs while hospitalized. Listened empathically. Provided chaplaincy education. Provided information about Spiritual Care Services. Offered prayer and assurance of continued prayers on patient's behalf. Chart reviewed. The following outcomes where achieved:  Patient shared limited information about both their medical narrative and spiritual journey/beliefs.  confirmed Patient's [employer] Affiliation. Patient processed feeling about current hospitalization. Patient expressed gratitude for 's visit. Assessment:  Patient does not have any Mormon/cultural needs that will affect patients preferences in health care. There are no spiritual or Mormon issues which require intervention at this time. Plan:  Chaplains will continue to follow and will provide pastoral care on an as needed/requested basis.  recommends bedside caregivers page  on duty if patient shows signs of acute spiritual or emotional distress.     [address]   [phone]

## 2018-12-18 NOTE — ANESTHESIA POSTPROCEDURE EVALUATION
Procedure(s):  RIGHT L4/5 LAMINECTOMY/C-ARM.     Anesthesia Post Evaluation      Multimodal analgesia: multimodal analgesia used between 6 hours prior to anesthesia start to PACU discharge  Patient location during evaluation: bedside  Patient participation: complete - patient participated  Level of consciousness: awake  Pain score: 1  Pain management: adequate  Airway patency: patent  Anesthetic complications: no  Cardiovascular status: stable  Respiratory status: acceptable  Hydration status: acceptable  Post anesthesia nausea and vomiting:  controlled      Visit Vitals  /63 (BP 1 Location: Right arm, BP Patient Position: At rest)   Pulse 60   Temp 36.8 °C (98.2 °F)   Resp 17   Ht 5' 2\" (1.575 m)   Wt 80.6 kg (177 lb 9.6 oz)   SpO2 95%   BMI 32.48 kg/m²

## 2018-12-18 NOTE — PROGRESS NOTES
Problem: Mobility Impaired (Adult and Pediatric)  Goal: *Acute Goals and Plan of Care (Insert Text)  Physical Therapy Goals  Initiated 12/18/2018 and to be accomplished within 3 day(s)  1. Patient will move from supine to sit and sit to supine , scoot up and down and roll side to side in bed with supervision/set-up. 2.  Patient will transfer from bed to chair and chair to bed with supervision/set-up using the least restrictive device. 3.  Patient will perform sit to stand with supervision/set-up. 4.  Patient will ambulate with supervision/set-up for >150 feet with the least restrictive device. 5.  Patient will ascend/descend 10-12 stairs with 1 handrail(s) with minimal assistance/contact guard assist.   Outcome: Progressing Towards Goal  physical Therapy TREATMENT    Patient: Alpa Gilbert (4 y.o. female)  Date: 12/18/2018  Diagnosis: HNP (herniated nucleus pulposus), lumbar [M51.26] <principal problem not specified>  Procedure(s) (LRB):  RIGHT L4/5 LAMINECTOMY/C-ARM (Right) 1 Day Post-Op  Precautions:     Chart, physical therapy assessment, plan of care and goals were reviewed. OBJECTIVE/ ASSESSMENT:  Patient found SL willing to work with PT. Upon arrival, pt voiced pain improvement in said position, however w/ movement and loading of spine, pt drastically increases. Pt able to participate w/ PT, however cont to complain of severe pain in right side of L/S describing as radiating from central to right oblique. Pt complains of increased pain when loaded vs unloaded. Pt's mobility greatly limited this tx 2/2 to pain limiting to only 20' w/ RW displaying short shuffled steps w/ inability to lengthen 2/2 to pain. Pt req rest after approx 10' in R [address] 23, and returned to bed to supine CGA. Although pt req little assistance w/ mobility, pt cont to experience a great amount of pain w/ all mobility tasks.  Therefore, it is not recommended for pt to return home at this time as pt must navigate 18 steps to enter home and was unable to perform stair training this visit. Pt is highly motivated and will cont to follow up w/ pt as appropriate to prepare for a safe DC. Full note to follow. Education:  [x]         Bed mobility  [x]         Transfers  [x]         Ambulation / gait  [x]         Assistive device management  [x]         Stairs  [x]         Body mechanics  [x]         Position change   [x]         Therapeutic exercise  [x]         Activity pacing / energy conservation  [x]         Other:    Progression toward goals:  [x]      Improving appropriately and progressing toward goals  []      Improving slowly and progressing toward goals  []      Not making progress toward goals and plan of care will be adjusted     PLAN:  Patient continues to benefit from skilled intervention to address the above impairments. Continue treatment per established plan of care. Discharge Recommendations:  Home Health per progression w/ PT  Further Equipment Recommendations for Discharge:  rolling walker     SUBJECTIVE:   Patient stated I think I twisted it earlier and ever since then it's has been hurting.     OBJECTIVE DATA SUMMARY:   Critical Behavior:  Neurologic State: Alert  Orientation Level: Oriented X4  Cognition: Appropriate decision making, Follows commands  Safety/Judgement: Awareness of environment  Functional Mobility Training:  Bed Mobility:  Rolling: Stand-by assistance  Supine to Sit: Stand-by assistance  Sit to Supine: Contact guard assistance  Scooting: Supervision  Transfers:  Sit to Stand: Stand-by assistance  Stand to Sit: Stand-by assistance  Balance:  Sitting: Intact  Standing: Impaired; With support  Standing - Static: Fair  Standing - Dynamic : Fair  Ambulation/Gait Training:  Distance (ft): 20 Feet (ft)  Assistive Device: Walker, rolling  Ambulation - Level of Assistance: Contact guard assistance  Gait Abnormalities: Decreased step clearance;Shuffling gait; Altered arm swing  Base of Support: Center of gravity altered  Speed/Ijeoma: Slow  Step Length: Right shortened;Left shortened  Interventions: Verbal cues; Visual/Demos  Pain: 5/10 at rest, increased to 9/10 w/ all mobility     Poor pain/activity tolerance   Please refer to the flowsheet for vital signs taken during this treatment. After treatment:   [] Patient left in no apparent distress sitting up in chair  [x] Patient left in no apparent distress in bed  [x] Call bell left within reach  [x] Nursing notified  [] Caregiver present  [] Bed alarm activated  [] SCDs applied  [] Ice applied      Loraine Reynolds PTA   Time Calculation: 30 mins    Mobility  Current  CJ= 20-39%. The severity rating is based on the Level of Assistance required for Functional Mobility and ADLs. Mobility   Goal  CI= 1-19%. The severity rating is based on the Level of Assistance required for Functional Mobility and ADLs.

## 2018-12-18 NOTE — ROUTINE PROCESS
TRANSFER - IN REPORT:    Verbal report received from [facility]) on Sonia Mcknight  being received from [employer]) for routine post - op      Report consisted of patients Situation, Background, Assessment and   Recommendations(SBAR). Information from the following report(s) SBAR, OR Summary, Intake/Output, MAR and Recent Results was reviewed with the receiving nurse. Opportunity for questions and clarification was provided. Assessment will be completed upon patients arrival to unit and care assumed.

## 2018-12-18 NOTE — PROGRESS NOTES
Reason for Admission:  HNP (herniated nucleus pulposus), lumbar [M51.26]                 RRAT Score:   4           Plan for utilizing home health: [address]                    Likelihood of Readmission:   LOW                         Transition of Care Plan:              Initial assessment completed with patient. Face sheet information confirmed:  Yes. The patient requests we communicate with patient and Jc Díaz, spouse in reference to medical care. This patient lives on  2nd floor of house with 5 steps to enter and 17 to 2nd floor with spouse and children. Patient was able to navigate steps as needed with assistance. Prior to hospitalization, patient was considered to be independent : No  . If not independent,  patient needs assist with :  Bathing and dressing with family assistance. Cognitive status of patient: Alert and oriented. Patient has a current ACP document on file: No  The patient's spouse will be available to transport patient home upon discharge. The patient already has bedside commode, shower chair, and rolling walker available in the home. Patient is not currently active with home health. Patient has never stayed in a skilled nursing facility or rehab. List of available home health agencies was reviewed with the patient prior to discharge. Freedom of choice signed: Yes, for  [address] [facility] and referral sent. Currently, the discharge plan is for home health and family support.       Care Management Interventions  PCP Verified by CM: Yes(Per pt, saw PCP 2 weeks ago.)  Mode of Transport at Discharge: Self  Transition of Care Consult (CM Consult): Discharge Planning  MyChart Signup: No  Discharge Durable Medical Equipment: No  Physical Therapy Consult: Yes  Occupational Therapy Consult: Yes  Speech Therapy Consult: No  Current Support Network: Lives with Spouse  Confirm Follow Up Transport: Family  Plan discussed with Pt/Family/Caregiver: Yes  Freedom of Choice Offered: Yes  Discharge Location  Discharge Placement: Home with home health      LUIS EDUARDO Gudino, RN  Pager # [phone]  Care Manager

## 2018-12-18 NOTE — PROGRESS NOTES
Problem: Mobility Impaired (Adult and Pediatric)  Goal: *Acute Goals and Plan of Care (Insert Text)  Physical Therapy Goals  Initiated 12/18/2018 and to be accomplished within 3 day(s)  1. Patient will move from supine to sit and sit to supine , scoot up and down and roll side to side in bed with supervision/set-up. 2.  Patient will transfer from bed to chair and chair to bed with supervision/set-up using the least restrictive device. 3.  Patient will perform sit to stand with supervision/set-up. 4.  Patient will ambulate with supervision/set-up for >150 feet with the least restrictive device. 5.  Patient will ascend/descend 10-12 stairs with 1 handrail(s) with minimal assistance/contact guard assist.     PT tx performed at 1358. Pt able to participate w/ PT, however cont to complain of severe pain in right side of L/S describing as radiating from central to right oblique. Pt complains of increased pain when loaded vs unloaded. Pt's mobility greatly limited this tx 2/2 to pain limiting to only 20' w/ RW. Therefore, it is not recommended for pt to return home at this time as pt must navigate 18 steps to enter home and was unable to perform stair training this visit. Will cont to follow up w/ pt as appropriate to prepare for a safe DC. Full note to follow.     HEIDY Hunter

## 2018-12-18 NOTE — PROGRESS NOTES
Discharge planning:    Spoke with Shannan Cheung NP concerning need for MSW. After a discussion, order for MSW was for patient receiving indigent medications. Patient will not receive indigent meds for narcotics or over the counter medications. NP will consider ordering non-narcotics for pain management at discharge. NP cancelled discharge for today. CM will continue to monitor for transitional needs.       LUIS EDUARDO Gutierrez, RN  Pager # [phone]  Care Manager

## 2018-12-18 NOTE — PROGRESS NOTES
Attempted to see patient for stair training as tentative plan is for D/C this afternoon. Patient was not able to complete this AM due to pain and at this tie requests therapist return later as she just got back in bed and took a valium. Will follow up as patient schedule allows. Thank you for this referral. Bree Álvarez, PT, DPT.

## 2018-12-18 NOTE — PROGRESS NOTES
Shift progress Note:  Assumed care of patient in bed from PACU awake and alert. Tolerated liquids and progress to regular diet. VSS. Dressing with scant drainage, No c/o numbness or  Medicated x3 for pain with some relief noted. Able to ambulate to bathroom with walker and minimal assist. Call bell within reach.  @ bedside.   Patient Vitals for the past 8 hrs:   Temp Pulse Resp BP SpO2   12/18/18 0230 98.2 °F (36.8 °C) 60 17 101/63 95 %   12/17/18 2250 97.9 °F (36.6 °C) 66 17 106/65 96 %

## 2018-12-18 NOTE — ROUTINE PROCESS
TRANSFER - OUT REPORT:    Verbal report given to [facility] on Pool [facility]  being transferred to room 506 for routine progression of care. Report consisted of patients Situation, Background, Assessment and   Recommendations(SBAR). Information from the following report(s) SBAR, OR Summary, Intake/Output, MAR and Recent Results was reviewed with the receiving nurse. Lines:   Peripheral IV 12/17/18 Left Forearm (Active)   Site Assessment Clean, dry, & intact 12/17/2018  6:38 PM   Phlebitis Assessment 0 12/17/2018  6:38 PM   Infiltration Assessment 0 12/17/2018  6:38 PM   Dressing Status Clean, dry, & intact 12/17/2018  6:38 PM   Dressing Type Tape;Transparent 12/17/2018  6:38 PM   Hub Color/Line Status Pink; Infusing 12/17/2018  6:38 PM   Alcohol Cap Used No 12/17/2018  3:32 PM        Opportunity for questions and clarification was provided.       Patient transported with:   Registered Nurse  Tech

## 2018-12-18 NOTE — HOME CARE
Rec HC order - d/c noted for today - pt seems to be in a lot of pain - [facility] will follow per Dr. Jonny Silverman protocol - pt has all needed dme at home -  MERLINE Kaiser RN

## 2018-12-18 NOTE — PROGRESS NOTES
PO day #1  VSS  Dressing dry  Right leg pain resolved  Pt states she twisted somehow this morning and now has right lumbar paraspinal pain  Pain improved some with valium  PT has not cleared pt to go home secondary to pt being unable to do steps due to pain.     Will start toradol  Neuro intact    Davy Juan, NP

## 2018-12-18 NOTE — PROGRESS NOTES
Patient educated: Activity:  OOB for all meals, walk every hour to prevent blood clots  Follow back precautions (no bending, twisting, lifting &  Log roll in/out of bed). VTE prophylaxis:   Use SCD pumps except when walking. Ankle pumps 10 times an hour at hospital & home. Take blood thinner medication as ordered by surgeon. Do not skip a dose. Pain Control:  Pain medications side effects discussed. Wean off narcotics ASAP. Use Tylenol ( 3000 mg/24 hours) , ice, distraction, moving, & change position to help with pain. Don't get nauseated. Eat a snack before taking pain medication    Do not get constipated: take stool softener/mild laxative daily while on narcotics. Incentive Spirometry:    Use of incentive spirometer 10 x/hr. Demonstration  2500 ml x 3  Wound Care: Dressing changed applying clean technique opsite visible. Incision approximated with old drainage noted.  educated on how to care for wound. Do not to take dressing off at home. Bathe daily with dial soap & wear clean clothes/clean towel. No lotions, powders, creams to surgical leg. .    Diet:   Eat for healing. Protein heals bone/muscle. Drink 8 glasses of water a day. Patient Safety:   Call light & belongings in reach. Call for help when want to walk or get OOB. Educational material given. Patient agreed to continue doing everything at home to prevent complications and have a successful recovery. Patient and spouse verbalized understand. Given the opportunity for asking questions.

## 2018-12-19 ENCOUNTER — TELEPHONE (OUTPATIENT)
Dept: ORTHOPEDIC SURGERY | Age: 48
End: 2018-12-19

## 2018-12-19 VITALS
SYSTOLIC BLOOD PRESSURE: 106 MMHG | HEIGHT: 62 IN | TEMPERATURE: 97.3 F | RESPIRATION RATE: 18 BRPM | WEIGHT: 177.6 LBS | HEART RATE: 66 BPM | BODY MASS INDEX: 32.68 KG/M2 | DIASTOLIC BLOOD PRESSURE: 71 MMHG | OXYGEN SATURATION: 97 %

## 2018-12-19 DIAGNOSIS — Z98.890 S/P LUMBAR LAMINECTOMY: Primary | ICD-10-CM

## 2018-12-19 PROCEDURE — 74011250637 HC RX REV CODE- 250/637: Performed by: ORTHOPAEDIC SURGERY

## 2018-12-19 PROCEDURE — 74011250637 HC RX REV CODE- 250/637: Performed by: NURSE PRACTITIONER

## 2018-12-19 PROCEDURE — 97116 GAIT TRAINING THERAPY: CPT

## 2018-12-19 PROCEDURE — 99218 HC RM OBSERVATION: CPT

## 2018-12-19 RX ORDER — NALOXONE HYDROCHLORIDE 4 MG/.1ML
SPRAY NASAL
Qty: 2 EACH | Refills: 0 | Status: SHIPPED | OUTPATIENT
Start: 2018-12-19 | End: 2019-06-27

## 2018-12-19 RX ORDER — DIAZEPAM 5 MG/1
5 TABLET ORAL
Qty: 12 TAB | Refills: 0 | OUTPATIENT
Start: 2018-12-19 | End: 2019-01-04 | Stop reason: ALTCHOICE

## 2018-12-19 RX ORDER — KETOROLAC TROMETHAMINE 10 MG/1
10 TABLET, FILM COATED ORAL EVERY 6 HOURS
Qty: 20 TAB | Refills: 0 | Status: SHIPPED | OUTPATIENT
Start: 2018-12-19 | End: 2019-01-04 | Stop reason: ALTCHOICE

## 2018-12-19 RX ADMIN — GABAPENTIN 800 MG: 400 CAPSULE ORAL at 08:43

## 2018-12-19 RX ADMIN — ACETAMINOPHEN 1000 MG: 500 TABLET ORAL at 08:43

## 2018-12-19 RX ADMIN — ACETAMINOPHEN 1000 MG: 500 TABLET ORAL at 15:07

## 2018-12-19 RX ADMIN — KETOROLAC TROMETHAMINE 10 MG: 10 TABLET, FILM COATED ORAL at 00:26

## 2018-12-19 RX ADMIN — SERTRALINE HYDROCHLORIDE 200 MG: 50 TABLET ORAL at 08:43

## 2018-12-19 RX ADMIN — KETOROLAC TROMETHAMINE 10 MG: 10 TABLET, FILM COATED ORAL at 12:22

## 2018-12-19 RX ADMIN — DIAZEPAM 10 MG: 5 TABLET ORAL at 08:44

## 2018-12-19 RX ADMIN — ACETAMINOPHEN 1000 MG: 500 TABLET ORAL at 02:36

## 2018-12-19 RX ADMIN — OXYCODONE HYDROCHLORIDE 10 MG: 5 TABLET ORAL at 15:07

## 2018-12-19 RX ADMIN — DIAZEPAM 10 MG: 5 TABLET ORAL at 00:26

## 2018-12-19 RX ADMIN — GABAPENTIN 800 MG: 400 CAPSULE ORAL at 15:07

## 2018-12-19 RX ADMIN — Medication 10 ML: at 05:38

## 2018-12-19 RX ADMIN — RANITIDINE 150 MG: 150 TABLET ORAL at 08:43

## 2018-12-19 RX ADMIN — OXYCODONE HYDROCHLORIDE 10 MG: 5 TABLET ORAL at 10:47

## 2018-12-19 RX ADMIN — PANTOPRAZOLE SODIUM 40 MG: 40 TABLET, DELAYED RELEASE ORAL at 06:45

## 2018-12-19 RX ADMIN — KETOROLAC TROMETHAMINE 10 MG: 10 TABLET, FILM COATED ORAL at 05:37

## 2018-12-19 RX ADMIN — OXYCODONE HYDROCHLORIDE 10 MG: 5 TABLET ORAL at 06:45

## 2018-12-19 NOTE — ROUTINE PROCESS
Bedside shift change report given to KickAss Candy (oncoming nurse) by Candie Lawson (offgoing nurse). Report included the following information SBAR.

## 2018-12-19 NOTE — ROUTINE PROCESS
Bedside and Verbal shift change report given to Cheyanne Gar 364 (oncoming nurse) by Santiago Mcqueen RN (offgoing nurse). Report included the following information SBAR, Kardex, Intake/Output, MAR and Recent Results.

## 2018-12-19 NOTE — PROGRESS NOTES
Problem: Mobility Impaired (Adult and Pediatric)  Goal: *Acute Goals and Plan of Care (Insert Text)  Physical Therapy Goals  Initiated 12/18/2018 and to be accomplished within 3 day(s)  1. Patient will move from supine to sit and sit to supine , scoot up and down and roll side to side in bed with supervision/set-up. 2.  Patient will transfer from bed to chair and chair to bed with supervision/set-up using the least restrictive device. 3.  Patient will perform sit to stand with supervision/set-up. 4.  Patient will ambulate with supervision/set-up for >150 feet with the least restrictive device. 5.  Patient will ascend/descend 10-12 stairs with 1 handrail(s) with minimal assistance/contact guard assist.   Outcome: Progressing Towards Goal  physical Therapy TREATMENT    Patient: Augustine Kraus (8 y.o. female)  Date: 12/19/2018  Diagnosis: HNP (herniated nucleus pulposus), lumbar [M51.26] <principal problem not specified>  Procedure(s) (LRB):  RIGHT L4/5 LAMINECTOMY/C-ARM (Right) 2 Days Post-Op  Precautions:     Chart, physical therapy assessment, plan of care and goals were reviewed. OBJECTIVE/ ASSESSMENT:  Patient found seated in recliner w/ (B) feet in dependent position willing to work with PT. Pt voiced improved pain levels this visit w/ bed mobility and amb from bed to chair prior to tx. Pt able to perform all mobility tasks this visit (sit <> stands, gait training, and stair training) SBA and supervision. Pt amb a total of 200' w/ RW displaying a step through pattern t/o, however step length/height was decreased 2/2 to increased pain. Pt voiced increased pain in L/S sacral area w/ movement and increased distance. Pt's posture also becomes slightly altered shifting anteriorly req greater dependence on RW req mult standing rest breaks t/o gait training. Pt able to navigate 11 steps, ascending and descending step to step using right hand railing only.  Pt voiced increased pain descending w/ ecc control in same area of complaints, thus cued on lateral descent, which improved overall status. Pt returned to room to chair, positioned for breakfast w/ ice pack, and left in room w/ all needs in reach. Per mobility performance, pt is safe to return home w/ assistance and recommended cont home therapy. Education:  [x]         Bed mobility  [x]         Transfers  [x]         Ambulation / gait  [x]         Assistive device management  [x]         Stairs  [x]         Body mechanics  [x]         Position change   [x]         Therapeutic exercise  [x]         Activity pacing / energy conservation  [x]         Other: ice pack timing (20 min on/off) also educated on anti-inflammatory meds and to speak w/ MD on further information and usage     Progression toward goals:  [x]      Improving appropriately and progressing toward goals  []      Improving slowly and progressing toward goals  []      Not making progress toward goals and plan of care will be adjusted     PLAN:  Patient continues to benefit from skilled intervention to address the above impairments. Continue treatment per established plan of care. Discharge Recommendations:  Home Health  Further Equipment Recommendations for Discharge:  rolling walker     SUBJECTIVE:   Patient stated It feels much better today, I'm not crying this time.     OBJECTIVE DATA SUMMARY:   Critical Behavior:  Neurologic State: Alert  Orientation Level: Oriented X4  Cognition: Appropriate decision making  Safety/Judgement: Awareness of environment  Functional Mobility Training:  Transfers:  Sit to Stand: Supervision  Stand to Sit: Supervision  Balance:  Sitting: Intact  Standing: Impaired; With support  Standing - Static: Good  Standing - Dynamic : Fair(+)  Ambulation/Gait Training:  Distance (ft): 200 Feet (ft)  Assistive Device: Walker, rolling  Ambulation - Level of Assistance: Stand-by assistance;Supervision  Gait Abnormalities: Decreased step clearance(decreased trunk rotation/arm swing )  Base of Support: Center of gravity altered  Step Length: Right shortened;Left shortened  Stairs:  Number of Stairs Trained: 11  Stairs - Level of Assistance: Stand-by assistance  Rail Use: Right   Pain: 6/10 constant, voices pain control best w/ ice modality, also improved w/ positioning and posture control   Activity Tolerance:   Good   Please refer to the flowsheet for vital signs taken during this treatment. After treatment:   [x] Patient left in no apparent distress sitting up in chair  [] Patient left in no apparent distress in bed  [x] Call bell left within reach  [x] Nursing notified  [] Caregiver present  [] Bed alarm activated  [] SCDs applied  [x] Ice applied       Nick Acuna PTA   Time Calculation: 29 mins    Mobility  Current  CI= 1-19%. The severity rating is based on the Level of Assistance required for Functional Mobility and ADLs. Mobility   Goal  CI= 1-19%. The severity rating is based on the Level of Assistance required for Functional Mobility and ADLs.

## 2018-12-19 NOTE — PROGRESS NOTES
vss afeb  Neuro intact  Pain improved this am  Wants to leave after stairs with PT OT  Dc home fu 2 weeks

## 2018-12-19 NOTE — PROGRESS NOTES
Discharge Planning    Possible discharge today. No needs from identified from CM.  will transport home . MEG Smith DoN, RN  Pager # [phone]  Care Manager

## 2018-12-19 NOTE — PROGRESS NOTES
Face to Face Encounter    Patients Name: Augustine Kraus  YOB: 1970    Primary Diagnosis: S/P Lumbar Lami, HNP    Date of Face to Face:   12/19/2018                                   Face to Face Encounter findings are related to primary reason for home care:   yes    1. I certify that the patient needs intermittent skilled nursing care, physical therapy and/or speech therapy. I will be following this patient in the Community and will be responsible for reviewing and signing the [address] 2. Initial Orders for Care: Must be completed only if Face to Face MD will not be signing the [address] Physical Therapy, Wound Care- Nursing    3. I certify that this patient is homebound for the following reason(s): Requires the assistance of 1 or more persons to leave the home     4. I certify that this patient is under my care and that I, or a nurse practitioner or [phone] working with me, had a Face-to-Face Encounter that meets the physician Face-to-Face Encounter requirements. Document the physical findings from the [address] [place] [address] Encounter that support the need for skilled services: Recent surgical procedure Lumbar Lami that requires wound assessment, teaching, and physical therapy services for evaluation and interventions.      Yordan Puga NP  12/19/2018

## 2018-12-19 NOTE — ROUTINE PROCESS
Pt discharged to home. Pt stable. IV d/cd on yesterday No signs of infection noted. Dressing to the back dry and intact. Pt verified that each discharge page matched pts name.  Pt d/cd to home via lift

## 2018-12-19 NOTE — TELEPHONE ENCOUNTER
Received call from [address] Celeste, RN at [facility], pt being DC-Home. She is requesting some Valium for home. Order placed for phone in along with Narcan.  Iqra will educate pt on not to take within 2 hours of pain medication

## 2018-12-19 NOTE — DISCHARGE SUMMARY
Discharge  Summary     Patient: Anali Phillips MRN: [id number]  SSN: [id number]    YOB: 1970  Age: 53 y.o.   Sex: female       Admit Date: 12/17/2018    Discharge Date: 12/19/2018      Admission Diagnoses: HNP (herniated nucleus pulposus), lumbar [M51.26]    Discharge Diagnoses:   Problem List as of 12/19/2018 Date Reviewed: 12/12/2018          Codes Class Noted - Resolved    HNP (herniated nucleus pulposus), lumbar ICD-10-CM: M51.26  ICD-9-CM: 722.10  12/17/2018 - Present        Spinal stenosis of lumbar region ICD-10-CM: M48.061  ICD-9-CM: 724.02  12/17/2018 - Present        History of shingles ICD-10-CM: Z86.19  ICD-9-CM: V12.09  8/7/2018 - Present        Spinal stenosis, lumbar region with neurogenic claudication ICD-10-CM: M48.062  ICD-9-CM: 724.03  8/7/2018 - Present        Fibromyalgia ICD-10-CM: M79.7  ICD-9-CM: 729.1  4/4/2012 - Present        HSV (herpes simplex virus) infection ICD-10-CM: B00.9  ICD-9-CM: 054.9  4/4/2012 - Present        Cervical pain ICD-10-CM: M54.2  ICD-9-CM: 723.1  4/4/2012 - Present    Overview Signed 4/4/2012 11:00 AM by Ashley Tavera     Axial, possibly facet joint mediated and levator scapulae pain             Nerve pain ICD-10-CM: M79.2  ICD-9-CM: 729.2  4/4/2012 - Present    Overview Signed 4/4/2012 11:00 AM by Ashley Tavera     Greater occipital             Myofascial pain ICD-10-CM: M79.18  ICD-9-CM: 729.1  4/4/2012 - Present    Overview Signed 4/4/2012 11:01 AM by Ashley Tavera     Left infraspinatus             Migraine ICD-10-CM: F04.723  ICD-9-CM: 346.90  4/4/2012 - Present    Overview Signed 4/4/2012 11:08 AM by Ashley Tavera     NOS/not intractable             Allergic rhinitis due to pollen ICD-10-CM: J30.1  ICD-9-CM: 477.0  4/4/2012 - Present        Abnormal liver function ICD-10-CM: K76.89  ICD-9-CM: 573.9  4/4/2012 - Present        Depressive disorder, not elsewhere classified ICD-10-CM: F32.9  ICD-9-CM: 635  4/4/2012 - Present Anxiety state, unspecified ICD-10-CM: F41.1  ICD-9-CM: 300.00  4/4/2012 - Present        Irritable bowel syndrome ICD-10-CM: K58.9  ICD-9-CM: 564.1  4/4/2012 - Present        Abnormal glandular Papanicolaou smear of cervix ICD-10-CM: R87.619  ICD-9-CM: 795.00  4/4/2012 - Present               Discharge Condition: Good    Procedure: Right L4-5 hemilaminotomy diskectomy. Hospital Course: Pt had myofascial and incisional back pain post-op, difficult to manage. Otherwise, Normal hospital course for this procedure. Tolerated surgical intervention well. VSS Throughout. Neuro intact . Incision dry and intact, tolerating PO intake, voiding adequately. Ambulatory . Disposition: home    Discharge Medications:      My Medications      TAKE these medications as instructed      Instructions Each Dose to Equal Morning Noon Evening Bedtime   aspirin delayed-release 81 mg tablet    Your last dose was: Your next dose is: Take  by mouth daily. cyclobenzaprine 10 mg tablet  Commonly known as:  FLEXERIL    Your last dose was: Your next dose is: Take  by mouth three (3) times daily as needed for Muscle Spasm(s). EPINEPHrine 0.3 mg/0.3 mL injection  Commonly known as:  EPIPEN    Your last dose was: Your next dose is:          0.3 mL by IntraMUSCular route once as needed for up to 1 dose. Indications: Anaphylaxis   0.3 mg                 gabapentin 800 mg tablet  Commonly known as:  NEURONTIN    Your last dose was: Your next dose is: Take 1 Tab by mouth three (3) times daily. 800 mg                 ketorolac 10 mg tablet  Commonly known as:  TORADOL    Your last dose was: Your next dose is: Take 1 Tab by mouth every six (6) hours. 10 mg                 lovastatin 20 mg tablet  Commonly known as:  MEVACOR    Your last dose was: Your next dose is: Take 1 Tab by mouth nightly.    20 mg                 omeprazole 20 mg capsule  Commonly known as:  PRILOSEC    Your last dose was: Your next dose is: Take 1 Cap by mouth two (2) times a day. 20 mg                 oxyCODONE-acetaminophen  mg per tablet  Commonly known as:  PERCOCET    Your last dose was: Your next dose is: Take 1 Tab by mouth every six (6) hours as needed for Pain. Max Daily Amount: 4 Tabs. 1 Tab                 sertraline 100 mg tablet  Commonly known as:  ZOLOFT    Your last dose was: Your next dose is: Take 2 Tabs by mouth daily. 200 mg                 ZANTAC 150 mg tablet  Generic drug:  raNITIdine    Your last dose was: Your next dose is: Take 150 mg by mouth two (2) times a day. 150 mg                    ASK your physician about these medications      Instructions Each Dose to Equal Morning Noon Evening Bedtime   diazePAM 5 mg tablet  Commonly known as:  VALIUM    Your last dose was: Your next dose is: Take 1 Tab by mouth every eight (8) hours as needed for Anxiety. Max Daily Amount: 15 mg.   5 mg                 naloxone 4 mg/actuation nasal spray  Commonly known as:  NARCAN    Your last dose was: Your next dose is:          Use 1 spray intranasally into 1 nostril. Where to Get Your Medications      These medications were sent to [address] [place] [address]    Phone:  [phone]   · ketorolac 10 mg tablet  · naloxone 4 mg/actuation nasal spray     Information on where to get these meds will be given to you by the nurse or doctor. Ask your nurse or doctor about these medications  · diazePAM 5 mg tablet  · oxyCODONE-acetaminophen  mg per tablet             Follow-up Appointments   Procedures    FOLLOW UP VISIT Appointment in: Two Weeks     Standing Status:   Standing     Number of Occurrences:   1     Order Specific Question:   Appointment in     Answer:    Two Weeks       Signed By: Liana Koroma NP     December 19, 2018

## 2018-12-19 NOTE — HOME CARE
Pt discharged today and not yesterday as planned, referral updated and notified Martine Montero at [facility] central intake that pt discharged today and will need a visit tomorrow for PT/Anjelica protocol and MSW, [facility] will follow. PAMELA SAPP.

## 2018-12-19 NOTE — PROGRESS NOTES
Patient educated: Activity:  OOB for all meals, walk every hour to prevent blood clots  Follow back precautions (no bending, twisting, lifting &  Log roll in/out of bed). VTE prophylaxis:   Use SCD pumps except when walking. Ankle pumps 10 times an hour at hospital & home. Take blood thinner medication as ordered by surgeon. Do not skip a dose. Pain Control:  Pain medications side effects discussed. Wean off narcotics ASAP. Use Tylenol ( 3000 mg/24 hours) , ice, distraction, moving, & change position to help with pain. Don't get nauseated. Eat a snack before taking pain medication    Do not get constipated: take stool softener/mild laxative daily while on narcotics. Incentive Spirometry:    Use of incentive spirometer 10 x/hr. Demonstration  1750 ml x 3  Wound Care: Dressing intact and dry. Do not to take dressing off at home. Bathe daily with dial soap & wear clean clothes/clean towel. No lotions, powders, creams to surgical leg. .    Diet:   Eat for healing. Protein heals bone/muscle. Drink 8 glasses of water a day. Patient Safety:   Call light & belongings in reach. Call for help when want to walk or get OOB. Educational material given. Patient agreed to continue doing everything at home to prevent complications and have a successful recovery. Patient  verbalized understand. Given the opportunity for asking questions.           Mobility Intervention:       [] Pt dangled at edge of bed    [] Pt assisted OOB to bedside commode    [] Pt assisted OOB to chair    [] Pt ambulated to bathroom    [] Patient was ambulated in room/hallway    Assistive Device Utilized:       [] Rolling walker   [] Crutches   [] Straight Cane   [] Knee immobilizer   [] IV pole    After Rounding and Checking on Patient     [x] Patient left in no apparent distress sitting up in chair  [] Patient left in no apparent distress in bed  [x] Call bell left within reach  [x] SCDs on both legs & machine turned on  [x] Ice applied  [] RN notified  [] Caregiver present  [] Bed alarm activated    Reason patient not mobilized:      [] Patient refused   [] Nausea/vomiting   [] Low blood pressure   [] Drowsy/lethargic    Pain Rating:       Left patient with call light, cell phone and personal belongings in reach for safety.

## 2018-12-19 NOTE — DISCHARGE INSTRUCTIONS
DISCHARGE SUMMARY from Nurse    PATIENT INSTRUCTIONS:    After general anesthesia or intravenous sedation, for 24 hours or while taking prescription Narcotics:  · Limit your activities  · Do not drive and operate hazardous machinery  · Do not make important personal or business decisions  · Do  not drink alcoholic beverages  · If you have not urinated within 8 hours after discharge, please contact your surgeon on call. Report the following to your surgeon:  · Excessive pain, swelling, redness or odor of or around the surgical area  · Temperature over 100.5  · Nausea and vomiting lasting longer than 4 hours or if unable to take medications  · Any signs of decreased circulation or nerve impairment to extremity: change in color, persistent  numbness, tingling, coldness or increase pain  · Any questions    What to do at Home:  Recommended activity: Activity as tolerated,     If you experience any of the following symptoms fever, chills, shortness of breath, please follow up with md.    *  Please give a list of your current medications to your Primary Care Provider. *  Please update this list whenever your medications are discontinued, doses are      changed, or new medications (including over-the-counter products) are added. *  Please carry medication information at all times in case of emergency situations. These are general instructions for a healthy lifestyle:    No smoking/ No tobacco products/ Avoid exposure to second hand smoke  Surgeon General's Warning:  Quitting smoking now greatly reduces serious risk to your health.     Obesity, smoking, and sedentary lifestyle greatly increases your risk for illness    A healthy diet, regular physical exercise & weight monitoring are important for maintaining a healthy lifestyle    You may be retaining fluid if you have a history of heart failure or if you experience any of the following symptoms:  Weight gain of 3 pounds or more overnight or 5 pounds in a week, increased swelling in our hands or feet or shortness of breath while lying flat in bed. Please call your doctor as soon as you notice any of these symptoms; do not wait until your next office visit. Recognize signs and symptoms of STROKE:    F-face looks uneven    A-arms unable to move or move unevenly    S-speech slurred or non-existent    T-time-call 911 as soon as signs and symptoms begin-DO NOT go       Back to bed or wait to see if you get better-TIME IS BRAIN. Warning Signs of HEART ATTACK     Call 911 if you have these symptoms:   Chest discomfort. Most heart attacks involve discomfort in the center of the chest that lasts more than a few minutes, or that goes away and comes back. It can feel like uncomfortable pressure, squeezing, fullness, or pain.  Discomfort in other areas of the upper body. Symptoms can include pain or discomfort in one or both arms, the back, neck, jaw, or stomach.  Shortness of breath with or without chest discomfort.  Other signs may include breaking out in a cold sweat, nausea, or lightheadedness. Don't wait more than five minutes to call 911 - MINUTES MATTER! Fast action can save your life. Calling 911 is almost always the fastest way to get lifesaving treatment. Emergency Medical Services staff can begin treatment when they arrive -- up to an hour sooner than if someone gets to the hospital by car. The discharge information has been reviewed with the patient. The patient verbalized understanding. Discharge medications reviewed with the patient and appropriate educational materials and side effects teaching were provided. ___________________________________________________________________________________________________________________________________     Lumbar Laminectomy: What to Expect at [address] can expect your back to feel stiff or sore after surgery. This should improve in the weeks after surgery.  You may have trouble sitting or standing in one position for very long and may need pain medicine in the weeks after your surgery. Your doctor may advise you to work with a physical therapist to strengthen the muscles around your spine and trunk. You will need to learn how to lift, twist, and bend so that you do not put too much strain on your back. This care sheet gives you a general idea about how long it will take for you to recover. But each person recovers at a different pace. Follow the steps below to get better as quickly as possible. How can you care for yourself at home? Activity    · Rest when you feel tired. Getting enough sleep will help you recover.     · Try to walk each day. Start by walking a little more than you did the day before. Bit by bit, increase the amount you walk. Walking boosts blood flow and helps prevent pneumonia and constipation. Walking may also decrease your muscle soreness after surgery.     · If advised by your doctor, you may need to avoid lifting anything that would cause excessive strain on your back. This may include a child, heavy grocery bags and milk containers, a heavy briefcase or backpack, cat litter or dog food bags, or a vacuum .     · Avoid strenuous activities, such as bicycle riding, jogging, weight lifting, or aerobic exercise, until your doctor says it is okay.     · Do not drive for 2 to 4 weeks after your surgery or until your doctor says it is okay.     · Avoid riding in a car for more than 30 minutes at a time for 2 to 4 weeks after surgery. If you must ride in a car for a longer distance, stop often to walk and stretch your legs.     · Try to change your position about every 30 minutes while sitting or standing. This will help decrease your back pain while you are healing.     · You will probably need to take 4 to 6 weeks off from work.  It depends on the type of work you do and how you feel.     · You may have sex as soon as you feel able, but avoid positions that put stress on your back or cause pain. Diet    · You can eat your normal diet. If your stomach is upset, try bland, low-fat foods like plain rice, broiled chicken, toast, and yogurt.     · Drink plenty of fluids (unless your doctor tells you not to).     · You may notice that your bowel movements are not regular right after your surgery. This is common. Try to avoid constipation and straining with bowel movements. You may want to take a fiber supplement every day. If you have not had a bowel movement after a couple of days, ask your doctor about taking a mild laxative. Medicines    · Your doctor will tell you if and when you can restart your medicines. He or she will also give you instructions about taking any new medicines.     · If you take blood thinners, such as warfarin (Coumadin), clopidogrel (Plavix), or aspirin, be sure to talk to your doctor. He or she will tell you if and when to start taking those medicines again. Make sure that you understand exactly what your doctor wants you to do.     · Take pain medicines exactly as directed. ? If the doctor gave you a prescription medicine for pain, take it as prescribed. ? If you are not taking a prescription pain medicine, ask your doctor if you can take an over-the-counter medicine.     · If your doctor prescribed antibiotics, take them as directed. Do not stop taking them just because you feel better. You need to take the full course of antibiotics.     · If you think your pain medicine is making you sick to your stomach:  ? Take your medicine after meals (unless your doctor has told you not to). ? Ask your doctor for a different pain medicine. Incision care    · If you have strips of tape on the cut (incision) the doctor made, leave the tape on for a week or until it falls off.     · Wash the area daily with warm, soapy water and pat it dry.     · Keep the area clean and dry. You may cover it with a gauze bandage if it weeps or rubs against clothing.  Change the bandage every day.   Exercise    · Do back exercises as instructed by your doctor.     · Your doctor may advise you to work with a physical therapist to improve the strength and flexibility of your back. Other instructions    · To reduce stiffness and help sore muscles, use a warm water bottle, a heating pad set on low, or a warm cloth on your back. Do not put heat right over the incision. Do not go to sleep with a heating pad on your skin. Follow-up care is a key part of your treatment and safety. Be sure to make and go to all appointments, and call your doctor if you are having problems. It's also a good idea to know your test results and keep a list of the medicines you take. When should you call for help? Call 911 anytime you think you may need emergency care. For example, call if:    · You passed out (lost consciousness).     · You have sudden chest pain and shortness of breath, or you cough up blood.     · You are unable to move a leg at all.   [facility] your doctor now or seek immediate medical care if:    · You have new or worse symptoms in your legs or buttocks. Symptoms may include:  ? Numbness or tingling. ? Weakness. ? Pain.     · You lose bladder or bowel control.     · You have loose stitches, or your incision comes open.     · You have blood or fluid draining from the incision.     · You have signs of infection, such as:  ? Increased pain, swelling, warmth, or redness. ? Pus draining from the incision. ? A fever. ? Red streaks leading from the incision.    Watch closely for changes in your health, and be sure to contact your doctor if:    · You do not have a bowel movement after taking a laxative.     · You are not getting better as expected. Where can you learn more? Go to http://steven-alexandru.info/. Enter S803 in the search box to learn more about \"Lumbar Laminectomy: What to Expect at Home. \"  Current as of: November 29, 2017  Content Version: 11.8  © 5228-3721 Healthwise, [employer]. Care instructions adapted under license by [employer] (which disclaims liability or warranty for this information). If you have questions about a medical condition or this instruction, always ask your healthcare professional. [address] any warranty or liability for your use of this information. [employer] Activation    Thank you for requesting access to [employer]. Please follow the instructions below to securely access and download your online medical record. [employer] allows you to send messages to your doctor, view your test results, renew your prescriptions, schedule appointments, and more. How Do I Sign Up? 1. In your internet browser, go to [email or web address]  2. Click on the First Time User? Click Here link in the Sign In box. You will be redirect to the New Member Sign Up page. 3. Enter your [employer] Access Code exactly as it appears below. You will not need to use this code after youve completed the sign-up process. If you do not sign up before the expiration date, you must request a new code. [employer] Access Code: Activation code not generated  Current [employer] Status: Active (This is the date your [employer] access code will )    4. Enter the last four digits of your Social Security Number (xxxx) and Date of Birth (mm/dd/yyyy) as indicated and click Submit. You will be taken to the next sign-up page. 5. Create a [employer] ID. This will be your [employer] login ID and cannot be changed, so think of one that is secure and easy to remember. 6. Create a [employer] password. You can change your password at any time. 7. Enter your Password Reset Question and Answer. This can be used at a later time if you forget your password. 8. Enter your e-mail address. You will receive e-mail notification when new information is available in [address] 9. Click Sign Up. You can now view and download portions of your medical record.   10. Click the [place] link to download a portable copy of your medical information. Additional Information    If you have questions, please visit the Frequently Asked Questions section of the [employer] website at [email or web address] Remember, [employer] is NOT to be used for urgent needs. For medical emergencies, dial 911. Patient armband removed and shredded    DISCHARGE SUMMARY from Nurse    PATIENT INSTRUCTIONS:    After general anesthesia or intravenous sedation, for 24 hours or while taking prescription Narcotics:  · Limit your activities  · Do not drive and operate hazardous machinery  · Do not make important personal or business decisions  · Do  not drink alcoholic beverages  · If you have not urinated within 8 hours after discharge, please contact your surgeon on call. Report the following to your surgeon:  · Excessive pain, swelling, redness or odor of or around the surgical area  · Temperature over 100.5  · Nausea and vomiting lasting longer than 4 hours or if unable to take medications  · Any signs of decreased circulation or nerve impairment to extremity: change in color, persistent  numbness, tingling, coldness or increase pain  · Any questions    What to do at Home:  Recommended activity: Activity as tolerated, No Bending, lifting, twisting    If you experience any of the following symptoms Nausea, vomiting, shortness of breath, chest pain, fever greater than 100.5, please follow up with PCP. *  Please give a list of your current medications to your Primary Care Provider. *  Please update this list whenever your medications are discontinued, doses are      changed, or new medications (including over-the-counter products) are added. *  Please carry medication information at all times in case of emergency situations.     These are general instructions for a healthy lifestyle:    No smoking/ No tobacco products/ Avoid exposure to second hand smoke  Surgeon General's Warning:  Quitting smoking now greatly reduces serious risk to your health. Obesity, smoking, and sedentary lifestyle greatly increases your risk for illness    A healthy diet, regular physical exercise & weight monitoring are important for maintaining a healthy lifestyle    You may be retaining fluid if you have a history of heart failure or if you experience any of the following symptoms:  Weight gain of 3 pounds or more overnight or 5 pounds in a week, increased swelling in our hands or feet or shortness of breath while lying flat in bed. Please call your doctor as soon as you notice any of these symptoms; do not wait until your next office visit. Recognize signs and symptoms of STROKE:    F-face looks uneven    A-arms unable to move or move unevenly    S-speech slurred or non-existent    T-time-call 911 as soon as signs and symptoms begin-DO NOT go       Back to bed or wait to see if you get better-TIME IS BRAIN. Warning Signs of HEART ATTACK     Call 911 if you have these symptoms:   Chest discomfort. Most heart attacks involve discomfort in the center of the chest that lasts more than a few minutes, or that goes away and comes back. It can feel like uncomfortable pressure, squeezing, fullness, or pain.  Discomfort in other areas of the upper body. Symptoms can include pain or discomfort in one or both arms, the back, neck, jaw, or stomach.  Shortness of breath with or without chest discomfort.  Other signs may include breaking out in a cold sweat, nausea, or lightheadedness. Don't wait more than five minutes to call 911 - MINUTES MATTER! Fast action can save your life. Calling 911 is almost always the fastest way to get lifesaving treatment. Emergency Medical Services staff can begin treatment when they arrive -- up to an hour sooner than if someone gets to the hospital by car. The discharge information has been reviewed with the patient. The patient verbalized understanding.   Discharge medications reviewed with the patient and appropriate educational materials and side effects teaching were provided.   ___________________________________________________________________________________________________________________________________

## 2018-12-19 NOTE — ROUTINE PROCESS
Patient complaining 10/10 sharp and burning back pain. Gave roxicodone 10 mg po. Bck dressing dry and clean, denies numbness or tingling. Patient ambulate to bathroom with a walker. Will continues to monitor. 0010 Patient resting quietly. Pain rating 7/10, valium 10 mg given. Patient ambulated to bathroom voided 400 cc of yellow urine.

## 2018-12-20 ENCOUNTER — HOME CARE VISIT (OUTPATIENT)
Dept: SCHEDULING | Facility: HOME HEALTH | Age: 48
End: 2018-12-20

## 2018-12-20 ENCOUNTER — HOME CARE VISIT (OUTPATIENT)
Dept: HOME HEALTH SERVICES | Facility: HOME HEALTH | Age: 48
End: 2018-12-20

## 2018-12-20 ENCOUNTER — HOME CARE VISIT (OUTPATIENT)
Dept: SCHEDULING | Facility: HOME HEALTH | Age: 48
End: 2018-12-20
Payer: SUBSIDIZED

## 2018-12-20 PROCEDURE — G0151 HHCP-SERV OF PT,EA 15 MIN: HCPCS

## 2018-12-20 PROCEDURE — 400013 HH SOC

## 2018-12-21 VITALS
SYSTOLIC BLOOD PRESSURE: 118 MMHG | DIASTOLIC BLOOD PRESSURE: 78 MMHG | TEMPERATURE: 97.5 F | HEART RATE: 70 BPM | OXYGEN SATURATION: 97 %

## 2018-12-21 DIAGNOSIS — Z98.890 S/P LUMBAR LAMINECTOMY: ICD-10-CM

## 2018-12-21 RX ORDER — OXYCODONE AND ACETAMINOPHEN 10; 325 MG/1; MG/1
1 TABLET ORAL
Qty: 20 TAB | Refills: 0 | OUTPATIENT
Start: 2018-12-21

## 2018-12-21 NOTE — TELEPHONE ENCOUNTER
Please call patient. She had a one level lami. She should be tapering off the pain medication to OTC Tylenol and ice.

## 2018-12-24 ENCOUNTER — HOME CARE VISIT (OUTPATIENT)
Dept: HOME HEALTH SERVICES | Facility: HOME HEALTH | Age: 48
End: 2018-12-24
Payer: SUBSIDIZED

## 2018-12-24 DIAGNOSIS — Z98.890 S/P LAMINECTOMY: ICD-10-CM

## 2018-12-24 DIAGNOSIS — Z98.890 S/P LUMBAR LAMINECTOMY: ICD-10-CM

## 2018-12-24 RX ORDER — KETOROLAC TROMETHAMINE 10 MG/1
10 TABLET, FILM COATED ORAL EVERY 6 HOURS
Qty: 20 TAB | Refills: 0 | OUTPATIENT
Start: 2018-12-24

## 2018-12-24 RX ORDER — CYCLOBENZAPRINE HCL 10 MG
10 TABLET ORAL
Qty: 60 TAB | Refills: 0 | Status: SHIPPED | OUTPATIENT
Start: 2018-12-24 | End: 2019-05-29 | Stop reason: SDUPTHER

## 2018-12-24 NOTE — TELEPHONE ENCOUNTER
Spoke with patient, informed of NP Mikel message below. Patient stated understanding however if you are clearing her to restart the Flexeril she will need some sent to her pharmacy on file. Patient states that her PCP would not refill it during her preop visit stating that it would be refilled on 12/31 when she was cleared postoperatively.  Please advise

## 2018-12-24 NOTE — TELEPHONE ENCOUNTER
Please call patient. We will not be refiiling Valium. If she is having spasms, we can send in Flexeril or Robaxin. And she can use heat. Please see if she has ever taken either and if one worked better then the other.

## 2018-12-24 NOTE — TELEPHONE ENCOUNTER
Spoke with patient, patient states the Toradol tabs really help she would like a refill on that. And the Valium as well, if possible. She states that she has Flexeril but her PCP placed it on hold until she is cleared on 12/31/18. She states that information is in her myChart. Please advise.

## 2018-12-24 NOTE — TELEPHONE ENCOUNTER
She can resume the flexeril as she should no longer be on Valium. For both the Valium and Toradol, they are for acute use only. Valium is a controlled substance and has potential addicting properties. We use this short term only. She can return to Flexeril and heat for spasms. Toradol is also used on a short term basis only. She can resume OTC tylenol and motrin as needed. She can also use ice to the incision for pain.

## 2018-12-24 NOTE — TELEPHONE ENCOUNTER
Patient is requesting a refill of her Valium for her spasms. Just had surgery 12/17/2018. Patient had to cx today due to  issues. Please call Valium to [address] at [phone].  Please call patient back at [phone]

## 2018-12-31 ENCOUNTER — OFFICE VISIT (OUTPATIENT)
Dept: ORTHOPEDIC SURGERY | Age: 48
End: 2018-12-31

## 2018-12-31 VITALS
DIASTOLIC BLOOD PRESSURE: 87 MMHG | TEMPERATURE: 97.8 F | HEART RATE: 93 BPM | OXYGEN SATURATION: 98 % | SYSTOLIC BLOOD PRESSURE: 128 MMHG | RESPIRATION RATE: 18 BRPM

## 2018-12-31 DIAGNOSIS — Z86.19 HISTORY OF SHINGLES: ICD-10-CM

## 2018-12-31 DIAGNOSIS — M51.26 HNP (HERNIATED NUCLEUS PULPOSUS), LUMBAR: ICD-10-CM

## 2018-12-31 DIAGNOSIS — M79.2 NERVE PAIN: Primary | ICD-10-CM

## 2018-12-31 DIAGNOSIS — Z98.890 S/P LAMINECTOMY: ICD-10-CM

## 2018-12-31 RX ORDER — OXYCODONE AND ACETAMINOPHEN 5; 325 MG/1; MG/1
1 TABLET ORAL
Qty: 14 TAB | Refills: 0 | Status: SHIPPED | OUTPATIENT
Start: 2018-12-31 | End: 2019-01-31 | Stop reason: ALTCHOICE

## 2018-12-31 RX ORDER — DOXYCYCLINE 100 MG/1
100 TABLET ORAL 2 TIMES DAILY
Qty: 10 TAB | Refills: 0 | Status: SHIPPED | OUTPATIENT
Start: 2018-12-31 | End: 2019-01-31 | Stop reason: ALTCHOICE

## 2018-12-31 NOTE — PROGRESS NOTES
[facility] [address].  [address], [address]  [place], [facility]  Phone: [phone]  Fax: [phone]    Spine Post-Op Office Visit Note    Patient: Alpa Gilbert   MRN: [id number]     Age:  53 y.o.,      Sex: female    YOB: 1970     PCP: Irais Slade NP    HISTORY OF PRESENT ILLNESS:  Chief Complaint   Patient presents with    Back Pain     PO     Alpa Gilbert is a 53 y.o.  female with history of lumbar pain. Patient had Right L4-5 hemilaminotomy diskectomy surgery 2 weeks ago. Prior to surgery, she was having right paracentral back pain bee stinging in her buttock. She was feeling really good post op and then developed a shingles outbreak. She is on acyclovir. She is having some incisional pain and also the shingles pain. She is having the right leg pain today, this was better 3 days ago. The shingles really has her in a bad way. She had transition to OTC tylenol but now her pain has increased due to the shingles out break. Patient denies any bladder/bowel dysfunction, new onset weakness, or other neurological deficits. Opioid Assessment    Least pain over the last week has been 5/10. Worst pain over the last week has been 8/10. Opioid Risk Tool Reviewed: YES, Score 1  Aberrant behaviors: None. Urine Drug Screen: acute pain. Controlled substance agreement on file: NO: acute pain.  reviewed:yes  Pill count is consistent with her prescription: n/a  Concomitant use of a benzodiazepine: no  MME: 10  Naloxone prescription is warranted. It has been provided or is already on file. Also,  abstinence syndrome was reviewed and discussed with her today N/A    Reports that pain would be a 8/10 w/out medication and that it goes down to a 5/10 after medication. This enables the pt to be functional, achieve ADLs and engage in social activities. Risks and benefits of  opiate therapy have been reviewed with the patient.   No pain behaviors. Denies thoughts of harming self or others. Pt has a good risk to benefit ratio which allows the pt to function in a home environment without side effects      This is a acute post op problem that is significantly improved. Per review of available records and patients , there are not sign of overuse, misuse, diversion, or concerning side effects. Today we reviewed: the risk of overdose, addiction, and dependency proper storage and disposal of medications the goals of treatment (improve functionality, quality of life, and pain)  The following changes were made to the patients current treatment plan: medications adjusted, see orders. ASSESSMENT   Diagnoses and all orders for this visit:    1. Nerve pain    2. HNP (herniated nucleus pulposus), lumbar    3. History of shingles  -     doxycycline (ADOXA) 100 mg tablet; Take 1 Tab by mouth two (2) times a day. -     oxyCODONE-acetaminophen (PERCOCET) 5-325 mg per tablet; Take 1 Tab by mouth two (2) times daily as needed for Pain. Max Daily Amount: 2 Tabs. 4. S/P laminectomy  -     doxycycline (ADOXA) 100 mg tablet; Take 1 Tab by mouth two (2) times a day. -     oxyCODONE-acetaminophen (PERCOCET) 5-325 mg per tablet; Take 1 Tab by mouth two (2) times daily as needed for Pain. Max Daily Amount: 2 Tabs. IMPRESSION AND PLAN   1) Pt was given information on back care. 2) 5 days of Doxy for slight incision drainage. 3) One week supply of a one week supply for severe acute pain, both related to the surgery and the shingles. Transition to OTC tylenol and motrin. She is on shingles medication from her PCP. 3) Wound care and activity level reviewed. 4) Ms. Ben Gifford has a reminder for a \"due or due soon\" health maintenance. I have asked that she contact her primary care provider, Dao Vivar NP, for follow-up on this health maintenance.   5) We have informed the patient to notify us for immediate appointment if he has any worsening neurogical symptoms or if an emergency situation presents, then call 911  6)  demonstrated consistency with prescribing. 7) Pt will follow-up in as scheduled. She will call if her incision continues to drain. SUBJECTIVE    Pain Scale: 7/10    Pain Assessment  12/31/2018   Location of Pain Back   Location Modifiers Inferior   Severity of Pain 7   Quality of Pain Sharp; Other (Comment)   Quality of Pain Comment Stabbing   Duration of Pain Persistent   Frequency of Pain Constant   Aggravating Factors Other (Comment); Standing   Aggravating Factors Comment sitting   Limiting Behavior Some   Relieving Factors Ice   Result of Injury No         Review of systems  Constitutional: Negative for fever, chills, or weight change. Respiratory: Negative for cough or shortness of breath. Cardiovascular: Negative for chest pain or palpitations. Gastrointestinal: Negative for acid reflux, change in bowel habits, or constipation. Genitourinary: Negative for dysuria and flank pain. Musculoskeletal: Positive for lumbar pain. Skin: Negative for rash. Neurological: Negative for headaches, dizziness, or numbness. Endo/Heme/Allergies: Negative for increased bruising. Psychiatric/Behavioral: Negative for difficulty with sleep.            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 10 Tab 0    oxyCODONE-acetaminophen (PERCOCET) 5-325 mg per tablet Take 1 Tab by mouth two (2) times daily as needed for Pain. Max Daily Amount: 2 Tabs. 14 Tab 0    cyclobenzaprine (FLEXERIL) 10 mg tablet Take 1 Tab by mouth three (3) times daily as needed for Muscle Spasm(s). 60 Tab 0    acetaminophen (TYLENOL) 500 mg tablet Take 1,000 mg by mouth as needed for Pain.  acyclovir (ZOVIRAX) 200 mg capsule Take 400 mg by mouth every twelve (12) hours every twelve (12) hours.  omeprazole (PRILOSEC) 20 mg capsule Take 1 Cap by mouth two (2) times a day. 60 Cap 1    gabapentin (NEURONTIN) 800 mg tablet Take 1 Tab by mouth three (3) times daily. 90 Tab 3    sertraline (ZOLOFT) 100 mg tablet Take 2 Tabs by mouth daily. 60 Tab 3    lovastatin (MEVACOR) 20 mg tablet Take 1 Tab by mouth nightly. 30 Tab 3    raNITIdine (ZANTAC) 150 mg tablet Take 150 mg by mouth two (2) times a day.  aspirin delayed-release 81 mg tablet Take  by mouth daily.  EPINEPHrine (EPIPEN) 0.3 mg/0.3 mL injection 0.3 mL by IntraMUSCular route once as needed for up to 1 dose. Indications: Anaphylaxis 1 Syringe 2    ketorolac (TORADOL) 10 mg tablet Take 1 Tab by mouth every six (6) hours. 20 Tab 0    diazePAM (VALIUM) 5 mg tablet Take 1 Tab by mouth every eight (8) hours as needed for Anxiety. Max Daily Amount: 15 mg. 12 Tab 0    naloxone (NARCAN) 4 mg/actuation nasal spray Use 1 spray intranasally into 1 nostril.  2 Each 0       Allergies   Allergen Reactions    Apple Anaphylaxis    Cephalexin Hives    Strawberry Anaphylaxis    Wasps [Hymenoptera Allergenic Extract] Anaphylaxis            OBJECTIVE    Vitals:    12/31/18 1040   BP: 128/87   Pulse: 93   Resp: 18   Temp: 97.8 °F (36.6 °C)   SpO2: 98%   PainSc:   7   PainLoc: Back       Physical Exam:  General: alert, cooperative, no distress, appears stated age  Constitutional: Well developed, well nourished, in no acute distress. Psychiatric: Affect and mood are appropriate. Integumentary: shingles out break about 3 inches below the incision. Wound: Incision healing, has well approximated edges, no erythema, warmth, drainage or signs of infection. She has a slight clear drainage coming from the bottom of her incision. Cardiovascular/Peripheral Vascular: No peripheral edema is noted. Lymphatic:  No evidence of lymphedema. No cervical lymphadenopathy. MOTOR     Hip Flex  Quads Hamstrings Ankle DF EHL Ankle PF   Right +4/5 +4/5 +4/5 +4/5 +4/5 +4/5   Left +4/5 +4/5 +4/5 +4/5 +4/5 +4/5       Straight Leg raise - bilaterally. normal gait and station      Ambulation without assistive device. full weight bearing, non-antalgic gait. Accompanied by self.       Segundo Alonso NP  December 31, 2018  10:34 AM

## 2018-12-31 NOTE — PATIENT INSTRUCTIONS
Lumbar Laminectomy: What to Expect at [address] can expect your back to feel stiff or sore after surgery. This should improve in the weeks after surgery. You may have trouble sitting or standing in one position for very long and may need pain medicine in the weeks after your surgery. Your doctor may advise you to work with a physical therapist to strengthen the muscles around your spine and trunk. You will need to learn how to lift, twist, and bend so that you do not put too much strain on your back. This care sheet gives you a general idea about how long it will take for you to recover. But each person recovers at a different pace. Follow the steps below to get better as quickly as possible. How can you care for yourself at home? Activity    · Rest when you feel tired. Getting enough sleep will help you recover.     · Try to walk each day. Start by walking a little more than you did the day before. Bit by bit, increase the amount you walk. Walking boosts blood flow and helps prevent pneumonia and constipation. Walking may also decrease your muscle soreness after surgery.     · If advised by your doctor, you may need to avoid lifting anything that would cause excessive strain on your back. This may include a child, heavy grocery bags and milk containers, a heavy briefcase or backpack, cat litter or dog food bags, or a vacuum .     · Avoid strenuous activities, such as bicycle riding, jogging, weight lifting, or aerobic exercise, until your doctor says it is okay.     · Do not drive for 2 to 4 weeks after your surgery or until your doctor says it is okay.     · Avoid riding in a car for more than 30 minutes at a time for 2 to 4 weeks after surgery. If you must ride in a car for a longer distance, stop often to walk and stretch your legs.     · Try to change your position about every 30 minutes while sitting or standing.  This will help decrease your back pain while you are healing.     · You will probably need to take 4 to 6 weeks off from work. It depends on the type of work you do and how you feel.     · You may have sex as soon as you feel able, but avoid positions that put stress on your back or cause pain. Diet    · You can eat your normal diet. If your stomach is upset, try bland, low-fat foods like plain rice, broiled chicken, toast, and yogurt.     · Drink plenty of fluids (unless your doctor tells you not to).     · You may notice that your bowel movements are not regular right after your surgery. This is common. Try to avoid constipation and straining with bowel movements. You may want to take a fiber supplement every day. If you have not had a bowel movement after a couple of days, ask your doctor about taking a mild laxative. Medicines    · Your doctor will tell you if and when you can restart your medicines. He or she will also give you instructions about taking any new medicines.     · If you take blood thinners, such as warfarin (Coumadin), clopidogrel (Plavix), or aspirin, be sure to talk to your doctor. He or she will tell you if and when to start taking those medicines again. Make sure that you understand exactly what your doctor wants you to do.     · Take pain medicines exactly as directed. ? If the doctor gave you a prescription medicine for pain, take it as prescribed. ? If you are not taking a prescription pain medicine, ask your doctor if you can take an over-the-counter medicine.     · If your doctor prescribed antibiotics, take them as directed. Do not stop taking them just because you feel better. You need to take the full course of antibiotics.     · If you think your pain medicine is making you sick to your stomach:  ? Take your medicine after meals (unless your doctor has told you not to). ? Ask your doctor for a different pain medicine.    Incision care    · If you have strips of tape on the cut (incision) the doctor made, leave the tape on for a week or until it falls off.     · Wash the area daily with warm, soapy water and pat it dry.     · Keep the area clean and dry. You may cover it with a gauze bandage if it weeps or rubs against clothing. Change the bandage every day. Exercise    · Do back exercises as instructed by your doctor.     · Your doctor may advise you to work with a physical therapist to improve the strength and flexibility of your back. Other instructions    · To reduce stiffness and help sore muscles, use a warm water bottle, a heating pad set on low, or a warm cloth on your back. Do not put heat right over the incision. Do not go to sleep with a heating pad on your skin. Follow-up care is a key part of your treatment and safety. Be sure to make and go to all appointments, and call your doctor if you are having problems. It's also a good idea to know your test results and keep a list of the medicines you take. When should you call for help? Call 911 anytime you think you may need emergency care. For example, call if:    · You passed out (lost consciousness).     · You have sudden chest pain and shortness of breath, or you cough up blood.     · You are unable to move a leg at all.   [facility] your doctor now or seek immediate medical care if:    · You have new or worse symptoms in your legs or buttocks. Symptoms may include:  ? Numbness or tingling. ? Weakness. ? Pain.     · You lose bladder or bowel control.     · You have loose stitches, or your incision comes open.     · You have blood or fluid draining from the incision.     · You have signs of infection, such as:  ? Increased pain, swelling, warmth, or redness. ? Pus draining from the incision. ? A fever. ? Red streaks leading from the incision.    Watch closely for changes in your health, and be sure to contact your doctor if:    · You do not have a bowel movement after taking a laxative.     · You are not getting better as expected. Where can you learn more?   Go to http://steven-alexandru.info/. Enter Z977 in the search box to learn more about \"Lumbar Laminectomy: What to Expect at Home. \"  Current as of: November 29, 2017  Content Version: 11.8  © 9184-1698 Healthwise, Incorporated. Care instructions adapted under license by [employer] (which disclaims liability or warranty for this information). If you have questions about a medical condition or this instruction, always ask your healthcare professional. [address] any warranty or liability for your use of this information.

## 2019-01-04 ENCOUNTER — TELEPHONE (OUTPATIENT)
Dept: FAMILY MEDICINE CLINIC | Age: 49
End: 2019-01-04

## 2019-01-04 ENCOUNTER — OFFICE VISIT (OUTPATIENT)
Dept: FAMILY MEDICINE CLINIC | Age: 49
End: 2019-01-04

## 2019-01-04 VITALS
SYSTOLIC BLOOD PRESSURE: 128 MMHG | RESPIRATION RATE: 17 BRPM | TEMPERATURE: 98.1 F | WEIGHT: 184 LBS | HEART RATE: 78 BPM | BODY MASS INDEX: 33.86 KG/M2 | DIASTOLIC BLOOD PRESSURE: 88 MMHG | OXYGEN SATURATION: 100 % | HEIGHT: 62 IN

## 2019-01-04 DIAGNOSIS — N63.10 LUMP OF RIGHT BREAST: Primary | ICD-10-CM

## 2019-01-04 DIAGNOSIS — Z91.038 HISTORY OF ANAPHYLACTIC SHOCK DUE TO INSECT STING: ICD-10-CM

## 2019-01-04 DIAGNOSIS — E66.9 OBESITY, CLASS II, BMI 35-39.9: ICD-10-CM

## 2019-01-04 DIAGNOSIS — Z86.19 HISTORY OF SHINGLES: ICD-10-CM

## 2019-01-04 DIAGNOSIS — R14.0 ABDOMINAL BLOATING: ICD-10-CM

## 2019-01-04 RX ORDER — GABAPENTIN 800 MG/1
800 TABLET ORAL 3 TIMES DAILY
Qty: 120 TAB | Refills: 3 | Status: SHIPPED | OUTPATIENT
Start: 2019-01-04 | End: 2019-05-21 | Stop reason: SDUPTHER

## 2019-01-04 RX ORDER — EPINEPHRINE 0.3 MG/.3ML
0.3 INJECTION SUBCUTANEOUS
Qty: 1 SYRINGE | Refills: 2 | Status: SHIPPED | OUTPATIENT
Start: 2019-01-04 | End: 2019-01-07 | Stop reason: SDUPTHER

## 2019-01-04 NOTE — TELEPHONE ENCOUNTER
Cristela Castellanos from [address] [facility] is calling to get a verbal confirmation to fill the epipen  as a 2 pack b/c that is how they are packaged. Please advise.     [phone]

## 2019-01-04 NOTE — PROGRESS NOTES
Patient is in the office today for a lump on her chest. She is also requesting an increase in her gabapentin. Patient would also like a new epi pen     1. Have you been to the ER, urgent care clinic since your last visit? Hospitalized since your last visit? Yes back surgery increased. 2. Have you seen or consulted any other health care providers outside of the [address] [place] [address] since your last visit? Include any pap smears or colon screening.  No

## 2019-01-04 NOTE — PATIENT INSTRUCTIONS
Breast Lumps: Care Instructions  Your Care Instructions  Breast lumps are common, especially in women between ages 27 and 48. Many women's breasts feel lumpy and tender before their menstrual period. Women also may have lumps when they are breastfeeding. Breast lumps may go away after menopause. All new breast lumps in women after menopause should be checked by a doctor. Although lumps may be normal for you, it is important to have your doctor check any lump or thickness that is not like the rest of your breast to make sure it is not cancer. A lump may be larger, harder, or different from the rest of your breast tissue. Follow-up care is a key part of your treatment and safety. Be sure to make and go to all appointments, and call your doctor if you are having problems. It's also a good idea to know your test results and keep a list of the medicines you take. How can you care for yourself at home? · Make an appointment to have a mammogram and other follow-up visits as recommended by your doctor. When should you call for help? Watch closely for changes in your health, and be sure to contact your doctor if:    · You do not get better as expected.     · Your breast has changed.     · You have pain in your breast.     · You have a discharge from your nipple.     · A breast lump changes or does not go away. Where can you learn more? Go to http://steven-alexandru.info/. Enter U763 in the search box to learn more about \"Breast Lumps: Care Instructions. \"  Current as of: May 15, 2018  Content Version: 11.8  © 6297-8094 Healthwise, Incorporated. Care instructions adapted under license by [employer] (which disclaims liability or warranty for this information). If you have questions about a medical condition or this instruction, always ask your healthcare professional. [address] any warranty or liability for your use of this information.

## 2019-01-04 NOTE — PROGRESS NOTES
HISTORY OF PRESENT ILLNESS  Renata Caicedo is a 53 y.o. female. Patient presents today for further evaluation of lump on her chest.  States she noticed a pea size lump which has progressively increasing in size. States area is painful only to touch. Requesting to have gabapentin increased. States she has been taking the 4th gabapentin at night which has been helping with the pain. Patient recently had surgery on lumbar spine with Dr. Damon Simmonds. Patient reports she had a shingles outbreak the day before her post operative visit which was on 12/31/18. States she increased her acyclovir 400mg to 2 tabs 4 times per day for the last 5 days. States flare has now resolved. No other concerns expressed at this time. Allergies   Allergen Reactions    Apple Anaphylaxis    Cephalexin Hives    Strawberry Anaphylaxis    Wasps [Hymenoptera Allergenic Extract] Anaphylaxis     Current Outpatient Medications   Medication Sig Dispense Refill    doxycycline (ADOXA) 100 mg tablet Take 1 Tab by mouth two (2) times a day. 10 Tab 0    oxyCODONE-acetaminophen (PERCOCET) 5-325 mg per tablet Take 1 Tab by mouth two (2) times daily as needed for Pain. Max Daily Amount: 2 Tabs. 14 Tab 0    cyclobenzaprine (FLEXERIL) 10 mg tablet Take 1 Tab by mouth three (3) times daily as needed for Muscle Spasm(s). 60 Tab 0    acetaminophen (TYLENOL) 500 mg tablet Take 1,000 mg by mouth as needed for Pain.  acyclovir (ZOVIRAX) 200 mg capsule Take 400 mg by mouth every twelve (12) hours every twelve (12) hours.  omeprazole (PRILOSEC) 20 mg capsule Take 1 Cap by mouth two (2) times a day. 60 Cap 1    gabapentin (NEURONTIN) 800 mg tablet Take 1 Tab by mouth three (3) times daily. 90 Tab 3    sertraline (ZOLOFT) 100 mg tablet Take 2 Tabs by mouth daily. 60 Tab 3    lovastatin (MEVACOR) 20 mg tablet Take 1 Tab by mouth nightly. 30 Tab 3    raNITIdine (ZANTAC) 150 mg tablet Take 150 mg by mouth two (2) times a day.       aspirin delayed-release 81 mg tablet Take  by mouth daily.  ketorolac (TORADOL) 10 mg tablet Take 1 Tab by mouth every six (6) hours. 20 Tab 0    diazePAM (VALIUM) 5 mg tablet Take 1 Tab by mouth every eight (8) hours as needed for Anxiety. Max Daily Amount: 15 mg. 12 Tab 0    naloxone (NARCAN) 4 mg/actuation nasal spray Use 1 spray intranasally into 1 nostril. 2 Each 0    EPINEPHrine (EPIPEN) 0.3 mg/0.3 mL injection 0.3 mL by IntraMUSCular route once as needed for up to 1 dose. 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    Pain, upper back     Psychiatric disorder     depression     Tattoo     Unspecified deficiency anemia 1999     Social History     Socioeconomic History    Marital status:      Spouse name: Not on file    Number of children: Not on file    Years of education: Not on file    Highest education level: Not on file   Social Needs    Financial resource strain: Not on file    Food insecurity - worry: Not on file    Food insecurity - inability: Not on file   [employer] needs - medical: Not on file   [employer] needs - non-medical: Not on file   Occupational History    Not on file   Tobacco Use    Smoking status: Passive Smoke Exposure - Never Smoker    Smokeless tobacco: Never Used    Tobacco comment: last attempt to quit 1/1/2003   Substance and Sexual Activity    Alcohol use: Yes     Alcohol/week: 0.5 oz     Types: 1 Cans of beer per week     Comment: social    Drug use: No    Sexual activity: Yes     Partners: Male   Other Topics Concern    Not on file   Social History Narrative    Not on file     Review of Systems   Constitutional: Negative for chills and fever. Respiratory: Negative for shortness of breath. Cardiovascular: Negative for chest pain. Genitourinary:        Chest nodule     /88 (BP 1 Location: Left arm, BP Patient Position: Sitting)   Pulse 78   Temp 98.1 °F (36.7 °C) (Oral)   Resp 17   Ht 5' 2\" (1.575 m)   Wt 184 lb (83.5 kg)   SpO2 100%   BMI 33.65 kg/m²   Physical Exam   Constitutional: She appears well-developed and well-nourished. No distress. HENT:   Head: Normocephalic and atraumatic. Neck: Normal range of motion. Neck supple. Cardiovascular: Normal rate, regular rhythm and normal heart sounds. Exam reveals no gallop and no friction rub. No murmur heard. Pulmonary/Chest: Effort normal and breath sounds normal. She has no wheezes. She has no rhonchi. She has no rales. Right breast exhibits no mass, no skin change and no tenderness. Left breast exhibits no mass, no skin change and no tenderness. Breasts are symmetrical.       Lymphadenopathy:     She has no cervical adenopathy. ASSESSMENT and PLAN    ICD-10-CM ICD-9-CM    1. Lump of right breast N63.10 611.72 US BREAST RT LIMITED=<3 QUAD   2. History of anaphylactic shock due to insect sting Z91.038 V15.06 EPINEPHrine (EPIPEN) 0.3 mg/0.3 mL injection-refill only    3. Obesity, Class II, BMI 35-39.9 E66.9 278.00    4. History of shingles Z86.19 V12.09      I have discussed the diagnosis with the patient and the intended plan as seen in the above orders. The patient has received an after-visit summary and questions were answered concerning future plans. I have discussed medication side effects and warnings with the patient as well. Patient agreeable with above plan and verbalizes understanding. Follow-up Disposition:  Return in about 2 weeks (around 1/18/2019) for depression .

## 2019-01-07 ENCOUNTER — TELEPHONE (OUTPATIENT)
Dept: FAMILY MEDICINE CLINIC | Age: 49
End: 2019-01-07

## 2019-01-07 DIAGNOSIS — N63.10 LUMP OF RIGHT BREAST: Primary | ICD-10-CM

## 2019-01-07 RX ORDER — EPINEPHRINE 0.3 MG/.3ML
0.3 INJECTION SUBCUTANEOUS
Qty: 2 SYRINGE | Refills: 2 | Status: SHIPPED | OUTPATIENT
Start: 2019-01-07 | End: 2021-12-08 | Stop reason: SDUPTHER

## 2019-01-07 NOTE — TELEPHONE ENCOUNTER
Diagnostic mammogram of right breast   Dx lump of right breast  Per verbal order Michael Aguilar NP-C 1/7/2019 1400

## 2019-01-07 NOTE — TELEPHONE ENCOUNTER
Clarice from [facility] wants a diagnostic mammogram order put in for the pt, pt is scheduled to come in for an apt in an hour. Please advise.     [phone]

## 2019-01-10 ENCOUNTER — HOME CARE VISIT (OUTPATIENT)
Dept: HOME HEALTH SERVICES | Facility: HOME HEALTH | Age: 49
End: 2019-01-10
Payer: SUBSIDIZED

## 2019-01-31 ENCOUNTER — OFFICE VISIT (OUTPATIENT)
Dept: FAMILY MEDICINE CLINIC | Age: 49
End: 2019-01-31

## 2019-01-31 VITALS
RESPIRATION RATE: 16 BRPM | DIASTOLIC BLOOD PRESSURE: 78 MMHG | SYSTOLIC BLOOD PRESSURE: 112 MMHG | HEART RATE: 77 BPM | WEIGHT: 185 LBS | HEIGHT: 62 IN | TEMPERATURE: 98.4 F | OXYGEN SATURATION: 99 % | BODY MASS INDEX: 34.04 KG/M2

## 2019-01-31 DIAGNOSIS — L50.9 URTICARIA OF UNKNOWN ORIGIN: Primary | ICD-10-CM

## 2019-01-31 DIAGNOSIS — R52 GENERALIZED PAIN: ICD-10-CM

## 2019-01-31 DIAGNOSIS — F32.A ANXIETY AND DEPRESSION: ICD-10-CM

## 2019-01-31 DIAGNOSIS — F41.9 ANXIETY AND DEPRESSION: ICD-10-CM

## 2019-01-31 RX ORDER — HYDROXYZINE PAMOATE 25 MG/1
25 CAPSULE ORAL
Qty: 45 CAP | Refills: 0 | Status: SHIPPED | OUTPATIENT
Start: 2019-01-31 | End: 2019-02-14

## 2019-01-31 RX ORDER — BUPROPION HYDROCHLORIDE 150 MG/1
150 TABLET ORAL
Qty: 30 TAB | Refills: 1 | Status: SHIPPED | OUTPATIENT
Start: 2019-01-31 | End: 2019-02-07 | Stop reason: SDUPTHER

## 2019-01-31 RX ORDER — PREDNISONE 10 MG/1
TABLET ORAL
Qty: 30 TAB | Refills: 0 | Status: SHIPPED | OUTPATIENT
Start: 2019-01-31 | End: 2019-03-08 | Stop reason: ALTCHOICE

## 2019-01-31 RX ORDER — ACETAMINOPHEN AND CODEINE PHOSPHATE 300; 30 MG/1; MG/1
1 TABLET ORAL
Qty: 20 TAB | Refills: 0 | Status: SHIPPED | OUTPATIENT
Start: 2019-01-31 | End: 2019-02-07 | Stop reason: SDUPTHER

## 2019-01-31 NOTE — PROGRESS NOTES
Pt is here for f/u for depression  Pt c/o rash on chest & inside her  nose that started Monday. 1. Have you been to the ER, urgent care clinic since your last visit? Hospitalized since your last visit? No    2. Have you seen or consulted any other health care providers outside of the [address] [place] [address] since your last visit? Include any pap smears or colon screening.  No

## 2019-01-31 NOTE — PATIENT INSTRUCTIONS
Hives: Care Instructions  Your Care Instructions  Hives are raised, red, itchy patches of skin. They are also called wheals or welts. They usually have red borders and pale centers. Hives range in size from ¼ inch to 3 inches or more across. They may seem to move from place to place on the skin. Several hives may form a large area of raised, red skin. You can get hives after an insect sting, after taking medicine or eating certain foods, or because of infection or stress. Other causes include plants, things you breathe in, makeup, heat, cold, sunlight, and latex. You cannot spread hives to other people. Hives may last a few minutes or a few days, but a single spot may last less than 36 hours. Follow-up care is a key part of your treatment and safety. Be sure to make and go to all appointments, and call your doctor if you are having problems. It's also a good idea to know your test results and keep a list of the medicines you take. How can you care for yourself at home? · Avoid whatever you think may have caused your hives, such as a certain food or medicine. However, you may not know the cause. · Put a cool, wet towel on the area to relieve itching. · Take an over-the-counter antihistamine, such as diphenhydramine (Benadryl), cetirizine (Zyrtec), or loratadine (Claritin), to help stop the hives and calm the itching. Read and follow directions on the label. These medicines can make you feel sleepy. Do not drive while using them. · Stay away from strong soaps, detergents, and chemicals. These can make itching worse. When should you call for help? Call 911 anytime you think you may need emergency care. For example, call if:    · You have symptoms of a severe allergic reaction. These may include:  ? Sudden raised, red areas (hives) all over your body. ? Swelling of the throat, mouth, lips, or tongue. ? Trouble breathing. ? Passing out (losing consciousness).  Or you may feel very lightheaded or suddenly feel weak, confused, or restless.    Call your doctor now or seek immediate medical care if:    · You have symptoms of an allergic reaction, such as:  ? A rash or hives (raised, red areas on the skin). ? Itching. ? Swelling. ? Belly pain, nausea, or vomiting.     · You get hives after you start a new medicine.     · Hives have not gone away after 24 hours.    Watch closely for changes in your health, and be sure to contact your doctor if:    · You do not get better as expected. Where can you learn more? Go to http://steven-alexandru.info/. Enter Z891 in the search box to learn more about \"Hives: Care Instructions. \"  Current as of: September 23, 2018  Content Version: 11.9  © 4101-9758 [employer]. Care instructions adapted under license by [employer] (which disclaims liability or warranty for this information). If you have questions about a medical condition or this instruction, always ask your healthcare professional. [address] any warranty or liability for your use of this information.

## 2019-01-31 NOTE — PROGRESS NOTES
HISTORY OF PRESENT ILLNESS  Renata Caicedo is a 53 y.o. female. Patient states she woke up Monday morning with bilateral eye swelling and portion of her cheeks and rash to chest wall. Reports rash has been progressively getting worse. Comments she has had this in the past with associated swelling and intermittent hives. States she has began taking her acylovir 800mg  5 times per day unsure if rash was a possible shingles outbreak. Reports she feels like she has a massive sunburn. Reports area is burning, reports when anything touches her skin it feels like needles. Depression/anxiety: reports symptoms have been getting worse. States she feels like zoloft has been as effective as it has been previously. Comments she would like to possibly have medication adjusted. Also requesting information for psychiatry. Allergies   Allergen Reactions    Apple Anaphylaxis    Cephalexin Hives    Strawberry Anaphylaxis    Wasps [Hymenoptera Allergenic Extract] Anaphylaxis     Current Outpatient Medications   Medication Sig Dispense Refill    gabapentin (NEURONTIN) 800 mg tablet Take 1 Tab by mouth three (3) times daily. 120 Tab 3    cyclobenzaprine (FLEXERIL) 10 mg tablet Take 1 Tab by mouth three (3) times daily as needed for Muscle Spasm(s). 60 Tab 0    acetaminophen (TYLENOL) 500 mg tablet Take 1,000 mg by mouth as needed for Pain.  acyclovir (ZOVIRAX) 200 mg capsule Take 400 mg by mouth every twelve (12) hours every twelve (12) hours.  sertraline (ZOLOFT) 100 mg tablet Take 2 Tabs by mouth daily. 60 Tab 3    lovastatin (MEVACOR) 20 mg tablet Take 1 Tab by mouth nightly. 30 Tab 3    raNITIdine (ZANTAC) 150 mg tablet Take 150 mg by mouth two (2) times a day.  aspirin delayed-release 81 mg tablet Take  by mouth daily.  oxyCODONE-acetaminophen (PERCOCET) 5-325 mg per tablet Take 1 Tab by mouth two (2) times daily as needed for Pain. Max Daily Amount: 2 Tabs.  14 Tab 0    naloxone [facility]) 4 mg/actuation nasal spray Use 1 spray intranasally into 1 nostril. 2 Each 0    omeprazole (PRILOSEC) 20 mg capsule Take 1 Cap by mouth two (2) times a day. 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    Pain, upper back     Psychiatric disorder     depression     Tattoo     Unspecified deficiency anemia 1999     Social History     Socioeconomic History    Marital status:      Spouse name: Not on file    Number of children: Not on file    Years of education: Not on file    Highest education level: Not on file   Social Needs    Financial resource strain: Not on file    Food insecurity - worry: Not on file    Food insecurity - inability: Not on file   [employer] needs - medical: Not on file   [employer] needs - non-medical: Not on file   Occupational History    Not on file   Tobacco Use    Smoking status: Passive Smoke Exposure - Never Smoker    Smokeless tobacco: Never Used    Tobacco comment: last attempt to quit 1/1/2003   Substance and Sexual Activity    Alcohol use: Yes     Alcohol/week: 0.5 oz     Types: 1 Cans of beer per week     Comment: social    Drug use: No    Sexual activity: Yes     Partners: Male   Other Topics Concern    Not on file   Social History Narrative    Not on file     Review of Systems   Constitutional: Negative for chills, fever and malaise/fatigue. Respiratory: Negative for shortness of breath. Cardiovascular: Negative for chest pain and palpitations. Gastrointestinal: Negative for nausea and vomiting. Skin: Positive for rash. Neurological: Negative for dizziness and headaches.      /78 (BP 1 Location: Left arm)   Pulse 77   Temp 98.4 °F (36.9 °C) (Oral)   Resp 16   Ht 5' 2\" (1.575 m)   Wt 185 lb (83.9 kg) SpO2 99%   BMI 33.84 kg/m²   Physical Exam   Cardiovascular: Normal rate, regular rhythm and normal heart sounds. Exam reveals no gallop and no friction rub. No murmur heard. Skin:        Large erythematous plaque to chest wall, scattered plaques to neck, no drainage       ASSESSMENT and PLAN    ICD-10-CM ICD-9-CM    1. Urticaria of unknown origin L50.9 708.9    2. Anxiety and depression F41.9 300.00     F32.9 311    3. Generalized pain R52 780.96 acetaminophen-codeine (TYLENOL #3) 300-30 mg per tablet     Orders Placed This Encounter    buPROPion XL (WELLBUTRIN XL) 150 mg tablet    predniSONE (DELTASONE) 10 mg tablet    hydrOXYzine pamoate (VISTARIL) 25 mg capsule    acetaminophen-codeine (TYLENOL #3) 300-30 mg per tablet     7-day supply of tylenol #3 codeine given for acute pain. No further refills will be authorized  I have reviewed the patients controlled substance prescription history, as maintained in the [facility]. There is no suspicious activity noted. Usage is consistent with current use of prescribed medications. I have discussed the diagnosis with the patient and the intended plan as seen in the above orders. The patient has received an after-visit summary and questions were answered concerning future plans. I have discussed medication side effects and warnings with the patient as well. Patient agreeable with above plan and verbalizes understanding. Follow-up Disposition:  Return in about 1 week (around 2/7/2019) for rash .

## 2019-02-07 ENCOUNTER — OFFICE VISIT (OUTPATIENT)
Dept: FAMILY MEDICINE CLINIC | Age: 49
End: 2019-02-07

## 2019-02-07 VITALS
BODY MASS INDEX: 34.23 KG/M2 | TEMPERATURE: 98.1 F | WEIGHT: 186 LBS | HEART RATE: 90 BPM | DIASTOLIC BLOOD PRESSURE: 82 MMHG | RESPIRATION RATE: 18 BRPM | OXYGEN SATURATION: 98 % | SYSTOLIC BLOOD PRESSURE: 140 MMHG | HEIGHT: 62 IN

## 2019-02-07 DIAGNOSIS — Z86.19 HISTORY OF SHINGLES: Primary | ICD-10-CM

## 2019-02-07 DIAGNOSIS — R52 GENERALIZED PAIN: ICD-10-CM

## 2019-02-07 RX ORDER — TERCONAZOLE 8 MG/G
1 CREAM VAGINAL
Qty: 20 G | Refills: 0 | Status: SHIPPED | OUTPATIENT
Start: 2019-02-07 | End: 2019-02-10

## 2019-02-07 RX ORDER — ACETAMINOPHEN AND CODEINE PHOSPHATE 300; 30 MG/1; MG/1
1 TABLET ORAL
Qty: 20 TAB | Refills: 0 | Status: SHIPPED | OUTPATIENT
Start: 2019-02-07 | End: 2019-02-14

## 2019-02-07 RX ORDER — BUPROPION HYDROCHLORIDE 150 MG/1
150 TABLET ORAL
Qty: 30 TAB | Refills: 1 | Status: SHIPPED | OUTPATIENT
Start: 2019-02-07 | End: 2019-07-01 | Stop reason: SDUPTHER

## 2019-02-07 NOTE — PROGRESS NOTES
HISTORY OF PRESENT ILLNESS  Viola Samuels is a 53 y.o. female. Patient reports rash has now spread to vaginal area. States she has had to keep ice on the area to help with pain. Reports she is having bleeding to labia majora. Reports she feels like she is on fire. Reports infectious disease/intermal medicine provider no longer is seeing infectious disease patient's. States she has not been able to find a GI in the area that will accept her insurance. Further reports she had an herpes labialis outbreak on her lower lip and has increased acyclovir and this has resolved. Patient reports last night she had diarrhea with yellow bile like substance. Comments she does see bright blood on the tissue with wiping. Does not see blood in commode with wiping. Allergies   Allergen Reactions    Apple Anaphylaxis    Cephalexin Hives    Strawberry Anaphylaxis    Wasps [Hymenoptera Allergenic Extract] Anaphylaxis     Current Outpatient Medications   Medication Sig Dispense Refill    buPROPion XL (WELLBUTRIN XL) 150 mg tablet Take 1 Tab by mouth every morning. 30 Tab 1    predniSONE (DELTASONE) 10 mg tablet 5 tabs x2 days; 4 tabs x2 days; 3 tabs x2 days; 2 tabs x2 days; 1 tab x2 days 30 Tab 0    hydrOXYzine pamoate (VISTARIL) 25 mg capsule Take 1 Cap by mouth four (4) times daily as needed for Itching for up to 14 days. 45 Cap 0    acetaminophen-codeine (TYLENOL #3) 300-30 mg per tablet Take 1 Tab by mouth every six (6) hours as needed for Pain for up to 7 days. Max Daily Amount: 4 Tabs. 20 Tab 0    gabapentin (NEURONTIN) 800 mg tablet Take 1 Tab by mouth three (3) times daily. 120 Tab 3    cyclobenzaprine (FLEXERIL) 10 mg tablet Take 1 Tab by mouth three (3) times daily as needed for Muscle Spasm(s). 60 Tab 0    acetaminophen (TYLENOL) 500 mg tablet Take 1,000 mg by mouth as needed for Pain.       acyclovir (ZOVIRAX) 200 mg capsule Take 400 mg by mouth every twelve (12) hours every twelve (12) hours.      sertraline (ZOLOFT) 100 mg tablet Take 2 Tabs by mouth daily. 60 Tab 3    lovastatin (MEVACOR) 20 mg tablet Take 1 Tab by mouth nightly. 30 Tab 3    raNITIdine (ZANTAC) 150 mg tablet Take 150 mg by mouth two (2) times a day.  aspirin delayed-release 81 mg tablet Take  by mouth daily.  naloxone (NARCAN) 4 mg/actuation nasal spray Use 1 spray intranasally into 1 nostril. 2 Each 0    omeprazole (PRILOSEC) 20 mg capsule Take 1 Cap by mouth two (2) times a day. 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    Pain, upper back     Psychiatric disorder     depression     Tattoo     Unspecified deficiency anemia 1999     Social History     Socioeconomic History    Marital status:      Spouse name: Not on file    Number of children: Not on file    Years of education: Not on file    Highest education level: Not on file   Social Needs    Financial resource strain: Not on file    Food insecurity - worry: Not on file    Food insecurity - inability: Not on file   [employer] needs - medical: Not on file   [employer] needs - non-medical: Not on file   Occupational History    Not on file   Tobacco Use    Smoking status: Passive Smoke Exposure - Never Smoker    Smokeless tobacco: Never Used    Tobacco comment: last attempt to quit 1/1/2003   Substance and Sexual Activity    Alcohol use: Yes     Alcohol/week: 0.5 oz     Types: 1 Cans of beer per week     Comment: social    Drug use: No    Sexual activity: Yes     Partners: Male   Other Topics Concern    Not on file   Social History Narrative    Not on file     Review of Systems   Constitutional: Negative for chills and fever. Gastrointestinal: Positive for diarrhea.    Genitourinary:        Labia swelling and pain    Skin: Positive for rash. /82 (BP 1 Location: Left arm)   Pulse 90   Temp 98.1 °F (36.7 °C) (Oral)   Resp 18   Ht 5' 2\" (1.575 m)   Wt 186 lb (84.4 kg)   SpO2 98%   BMI 34.02 kg/m²   Physical Exam   Constitutional: She appears well-developed and well-nourished. Cardiovascular: Normal rate, regular rhythm and normal heart sounds. Exam reveals no gallop and no friction rub. No murmur heard. Pulmonary/Chest: Effort normal and breath sounds normal. She has no wheezes. She has no rhonchi. She has no rales. Genitourinary: There is tenderness (erythematous) on the right labia. There is no lesion on the right labia. There is tenderness (erythematous ) on the left labia. There is no lesion on the left labia. ASSESSMENT and PLAN    ICD-10-CM ICD-9-CM    1. History of shingles Z86.19 V12.09 REFERRAL TO INFECTIOUS DISEASE   2. Generalized pain R52 780.96 acetaminophen-codeine (TYLENOL #3) 300-30 mg per tablet     Orders Placed This Encounter    REFERRAL TO INFECTIOUS DISEASE    buPROPion XL (WELLBUTRIN XL) 150 mg tablet    acetaminophen-codeine (TYLENOL #3) 300-30 mg per tablet    terconazole (TERAZOL 3) 0.8 % vaginal cream     I have reviewed the patients controlled substance prescription history, as maintained in the [facility]. There is no suspicious activity noted. Usage is consistent with current use of prescribed medications. No further refills of tylenol #3 will be authorized  I have discussed the diagnosis with the patient and the intended plan as seen in the above orders. The patient has received an after-visit summary and questions were answered concerning future plans. I have discussed medication side effects and warnings with the patient as well. Patient agreeable with above plan and verbalizes understanding. Follow-up Disposition:  Return in about 10 days (around 2/17/2019) for rash .

## 2019-02-07 NOTE — PROGRESS NOTES
Pt is here for Pt c/o that rash has spread to her genital area & is causing pain & severe burning. 1. Have you been to the ER, urgent care clinic since your last visit? Hospitalized since your last visit? No    2. Have you seen or consulted any other health care providers outside of the [address] [place] [address] since your last visit? Include any pap smears or colon screening.  No

## 2019-02-07 NOTE — PATIENT INSTRUCTIONS
Learning About Managing Acute Pain at Home  What is a pain management plan? A pain management plan spells out ways you can deal with your pain at home. You and your care team will create this plan before you leave the hospital. The plan may include:  · The goals of your treatment. This may include how you can expect your pain and function to improve. · The treatments your doctor suggests for your pain. These may include medicines, physical therapy, or relaxation exercises. · Notes about how you and your care team will work together as you recover. Your team may include your doctor, a physical therapist, and an occupational therapist.  · A review of your treatment goals for pain and function. Your feelings about how you want to manage your pain are important. Be open and honest when you talk with your doctor. This will help ensure that you get a plan that is safe and that works best for you. Why is it important to follow your plan? After you have an injury or surgery, a certain amount of pain is common and normal. But you can manage your pain after you leave the hospital.  The best way to do that is to follow your pain management plan. This will help keep you comfortable and able to do the things you want to do. It can also speed your recovery and help reduce the risk of problems. What are the side effects of pain medicines? All pain medicines--like acetaminophen, nonsteroidal anti-inflammatory drugs, and opioids--have side effects. They can include allergic reaction, rash, and upset stomach. Common side effects of opioids also include constipation and nausea. More serious effects include needing larger doses over time, getting sick if you suddenly stop taking the drug, addiction, and death. How do you manage pain after you leave the hospital?  After you leave the hospital, the best way to benefit from your treatment is to take good care of yourself. Here are some ways to do that.   · Try nonmedical ways to relieve pain. These ways include breathing exercises, progressive muscle relaxation, yoga, meditation, and massage. · Take your medicines or other treatments exactly as prescribed. Let your doctor know if your pain isn't getting better. · Pace yourself. It might be hard to take it easy when you get home. But even simple activities can increase pain at first. Follow your doctor's instructions about when you can be active again and any activities you should avoid. When you do start getting back to your regular activities, start slowly. · Arrange your home to help you recover. Here are some ideas:  ? Remove throw rugs to prevent falling. ? Sleep close to the bathroom, or have a commode near your bed.  ? Have pillows near you so you can sit or lie in a comfortable position. · Use tools that may help. Some devices may help you do your daily activities and be more mobile. These devices include walking canes, crutches, grab bars, and reachers. · Try heat or cold. Heat can soothe muscle pain and other aches. Cold can help with swelling. · Get support. Friends and relatives often want to help but don't know what to do. Let them know what you need. It will make them happy and will help you. How do you take opioids safely? Opioids can help you manage pain. But they can easily be misused. Misuse can lead to more problems like addiction and even death. Because of this, it is best to get off them as soon as possible. As soon as you don't need them, talk to your doctor about how to safely stop taking them. If you need to take opioids to manage pain, this advice can help you stay safe. · Take opioids exactly as directed. Follow the directions carefully. It's easy to misuse opioids if you take a dose other than what's prescribed by your doctor. Even sharing them with someone they were not meant for is misuse. · Do not drive or operate machinery.    Opioids may affect your judgment and decision making. Talk with your doctor about when it is safe to drive. · Avoid alcohol, sleeping medicines, and muscle relaxers. Opioids can be dangerous if you take them with alcohol or with certain drugs. This includes over-the-counter medicines. Make sure your doctor knows about all the other medicines you take. Don't start any new medicines before you talk to your doctor or pharmacist.  · Ask your doctor about a naloxone rescue kit. It can help you--and even save your life--if you take too much of an opioid. How do you safely store opioid pills and patches? It's important to store opioids safely so that they aren't used by the wrong person. Your pain medicine is only for you to take. If someone else takes your medicine, it can harm that person. You can safely store your medicine. Follow these tips. · Store pills and patches up high and out of sight. ? Keep them away from children and pets. ? Return the container to the same place each time you take your medicine. · Try locking your opioid medicine in a cabinet. · Make sure the bottles are closed tightly. If they have a safety cap, make sure that it's locked. Tighten the cap until you hear a click or can't twist it anymore. · Keep track of how many pills or patches you have left. You may want to keep track in a notebook. · Let the people who live with you know about your medicine. ? Tell them that it is only for you to take. ? If guests have opioid medicine with them, ask them to keep it safe. How do you get rid of opioid pills and patches safely? If you have opioid pills or patches that you aren't going to use, get rid of them right away. It's also important to get rid of used opioid patches. When you get rid of these medicines safely, you take away any chance that a person or an animal might get sick from one of them. Follow one of these steps. If you can't do the first step, then take the next step.   · Bring them to a Drug Enforcement Administration ([employer])-authorized medicine take-back program or drop-off box. ? Your local trash and recycle center, pharmacy, or hospital may offer one of these. · Throw the medicines in the trash. Take this step if you can't get to a take-back program or drop-off box and the medicine's instructions do not have specific disposal information. ? Take the medicine out of its container. ? Mix it with something that tastes bad, like cat litter or coffee grounds. ? Place the mixture in a sealed plastic bag and put the bag in your household trash. · Flush them down the sink or toilet. ? You can flush your medicine down the toilet or sink only if you can't go to a [employer]-approved site or if your medicine's instructions specifically say to.  ? If you are throwing away a patch, first fold the sticky sides together. ? To see a list of medicines that should be flushed, go to: www.fda.gov/Drugs/ResourcesForYou/Consumers/BuyingUsingMedicineSafely/EnsuringSafeUseofMedicine/SafeDisposalofMedicines/kuo732042.htm. Follow-up care is a key part of your treatment and safety. Be sure to make and go to all appointments, and call your doctor if you are having problems. It's also a good idea to know your test results and keep a list of the medicines you take. Where can you learn more? Go to http://steven-alexandru.info/. Enter P175 in the search box to learn more about \"Learning About Managing Acute Pain at Home. \"  Current as of: Juliana 3, 2018  Content Version: 11.9  © 5503-4297 [employer]. Care instructions adapted under license by [employer] (which disclaims liability or warranty for this information). If you have questions about a medical condition or this instruction, always ask your healthcare professional. [address] any warranty or liability for your use of this information.

## 2019-02-27 RX ORDER — VALACYCLOVIR HYDROCHLORIDE 1 G/1
1000 TABLET, FILM COATED ORAL DAILY
Qty: 30 TAB | Refills: 1 | Status: SHIPPED | OUTPATIENT
Start: 2019-02-27 | End: 2019-12-02 | Stop reason: SDUPTHER

## 2019-03-08 ENCOUNTER — OFFICE VISIT (OUTPATIENT)
Dept: FAMILY MEDICINE CLINIC | Age: 49
End: 2019-03-08

## 2019-03-08 VITALS
HEART RATE: 88 BPM | OXYGEN SATURATION: 97 % | HEIGHT: 62 IN | SYSTOLIC BLOOD PRESSURE: 131 MMHG | WEIGHT: 189.2 LBS | RESPIRATION RATE: 16 BRPM | TEMPERATURE: 97 F | BODY MASS INDEX: 34.82 KG/M2 | DIASTOLIC BLOOD PRESSURE: 84 MMHG

## 2019-03-08 DIAGNOSIS — K21.9 GASTROESOPHAGEAL REFLUX DISEASE, ESOPHAGITIS PRESENCE NOT SPECIFIED: ICD-10-CM

## 2019-03-08 DIAGNOSIS — K08.89 DENTALGIA: ICD-10-CM

## 2019-03-08 DIAGNOSIS — L50.9 URTICARIA OF UNKNOWN ORIGIN: Primary | ICD-10-CM

## 2019-03-08 DIAGNOSIS — K02.9 DENTAL CARIES: ICD-10-CM

## 2019-03-08 RX ORDER — AMOXICILLIN 500 MG/1
500 CAPSULE ORAL 3 TIMES DAILY
Qty: 30 CAP | Refills: 0 | Status: SHIPPED | OUTPATIENT
Start: 2019-03-08 | End: 2019-03-18

## 2019-03-08 RX ORDER — OMEPRAZOLE 20 MG/1
20 CAPSULE, DELAYED RELEASE ORAL 2 TIMES DAILY
Qty: 60 CAP | Refills: 1 | Status: SHIPPED | OUTPATIENT
Start: 2019-03-08 | End: 2019-05-31 | Stop reason: SDUPTHER

## 2019-03-08 RX ORDER — ACETAMINOPHEN AND CODEINE PHOSPHATE 300; 30 MG/1; MG/1
1 TABLET ORAL
Qty: 15 TAB | Refills: 0 | Status: SHIPPED | OUTPATIENT
Start: 2019-03-08 | End: 2019-03-13

## 2019-03-08 NOTE — PATIENT INSTRUCTIONS
Tooth and Gum Pain: Care Instructions  Your Care Instructions    The most common causes of dental pain are tooth decay and gum disease. Pain can also be caused by an infection of the tooth (abscess) or the gums. Or you may have pain from a broken or cracked tooth. Other causes of pain include infection and damage to a tooth from nervous grinding of your teeth. A wisdom tooth can be painful when it is coming in but cannot break through the gum. It can also be painful when the tooth is only partway in and extra gum tissue has formed around it. The tissue can get inflamed (pericoronitis), and sometimes it gets infected. Prompt dental care can help find the cause of your toothache and keep the tooth from dying or gum disease from getting worse. Self-care at home may reduce your pain and discomfort. Follow-up care is a key part of your treatment and safety. Be sure to make and go to all appointments, and call your dentist or doctor if you are having problems. It's also a good idea to know your test results and keep a list of the medicines you take. How can you care for yourself at home? · To reduce pain and facial swelling, put an ice or cold pack on the outside of your cheek for 10 to 20 minutes at a time. Put a thin cloth between the ice and your skin. Do not use heat. · If your doctor prescribed antibiotics, take them as directed. Do not stop taking them just because you feel better. You need to take the full course of antibiotics. · Ask your doctor if you can take an over-the-counter pain medicine, such as acetaminophen (Tylenol), ibuprofen (Advil, Motrin), or naproxen (Aleve). Be safe with medicines. Read and follow all instructions on the label. · Avoid very hot, cold, or sweet foods and drinks if they increase your pain. · Rinse your mouth with warm salt water every 2 hours to help relieve pain and swelling. Mix 1 teaspoon of salt in 8 ounces of water.   · Talk to your dentist about using special toothpaste for sensitive teeth. To reduce pain on contact with heat or cold or when brushing, brush with this toothpaste regularly or rub a small amount of the paste on the sensitive area with a clean finger 2 or 3 times a day. Floss gently between your teeth. · Do not smoke or use spit tobacco. Tobacco use can make gum problems worse, decreases your ability to fight infection in your gums, and delays healing. If you need help quitting, talk to your doctor about stop-smoking programs and medicines. These can increase your chances of quitting for good. When should you call for help? Call 911 anytime you think you may need emergency care. For example, call if:    · You have trouble breathing.    Call your dentist or doctor now or seek immediate medical care if:    · You have signs of infection, such as:  ? Increased pain, swelling, warmth, or redness. ? Red streaks leading from the area. ? Pus draining from the area. ? A fever.    Watch closely for changes in your health, and be sure to contact your doctor if:    · You do not get better as expected. Where can you learn more? Go to http://steven-alexandru.info/. Enter [id number] [id number] in the search box to learn more about \"Tooth and Gum Pain: Care Instructions. \"  Current as of: March 27, 2018  Content Version: 11.9  © 9066-5161 [employer]. Care instructions adapted under license by [employer] (which disclaims liability or warranty for this information). If you have questions about a medical condition or this instruction, always ask your healthcare professional. [place] [address] any warranty or liability for your use of this information. Abscessed Tooth: Care Instructions  Your Care Instructions    An abscessed tooth is a tooth that has a pocket of pus in the tissues around it. Pus forms when the body tries to fight an infection caused by bacteria. If the pus cannot drain, it forms an abscess.  An abscessed tooth can cause red, swollen gums and throbbing pain, especially when you chew. You may have a bad taste in your mouth and a fever, and your jaw may swell. Damage to the tooth, untreated tooth decay, or gum disease can cause an abscessed tooth. An abscessed tooth needs to be treated by a dental professional right away. If it is not treated, the infection could spread to other parts of your body. Your dentist will give you antibiotics to stop the infection. He or she may make a hole in the tooth or cut open (johnathan) the abscess inside your mouth so that the infection can drain, which should relieve your pain. You may need to have a root canal treatment, which tries to save your tooth by taking out the infected pulp and replacing it with a healing medicine and/or a filling. If these treatments do not work, your tooth may have to be removed. Follow-up care is a key part of your treatment and safety. Be sure to make and go to all appointments, and call your doctor if you are having problems. It's also a good idea to know your test results and keep a list of the medicines you take. How can you care for yourself at home? · Reduce pain and swelling in your face and jaw by putting ice or a cold pack on the outside of your cheek for 10 to 20 minutes at a time. Put a thin cloth between the ice and your skin. · Take pain medicines exactly as directed. ? If the doctor gave you a prescription medicine for pain, take it as prescribed. ? If you are not taking a prescription pain medicine, ask your doctor if you can take an over-the-counter medicine. · Take your antibiotics as directed. Do not stop taking them just because you feel better. You need to take the full course of antibiotics. To prevent tooth abscess  · Brush and floss every day, and have regular dental checkups. · Eat a healthy diet, and avoid sugary foods and drinks.   · Do not smoke, use e-cigarettes with nicotine, or use spit tobacco. Tobacco and nicotine slow your ability to heal. Tobacco also increases your risk for gum disease and cancer of the mouth and throat. If you need help quitting, talk to your doctor about stop-smoking programs and medicines. These can increase your chances of quitting for good. When should you call for help? Call 911 anytime you think you may need emergency care. For example, call if:    · You have trouble breathing.    Call your doctor now or seek immediate medical care if:    · You have new or worse symptoms of infection, such as:  ? Increased pain, swelling, warmth, or redness. ? Red streaks leading from the area. ? Pus draining from the area. ? A fever.    Watch closely for changes in your health, and be sure to contact your doctor if:    · You do not get better as expected. Where can you learn more? Go to http://steven-alexandru.info/. Enter M945 in the search box to learn more about \"Abscessed Tooth: Care Instructions. \"  Current as of: March 27, 2018  Content Version: 11.9  © 0015-1708 [employer]. Care instructions adapted under license by [employer] (which disclaims liability or warranty for this information). If you have questions about a medical condition or this instruction, always ask your healthcare professional. [place] [address] any warranty or liability for your use of this information.

## 2019-03-08 NOTE — PROGRESS NOTES
Pt is here for f/u on rash    1. Have you been to the ER, urgent care clinic since your last visit? Hospitalized since your last visit? No    2. Have you seen or consulted any other health care providers outside of the [address] [place] [address] since your last visit? Include any pap smears or colon screening.  No

## 2019-03-08 NOTE — PROGRESS NOTES
HISTORY OF PRESENT ILLNESS  Nuha Hernandez is a 53 y.o. female. Patient states rash has resolved. Further reports after using terazole and ice after several days perineal swelling resolved. Patient reports did attempt to schedule an appt with ID provider. Per patient she was informed the provider had to review her records first prior to having appointment scheduled. Comments she broke her tooth yesterday while eating a carrot. She has a scheduled appt with her dentist on Monday. Has increased swelling and pain. Comments pain is radiating up to her ear. States she has been taking otc ibuprofen 4 tabs without true improvement. Allergies   Allergen Reactions    Apple Anaphylaxis    Cephalexin Hives    Strawberry Anaphylaxis    Wasps [Hymenoptera Allergenic Extract] Anaphylaxis     Current Outpatient Medications   Medication Sig Dispense Refill    sertraline (ZOLOFT) 100 mg tablet TAKE TWO TABLETS BY MOUTH DAILY 60 Tab 2    valACYclovir (VALTREX) 1 gram tablet Take 1 Tab by mouth daily. 30 Tab 1    buPROPion XL (WELLBUTRIN XL) 150 mg tablet Take 1 Tab by mouth every morning. 30 Tab 1    gabapentin (NEURONTIN) 800 mg tablet Take 1 Tab by mouth three (3) times daily. 120 Tab 3    cyclobenzaprine (FLEXERIL) 10 mg tablet Take 1 Tab by mouth three (3) times daily as needed for Muscle Spasm(s). 60 Tab 0    acetaminophen (TYLENOL) 500 mg tablet Take 1,000 mg by mouth as needed for Pain.  lovastatin (MEVACOR) 20 mg tablet Take 1 Tab by mouth nightly. 30 Tab 3    raNITIdine (ZANTAC) 150 mg tablet Take 150 mg by mouth two (2) times a day.  aspirin delayed-release 81 mg tablet Take  by mouth daily.  naloxone (NARCAN) 4 mg/actuation nasal spray Use 1 spray intranasally into 1 nostril. 2 Each 0    omeprazole (PRILOSEC) 20 mg capsule Take 1 Cap by mouth two (2) times a day.  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 needs - medical: Not on file   [employer] needs - non-medical: Not on file   Occupational History    Not on file   Tobacco Use    Smoking status: Passive Smoke Exposure - Never Smoker    Smokeless tobacco: Never Used    Tobacco comment: last attempt to quit 1/1/2003   Substance and Sexual Activity    Alcohol use: Yes     Alcohol/week: 0.5 oz     Types: 1 Cans of beer per week     Comment: social    Drug use: No    Sexual activity: Yes     Partners: Male   Other Topics Concern    Not on file   Social History Narrative    Not on file     Review of Systems   Constitutional: Negative for chills and fever. HENT: Positive for ear pain (right ear). Left upper tooth pain    Respiratory: Negative for shortness of breath. Cardiovascular: Negative for chest pain and palpitations. Skin: Negative for rash. Neurological: Negative for dizziness and headaches. /84 (BP 1 Location: Left arm)   Pulse 88   Temp 97 °F (36.1 °C) (Oral)   Resp 16   Ht 5' 2\" (1.575 m)   Wt 189 lb 3.2 oz (85.8 kg)   SpO2 97%   BMI 34.61 kg/m²   Physical Exam   Constitutional: She appears well-developed and well-nourished. No distress. HENT:   Head: Normocephalic and atraumatic. Right Ear: Tympanic membrane normal. Tympanic membrane is not erythematous. No middle ear effusion. Left Ear: Tympanic membrane normal. Tympanic membrane is not erythematous. No middle ear effusion. Mouth/Throat: Dental caries present. Facial swelling on right    Neck: Normal range of motion. Neck supple. Cardiovascular: Normal rate, regular rhythm and normal heart sounds. Exam reveals no gallop and no friction rub. No murmur heard. Pulmonary/Chest: Effort normal and breath sounds normal. She has no wheezes. She has no rhonchi. She has no rales. Skin: Skin is warm, dry and intact. No rash noted. ASSESSMENT and PLAN    ICD-10-CM ICD-9-CM    1. Urticaria of unknown origin L50.9 708.9    2. Gastroesophageal reflux disease, esophagitis presence not specified K21.9 530.81 omeprazole (PRILOSEC) 20 mg capsule   3. Dentalgia K08.89 525.9 acetaminophen-codeine (TYLENOL #3) 300-30 mg per tablet   4. Dental caries K02.9 521.00      I have reviewed the patients controlled substance prescription history, as maintained in the [facility]. There is no suspicious activity noted. Usage is consistent with current use of prescribed medications. I have discussed the diagnosis with the patient and the intended plan as seen in the above orders. The patient has received an after-visit summary and questions were answered concerning future plans. I have discussed medication side effects and warnings with the patient as well. Patient agreeable with above plan and verbalizes understanding. Follow-up Disposition:  Return in about 2 months (around 5/8/2019) for HLD.

## 2019-04-03 ENCOUNTER — OFFICE VISIT (OUTPATIENT)
Dept: FAMILY MEDICINE CLINIC | Age: 49
End: 2019-04-03

## 2019-04-03 VITALS
DIASTOLIC BLOOD PRESSURE: 73 MMHG | HEIGHT: 62 IN | SYSTOLIC BLOOD PRESSURE: 133 MMHG | HEART RATE: 84 BPM | OXYGEN SATURATION: 98 % | BODY MASS INDEX: 33.86 KG/M2 | TEMPERATURE: 98.8 F | RESPIRATION RATE: 16 BRPM | WEIGHT: 184 LBS

## 2019-04-03 DIAGNOSIS — R52 GENERALIZED PAIN: ICD-10-CM

## 2019-04-03 DIAGNOSIS — N89.8 VAGINAL IRRITATION: Primary | ICD-10-CM

## 2019-04-03 DIAGNOSIS — R10.2 VAGINAL PAIN: ICD-10-CM

## 2019-04-03 DIAGNOSIS — Z86.19 HISTORY OF SHINGLES: ICD-10-CM

## 2019-04-03 RX ORDER — ACETAMINOPHEN AND CODEINE PHOSPHATE 300; 30 MG/1; MG/1
1 TABLET ORAL
Qty: 15 TAB | Refills: 0 | Status: SHIPPED | OUTPATIENT
Start: 2019-04-03 | End: 2019-04-08

## 2019-04-03 RX ORDER — HYDROCORTISONE 25 MG/G
CREAM TOPICAL 2 TIMES DAILY
Qty: 30 G | Refills: 0 | Status: SHIPPED | OUTPATIENT
Start: 2019-04-03 | End: 2019-04-17

## 2019-04-03 NOTE — PROGRESS NOTES
1. Have you been to the ER, urgent care clinic since your last visit? Hospitalized since your last visit?no    2. Have you seen or consulted any other health care providers outside of the [address] [place] [address] since your last visit? Include any pap smears or colon screening.  No  Chief Complaint   Patient presents with    Rash

## 2019-04-03 NOTE — PROGRESS NOTES
HISTORY OF PRESENT ILLNESS  Loye Romberg is a 53 y.o. female. Patient presents today for evaluation of recurrent rash. States rash returned  4 days ago. States she was searching on the internet and inquires if she may have Vulvodynia given recurrent episodes of vulva pain. States she began to have a burning sensation and discomfort. Patient states she would like a referral to GYN for further evaluation. Comments she does use ice to area with improvement. Requesting rx on tylenol #3 due to increased pain. States when she has pain this tends to cause a shingles outbreak. Patient reports she has a scheduled appt with GI Dr. Mickey Burns 4/19/19. Also reports she has found two Infectious disease providers that accept her insurance: PAULO AlcocerRISA([phone]) and Dr. ACUÑA([phone]). Allergies   Allergen Reactions    Apple Anaphylaxis    Cephalexin Hives    Strawberry Anaphylaxis    Wasps [Hymenoptera Allergenic Extract] Anaphylaxis     Current Outpatient Medications   Medication Sig Dispense Refill    omeprazole (PRILOSEC) 20 mg capsule Take 1 Cap by mouth two (2) times a day. 60 Cap 1    sertraline (ZOLOFT) 100 mg tablet TAKE TWO TABLETS BY MOUTH DAILY 60 Tab 2    valACYclovir (VALTREX) 1 gram tablet Take 1 Tab by mouth daily. 30 Tab 1    buPROPion XL (WELLBUTRIN XL) 150 mg tablet Take 1 Tab by mouth every morning. 30 Tab 1    gabapentin (NEURONTIN) 800 mg tablet Take 1 Tab by mouth three (3) times daily. 120 Tab 3    cyclobenzaprine (FLEXERIL) 10 mg tablet Take 1 Tab by mouth three (3) times daily as needed for Muscle Spasm(s). 60 Tab 0    acetaminophen (TYLENOL) 500 mg tablet Take 1,000 mg by mouth as needed for Pain.  naloxone (NARCAN) 4 mg/actuation nasal spray Use 1 spray intranasally into 1 nostril. 2 Each 0    lovastatin (MEVACOR) 20 mg tablet Take 1 Tab by mouth nightly. 30 Tab 3    raNITIdine (ZANTAC) 150 mg tablet Take 150 mg by mouth two (2) times a day.       aspirin delayed-release 81 mg tablet Take  by mouth daily. 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    Pain, upper back     Psychiatric disorder     depression     Tattoo     Unspecified deficiency anemia 1999     Social History     Socioeconomic History    Marital status:      Spouse name: Not on file    Number of children: Not on file    Years of education: Not on file    Highest education level: Not on file   Occupational History    Not on file   Social Needs    Financial resource strain: Not on file    Food insecurity:     Worry: Not on file     Inability: Not on file    Transportation needs:     Medical: Not on file     Non-medical: Not on file   Tobacco Use    Smoking status: Passive Smoke Exposure - Never Smoker    Smokeless tobacco: Never Used    Tobacco comment: last attempt to quit 1/1/2003   Substance and Sexual Activity    Alcohol use:  Yes     Alcohol/week: 0.5 oz     Types: 1 Cans of beer per week     Comment: social    Drug use: No    Sexual activity: Yes     Partners: Male   Lifestyle    Physical activity:     Days per week: Not on file     Minutes per session: Not on file    Stress: Not on file   Relationships    Social connections:     Talks on phone: Not on file     Gets together: Not on file     Attends Anabaptism service: Not on file     Active member of club or organization: Not on file     Attends meetings of clubs or organizations: Not on file     Relationship status: Not on file    Intimate partner violence:     Fear of current or ex partner: Not on file     Emotionally abused: Not on file     Physically abused: Not on file     Forced sexual activity: Not on file   Other Topics Concern    Not on file   Social History Narrative    Not on file     Review of Systems Constitutional: Negative for chills and fever. Genitourinary:        Vulva pain     /73 (BP 1 Location: Left arm, BP Patient Position: Sitting)   Pulse 84   Temp 98.8 °F (37.1 °C) (Oral)   Resp 16   Ht 5' 2\" (1.575 m)   Wt 184 lb (83.5 kg)   SpO2 98%   BMI 33.65 kg/m²   Physical Exam   Constitutional: She appears well-developed and well-nourished. No distress. HENT:   Head: Normocephalic and atraumatic. Neck: Normal range of motion. Neck supple. Cardiovascular: Normal rate, regular rhythm and normal heart sounds. Exam reveals no gallop and no friction rub. No murmur heard. Pulmonary/Chest: Effort normal and breath sounds normal. She has no wheezes. She has no rhonchi. She has no rales. Genitourinary: There is tenderness (erythema ) on the right labia. There is no rash on the right labia. There is tenderness (and erythema ) on the left labia. There is no rash on the left labia. Lymphadenopathy:     She has no cervical adenopathy. Skin: Skin is warm and dry. ASSESSMENT and PLAN    ICD-10-CM ICD-9-CM    1. Vaginal irritation N89.8 623.9 REFERRAL TO OBSTETRICS AND GYNECOLOGY   2. Vaginal pain R10.2 625.9 REFERRAL TO OBSTETRICS AND GYNECOLOGY   3. Generalized pain R52 780.96 acetaminophen-codeine (TYLENOL #3) 300-30 mg per tablet   4. History of shingles Z86.19 V12.09 REFERRAL TO INFECTIOUS DISEASE     Orders Placed This Encounter    REFERRAL TO OBSTETRICS AND GYNECOLOGY    benzocaine-resorcinol (VAGISIL) 5-2 % topical cream    hydrocortisone (HYTONE) 2.5 % topical cream    acetaminophen-codeine (TYLENOL #3) 300-30 mg per tablet     Informed to use hydrocortisone for pain on the outer labia majora only  I have reviewed the patients controlled substance prescription history, as maintained in the [facility]. There is no suspicious activity noted. Usage is consistent with current use of prescribed medications.   I have discussed the diagnosis with the patient and the intended plan as seen in the above orders. The patient has received an after-visit summary and questions were answered concerning future plans. I have discussed medication side effects and warnings with the patient as well. Patient agreeable with above plan and verbalizes understanding. Follow-up and Dispositions    · Return in about 2 weeks (around 4/17/2019) for pain and irritation .

## 2019-04-17 ENCOUNTER — TELEPHONE (OUTPATIENT)
Dept: FAMILY MEDICINE CLINIC | Age: 49
End: 2019-04-17

## 2019-04-17 DIAGNOSIS — Z86.19 HISTORY OF SHINGLES: Primary | ICD-10-CM

## 2019-04-17 NOTE — TELEPHONE ENCOUNTER
The infectous disease office called regarding making a patient an appt.  They have looked over her notes and decided she needs to go to dermatology not a infectious problem please advise

## 2019-04-18 NOTE — TELEPHONE ENCOUNTER
Left message for Pt to return call. Pt aware Infectious Disease recommends Pt see a dermatologist and states its not an infectious disease problem. Pt okay with being referred to dermatology.

## 2019-05-21 ENCOUNTER — HOSPITAL ENCOUNTER (OUTPATIENT)
Dept: LAB | Age: 49
Discharge: HOME | End: 2019-05-21
Payer: COMMERCIAL

## 2019-05-21 ENCOUNTER — OFFICE VISIT (OUTPATIENT)
Dept: FAMILY MEDICINE CLINIC | Age: 49
End: 2019-05-21

## 2019-05-21 ENCOUNTER — TELEPHONE (OUTPATIENT)
Dept: FAMILY MEDICINE CLINIC | Age: 49
End: 2019-05-21

## 2019-05-21 VITALS
WEIGHT: 182.8 LBS | OXYGEN SATURATION: 99 % | HEIGHT: 62 IN | HEART RATE: 71 BPM | DIASTOLIC BLOOD PRESSURE: 89 MMHG | BODY MASS INDEX: 33.64 KG/M2 | TEMPERATURE: 98.8 F | SYSTOLIC BLOOD PRESSURE: 119 MMHG | RESPIRATION RATE: 16 BRPM

## 2019-05-21 DIAGNOSIS — K21.9 GASTROESOPHAGEAL REFLUX DISEASE, ESOPHAGITIS PRESENCE NOT SPECIFIED: ICD-10-CM

## 2019-05-21 DIAGNOSIS — E78.00 PURE HYPERCHOLESTEROLEMIA: ICD-10-CM

## 2019-05-21 DIAGNOSIS — Z86.19 HISTORY OF SHINGLES: ICD-10-CM

## 2019-05-21 DIAGNOSIS — E78.00 PURE HYPERCHOLESTEROLEMIA: Primary | ICD-10-CM

## 2019-05-21 DIAGNOSIS — R52 GENERALIZED PAIN: ICD-10-CM

## 2019-05-21 LAB
ALBUMIN SERPL-MCNC: 4.4 G/DL (ref 3.4–5)
ALBUMIN/GLOB SERPL: 1.5 {RATIO} (ref 0.8–1.7)
ALP SERPL-CCNC: 71 U/L (ref 45–117)
ALT SERPL-CCNC: 31 U/L (ref 13–56)
ANION GAP SERPL CALC-SCNC: 6 MMOL/L (ref 3–18)
AST SERPL-CCNC: 18 U/L (ref 15–37)
BILIRUB SERPL-MCNC: 0.3 MG/DL (ref 0.2–1)
BUN SERPL-MCNC: 17 MG/DL (ref 7–18)
BUN/CREAT SERPL: 20 (ref 12–20)
CALCIUM SERPL-MCNC: 9 MG/DL (ref 8.5–10.1)
CHLORIDE SERPL-SCNC: 102 MMOL/L (ref 100–108)
CHOLEST SERPL-MCNC: 199 MG/DL
CO2 SERPL-SCNC: 27 MMOL/L (ref 21–32)
CREAT SERPL-MCNC: 0.86 MG/DL (ref 0.6–1.3)
GLOBULIN SER CALC-MCNC: 2.9 G/DL (ref 2–4)
GLUCOSE SERPL-MCNC: 90 MG/DL (ref 74–99)
HDLC SERPL-MCNC: 42 MG/DL (ref 40–60)
HDLC SERPL: 4.7 {RATIO} (ref 0–5)
LDLC SERPL CALC-MCNC: 127 MG/DL (ref 0–100)
LIPID PROFILE,FLP: ABNORMAL
POTASSIUM SERPL-SCNC: 4.4 MMOL/L (ref 3.5–5.5)
PROT SERPL-MCNC: 7.3 G/DL (ref 6.4–8.2)
SODIUM SERPL-SCNC: 135 MMOL/L (ref 136–145)
TRIGL SERPL-MCNC: 150 MG/DL (ref ?–150)
VLDLC SERPL CALC-MCNC: 30 MG/DL

## 2019-05-21 PROCEDURE — 36415 COLL VENOUS BLD VENIPUNCTURE: CPT

## 2019-05-21 PROCEDURE — 80061 LIPID PANEL: CPT

## 2019-05-21 PROCEDURE — 80053 COMPREHEN METABOLIC PANEL: CPT

## 2019-05-21 RX ORDER — ACETAMINOPHEN AND CODEINE PHOSPHATE 300; 30 MG/1; MG/1
1 TABLET ORAL
Qty: 15 TAB | Refills: 0 | Status: SHIPPED | OUTPATIENT
Start: 2019-05-21 | End: 2019-05-26

## 2019-05-21 RX ORDER — GABAPENTIN 800 MG/1
800 TABLET ORAL 3 TIMES DAILY
Qty: 120 TAB | Refills: 3 | Status: SHIPPED | OUTPATIENT
Start: 2019-05-21 | End: 2019-07-23 | Stop reason: SDUPTHER

## 2019-05-21 NOTE — TELEPHONE ENCOUNTER
Patient calling would like to know if she could have something called in for nausea. She stated the dissolvable Zofran worked good for her.      pls advise

## 2019-05-21 NOTE — PROGRESS NOTES
Subjective:   Wendy Cadet is a 53 y.o. female with Hypercholesterolemia presents for follow up. Hypercholesterolemia ROS: Medication:  Lovastatin. Cardiovascular ROS - taking medications as instructed, no medication side effects noted, no TIA's, no chest pain on exertion, no dyspnea on exertion, no swelling of ankles. Patient reports she was seen by Dr. Jing Ge. Comments she was informed she may have microscopic colitis. States she is scheduled to have a colonoscopy with biopsy. Reports she was researching and it appears several of her medications may cause worsening of her symptoms. Comments she has been taking Valtrex as needed for shingles outbreak. Other symptoms and concerns: states she began walking/jog for the last several weeks. However, has noticed increased bruising to bilateral shins. She also has noticed bruising to posterior left lower leg. Current Outpatient Medications   Medication Sig Dispense Refill    benzocaine-resorcinol (VAGISIL) 5-2 % topical cream Apply  to affected area two (2) times daily as needed for Itching. 28 g 0    omeprazole (PRILOSEC) 20 mg capsule Take 1 Cap by mouth two (2) times a day. 60 Cap 1    sertraline (ZOLOFT) 100 mg tablet TAKE TWO TABLETS BY MOUTH DAILY 60 Tab 2    valACYclovir (VALTREX) 1 gram tablet Take 1 Tab by mouth daily. 30 Tab 1    buPROPion XL (WELLBUTRIN XL) 150 mg tablet Take 1 Tab by mouth every morning. 30 Tab 1    gabapentin (NEURONTIN) 800 mg tablet Take 1 Tab by mouth three (3) times daily. 120 Tab 3    acetaminophen (TYLENOL) 500 mg tablet Take 1,000 mg by mouth as needed for Pain.  lovastatin (MEVACOR) 20 mg tablet Take 1 Tab by mouth nightly. 30 Tab 3    raNITIdine (ZANTAC) 150 mg tablet Take 150 mg by mouth two (2) times a day.  aspirin delayed-release 81 mg tablet Take  by mouth daily.  cyclobenzaprine (FLEXERIL) 10 mg tablet Take 1 Tab by mouth three (3) times daily as needed for Muscle Spasm(s).  60 Tab 0  naloxone (NARCAN) 4 mg/actuation nasal spray Use 1 spray intranasally into 1 nostril. 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    Pain, upper back     Psychiatric disorder     depression     Tattoo     Unspecified deficiency anemia 1999     Family History   Problem Relation Age of Onset    Hypertension Mother     Arthritis-osteo Mother     GERD Father     Hypertension Father     Cancer Sister     MS Maternal Grandmother      Lab Results   Component Value Date/Time    Cholesterol, total 215 (H) 08/20/2018 11:22 AM    HDL Cholesterol 49 08/20/2018 11:22 AM    LDL, calculated 135 (H) 08/20/2018 11:22 AM    VLDL, calculated 31 08/20/2018 11:22 AM    Triglyceride 155 (H) 08/20/2018 11:22 AM    CHOL/HDL Ratio 4.4 08/20/2018 11:22 AM     Lab Results   Component Value Date/Time    Sodium 139 12/10/2018 01:02 PM    Potassium 4.6 12/10/2018 01:02 PM    Chloride 102 12/10/2018 01:02 PM    CO2 30 12/10/2018 01:02 PM    Anion gap 7 12/10/2018 01:02 PM    Glucose 96 12/10/2018 01:02 PM    BUN 17 12/10/2018 01:02 PM    Creatinine 0.88 12/10/2018 01:02 PM    BUN/Creatinine ratio 19 12/10/2018 01:02 PM    GFR est AA >60 12/10/2018 01:02 PM    GFR est non-AA >60 12/10/2018 01:02 PM    Calcium 9.4 12/10/2018 01:02 PM    Bilirubin, total 0.3 08/20/2018 11:22 AM    AST (SGOT) 26 08/20/2018 11:22 AM    Alk.  phosphatase 68 08/20/2018 11:22 AM    Protein, total 7.9 08/20/2018 11:22 AM    Albumin 4.3 08/20/2018 11:22 AM    Globulin 3.6 08/20/2018 11:22 AM    A-G Ratio 1.2 08/20/2018 11:22 AM    ALT (SGPT) 46 08/20/2018 11:22 AM     Lab Results   Component Value Date/Time    WBC 8.2 12/10/2018 01:02 PM    HGB 13.0 12/10/2018 01:02 PM    HCT 39.6 12/10/2018 01:02 PM    PLATELET 032 70/01/7574 01:02 PM    MCV 91.9 12/10/2018 01:02 PM     .  Wt Readings from Last 3 Encounters:   05/21/19 182 lb 12.8 oz (82.9 kg)   04/03/19 184 lb (83.5 kg)   03/08/19 189 lb 3.2 oz (85.8 kg)       Objective:   Visit Vitals  /89 (BP 1 Location: Left arm)   Pulse 71   Temp 98.8 °F (37.1 °C) (Oral)   Resp 16   Ht 5' 2\" (1.575 m)   Wt 182 lb 12.8 oz (82.9 kg)   SpO2 99%   BMI 33.43 kg/m²     General appearance - alert, well appearing, and in no distress  Neck - supple, no significant adenopathy, carotids upstroke normal bilaterally, no bruits  Chest - clear to auscultation, no wheezes, rales or rhonchi, symmetric air entry  Heart - normal rate, regular rhythm, normal S1, S2, no murmurs, rubs, clicks or gallops  Extremities - peripheral pulses normal, no pedal edema, no clubbing or cyanosis  Skin - DERMATITIS NOTED: herpes zoster of right buttock, erythematous cluster with crusted vesicles        Assessment/Plan:      ICD-10-CM ICD-9-CM    1. Pure hypercholesterolemia E78.00 272.0 LIPID PANEL      METABOLIC PANEL, COMPREHENSIVE   2. Generalized pain R52 780.96 REFERRAL TO PAIN MANAGEMENT      acetaminophen-codeine (TYLENOL #3) 300-30 mg per tablet   3. Gastroesophageal reflux disease, esophagitis presence not specified K21.9 530.81    4. History of shingles Z86.19 V12.09      Orders Placed This Encounter    LIPID PANEL    METABOLIC PANEL, COMPREHENSIVE    REFERRAL TO PAIN MANAGEMENT    gabapentin (NEURONTIN) 800 mg tablet    acetaminophen-codeine (TYLENOL #3) 300-30 mg per tablet     Informed patient given recurrent need for Tylenol #3 referral placed to pain management for recurrent shingles pain  I have discussed the diagnosis with the patient and the intended plan as seen in the above orders. The patient has received an after-visit summary and questions were answered concerning future plans. I have discussed medication side effects and warnings with the patient as well. Pt verbalizes understanding.   Patient agreeable with above plan and verbalizes understanding. Follow-up and Dispositions    · Return in about 3 months (around 8/21/2019) for HLD/anxiety/depression .

## 2019-05-21 NOTE — PATIENT INSTRUCTIONS

## 2019-05-21 NOTE — PROGRESS NOTES
Pt is here for 2 month follow up cholesterol    1. Have you been to the ER, urgent care clinic since your last visit? Hospitalized since your last visit? No    2. Have you seen or consulted any other health care providers outside of the [address] [place] [address] since your last visit? Include any pap smears or colon screening.  No

## 2019-05-29 RX ORDER — CYCLOBENZAPRINE HCL 10 MG
10 TABLET ORAL
Qty: 60 TAB | Refills: 0 | Status: SHIPPED | OUTPATIENT
Start: 2019-05-29 | End: 2019-06-26 | Stop reason: SDUPTHER

## 2019-05-29 NOTE — TELEPHONE ENCOUNTER
Requested Prescriptions     Pending Prescriptions Disp Refills    cyclobenzaprine (FLEXERIL) 10 mg tablet 60 Tab 0     Sig: Take 1 Tab by mouth three (3) times daily as needed for Muscle Spasm(s). PT is requesting medication, she has been out of it for a while and cannot go another night without it. Her muscle spasms are horrible.

## 2019-05-30 NOTE — TELEPHONE ENCOUNTER
Pt states she is still having nausea but it is caused by nedication she was given by her GI MD. Pt states she will contact their office & notify them of her symptoms & if they will not change or or give her something for nausea she will call back.

## 2019-05-31 DIAGNOSIS — E78.00 PURE HYPERCHOLESTEROLEMIA: ICD-10-CM

## 2019-05-31 DIAGNOSIS — K21.9 GASTROESOPHAGEAL REFLUX DISEASE, ESOPHAGITIS PRESENCE NOT SPECIFIED: ICD-10-CM

## 2019-05-31 RX ORDER — OMEPRAZOLE 20 MG/1
CAPSULE, DELAYED RELEASE ORAL
Qty: 60 CAP | Refills: 1 | Status: SHIPPED | OUTPATIENT
Start: 2019-05-31 | End: 2019-08-29 | Stop reason: SDUPTHER

## 2019-05-31 RX ORDER — LOVASTATIN 20 MG/1
TABLET ORAL
Qty: 30 TAB | Refills: 3 | Status: SHIPPED | OUTPATIENT
Start: 2019-05-31 | End: 2019-06-27

## 2019-06-13 ENCOUNTER — TELEPHONE (OUTPATIENT)
Dept: OBGYN CLINIC | Age: 49
End: 2019-06-13

## 2019-06-13 NOTE — TELEPHONE ENCOUNTER
Call placed to patient regarding upcoming new patient visit. Left voicemail for patient to return call. Calling to make patient aware that the office will be closing as of June 21, 2019. If patient returns call, please notify that Dr. Jordin Preez will see her if she would like, however she will not be available for follow up care. Please offer the option of keeping or cancelling the appointment.

## 2019-06-20 RX ORDER — SERTRALINE HYDROCHLORIDE 100 MG/1
TABLET, FILM COATED ORAL
Qty: 60 TAB | Refills: 2 | Status: SHIPPED | OUTPATIENT
Start: 2019-06-20 | End: 2019-10-08 | Stop reason: SDUPTHER

## 2019-06-26 RX ORDER — CYCLOBENZAPRINE HCL 10 MG
10 TABLET ORAL
Qty: 60 TAB | Refills: 0 | Status: SHIPPED | OUTPATIENT
Start: 2019-06-26 | End: 2019-06-27

## 2019-06-27 DIAGNOSIS — Z79.899 ENCOUNTER FOR LONG-TERM (CURRENT) USE OF HIGH-RISK MEDICATION: ICD-10-CM

## 2019-06-27 DIAGNOSIS — R52 GENERALIZED PAIN: Primary | ICD-10-CM

## 2019-06-27 RX ORDER — ACETAMINOPHEN AND CODEINE PHOSPHATE 300; 30 MG/1; MG/1
1 TABLET ORAL
Qty: 15 TAB | Refills: 0 | Status: SHIPPED | OUTPATIENT
Start: 2019-06-27 | End: 2019-06-30

## 2019-06-27 NOTE — TELEPHONE ENCOUNTER
I have reviewed the patients controlled substance prescription history, as maintained in the [facility]. There is no suspicious activity noted. Usage is consistent with current use of prescribed medications.

## 2019-06-28 ENCOUNTER — HOSPITAL ENCOUNTER (OUTPATIENT)
Dept: LAB | Age: 49
Discharge: HOME | End: 2019-06-28
Payer: COMMERCIAL

## 2019-06-28 DIAGNOSIS — Z79.899 ENCOUNTER FOR LONG-TERM (CURRENT) USE OF HIGH-RISK MEDICATION: ICD-10-CM

## 2019-06-28 PROCEDURE — 80307 DRUG TEST PRSMV CHEM ANLYZR: CPT

## 2019-07-01 ENCOUNTER — TELEPHONE (OUTPATIENT)
Dept: FAMILY MEDICINE CLINIC | Age: 49
End: 2019-07-01

## 2019-07-01 NOTE — TELEPHONE ENCOUNTER
Patient needs to be made aware [facility] does not accept her insurance. She will need to contact her insurance to see where she can go for pain management. Once she confirms the office takes her insurance we can send over her records to see if she would be accepted as a patient. Left non detailed message to return call to office.

## 2019-07-02 ENCOUNTER — TELEPHONE (OUTPATIENT)
Dept: FAMILY MEDICINE CLINIC | Age: 49
End: 2019-07-02

## 2019-07-02 NOTE — TELEPHONE ENCOUNTER
Returned call to the patient. Patient comments she would like to get biopsy results. Advised patient she will need to contact her specialist. She verbalized understanding.

## 2019-07-04 LAB — DRUGS UR: NORMAL

## 2019-07-05 ENCOUNTER — HOSPITAL ENCOUNTER (EMERGENCY)
Age: 49
Discharge: HOME | End: 2019-07-05
Attending: EMERGENCY MEDICINE
Payer: COMMERCIAL

## 2019-07-05 ENCOUNTER — APPOINTMENT (OUTPATIENT)
Dept: CT IMAGING | Age: 49
End: 2019-07-05
Attending: NURSE PRACTITIONER
Payer: COMMERCIAL

## 2019-07-05 VITALS
HEIGHT: 62 IN | OXYGEN SATURATION: 99 % | TEMPERATURE: 97.9 F | WEIGHT: 180 LBS | DIASTOLIC BLOOD PRESSURE: 88 MMHG | RESPIRATION RATE: 16 BRPM | SYSTOLIC BLOOD PRESSURE: 134 MMHG | HEART RATE: 69 BPM | BODY MASS INDEX: 33.13 KG/M2

## 2019-07-05 DIAGNOSIS — R10.84 GENERALIZED ABDOMINAL PAIN: Primary | ICD-10-CM

## 2019-07-05 LAB
ALBUMIN SERPL-MCNC: 4.1 G/DL (ref 3.4–5)
ALBUMIN/GLOB SERPL: 1.2 {RATIO} (ref 0.8–1.7)
ALP SERPL-CCNC: 72 U/L (ref 45–117)
ALT SERPL-CCNC: 43 U/L (ref 13–56)
ANION GAP SERPL CALC-SCNC: 7 MMOL/L (ref 3–18)
APPEARANCE UR: CLEAR
AST SERPL-CCNC: 24 U/L (ref 15–37)
BASOPHILS # BLD: 0 K/UL (ref 0–0.1)
BASOPHILS NFR BLD: 0 % (ref 0–2)
BILIRUB SERPL-MCNC: 0.2 MG/DL (ref 0.2–1)
BILIRUB UR QL: NEGATIVE
BUN SERPL-MCNC: 12 MG/DL (ref 7–18)
BUN/CREAT SERPL: 12 (ref 12–20)
CALCIUM SERPL-MCNC: 8.9 MG/DL (ref 8.5–10.1)
CHLORIDE SERPL-SCNC: 105 MMOL/L (ref 100–108)
CO2 SERPL-SCNC: 27 MMOL/L (ref 21–32)
COLOR UR: YELLOW
CREAT SERPL-MCNC: 1.04 MG/DL (ref 0.6–1.3)
DIFFERENTIAL METHOD BLD: ABNORMAL
EOSINOPHIL # BLD: 0.2 K/UL (ref 0–0.4)
EOSINOPHIL NFR BLD: 2 % (ref 0–5)
ERYTHROCYTE [DISTWIDTH] IN BLOOD BY AUTOMATED COUNT: 11.9 % (ref 11.6–14.5)
GLOBULIN SER CALC-MCNC: 3.4 G/DL (ref 2–4)
GLUCOSE SERPL-MCNC: 105 MG/DL (ref 74–99)
GLUCOSE UR STRIP.AUTO-MCNC: NEGATIVE MG/DL
HCT VFR BLD AUTO: 37 % (ref 35–45)
HGB BLD-MCNC: 12.4 G/DL (ref 12–16)
HGB UR QL STRIP: NEGATIVE
KETONES UR QL STRIP.AUTO: NEGATIVE MG/DL
LEUKOCYTE ESTERASE UR QL STRIP.AUTO: NEGATIVE
LIPASE SERPL-CCNC: 90 U/L (ref 73–393)
LYMPHOCYTES # BLD: 2.2 K/UL (ref 0.9–3.6)
LYMPHOCYTES NFR BLD: 31 % (ref 21–52)
MAGNESIUM SERPL-MCNC: 2.1 MG/DL (ref 1.6–2.6)
MCH RBC QN AUTO: 30.5 PG (ref 24–34)
MCHC RBC AUTO-ENTMCNC: 33.5 G/DL (ref 31–37)
MCV RBC AUTO: 90.9 FL (ref 74–97)
MONOCYTES # BLD: 0.6 K/UL (ref 0.05–1.2)
MONOCYTES NFR BLD: 8 % (ref 3–10)
NEUTS SEG # BLD: 4 K/UL (ref 1.8–8)
NEUTS SEG NFR BLD: 59 % (ref 40–73)
NITRITE UR QL STRIP.AUTO: NEGATIVE
PH UR STRIP: 5 [PH] (ref 5–8)
PLATELET # BLD AUTO: 224 K/UL (ref 135–420)
PMV BLD AUTO: 9.6 FL (ref 9.2–11.8)
POTASSIUM SERPL-SCNC: 4.4 MMOL/L (ref 3.5–5.5)
PROT SERPL-MCNC: 7.5 G/DL (ref 6.4–8.2)
PROT UR STRIP-MCNC: NEGATIVE MG/DL
RBC # BLD AUTO: 4.07 M/UL (ref 4.2–5.3)
SODIUM SERPL-SCNC: 139 MMOL/L (ref 136–145)
SP GR UR REFRACTOMETRY: 1.02 (ref 1–1.03)
UROBILINOGEN UR QL STRIP.AUTO: 0.2 EU/DL (ref 0.2–1)
WBC # BLD AUTO: 6.9 K/UL (ref 4.6–13.2)

## 2019-07-05 PROCEDURE — 83690 ASSAY OF LIPASE: CPT

## 2019-07-05 PROCEDURE — 74011250637 HC RX REV CODE- 250/637: Performed by: NURSE PRACTITIONER

## 2019-07-05 PROCEDURE — 96374 THER/PROPH/DIAG INJ IV PUSH: CPT

## 2019-07-05 PROCEDURE — 99284 EMERGENCY DEPT VISIT MOD MDM: CPT

## 2019-07-05 PROCEDURE — 96361 HYDRATE IV INFUSION ADD-ON: CPT

## 2019-07-05 PROCEDURE — 74011250636 HC RX REV CODE- 250/636: Performed by: NURSE PRACTITIONER

## 2019-07-05 PROCEDURE — 74011636320 HC RX REV CODE- 636/320: Performed by: EMERGENCY MEDICINE

## 2019-07-05 PROCEDURE — C9113 INJ PANTOPRAZOLE SODIUM, VIA: HCPCS | Performed by: NURSE PRACTITIONER

## 2019-07-05 PROCEDURE — 81003 URINALYSIS AUTO W/O SCOPE: CPT

## 2019-07-05 PROCEDURE — 74177 CT ABD & PELVIS W/CONTRAST: CPT

## 2019-07-05 PROCEDURE — 83735 ASSAY OF MAGNESIUM: CPT

## 2019-07-05 PROCEDURE — 85025 COMPLETE CBC W/AUTO DIFF WBC: CPT

## 2019-07-05 PROCEDURE — 80053 COMPREHEN METABOLIC PANEL: CPT

## 2019-07-05 PROCEDURE — 96375 TX/PRO/DX INJ NEW DRUG ADDON: CPT

## 2019-07-05 RX ORDER — OXYCODONE AND ACETAMINOPHEN 5; 325 MG/1; MG/1
1 TABLET ORAL
Qty: 15 TAB | Refills: 0 | Status: SHIPPED | OUTPATIENT
Start: 2019-07-05 | End: 2019-07-10

## 2019-07-05 RX ORDER — ONDANSETRON 2 MG/ML
4 INJECTION INTRAMUSCULAR; INTRAVENOUS ONCE
Status: COMPLETED | OUTPATIENT
Start: 2019-07-05 | End: 2019-07-05

## 2019-07-05 RX ORDER — OXYCODONE AND ACETAMINOPHEN 5; 325 MG/1; MG/1
2 TABLET ORAL
Status: COMPLETED | OUTPATIENT
Start: 2019-07-05 | End: 2019-07-05

## 2019-07-05 RX ORDER — SODIUM CHLORIDE 9 MG/ML
100 INJECTION, SOLUTION INTRAVENOUS CONTINUOUS
Status: DISCONTINUED | OUTPATIENT
Start: 2019-07-05 | End: 2019-07-06 | Stop reason: HOSPADM

## 2019-07-05 RX ORDER — MORPHINE SULFATE 4 MG/ML
4 INJECTION INTRAVENOUS
Status: COMPLETED | OUTPATIENT
Start: 2019-07-05 | End: 2019-07-05

## 2019-07-05 RX ORDER — PANTOPRAZOLE SODIUM 40 MG/10ML
40 INJECTION, POWDER, LYOPHILIZED, FOR SOLUTION INTRAVENOUS ONCE
Status: COMPLETED | OUTPATIENT
Start: 2019-07-05 | End: 2019-07-05

## 2019-07-05 RX ORDER — ONDANSETRON 8 MG/1
8 TABLET, ORALLY DISINTEGRATING ORAL
Qty: 15 TAB | Refills: 0 | Status: SHIPPED | OUTPATIENT
Start: 2019-07-05 | End: 2019-09-24 | Stop reason: SDUPTHER

## 2019-07-05 RX ADMIN — ONDANSETRON 4 MG: 2 INJECTION INTRAMUSCULAR; INTRAVENOUS at 20:18

## 2019-07-05 RX ADMIN — MORPHINE SULFATE 4 MG: 4 INJECTION INTRAVENOUS at 20:18

## 2019-07-05 RX ADMIN — PANTOPRAZOLE SODIUM 40 MG: 40 INJECTION, POWDER, FOR SOLUTION INTRAVENOUS at 20:18

## 2019-07-05 RX ADMIN — SODIUM CHLORIDE 1000 ML: 900 INJECTION, SOLUTION INTRAVENOUS at 21:29

## 2019-07-05 RX ADMIN — OXYCODONE HYDROCHLORIDE AND ACETAMINOPHEN 2 TABLET: 5; 325 TABLET ORAL at 22:39

## 2019-07-05 RX ADMIN — SODIUM CHLORIDE 100 ML/HR: 900 INJECTION, SOLUTION INTRAVENOUS at 21:29

## 2019-07-05 RX ADMIN — IOPAMIDOL 100 ML: 612 INJECTION, SOLUTION INTRAVENOUS at 20:43

## 2019-07-05 NOTE — ED TRIAGE NOTES
\"I\"m still having really bad diarrhea and my epigastric pain is off the chart and my abdomen is so bloated. \" Pt. Also c/o nausea. Reports colonoscopy done last Friday.

## 2019-07-05 NOTE — ED PROVIDER NOTES
Patient presents to the emergency department with abdominal bloating and abdominal pain. Patient had a positive C. difficile on June 24 patient is on vancomycin p.o. for the C. Difficile she presents to the emergency department with increased abdominal pain and abdominal gas and bloating. Patient states the diarrhea has continued she is taking Imodium at home for it patient states that she has been recommended to have a fecal transplant fever No vomiting is been reported patient states she has had some nausea    The history is provided by the patient. No  was used. Abdominal Pain    This is a recurrent problem. The problem has not changed since onset. The pain is located in the generalized abdominal region. The pain is at a severity of 7/10. The pain is moderate. Associated symptoms include diarrhea and nausea. Pertinent negatives include no fever, no vomiting and no constipation. Nothing worsens the pain. The pain is relieved by nothing. Past medical history comments: C. difficile.         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    IBS (irritable bowel syndrome)     Ill-defined condition 2011    h/o of \"blood clot in my lung, after a picc line\"    Ill-defined condition     painless rectal bleeding    MRSA infection     h/o, not current    Ovarian cyst     Pain, upper back     Psychiatric disorder     depression     Stress incontinence     Tattoo     Unspecified deficiency anemia 1999       Past Surgical History:   Procedure Laterality Date    COLONOSCOPY N/A 6/28/2019    DIAGNOSTIC COLONOSCOPY with random bxs performed by Magdy Bustillos MD at [address] [place] [address] HX CHOLECYSTECTOMY  2002    HX GYN  2018    Abalation    HX GYN      rt ovary removed    HX GYN      left ovary clipped.  HX ORTHOPAEDIC      arthoscopic rt knee    HX ORTHOPAEDIC      left knee meniscis tear    HX ORTHOPAEDIC Right     meniscus repair    NEUROLOGICAL PROCEDURE UNLISTED  12/2018    laminectomy         Family History:   Problem Relation Age of Onset    Hypertension Mother     Arthritis-osteo Mother     GERD Father     Hypertension Father     Cancer Sister     MS Maternal Grandmother     Diabetes Paternal Grandmother        Social History     Socioeconomic History    Marital status:      Spouse name: Not on file    Number of children: Not on file    Years of education: Not on file    Highest education level: Not on file   Occupational History    Not on file   Social Needs    Financial resource strain: Not on file    Food insecurity:     Worry: Not on file     Inability: Not on file    Transportation needs:     Medical: Not on file     Non-medical: Not on file   Tobacco Use    Smoking status: Former Smoker     Packs/day: 0.50     Years: 10.00     Pack years: 5.00    Smokeless tobacco: Never Used    Tobacco comment: pt smoked on and off for 10 years and quit 6 years ago   Substance and Sexual Activity    Alcohol use:  Yes     Alcohol/week: 0.5 oz     Types: 1 Cans of beer per week     Comment: social    Drug use: No    Sexual activity: Yes     Partners: Male   Lifestyle    Physical activity:     Days per week: Not on file     Minutes per session: Not on file    Stress: Not on file   Relationships    Social connections:     Talks on phone: Not on file     Gets together: Not on file     Attends [employer] service: Not on file     Active member of club or organization: Not on file     Attends meetings of clubs or organizations: Not on file     Relationship status: Not on file   [address] [place] [address] Intimate partner violence:     Fear of current or ex partner: Not on file     Emotionally abused: Not on file     Physically abused: Not on file     Forced sexual activity: Not on file   Other Topics Concern    Not on file   Social History Narrative    Not on file         ALLERGIES: Apple; Cephalexin; Strawberry; and Wasps [hymenoptera allergenic extract]    Review of Systems   Constitutional: Negative for fever. Gastrointestinal: Positive for abdominal distention, abdominal pain, diarrhea and nausea. Negative for blood in stool, constipation, rectal pain and vomiting. Neurological: Negative for dizziness. All other systems reviewed and are negative. Vitals:    07/05/19 1758   BP: 122/78   Pulse: 93   Resp: 16   Temp: 97.9 °F (36.6 °C)   SpO2: 98%   Weight: 81.6 kg (180 lb)   Height: 5' 2\" (1.575 m)            Physical Exam   Constitutional: She is oriented to person, place, and time. She appears well-developed and well-nourished. HENT:   Head: Normocephalic and atraumatic. Eyes: Pupils are equal, round, and reactive to light. Conjunctivae and EOM are normal.   Neck: Normal range of motion. Neck supple. Cardiovascular: Normal rate and regular rhythm. Pulmonary/Chest: Effort normal and breath sounds normal.   Abdominal: Soft. Bowel sounds are normal. She exhibits distension. She exhibits no fluid wave and no ascites. There is generalized tenderness. Abdomen is soft and nonacute generalized tenderness to entire abdomen   Musculoskeletal: Normal range of motion. Neurological: She is alert and oriented to person, place, and time. She has normal reflexes. Skin: Skin is warm and dry. Psychiatric: She has a normal mood and affect. Her behavior is normal. Judgment and thought content normal.   Nursing note and vitals reviewed.        MDM  Number of Diagnoses or Management Options  Generalized abdominal pain: established and improving  Diagnosis management comments: Patient treated for pain and nausea labs were done CT scan obtained. Patient was on contact precautions during entire visit in ED. Labs UA abdominal CT negative for any acute finding. Patient will be provided pain medicine for home Zofran for nausea. Patient states she has Bentyl. And she has simethicone at home for the gas. Patient will be provided colorectal specialist referral with Dr. Cordelia Montesinos. Discussed fecal transplant with patient and contact precautions at home and good handwashing and cleansing of areas that she is exposed to and especially the bathroom. She states she is improved her pain is decreased I suspect no other acute illness patient will be discharged home with the colorectal referral and suggested to follow-up also with her primary care.   She is informed of signs or symptoms to return to the emergency department as soon as possible       Amount and/or Complexity of Data Reviewed  Clinical lab tests: ordered and reviewed  Review and summarize past medical records: yes  Independent visualization of images, tracings, or specimens: yes    Risk of Complications, Morbidity, and/or Mortality  Presenting problems: moderate  Diagnostic procedures: moderate  Management options: moderate    Patient Progress  Patient progress: improved         Procedures          Vitals:  Patient Vitals for the past 12 hrs:   Temp Pulse Resp BP SpO2   07/05/19 1758 97.9 °F (36.6 °C) 93 16 122/78 98 %       Medications ordered:   Medications   0.9% sodium chloride infusion (has no administration in time range)   sodium chloride 0.9 % bolus infusion 1,000 mL (has no administration in time range)   morphine injection 4 mg (4 mg IntraVENous Given 7/5/19 2018)   ondansetron (ZOFRAN) injection 4 mg (4 mg IntraVENous Given 7/5/19 2018)   pantoprazole (PROTONIX) injection 40 mg (40 mg IntraVENous Given 7/5/19 2018)   iopamidol (ISOVUE 300) 61 % contrast injection  mL (100 mL IntraVENous Given 7/5/19 2043)   oxyCODONE-acetaminophen (PERCOCET) 5-325 mg per tablet 2 Tab (2 Tabs Oral Given 7/5/19 3976)         Lab findings:  Recent Results (from the past 12 hour(s))   CBC WITH AUTOMATED DIFF    Collection Time: 07/05/19  6:11 PM   Result Value Ref Range    WBC 6.9 4.6 - 13.2 K/uL    RBC 4.07 (L) 4.20 - 5.30 M/uL    HGB 12.4 12.0 - 16.0 g/dL    HCT 37.0 35.0 - 45.0 %    MCV 90.9 74.0 - 97.0 FL    MCH 30.5 24.0 - 34.0 PG    MCHC 33.5 31.0 - 37.0 g/dL    RDW 11.9 11.6 - 14.5 %    PLATELET 801 095 - 734 K/uL    MPV 9.6 9.2 - 11.8 FL    NEUTROPHILS 59 40 - 73 %    LYMPHOCYTES 31 21 - 52 %    MONOCYTES 8 3 - 10 %    EOSINOPHILS 2 0 - 5 %    BASOPHILS 0 0 - 2 %    ABS. NEUTROPHILS 4.0 1.8 - 8.0 K/UL    ABS. LYMPHOCYTES 2.2 0.9 - 3.6 K/UL    ABS. MONOCYTES 0.6 0.05 - 1.2 K/UL    ABS. EOSINOPHILS 0.2 0.0 - 0.4 K/UL    ABS. BASOPHILS 0.0 0.0 - 0.1 K/UL    DF AUTOMATED     METABOLIC PANEL, COMPREHENSIVE    Collection Time: 07/05/19  6:11 PM   Result Value Ref Range    Sodium 139 136 - 145 mmol/L    Potassium 4.4 3.5 - 5.5 mmol/L    Chloride 105 100 - 108 mmol/L    CO2 27 21 - 32 mmol/L    Anion gap 7 3.0 - 18 mmol/L    Glucose 105 (H) 74 - 99 mg/dL    BUN 12 7.0 - 18 MG/DL    Creatinine 1.04 0.6 - 1.3 MG/DL    BUN/Creatinine ratio 12 12 - 20      GFR est AA >60 >60 ml/min/1.73m2    GFR est non-AA 57 (L) >60 ml/min/1.73m2    Calcium 8.9 8.5 - 10.1 MG/DL    Bilirubin, total 0.2 0.2 - 1.0 MG/DL    ALT (SGPT) 43 13 - 56 U/L    AST (SGOT) 24 15 - 37 U/L    Alk.  phosphatase 72 45 - 117 U/L    Protein, total 7.5 6.4 - 8.2 g/dL    Albumin 4.1 3.4 - 5.0 g/dL    Globulin 3.4 2.0 - 4.0 g/dL    A-G Ratio 1.2 0.8 - 1.7     LIPASE    Collection Time: 07/05/19  6:11 PM   Result Value Ref Range    Lipase 90 73 - 393 U/L   MAGNESIUM    Collection Time: 07/05/19  6:11 PM   Result Value Ref Range    Magnesium 2.1 1.6 - 2.6 mg/dL   URINALYSIS W/ RFLX MICROSCOPIC    Collection Time: 07/05/19  6:39 PM   Result Value Ref Range    Color YELLOW      Appearance CLEAR      Specific gravity 1.021 1.005 - 1.030      pH (UA) 5.0 5.0 - 8.0      Protein NEGATIVE  NEG mg/dL    Glucose NEGATIVE  NEG mg/dL    Ketone NEGATIVE  NEG mg/dL    Bilirubin NEGATIVE  NEG      Blood NEGATIVE  NEG      Urobilinogen 0.2 0.2 - 1.0 EU/dL    Nitrites NEGATIVE  NEG      Leukocyte Esterase NEGATIVE  NEG             X-Ray, CT or other radiology findings or impressions:  CT ABD PELV W CONT   Final Result   IMPRESSION:      No acute intra-abdominal findings. Mild hepatic steatosis. Prior cholecystectomy. Disposition:    Diagnosis:   1. Generalized abdominal pain        Disposition: to Home      Follow-up Information     Follow up With Specialties Details Why Contact Info    Anthony Calderon MD Colon and Rectal Surgery In 2 days  [address] [place] [address]  [phone]             Patient's Medications   Start Taking    ONDANSETRON (ZOFRAN ODT) 8 MG DISINTEGRATING TABLET    Take 1 Tab by mouth every eight (8) hours as needed for Nausea for up to 12 doses. OXYCODONE-ACETAMINOPHEN (PERCOCET) 5-325 MG PER TABLET    Take 1 Tab by mouth every four (4) hours as needed for Pain for up to 5 days. Max Daily Amount: 6 Tabs. Continue Taking    BUPROPION XL (WELLBUTRIN XL) 150 MG TABLET    TAKE 1 TABLET BY MOUTH ONCE DAILY IN THE MORNING    CYCLOBENZAPRINE (FLEXERIL) 10 MG TABLET    Take 10 mg by mouth three (3) times daily as needed for Muscle Spasm(s). DICYCLOMINE (BENTYL) 10 MG CAPSULE    Take 10 mg by mouth 4 times daily (before meals and nightly). GABAPENTIN (NEURONTIN) 800 MG TABLET    Take 1 Tab by mouth three (3) times daily. LOVASTATIN (MEVACOR) 20 MG TABLET    Take 20 mg by mouth nightly. OMEPRAZOLE (PRILOSEC) 20 MG CAPSULE    TAKE 1 CAPSULE BY MOUTH TWICE DAILY    SERTRALINE (ZOLOFT) 100 MG TABLET    TAKE TWO TABLETS BY MOUTH DAILY    VALACYCLOVIR (VALTREX) 1 GRAM TABLET    Take 1 Tab by mouth daily.     VANCOMYCIN 125 MG/2.5 ML SYRG    Take 125 mg by mouth every six (6) hours for 10 days. These Medications have changed    No medications on file   Stop Taking    No medications on file       Return to the ER if you are unable to obtain referral as directed. Nick Flores's  results have been reviewed with her. She has been counseled regarding her diagnosis, treatment, and plan. She verbally conveys understanding and agreement of the signs, symptoms, diagnosis, treatment and prognosis and additionally agrees to follow up as discussed. She also agrees with the care-plan and conveys that all of her questions have been answered. I have also provided discharge instructions for her that include: educational information regarding their diagnosis and treatment, and list of reasons why they would want to return to the ED prior to their follow-up appointment, should her condition change. Brady Heard ENP-C,FNP-C      Dragon voice recognition was used to generate this report, which may have resulted in some phonetic based errors in grammar and contents.  Even though attempts were made to correct all the mistakes, some may have been missed, and remained in the body of the document

## 2019-07-06 NOTE — ROUTINE PROCESS
I have reviewed discharge instructions with the patient. The patient verbalized understanding. Patient armband removed and shredded. Pt ambulated to car without any distress. 100% of the time

## 2019-07-06 NOTE — DISCHARGE INSTRUCTIONS

## 2019-07-22 ENCOUNTER — APPOINTMENT (OUTPATIENT)
Dept: GENERAL RADIOLOGY | Age: 49
End: 2019-07-22
Attending: EMERGENCY MEDICINE
Payer: COMMERCIAL

## 2019-07-22 ENCOUNTER — HOSPITAL ENCOUNTER (EMERGENCY)
Age: 49
Discharge: HOME | End: 2019-07-22
Attending: EMERGENCY MEDICINE
Payer: COMMERCIAL

## 2019-07-22 VITALS
HEART RATE: 80 BPM | SYSTOLIC BLOOD PRESSURE: 125 MMHG | OXYGEN SATURATION: 97 % | RESPIRATION RATE: 18 BRPM | DIASTOLIC BLOOD PRESSURE: 77 MMHG | TEMPERATURE: 98 F

## 2019-07-22 DIAGNOSIS — R10.13 ABDOMINAL PAIN, EPIGASTRIC: Primary | ICD-10-CM

## 2019-07-22 LAB
ALBUMIN SERPL-MCNC: 4.4 G/DL (ref 3.4–5)
ALBUMIN/GLOB SERPL: 1.2 {RATIO} (ref 0.8–1.7)
ALP SERPL-CCNC: 76 U/L (ref 45–117)
ALT SERPL-CCNC: 40 U/L (ref 13–56)
ANION GAP SERPL CALC-SCNC: 8 MMOL/L (ref 3–18)
APPEARANCE UR: CLEAR
AST SERPL-CCNC: 26 U/L (ref 10–38)
BASOPHILS # BLD: 0 K/UL (ref 0–0.06)
BASOPHILS NFR BLD: 0 % (ref 0–3)
BILIRUB DIRECT SERPL-MCNC: <0.1 MG/DL (ref 0–0.2)
BILIRUB SERPL-MCNC: 0.3 MG/DL (ref 0.2–1)
BILIRUB UR QL: NEGATIVE
BUN SERPL-MCNC: 15 MG/DL (ref 7–18)
BUN/CREAT SERPL: 15 (ref 12–20)
CALCIUM SERPL-MCNC: 9.1 MG/DL (ref 8.5–10.1)
CHLORIDE SERPL-SCNC: 106 MMOL/L (ref 100–111)
CO2 SERPL-SCNC: 25 MMOL/L (ref 21–32)
COLOR UR: YELLOW
CREAT SERPL-MCNC: 1.01 MG/DL (ref 0.6–1.3)
DIFFERENTIAL METHOD BLD: ABNORMAL
EOSINOPHIL # BLD: 0.2 K/UL (ref 0–0.4)
EOSINOPHIL NFR BLD: 2 % (ref 0–5)
ERYTHROCYTE [DISTWIDTH] IN BLOOD BY AUTOMATED COUNT: 12.2 % (ref 11.6–14.5)
GLOBULIN SER CALC-MCNC: 3.6 G/DL (ref 2–4)
GLUCOSE SERPL-MCNC: 95 MG/DL (ref 74–99)
GLUCOSE UR STRIP.AUTO-MCNC: NEGATIVE MG/DL
HCG UR QL: NEGATIVE
HCT VFR BLD AUTO: 37.5 % (ref 35–45)
HGB BLD-MCNC: 13.1 G/DL (ref 12–16)
HGB UR QL STRIP: NEGATIVE
KETONES UR QL STRIP.AUTO: ABNORMAL MG/DL
LEUKOCYTE ESTERASE UR QL STRIP.AUTO: NEGATIVE
LIPASE SERPL-CCNC: 96 U/L (ref 73–393)
LYMPHOCYTES # BLD: 2.2 K/UL (ref 0.8–3.5)
LYMPHOCYTES NFR BLD: 20 % (ref 20–51)
MCH RBC QN AUTO: 31.6 PG (ref 24–34)
MCHC RBC AUTO-ENTMCNC: 34.9 G/DL (ref 31–37)
MCV RBC AUTO: 90.4 FL (ref 74–97)
MONOCYTES # BLD: 0.7 K/UL (ref 0–1)
MONOCYTES NFR BLD: 6 % (ref 2–9)
NEUTS SEG # BLD: 7.9 K/UL (ref 1.8–8)
NEUTS SEG NFR BLD: 72 % (ref 42–75)
NITRITE UR QL STRIP.AUTO: NEGATIVE
PH UR STRIP: 5.5 [PH] (ref 5–8)
PLATELET # BLD AUTO: 247 K/UL (ref 135–420)
PLATELET COMMENTS,PCOM: ABNORMAL
PMV BLD AUTO: 9.7 FL (ref 9.2–11.8)
POTASSIUM SERPL-SCNC: 4.1 MMOL/L (ref 3.5–5.5)
PROT SERPL-MCNC: 8 G/DL (ref 6.4–8.2)
PROT UR STRIP-MCNC: NEGATIVE MG/DL
RBC # BLD AUTO: 4.15 M/UL (ref 4.2–5.3)
RBC MORPH BLD: ABNORMAL
SODIUM SERPL-SCNC: 139 MMOL/L (ref 136–145)
SP GR UR REFRACTOMETRY: >1.03 (ref 1–1.03)
UROBILINOGEN UR QL STRIP.AUTO: 1 EU/DL (ref 0.2–1)
WBC # BLD AUTO: 11 K/UL (ref 4.6–13.2)

## 2019-07-22 PROCEDURE — 80048 BASIC METABOLIC PNL TOTAL CA: CPT

## 2019-07-22 PROCEDURE — 80076 HEPATIC FUNCTION PANEL: CPT

## 2019-07-22 PROCEDURE — 83690 ASSAY OF LIPASE: CPT

## 2019-07-22 PROCEDURE — 96375 TX/PRO/DX INJ NEW DRUG ADDON: CPT

## 2019-07-22 PROCEDURE — 81003 URINALYSIS AUTO W/O SCOPE: CPT

## 2019-07-22 PROCEDURE — 99284 EMERGENCY DEPT VISIT MOD MDM: CPT

## 2019-07-22 PROCEDURE — 81025 URINE PREGNANCY TEST: CPT

## 2019-07-22 PROCEDURE — 71046 X-RAY EXAM CHEST 2 VIEWS: CPT

## 2019-07-22 PROCEDURE — 74011250636 HC RX REV CODE- 250/636: Performed by: EMERGENCY MEDICINE

## 2019-07-22 PROCEDURE — 96374 THER/PROPH/DIAG INJ IV PUSH: CPT

## 2019-07-22 PROCEDURE — 74019 RADEX ABDOMEN 2 VIEWS: CPT

## 2019-07-22 PROCEDURE — 74011000250 HC RX REV CODE- 250: Performed by: EMERGENCY MEDICINE

## 2019-07-22 PROCEDURE — 74011250637 HC RX REV CODE- 250/637: Performed by: EMERGENCY MEDICINE

## 2019-07-22 PROCEDURE — 85025 COMPLETE CBC W/AUTO DIFF WBC: CPT

## 2019-07-22 PROCEDURE — C9113 INJ PANTOPRAZOLE SODIUM, VIA: HCPCS | Performed by: EMERGENCY MEDICINE

## 2019-07-22 RX ORDER — ONDANSETRON 2 MG/ML
4 INJECTION INTRAMUSCULAR; INTRAVENOUS
Status: COMPLETED | OUTPATIENT
Start: 2019-07-22 | End: 2019-07-22

## 2019-07-22 RX ORDER — PANTOPRAZOLE SODIUM 40 MG/1
40 TABLET, DELAYED RELEASE ORAL DAILY
Qty: 20 TAB | Refills: 0 | Status: SHIPPED | OUTPATIENT
Start: 2019-07-22 | End: 2019-08-11

## 2019-07-22 RX ORDER — PANTOPRAZOLE SODIUM 40 MG/10ML
40 INJECTION, POWDER, LYOPHILIZED, FOR SOLUTION INTRAVENOUS
Status: COMPLETED | OUTPATIENT
Start: 2019-07-22 | End: 2019-07-22

## 2019-07-22 RX ORDER — FAMOTIDINE 10 MG/ML
20 INJECTION INTRAVENOUS
Status: COMPLETED | OUTPATIENT
Start: 2019-07-22 | End: 2019-07-22

## 2019-07-22 RX ORDER — MORPHINE SULFATE 2 MG/ML
4 INJECTION, SOLUTION INTRAMUSCULAR; INTRAVENOUS ONCE
Status: DISCONTINUED | OUTPATIENT
Start: 2019-07-22 | End: 2019-07-22

## 2019-07-22 RX ORDER — MORPHINE SULFATE 4 MG/ML
4 INJECTION INTRAVENOUS ONCE
Status: COMPLETED | OUTPATIENT
Start: 2019-07-22 | End: 2019-07-22

## 2019-07-22 RX ORDER — LIDOCAINE HYDROCHLORIDE 20 MG/ML
15 SOLUTION OROPHARYNGEAL AS NEEDED
Status: DISCONTINUED | OUTPATIENT
Start: 2019-07-22 | End: 2019-07-22 | Stop reason: HOSPADM

## 2019-07-22 RX ADMIN — MORPHINE SULFATE 4 MG: 4 INJECTION INTRAVENOUS at 19:31

## 2019-07-22 RX ADMIN — LIDOCAINE HYDROCHLORIDE 15 ML: 20 SOLUTION ORAL; TOPICAL at 18:05

## 2019-07-22 RX ADMIN — ALUMINUM HYDROXIDE AND MAGNESIUM HYDROXIDE 30 ML: 200; 200 SUSPENSION ORAL at 18:05

## 2019-07-22 RX ADMIN — PANTOPRAZOLE SODIUM 40 MG: 40 INJECTION, POWDER, FOR SOLUTION INTRAVENOUS at 18:06

## 2019-07-22 RX ADMIN — ONDANSETRON 4 MG: 2 INJECTION INTRAMUSCULAR; INTRAVENOUS at 19:31

## 2019-07-22 RX ADMIN — FAMOTIDINE 20 MG: 10 INJECTION INTRAVENOUS at 16:15

## 2019-07-22 NOTE — DISCHARGE INSTRUCTIONS
Patient Education        Gastritis: Care Instructions  Your Care Instructions    Gastritis is a sore and upset stomach. It happens when something irritates the stomach lining. Many things can cause it. These include an infection such as the flu or something you ate or drank. Medicines or a sore on the lining of the stomach (ulcer) also can cause it. Your belly may bloat and ache. You may belch, vomit, and feel sick to your stomach. You should be able to relieve the problem by taking medicine. And it may help to change your diet. If gastritis lasts, your doctor may prescribe medicine. Follow-up care is a key part of your treatment and safety. Be sure to make and go to all appointments, and call your doctor if you are having problems. It's also a good idea to know your test results and keep a list of the medicines you take. How can you care for yourself at home? · If your doctor prescribed antibiotics, take them as directed. Do not stop taking them just because you feel better. You need to take the full course of antibiotics. · Be safe with medicines. If your doctor prescribed medicine to decrease stomach acid, take it as directed. Call your doctor if you think you are having a problem with your medicine. · Do not take any other medicine, including over-the-counter pain relievers, without talking to your doctor first.  · If your doctor recommends over-the-counter medicine to reduce stomach acid, such as Pepcid AC, Prilosec, Tagamet HB, or Zantac 75, follow the directions on the label. · Drink plenty of fluids (enough so that your urine is light yellow or clear like water) to prevent dehydration. Choose water and other caffeine-free clear liquids. If you have kidney, heart, or liver disease and have to limit fluids, talk with your doctor before you increase the amount of fluids you drink. · Limit how much alcohol you drink. · Avoid coffee, tea, cola drinks, chocolate, and other foods with caffeine.  They increase stomach acid. When should you call for help? Call 911 anytime you think you may need emergency care. For example, call if:    · You vomit blood or what looks like coffee grounds.     · You pass maroon or very bloody stools.    Call your doctor now or seek immediate medical care if:    · You start breathing fast and have not produced urine in the last 8 hours.     · You cannot keep fluids down.    Watch closely for changes in your health, and be sure to contact your doctor if:    · You do not get better as expected. Where can you learn more? Go to http://steven-alexandru.info/. Enter [id number] in the search box to learn more about \"Gastritis: Care Instructions. \"  Current as of: November 7, 2018  Content Version: 12.1  © 8072-1948 [employer]. Care instructions adapted under license by [employer] (which disclaims liability or warranty for this information). If you have questions about a medical condition or this instruction, always ask your healthcare professional. [address] any warranty or liability for your use of this information.

## 2019-07-22 NOTE — ED TRIAGE NOTES
Pt reports abdominal pain and bloating, recently diagnosed with C-Diff and completed vancomycin, had colonoscopy on 6/20. Reports nausea, vomiting and constipation. LBM 4 days ago.

## 2019-07-22 NOTE — ED PROVIDER NOTES
EMERGENCY DEPARTMENT HISTORY AND PHYSICAL EXAM    3:06 PM  Date: 7/22/2019  Patient Name: Josefina Urbina    History of Presenting Illness     Chief Complaint   Patient presents with    Abdominal Pain        History Provided By: Patient    HPI: Josefina Urbina is a 53 y.o. female with history of IBS and recent C. difficile SB normal colonoscopy in thank worse last month. Patient is presenting with diffuse abdominal pain worse on over the epigastric area radiating to her back associated with nausea without vomiting. No history of fever or chills but she does. Last bowel movement was yesterday and was very small. No history of melena or bloody stool. 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    IBS (irritable bowel syndrome)     Ill-defined condition 2011    h/o of \"blood clot in my lung, after a picc line\"    Ill-defined condition     painless rectal bleeding    MRSA infection     h/o, not current    Ovarian cyst     Pain, upper back     Psychiatric disorder     depression     Stress incontinence     Tattoo     Unspecified deficiency anemia 1999       Past Surgical History:  Past Surgical History:   Procedure Laterality Date    COLONOSCOPY N/A 6/28/2019    DIAGNOSTIC COLONOSCOPY with random bxs performed by Chandrika Leon MD at [address] [place] [address] HX CHOLECYSTECTOMY  2002    HX GYN  2018    Abalation    HX GYN      rt ovary removed    HX GYN      left ovary clipped.  HX ORTHOPAEDIC      arthoscopic rt knee    HX ORTHOPAEDIC      left knee meniscis tear    HX ORTHOPAEDIC Right     meniscus repair    NEUROLOGICAL PROCEDURE UNLISTED  12/2018    laminectomy       Family History:  Family History   Problem Relation Age of Onset    Hypertension Mother     Arthritis-osteo Mother     GERD Father     Hypertension Father     Cancer Sister     MS Maternal Grandmother     Diabetes Paternal Grandmother        Social History:  Social History     Tobacco Use    Smoking status: Former Smoker     Packs/day: 0.50     Years: 10.00     Pack years: 5.00    Smokeless tobacco: Never Used    Tobacco comment: pt smoked on and off for 10 years and quit 6 years ago   Substance Use Topics    Alcohol use: Yes     Alcohol/week: 0.8 standard drinks     Types: 1 Cans of beer per week     Comment: social    Drug use: No       Allergies: Allergies   Allergen Reactions    Apple Anaphylaxis    Cephalexin Hives    Strawberry Anaphylaxis    Wasps [Hymenoptera Allergenic Extract] Anaphylaxis       Review of Systems   Review of Systems   Gastrointestinal: Positive for abdominal distention, abdominal pain, constipation and nausea. Genitourinary: Positive for dysuria. All other systems reviewed and are negative. Physical Exam     Patient Vitals for the past 12 hrs:   Pulse Resp BP SpO2   07/22/19 1400 80 18 108/78 97 %       Physical Exam   Constitutional: She is oriented to person, place, and time. She appears well-developed and well-nourished. HENT:   Head: Normocephalic and atraumatic. Eyes: Conjunctivae and EOM are normal.   Neck: Normal range of motion. Neck supple. Cardiovascular: Normal rate and intact distal pulses. Pulmonary/Chest: Effort normal. No respiratory distress. Abdominal: Soft. She exhibits no distension. There is tenderness in the epigastric area. Musculoskeletal: Normal range of motion. She exhibits no deformity.    Neurological: She is alert and oriented to person, place, and time. Skin: Skin is warm and dry. Psychiatric: She has a normal mood and affect. Her behavior is normal. Judgment and thought content normal.       Diagnostic Study Results     Labs -  No results found for this or any previous visit (from the past 12 hour(s)). Radiologic Studies -   No results found. Medical Decision Making     ED Course: Progress Notes, Reevaluation, and Consults:    3:06 PM Initial assessment performed. The patients presenting problems have been discussed, and they/their family are in agreement with the care plan formulated and outlined with them. I have encouraged them to ask questions as they arise throughout their visit. 7:41 PM  Patient is still complaining of a lot of pain she was crying and states that her pain is comes in waves. Patient had a recent abdominal CT for the exact same pain. I discussed with the patient that since she had a normal CT for the exact same pain and then there is no need to repeat it given the risk of radiation. Patient seemed to agree. Will obtain an erect and supine x-ray as well as try to control her pain and patient will follow-up with GI as an outpatient    Provider Notes (Medical Decision Making): 27-year-old female history of IBS is abdominal pain mainly in the epigastrium with. Patient is well-appearing nontoxic and has a benign exam with mild tenderness in the epigastric area. This is likely ulcer disease. No concern for SBO or C. difficile at this point. Basic screening labs including UA to rule out UTI, symptomatic treatment. H&H is stable patient can follow-up with GI as an outpatient    Procedures:     Critical Care Time:     Vital Signs-Reviewed the patient's vital signs. Reviewed pt's pulse ox reading. EKG: Interpreted by the EP.    Time Interpreted:    Rate:    Rhythm: Normal Sinus Rhythm    Interpretation:   Comparison:     Records Reviewed: Nursing Notes, Old Medical Records, Previous Radiology Studies and Previous Laboratory Studies (Time of Review: 3:06 PM)  -I am the first provider for this patient.  -I reviewed the vital signs, available nursing notes, past medical history, past surgical history, family history and social history. Current Outpatient Medications   Medication Sig Dispense Refill    ondansetron (ZOFRAN ODT) 8 mg disintegrating tablet Take 1 Tab by mouth every eight (8) hours as needed for Nausea for up to 12 doses. 15 Tab 0    buPROPion XL (WELLBUTRIN XL) 150 mg tablet TAKE 1 TABLET BY MOUTH ONCE DAILY IN THE MORNING 30 Tab 3    dicyclomine (BENTYL) 10 mg capsule Take 10 mg by mouth 4 times daily (before meals and nightly).  cyclobenzaprine (FLEXERIL) 10 mg tablet Take 10 mg by mouth three (3) times daily as needed for Muscle Spasm(s).  lovastatin (MEVACOR) 20 mg tablet Take 20 mg by mouth nightly.  sertraline (ZOLOFT) 100 mg tablet TAKE TWO TABLETS BY MOUTH DAILY (Patient taking differently: Take 100 mg by mouth two (2) times a day. TAKE TWO TABLETS BY MOUTH DAILY) 60 Tab 2    omeprazole (PRILOSEC) 20 mg capsule TAKE 1 CAPSULE BY MOUTH TWICE DAILY 60 Cap 1    gabapentin (NEURONTIN) 800 mg tablet Take 1 Tab by mouth three (3) times daily. 120 Tab 3    valACYclovir (VALTREX) 1 gram tablet Take 1 Tab by mouth daily. (Patient taking differently: Take 1,000 mg by mouth daily as needed.) 30 Tab 1        Clinical Impression     Clinical Impression: No diagnosis found. Disposition: DC      DISCHARGE NOTE:     Pt has been reexamined. Patient has no new complaints, changes, or physical findings. Care plan outlined and precautions discussed. Results of labs and imaging were reviewed with the patient. All medications were reviewed with the patient; will d/c home with Protonix. All of pt's questions and concerns were addressed. Patient was instructed and agrees to follow up with GI, as well as to return to the ED upon further deterioration.  Patient is ready to go home.    This note was dictated utilizing voice recognition software which may lead to typographical errors. I apologize in advance if the situation occurs. If questions arise please do not hesitate to contact me or call our department.     Daljit Donald MD  3:06 PM

## 2019-07-23 DIAGNOSIS — R52 GENERALIZED PAIN: ICD-10-CM

## 2019-07-23 DIAGNOSIS — M79.7 FIBROMYALGIA: Primary | ICD-10-CM

## 2019-07-23 RX ORDER — ACETAMINOPHEN AND CODEINE PHOSPHATE 300; 30 MG/1; MG/1
1 TABLET ORAL
Qty: 15 TAB | Refills: 0 | OUTPATIENT
Start: 2019-07-23 | End: 2019-07-26

## 2019-07-23 RX ORDER — GABAPENTIN 800 MG/1
800 TABLET ORAL 3 TIMES DAILY
Qty: 90 TAB | Refills: 1 | Status: SHIPPED | OUTPATIENT
Start: 2019-07-31 | End: 2019-08-28 | Stop reason: SDUPTHER

## 2019-07-23 NOTE — TELEPHONE ENCOUNTER
printed for provider review  Oxycodone-Acetaminophen 5-325mg tablet dispensed on 7/6/2019 #15 Chu Caicedo  Last refill #3 6/28/2019  Last fill of Gabapentin dispensed 7/3/2019  Last office visit 5/21/2019  Last urine drug screen 6/28/2019

## 2019-07-23 NOTE — TELEPHONE ENCOUNTER
Please inform patient tylenol #3 has been denied due to having a scheduled follow up with GI tomorrow. Also, upon chart review she has constipation and bloating. Narcotic medication causes constipation. I would advise miralax to help with constipation which should may help with bloating. Thanks!

## 2019-07-23 NOTE — TELEPHONE ENCOUNTER
Jen Cuevas just giving a quick update. The colorectal surgeon that performed my colonoscopy (Dr. [facility]) is squeezing me in tomorrow at 2pm, Thank God!! The pain is overwhelming and the abdominal destention is a bit alarming needless to say. I do   truly appreciate your prompt response. I am still in the process of finding a pain management doctor with all this going on it has been a challenge to stay on top of finding one that is local and accepts [facility]. Please can u run by [facility] and see if she can give me another small tylenol #3 script so i can keep this pain at some reasonable range that will keep me from running to the ER again. Thank you for your time. Holly Sutton, Mrs. Landy Civil     I also forgot that Gabapentin is now a controlled substance i am not in need of it yet i still have a weeks worth at the least left. But if she honors the tylenol#3 script and I send my  to get it he could also pick the Gabapentin script up too even if [facility] would like to post date it thats fine it would save a trip out there next week. ..def would need the T3 dated today tho.  Thank you again  V

## 2019-08-22 ENCOUNTER — TELEPHONE (OUTPATIENT)
Dept: FAMILY MEDICINE CLINIC | Age: 49
End: 2019-08-22

## 2019-08-22 NOTE — TELEPHONE ENCOUNTER
Patient calling asking for a referral to [facility]. Patient stated this is the only place that will take her insurance in this area.  Patient stated she sent [employer] message with information on getting a referral to this office    pls advise

## 2019-08-28 DIAGNOSIS — K21.9 GASTROESOPHAGEAL REFLUX DISEASE, ESOPHAGITIS PRESENCE NOT SPECIFIED: ICD-10-CM

## 2019-08-28 DIAGNOSIS — M79.7 FIBROMYALGIA: ICD-10-CM

## 2019-08-28 RX ORDER — BUPROPION HYDROCHLORIDE 150 MG/1
TABLET ORAL
Qty: 30 TAB | Refills: 3 | Status: CANCELLED | OUTPATIENT
Start: 2019-08-28

## 2019-08-29 ENCOUNTER — OFFICE VISIT (OUTPATIENT)
Dept: OBGYN CLINIC | Age: 49
End: 2019-08-29

## 2019-08-29 ENCOUNTER — HOSPITAL ENCOUNTER (OUTPATIENT)
Dept: LAB | Age: 49
Discharge: HOME | End: 2019-08-29
Payer: COMMERCIAL

## 2019-08-29 VITALS
SYSTOLIC BLOOD PRESSURE: 115 MMHG | HEIGHT: 62 IN | BODY MASS INDEX: 34.6 KG/M2 | DIASTOLIC BLOOD PRESSURE: 85 MMHG | HEART RATE: 74 BPM | RESPIRATION RATE: 20 BRPM | WEIGHT: 188 LBS

## 2019-08-29 DIAGNOSIS — N76.3 CHRONIC VULVITIS: Primary | ICD-10-CM

## 2019-08-29 DIAGNOSIS — N90.89 LESION OF VULVA: ICD-10-CM

## 2019-08-29 LAB — WET MOUNT POCT, WMPOCT: NORMAL

## 2019-08-29 PROCEDURE — 87255 GENET VIRUS ISOLATE HSV: CPT

## 2019-08-31 LAB
HSV SPEC CULT: NORMAL
SPECIMEN SOURCE: NORMAL

## 2019-09-03 RX ORDER — OMEPRAZOLE 20 MG/1
CAPSULE, DELAYED RELEASE ORAL
Qty: 60 CAP | Refills: 1 | Status: SHIPPED | OUTPATIENT
Start: 2019-09-03 | End: 2019-12-03 | Stop reason: DRUGHIGH

## 2019-09-03 RX ORDER — OMEPRAZOLE 20 MG/1
CAPSULE, DELAYED RELEASE ORAL
Qty: 60 CAP | Refills: 1 | Status: SHIPPED | OUTPATIENT
Start: 2019-09-03 | End: 2019-11-04 | Stop reason: SDUPTHER

## 2019-09-03 RX ORDER — GABAPENTIN 800 MG/1
800 TABLET ORAL 3 TIMES DAILY
Qty: 90 TAB | Refills: 2 | Status: SHIPPED | OUTPATIENT
Start: 2019-09-03 | End: 2019-09-28 | Stop reason: SDUPTHER

## 2019-09-03 RX ORDER — CYCLOBENZAPRINE HCL 10 MG
10 TABLET ORAL
Qty: 60 TAB | Refills: 1 | Status: SHIPPED | OUTPATIENT
Start: 2019-09-03 | End: 2020-03-03

## 2019-09-04 ENCOUNTER — TELEPHONE (OUTPATIENT)
Dept: FAMILY MEDICINE CLINIC | Age: 49
End: 2019-09-04

## 2019-09-04 NOTE — TELEPHONE ENCOUNTER
Pt calling re these 3 items:    1. Requesting a refill of hydrocortisone (HYTONE) 2.5 % topical cream   Stated it for the exact same thing you originally prescribed it for on OV 04/03/2019 . She request you send to:   [email or web address]    2. Gyn. She went to gyn appt 08/2019 and provider saw 2 spots which are being biopsied 09/17/2019    3. She said since colonoscopy didn't find anything, that provider has ordered an urgent upper endoscopy.     She made a f/u appt with you for 09/23/2019 for after all these prior items have been addressed

## 2019-09-05 RX ORDER — HYDROCORTISONE 25 MG/G
CREAM TOPICAL 2 TIMES DAILY
Qty: 30 G | Refills: 0 | Status: SHIPPED | OUTPATIENT
Start: 2019-09-05 | End: 2019-11-07 | Stop reason: SDUPTHER

## 2019-09-17 ENCOUNTER — HOSPITAL ENCOUNTER (OUTPATIENT)
Dept: LAB | Age: 49
Discharge: HOME | End: 2019-09-17
Payer: COMMERCIAL

## 2019-09-17 ENCOUNTER — OFFICE VISIT (OUTPATIENT)
Dept: OBGYN CLINIC | Age: 49
End: 2019-09-17

## 2019-09-17 VITALS
WEIGHT: 189 LBS | SYSTOLIC BLOOD PRESSURE: 135 MMHG | DIASTOLIC BLOOD PRESSURE: 71 MMHG | RESPIRATION RATE: 18 BRPM | BODY MASS INDEX: 34.78 KG/M2 | HEART RATE: 72 BPM | HEIGHT: 62 IN

## 2019-09-17 DIAGNOSIS — N76.3 CHRONIC VULVITIS: Primary | ICD-10-CM

## 2019-09-17 PROCEDURE — 88305 TISSUE EXAM BY PATHOLOGIST: CPT

## 2019-09-17 NOTE — PROCEDURES
[facility]  OFFICE PROCEDURE PROGRESS NOTE        Chart reviewed for the following:   I, Sherryle Kill, DO, have reviewed the History, Physical and updated the Allergic reactions for [address] performed immediately prior to start of procedure:   I, Sherryle Kill, DO, have performed the following reviews on [facility] prior to the start of the procedure:            * Patient was identified by name and date of birth   * Agreement on procedure being performed was verified  * Risks and Benefits explained to the patient  * Procedure site verified and marked as necessary  * Patient was positioned for comfort  * Consent was signed and verified     Time: 7191    Date of procedure: 9/17/2019    Procedure performed by:  Sherryle Kill, DO    Provider assisted by: Ken Rizvi LPN    Patient assisted by: self    How tolerated by patient: tolerated the procedure well with no complications    Post Procedural Pain Scale: 2 - Hurts Little Bit    Comments: none    Procedure note, vulvar biopsy:  All questions were answered and written consent obtained. The vulva was inspected and representative areas chosen at the right para-clitoral area and the left para-rectal area. These locations were swabbed with Betadine and local anesthesia obtained with the injection of a total of 1.5 mg of lidocaine with epinephrine. A 3 mm Keys punch biopsy device was used to obtain a full thickness specimen at each location. Hemostasis was achieved with silver nitrate. There were no complications and the patient tolerated the procedure well. I will contact her with the pathology report.

## 2019-09-18 ENCOUNTER — TELEPHONE (OUTPATIENT)
Dept: OBGYN CLINIC | Age: 49
End: 2019-09-18

## 2019-09-18 RX ORDER — LIDOCAINE 50 MG/G
OINTMENT TOPICAL AS NEEDED
Qty: 1 TUBE | Refills: 0 | Status: SHIPPED | OUTPATIENT
Start: 2019-09-18 | End: 2020-03-03

## 2019-09-18 NOTE — TELEPHONE ENCOUNTER
Patient called to complain about biopsy.  Patient complained about 800 mg ibuprofen not being effective enough she states the sight is extremely red and irritated please advise

## 2019-09-20 ENCOUNTER — OFFICE VISIT (OUTPATIENT)
Dept: OBGYN CLINIC | Age: 49
End: 2019-09-20

## 2019-09-20 VITALS
SYSTOLIC BLOOD PRESSURE: 135 MMHG | HEIGHT: 62 IN | DIASTOLIC BLOOD PRESSURE: 82 MMHG | HEART RATE: 99 BPM | WEIGHT: 188 LBS | BODY MASS INDEX: 34.6 KG/M2

## 2019-09-20 DIAGNOSIS — R10.2 VULVAR PAIN: Primary | ICD-10-CM

## 2019-09-20 RX ORDER — HYDROCODONE BITARTRATE AND ACETAMINOPHEN 5; 325 MG/1; MG/1
1 TABLET ORAL
Qty: 15 TAB | Refills: 0 | Status: SHIPPED | OUTPATIENT
Start: 2019-09-20 | End: 2019-09-23

## 2019-09-20 NOTE — PROGRESS NOTES
Subjective:      Patient is now 3 days post a vulvar biopsy and presents complaining of severe vulvar pain. She states that she thinks that one of the biopsy sites is draining pus. She was previously prescribed lidocaine ointment for pain relief, but says that it only helps for a short time. Current Outpatient Medications   Medication Sig Dispense Refill    HYDROcodone-acetaminophen (NORCO) 5-325 mg per tablet Take 1 Tab by mouth every four (4) hours as needed for Pain for up to 3 days. Max Daily Amount: 6 Tabs. 15 Tab 0    lidocaine (XYLOCAINE) 5 % ointment Apply  to affected area as needed for Pain. 1 Tube 0    hydrocortisone (HYTONE) 2.5 % topical cream Apply  to affected area two (2) times a day. use thin layer 30 g 0    omeprazole (PRILOSEC) 20 mg capsule TAKE 1 CAPSULE BY MOUTH TWICE DAILY 60 Cap 1    gabapentin (NEURONTIN) 800 mg tablet Take 1 Tab by mouth three (3) times daily. 90 Tab 2    omeprazole (PRILOSEC) 20 mg capsule TAKE 1 CAPSULE BY MOUTH TWICE DAILY as needed 60 Cap 1    cyclobenzaprine (FLEXERIL) 10 mg tablet Take 1 Tab by mouth three (3) times daily as needed for Muscle Spasm(s). 60 Tab 1    ondansetron (ZOFRAN ODT) 8 mg disintegrating tablet Take 1 Tab by mouth every eight (8) hours as needed for Nausea for up to 12 doses. 15 Tab 0    buPROPion XL (WELLBUTRIN XL) 150 mg tablet TAKE 1 TABLET BY MOUTH ONCE DAILY IN THE MORNING 30 Tab 3    dicyclomine (BENTYL) 10 mg capsule Take 10 mg by mouth 4 times daily (before meals and nightly).  lovastatin (MEVACOR) 20 mg tablet Take 20 mg by mouth nightly.  sertraline (ZOLOFT) 100 mg tablet TAKE TWO TABLETS BY MOUTH DAILY (Patient taking differently: Take 100 mg by mouth two (2) times a day. TAKE TWO TABLETS BY MOUTH DAILY) 60 Tab 2    valACYclovir (VALTREX) 1 gram tablet Take 1 Tab by mouth daily.  (Patient taking differently: Take 1,000 mg by mouth daily as needed.) 30 Tab 1     Allergies   Allergen Reactions    Apple Anaphylaxis    Cephalexin Hives    Strawberry Anaphylaxis    Wasps [Hymenoptera Allergenic Extract] Anaphylaxis     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    IBS (irritable bowel syndrome)     Ill-defined condition 2011    h/o of \"blood clot in my lung, after a picc line\"    Ill-defined condition     painless rectal bleeding    MRSA infection     h/o, not current    Ovarian cyst     Pain, upper back     Psychiatric disorder     depression     Stress incontinence     Tattoo     Unspecified deficiency anemia 1999     Past Surgical History:   Procedure Laterality Date    COLONOSCOPY N/A 6/28/2019    DIAGNOSTIC COLONOSCOPY with random bxs performed by Yazmin Quispe MD at [address] [place] [address] HX CHOLECYSTECTOMY  2002    HX GYN  2018    Abalation    HX GYN      rt ovary removed    HX GYN      left ovary clipped.     HX ORTHOPAEDIC      arthoscopic rt knee    HX ORTHOPAEDIC      left knee meniscis tear    HX ORTHOPAEDIC Right     meniscus repair    NEUROLOGICAL PROCEDURE UNLISTED  12/2018    laminectomy     Family History   Problem Relation Age of Onset    Hypertension Mother     Arthritis-osteo Mother     GERD Father     Hypertension Father     Cancer Sister     MS Maternal Grandmother     Diabetes Paternal Grandmother      Social History     Tobacco Use    Smoking status: Former Smoker     Packs/day: 0.50     Years: 10.00     Pack years: 5.00    Smokeless tobacco: Never Used    Tobacco comment: pt smoked on and off for 10 years and quit 6 years ago   Substance Use Topics    Alcohol use: Yes     Alcohol/week: 0.8 standard drinks     Types: 1 Cans of beer per week     Comment: social      Review of Systems    A comprehensive review of systems was negative except for that written in the HPI. Objective:     Visit Vitals  /82 (BP 1 Location: Left arm, BP Patient Position: Sitting)   Pulse 99   Ht 5' 2\" (1.575 m)   Wt 188 lb (85.3 kg)   BMI 34.39 kg/m²     General appearance: alert, cooperative, no distress, appears stated age  Vulva: right labial biopsy without erythema, induration, or drainage. Perirectal biopsy site healing well. No evidence of infection at either site. Pathology reviewed and discussed:     A: VULVA, RIGHT ASPECT, BIOPSY:   SQUAMOUS EPITHELIAL HYPERPLASIA. MILD CHRONIC AND ACUTE INFLAMMATION. B: VULVA, LEFT ASPECT, BIOPSY:   SQUAMOUS EPITHELIAL HYPERPLASIA. Assessment/Plan:     Vulvar pain, chronic vulvitis. Rx Norco for short term pain relief, patient to continue lidocaine ointment as prescribed. Patient is undergoing a workup with her PCP for autoimmune processes. If workup is negative recommend that she follow up with dermatology for further evaluation. Plan of care discussed. Patient expressed understanding.

## 2019-09-24 ENCOUNTER — OFFICE VISIT (OUTPATIENT)
Dept: FAMILY MEDICINE CLINIC | Age: 49
End: 2019-09-24

## 2019-09-24 ENCOUNTER — HOSPITAL ENCOUNTER (OUTPATIENT)
Dept: LAB | Age: 49
Discharge: HOME | End: 2019-09-24
Payer: COMMERCIAL

## 2019-09-24 VITALS
DIASTOLIC BLOOD PRESSURE: 68 MMHG | RESPIRATION RATE: 16 BRPM | BODY MASS INDEX: 34.6 KG/M2 | SYSTOLIC BLOOD PRESSURE: 102 MMHG | HEART RATE: 84 BPM | TEMPERATURE: 98 F | WEIGHT: 188 LBS | HEIGHT: 62 IN | OXYGEN SATURATION: 98 %

## 2019-09-24 DIAGNOSIS — M79.10 MYALGIA: ICD-10-CM

## 2019-09-24 DIAGNOSIS — R52 GENERALIZED PAIN: ICD-10-CM

## 2019-09-24 DIAGNOSIS — E78.00 PURE HYPERCHOLESTEROLEMIA: ICD-10-CM

## 2019-09-24 DIAGNOSIS — M25.50 ARTHRALGIA, UNSPECIFIED JOINT: ICD-10-CM

## 2019-09-24 DIAGNOSIS — M25.50 ARTHRALGIA, UNSPECIFIED JOINT: Primary | ICD-10-CM

## 2019-09-24 LAB
CALCIUM SERPL-MCNC: 9.7 MG/DL (ref 8.5–10.1)
CK SERPL-CCNC: 110 U/L (ref 26–192)
PTH-INTACT SERPL-MCNC: 55.9 PG/ML (ref 18.4–88)
TSH SERPL DL<=0.05 MIU/L-ACNC: 1.38 UIU/ML (ref 0.36–3.74)

## 2019-09-24 PROCEDURE — 86140 C-REACTIVE PROTEIN: CPT

## 2019-09-24 PROCEDURE — 84480 ASSAY TRIIODOTHYRONINE (T3): CPT

## 2019-09-24 PROCEDURE — 82550 ASSAY OF CK (CPK): CPT

## 2019-09-24 PROCEDURE — 36415 COLL VENOUS BLD VENIPUNCTURE: CPT

## 2019-09-24 PROCEDURE — 82306 VITAMIN D 25 HYDROXY: CPT

## 2019-09-24 PROCEDURE — 84443 ASSAY THYROID STIM HORMONE: CPT

## 2019-09-24 PROCEDURE — 84439 ASSAY OF FREE THYROXINE: CPT

## 2019-09-24 PROCEDURE — 83970 ASSAY OF PARATHORMONE: CPT

## 2019-09-24 PROCEDURE — 82330 ASSAY OF CALCIUM: CPT

## 2019-09-24 RX ORDER — ONDANSETRON 8 MG/1
8 TABLET, ORALLY DISINTEGRATING ORAL
Qty: 15 TAB | Refills: 0 | Status: SHIPPED | OUTPATIENT
Start: 2019-09-24 | End: 2019-10-11 | Stop reason: SDUPTHER

## 2019-09-24 RX ORDER — ACETAMINOPHEN AND CODEINE PHOSPHATE 300; 30 MG/1; MG/1
1 TABLET ORAL
Qty: 56 TAB | Refills: 0 | Status: SHIPPED | OUTPATIENT
Start: 2019-09-24 | End: 2019-10-11 | Stop reason: SDUPTHER

## 2019-09-24 NOTE — PROGRESS NOTES
Subjective:   Nargis Stephens is a 53 y.o. female with Hypercholesterolemia presents for follow up. Hypercholesterolemia ROS: Medication:  lovastatin, which is/are control uncertain. Cardiovascular ROS - taking medications as instructed, no medication side effects noted, no TIA's, no chest pain on exertion, no dyspnea on exertion, no swelling of ankles  Other symptoms and concerns: patient states she continues to have recurrent inflammation. Comments she was seen by OB/GYN and per patient was informed she has chronic inflammation. Also reports she was informed by her GI whom performed her coloscopy she had normal colonoscopy and positive c-diff. Comments she was informed she needed to have an endoscopy but the provider she was referred to does not accept her insurance. Also continues to have recurrent hives as well. Admits to arthralgias and myalgias. Has morning stiffness. Has had PILY, ESR, RA factor and lupus panel which all returned normal .  comments she was advised by her dermatologist to have consult with rheumatology. Current Outpatient Medications   Medication Sig Dispense Refill    lidocaine (XYLOCAINE) 5 % ointment Apply  to affected area as needed for Pain. 1 Tube 0    hydrocortisone (HYTONE) 2.5 % topical cream Apply  to affected area two (2) times a day. use thin layer 30 g 0    omeprazole (PRILOSEC) 20 mg capsule TAKE 1 CAPSULE BY MOUTH TWICE DAILY 60 Cap 1    gabapentin (NEURONTIN) 800 mg tablet Take 1 Tab by mouth three (3) times daily. 90 Tab 2    omeprazole (PRILOSEC) 20 mg capsule TAKE 1 CAPSULE BY MOUTH TWICE DAILY as needed 60 Cap 1    cyclobenzaprine (FLEXERIL) 10 mg tablet Take 1 Tab by mouth three (3) times daily as needed for Muscle Spasm(s). 60 Tab 1    ondansetron (ZOFRAN ODT) 8 mg disintegrating tablet Take 1 Tab by mouth every eight (8) hours as needed for Nausea for up to 12 doses.  15 Tab 0    buPROPion XL (WELLBUTRIN XL) 150 mg tablet TAKE 1 TABLET BY MOUTH ONCE DAILY IN THE MORNING 30 Tab 3    dicyclomine (BENTYL) 10 mg capsule Take 10 mg by mouth 4 times daily (before meals and nightly).  lovastatin (MEVACOR) 20 mg tablet Take 20 mg by mouth nightly.  sertraline (ZOLOFT) 100 mg tablet TAKE TWO TABLETS BY MOUTH DAILY (Patient taking differently: Take 100 mg by mouth two (2) times a day. TAKE TWO TABLETS BY MOUTH DAILY) 60 Tab 2    valACYclovir (VALTREX) 1 gram tablet Take 1 Tab by mouth daily.  (Patient taking differently: Take 1,000 mg by mouth daily as needed.) 30 Tab 1      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    IBS (irritable bowel syndrome)     Ill-defined condition 2011    h/o of \"blood clot in my lung, after a picc line\"    Ill-defined condition     painless rectal bleeding    MRSA infection     h/o, not current    Ovarian cyst     Pain, upper back     Psychiatric disorder     depression     Stress incontinence     Tattoo     Unspecified deficiency anemia 1999     Family History   Problem Relation Age of Onset    Hypertension Mother     Arthritis-osteo Mother     GERD Father     Hypertension Father     Cancer Sister     MS Maternal Grandmother     Diabetes Paternal Grandmother      Lab Results   Component Value Date/Time    Cholesterol, total 199 05/21/2019 09:43 AM    HDL Cholesterol 42 05/21/2019 09:43 AM    LDL, calculated 127 (H) 05/21/2019 09:43 AM    VLDL, calculated 30 05/21/2019 09:43 AM    Triglyceride 150 (H) 05/21/2019 09:43 AM    CHOL/HDL Ratio 4.7 05/21/2019 09:43 AM     Lab Results   Component Value Date/Time    Sodium 139 07/22/2019 03:15 PM    Potassium 4.1 07/22/2019 03:15 PM    Chloride 106 07/22/2019 03:15 PM    CO2 25 07/22/2019 03:15 PM    Anion gap 8 07/22/2019 03:15 PM    Glucose 95 07/22/2019 03:15 PM    BUN 15 07/22/2019 03:15 PM    Creatinine 1.01 07/22/2019 03:15 PM    BUN/Creatinine ratio 15 07/22/2019 03:15 PM    GFR est AA >60 07/22/2019 03:15 PM    GFR est non-AA 59 (L) 07/22/2019 03:15 PM    Calcium 9.1 07/22/2019 03:15 PM    Bilirubin, total 0.3 07/22/2019 03:15 PM    AST (SGOT) 26 07/22/2019 03:15 PM    Alk. phosphatase 76 07/22/2019 03:15 PM    Protein, total 8.0 07/22/2019 03:15 PM    Albumin 4.4 07/22/2019 03:15 PM    Globulin 3.6 07/22/2019 03:15 PM    A-G Ratio 1.2 07/22/2019 03:15 PM    ALT (SGPT) 40 07/22/2019 03:15 PM     Lab Results   Component Value Date/Time    WBC 11.0 07/22/2019 03:15 PM    HGB 13.1 07/22/2019 03:15 PM    HCT 37.5 07/22/2019 03:15 PM    PLATELET 977 24/22/5459 03:15 PM    MCV 90.4 07/22/2019 03:15 PM     .  Wt Readings from Last 3 Encounters:   09/24/19 188 lb (85.3 kg)   09/20/19 188 lb (85.3 kg)   09/17/19 189 lb (85.7 kg)       Objective:   Visit Vitals  /68   Pulse 84   Temp 98 °F (36.7 °C) (Oral)   Resp 16   Ht 5' 2\" (1.575 m)   Wt 188 lb (85.3 kg)   SpO2 98%   BMI 34.39 kg/m²     General appearance - alert, well appearing, and in no distress  Neck - supple, no significant adenopathy, carotids upstroke normal bilaterally, no bruits  Chest - clear to auscultation, no wheezes, rales or rhonchi, symmetric air entry  Heart - normal rate, regular rhythm, normal S1, S2, no murmurs, rubs, clicks or gallops  Extremities - peripheral pulses normal, no pedal edema, no clubbing or cyanosis      Assessment/Plan:      ICD-10-CM ICD-9-CM    1.  Arthralgia, unspecified joint M25.50 719.40 REFERRAL TO RHEUMATOLOGY      T3 TOTAL      T4, FREE      TSH 3RD GENERATION      PTH INTACT      CALCIUM, IONIZED      VITAMIN D, 25 HYDROXY      C REACTIVE PROTEIN, QT CK   2. Myalgia M79.10 729.1 REFERRAL TO RHEUMATOLOGY      T3 TOTAL      T4, FREE      TSH 3RD GENERATION      PTH INTACT      CALCIUM, IONIZED      VITAMIN D, 25 HYDROXY      C REACTIVE PROTEIN, QT      CK   3. Pure hypercholesterolemia E78.00 272.0    4. Generalized pain R52 780.96 acetaminophen-codeine (TYLENOL #3) 300-30 mg per tablet     Orders Placed This Encounter    T3 TOTAL    T4, FREE    TSH 3RD GENERATION    PTH INTACT    CALCIUM, IONIZED    VITAMIN D, 25 HYDROXY    C REACTIVE PROTEIN, QT    CK    REFERRAL TO RHEUMATOLOGY    ondansetron (ZOFRAN ODT) 8 mg disintegrating tablet    acetaminophen-codeine (TYLENOL #3) 300-30 mg per tablet     I have discussed the diagnosis with the patient and the intended plan as seen in the above orders. The patient has received an after-visit summary and questions were answered concerning future plans. I have discussed medication side effects and warnings with the patient as well. Pt verbalizes understanding. Patient agreeable with above plan and verbalizes understanding. Follow-up and Dispositions    · Return in about 4 weeks (around 10/22/2019) for joint/muscle aches.

## 2019-09-24 NOTE — PROGRESS NOTES
Hill Griffin presents today for   Chief Complaint   Patient presents with    Cholesterol Problem    Anxiety    Depression       Is someone accompanying this pt? no    Is the patient using any DME equipment during OV? zno    Depression Screening:  3 most recent PHQ Screens 1/31/2019   Little interest or pleasure in doing things Several days   Feeling down, depressed, irritable, or hopeless More than half the days   Total Score PHQ 2 3   Trouble falling or staying asleep, or sleeping too much Nearly every day   Feeling tired or having little energy More than half the days   Poor appetite, weight loss, or overeating Not at all   Feeling bad about yourself - or that you are a failure or have let yourself or your family down Not at all   Trouble concentrating on things such as school, work, reading, or watching TV More than half the days   Moving or speaking so slowly that other people could have noticed; or the opposite being so fidgety that others notice Not at all   Thoughts of being better off dead, or hurting yourself in some way Not at all   PHQ 9 Score 10   How difficult have these problems made it for you to do your work, take care of your home and get along with others Very difficult       Learning Assessment:  Learning Assessment 5/18/2018   PRIMARY LEARNER Patient   HIGHEST LEVEL OF EDUCATION - PRIMARY LEARNER  SOME COLLEGE   BARRIERS PRIMARY LEARNER NONE   PRIMARY LANGUAGE ENGLISH   LEARNER PREFERENCE PRIMARY DEMONSTRATION   ANSWERED BY self   RELATIONSHIP SELF       Abuse Screening:  Abuse Screening Questionnaire 5/18/2018   Do you ever feel afraid of your partner? N   Are you in a relationship with someone who physically or mentally threatens you? N   Is it safe for you to go home? Y       Fall Risk  No flowsheet data found. Health Maintenance reviewed and discussed and ordered per Provider.     Health Maintenance Due   Topic Date Due    DTaP/Tdap/Td series (1 - Tdap) 12/12/1992    Influenza Age 5 to Adult  08/01/2019           Coordination of Care:  1. Have you been to the ER, urgent care clinic since your last visit? Hospitalized since your last visit? Yes ed visit july    2. Have you seen or consulted any other health care providers outside of the [address] [place] [address] since your last visit? Include any pap smears or colon screening.  harsh

## 2019-09-25 LAB
25(OH)D3 SERPL-MCNC: 19.8 NG/ML (ref 30–100)
CRP SERPL-MCNC: 0.5 MG/DL (ref 0–0.3)
T4 FREE SERPL-MCNC: 0.7 NG/DL (ref 0.7–1.5)

## 2019-09-26 LAB
CA-I SERPL ISE-MCNC: 5 MG/DL (ref 4.5–5.6)
T3 SERPL-MCNC: 75 NG/DL (ref 71–180)

## 2019-09-28 DIAGNOSIS — M79.7 FIBROMYALGIA: ICD-10-CM

## 2019-09-30 RX ORDER — GABAPENTIN 800 MG/1
TABLET ORAL
Qty: 90 TAB | Refills: 0 | Status: SHIPPED | OUTPATIENT
Start: 2019-09-30 | End: 2019-12-26 | Stop reason: SDUPTHER

## 2019-10-04 RX ORDER — ERGOCALCIFEROL 1.25 MG/1
50000 CAPSULE ORAL
Qty: 4 CAP | Refills: 2 | Status: SHIPPED | OUTPATIENT
Start: 2019-10-04 | End: 2020-11-23 | Stop reason: ALTCHOICE

## 2019-10-08 RX ORDER — SERTRALINE HYDROCHLORIDE 100 MG/1
TABLET, FILM COATED ORAL
Qty: 60 TAB | Refills: 2 | Status: SHIPPED | OUTPATIENT
Start: 2019-10-08 | End: 2019-12-04 | Stop reason: SDUPTHER

## 2019-10-11 DIAGNOSIS — R52 GENERALIZED PAIN: ICD-10-CM

## 2019-10-11 RX ORDER — ACETAMINOPHEN AND CODEINE PHOSPHATE 300; 30 MG/1; MG/1
1 TABLET ORAL
Qty: 56 TAB | Refills: 0 | Status: SHIPPED | OUTPATIENT
Start: 2019-10-11 | End: 2019-10-22 | Stop reason: SDUPTHER

## 2019-10-11 RX ORDER — ONDANSETRON 8 MG/1
8 TABLET, ORALLY DISINTEGRATING ORAL
Qty: 15 TAB | Refills: 0 | Status: SHIPPED | OUTPATIENT
Start: 2019-10-11 | End: 2019-10-22 | Stop reason: SDUPTHER

## 2019-10-11 NOTE — TELEPHONE ENCOUNTER
I have reviewed the patients controlled substance prescription history, as maintained in the [facility]. There is no suspicious activity noted. Usage is consistent with current use of prescribed medications.

## 2019-10-11 NOTE — TELEPHONE ENCOUNTER
From  Lexus Meade To  [facility] Sent  10/11/2019  2:05 PM   Lexus Meade would like a refill of the following medications:       Other -  No other script needed I would like to add a note about the Tylenol#3 it has been very effective in helping to control my widespread chronic pain issues. I have had no other visits to urgent care and am waiting for  PM doc in this area to accept my [employer] ins. Will b changing to [place] on Nov 1st as we discussed Sept 23rd. Having awful time w/ referrals being accepted by [facility], even when I call [employer] member 800# and they r the ones that tell me where I will be covered! ??

## 2019-10-22 DIAGNOSIS — R52 GENERALIZED PAIN: ICD-10-CM

## 2019-10-24 RX ORDER — ONDANSETRON 8 MG/1
8 TABLET, ORALLY DISINTEGRATING ORAL
Qty: 15 TAB | Refills: 0 | Status: SHIPPED | OUTPATIENT
Start: 2019-10-24 | End: 2019-11-04 | Stop reason: SDUPTHER

## 2019-10-24 RX ORDER — ACETAMINOPHEN AND CODEINE PHOSPHATE 300; 30 MG/1; MG/1
1 TABLET ORAL
Qty: 56 TAB | Refills: 0 | Status: SHIPPED | OUTPATIENT
Start: 2019-10-24 | End: 2019-11-07 | Stop reason: SDUPTHER

## 2019-10-29 ENCOUNTER — OFFICE VISIT (OUTPATIENT)
Dept: FAMILY MEDICINE CLINIC | Age: 49
End: 2019-10-29

## 2019-10-29 VITALS
HEIGHT: 62 IN | WEIGHT: 191 LBS | OXYGEN SATURATION: 98 % | SYSTOLIC BLOOD PRESSURE: 109 MMHG | DIASTOLIC BLOOD PRESSURE: 81 MMHG | RESPIRATION RATE: 16 BRPM | BODY MASS INDEX: 35.15 KG/M2 | HEART RATE: 82 BPM | TEMPERATURE: 98.1 F

## 2019-10-29 DIAGNOSIS — M79.7 FIBROMYALGIA: Primary | ICD-10-CM

## 2019-10-29 DIAGNOSIS — F41.9 ANXIETY AND DEPRESSION: ICD-10-CM

## 2019-10-29 DIAGNOSIS — R52 GENERALIZED PAIN: ICD-10-CM

## 2019-10-29 DIAGNOSIS — F32.A ANXIETY AND DEPRESSION: ICD-10-CM

## 2019-10-29 RX ORDER — BUPROPION HYDROCHLORIDE 300 MG/1
300 TABLET ORAL
Qty: 90 TAB | Refills: 0 | Status: SHIPPED | OUTPATIENT
Start: 2019-10-29 | End: 2019-12-19 | Stop reason: SDUPTHER

## 2019-10-29 NOTE — PATIENT INSTRUCTIONS
Fibromyalgia: Care Instructions  Your Care Instructions    Fibromyalgia is a painful condition that is not completely understood by medical experts. The cause of fibromyalgia is not known. It can make you feel tired and ache all over. It causes tender spots at specific points of the body that hurt only when you press on them. You may have trouble sleeping, as well as other symptoms. These problems can upset your work and home life. Symptoms tend to come and go, although they may never go away completely. Fibromyalgia does not harm your muscles, joints, or organs. Follow-up care is a key part of your treatment and safety. Be sure to make and go to all appointments, and call your doctor if you are having problems. It's also a good idea to know your test results and keep a list of the medicines you take. How can you care for yourself at home? · Exercise often. Walk, swim, or bike to help with pain and sleep problems and to make you feel better. · Try to get a good night's sleep. Go to bed and get up at the same time each day, whether you feel rested or not. Make sure you have a good mattress and pillow. · Reduce stress. Avoid things that cause you stress, if you can. If not, work at making them less stressful. Learn to use biofeedback, guided imagery, meditation, or other methods to relax. · Make healthy changes. Eat a balanced diet, quit smoking, and limit alcohol and caffeine. · Use a heating pad set on low or take warm baths or showers for pain. Using cold packs for up to 20 minutes at a time can also relieve pain. Put a thin cloth between the cold pack and your skin. A gentle massage might help too. · Be safe with medicines. Take your medicines exactly as prescribed. Call your doctor if you think you are having a problem with your medicine. Your doctor may talk to you about taking antidepressant medicines. These medicines may improve sleep, relieve pain, and in some cases treat depression.   · Learn about fibromyalgia. This makes coping easier. Then, take an active role in your treatment. · Think about joining a support group with others who have fibromyalgia to learn more and get support. When should you call for help? Watch closely for changes in your health, and be sure to contact your doctor if:    · You feel sad, helpless, or hopeless; lose interest in things you used to enjoy; or have other symptoms of depression.     · Your fibromyalgia symptoms get worse. Where can you learn more? Go to http://steven-alexandru.info/. Enter V003 in the search box to learn more about \"Fibromyalgia: Care Instructions. \"  Current as of: March 28, 2019  Content Version: 12.2  © 5786-9870 [employer]. Care instructions adapted under license by [employer] (which disclaims liability or warranty for this information). If you have questions about a medical condition or this instruction, always ask your healthcare professional. [address] any warranty or liability for your use of this information.

## 2019-10-29 NOTE — PROGRESS NOTES
HISTORY OF PRESENT ILLNESS  Severo Iha is a 53 y.o. female. Patient presents today to follow up on myalgia. States she has been getting frustrated as she doesn't know what is going on. States she continues to diarrhea. States last week she passed blood tinged mucus with BM which has occurred in the past.  Further she has been having bilateral stabbing headaches for the past couple of weeks. Comments yesterday she had brief episode diplopia associated with headache. Has not had this in the past .  Has not had her eyes examined in a while. Further states due to continuous ongoing medication conditions she feels her anxiety has increased. States wellbutrin  And zoloft was working well, inquires if medication can be increased. Allergies   Allergen Reactions    Apple Anaphylaxis    Cephalexin Hives    Strawberry Anaphylaxis    Wasps [Hymenoptera Allergenic Extract] Anaphylaxis     Current Outpatient Medications   Medication Sig Dispense Refill    ondansetron (ZOFRAN ODT) 8 mg disintegrating tablet Take 1 Tab by mouth every eight (8) hours as needed for Nausea for up to 12 doses. 15 Tab 0    acetaminophen-codeine (TYLENOL #3) 300-30 mg per tablet Take 1 Tab by mouth every six (6) hours as needed for Pain for up to 14 days. Max Daily Amount: 4 Tabs. 56 Tab 0    sertraline (ZOLOFT) 100 mg tablet TAKE 2 TABLETS BY MOUTH ONCE DAILY 60 Tab 2    ergocalciferol (ERGOCALCIFEROL) 50,000 unit capsule Take 1 Cap by mouth every seven (7) days. 4 Cap 2    gabapentin (NEURONTIN) 800 mg tablet TAKE 1 TABLET BY MOUTH THREE TIMES DAILY AS NEEDED AS DIRECTED 90 Tab 0    lidocaine (XYLOCAINE) 5 % ointment Apply  to affected area as needed for Pain. 1 Tube 0    hydrocortisone (HYTONE) 2.5 % topical cream Apply  to affected area two (2) times a day.  use thin layer 30 g 0    omeprazole (PRILOSEC) 20 mg capsule TAKE 1 CAPSULE BY MOUTH TWICE DAILY 60 Cap 1    omeprazole (PRILOSEC) 20 mg capsule TAKE 1 CAPSULE BY MOUTH TWICE DAILY as needed 60 Cap 1    cyclobenzaprine (FLEXERIL) 10 mg tablet Take 1 Tab by mouth three (3) times daily as needed for Muscle Spasm(s). 60 Tab 1    buPROPion XL (WELLBUTRIN XL) 150 mg tablet TAKE 1 TABLET BY MOUTH ONCE DAILY IN THE MORNING 30 Tab 3    dicyclomine (BENTYL) 10 mg capsule Take 10 mg by mouth 4 times daily (before meals and nightly).  lovastatin (MEVACOR) 20 mg tablet Take 20 mg by mouth nightly.  valACYclovir (VALTREX) 1 gram tablet Take 1 Tab by mouth daily.  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    IBS (irritable bowel syndrome)     Ill-defined condition 2011    h/o of \"blood clot in my lung, after a picc line\"    Ill-defined condition     painless rectal bleeding    MRSA infection     h/o, not current    Ovarian cyst     Pain, upper back     Psychiatric disorder     depression     Stress incontinence     Tattoo     Unspecified deficiency anemia 1999     Social History     Socioeconomic History    Marital status:      Spouse name: Not on file    Number of children: Not on file    Years of education: Not on file    Highest education level: Not on file   Occupational History    Not on file   Social Needs    Financial resource strain: Not on file    Food insecurity:     Worry: Not on file     Inability: Not on file    Transportation needs:     Medical: Not on file     Non-medical: Not on file   Tobacco Use    Smoking status: Former Smoker Packs/day: 0.50     Years: 10.00     Pack years: 5.00    Smokeless tobacco: Never Used    Tobacco comment: pt smoked on and off for 10 years and quit 6 years ago   Substance and Sexual Activity    Alcohol use: Yes     Alcohol/week: 0.8 standard drinks     Types: 1 Cans of beer per week     Comment: social    Drug use: No    Sexual activity: Yes     Partners: Male   Lifestyle    Physical activity:     Days per week: Not on file     Minutes per session: Not on file    Stress: Not on file   Relationships    Social connections:     Talks on phone: Not on file     Gets together: Not on file     Attends [employer] service: Not on file     Active member of club or organization: Not on file     Attends meetings of clubs or organizations: Not on file     Relationship status: Not on file    Intimate partner violence:     Fear of current or ex partner: Not on file     Emotionally abused: Not on file     Physically abused: Not on file     Forced sexual activity: Not on file   Other Topics Concern    Not on file   Social History Narrative    Not on file     Wt Readings from Last 3 Encounters:   10/29/19 191 lb (86.6 kg)   09/24/19 188 lb (85.3 kg)   09/20/19 188 lb (85.3 kg)     BP Readings from Last 3 Encounters:   10/29/19 109/81   09/24/19 102/68   09/20/19 135/82     Review of Systems   Constitutional: Negative for chills and fever. Respiratory: Negative for shortness of breath. Cardiovascular: Negative for chest pain and palpitations. Gastrointestinal: Positive for abdominal pain (recurrent). Neurological: Positive for headaches. Negative for dizziness. Psychiatric/Behavioral: The patient is nervous/anxious. /81   Pulse 82   Temp 98.1 °F (36.7 °C) (Oral)   Resp 16   Ht 5' 2\" (1.575 m)   Wt 191 lb (86.6 kg)   SpO2 98%   BMI 34.93 kg/m²      Physical Exam   Constitutional: She is oriented to person, place, and time. She appears well-developed and well-nourished.    HENT:   Head: Normocephalic and atraumatic. Neck: Normal range of motion. Neck supple. Cardiovascular: Normal rate, regular rhythm and normal heart sounds. Exam reveals no gallop and no friction rub. No murmur heard. Pulmonary/Chest: Effort normal and breath sounds normal. She has no wheezes. She has no rhonchi. She has no rales. Abdominal: Soft. Bowel sounds are normal. There is generalized tenderness (mild). Neurological: She is alert and oriented to person, place, and time. Skin: Skin is warm and dry. ASSESSMENT and PLAN    ICD-10-CM ICD-9-CM    1. Fibromyalgia M79.7 729.1    2. Generalized pain R52 780.96    3. Anxiety and depression F41.9 300.00     F32.9 311      Orders Placed This Encounter    buPROPion XL (WELLBUTRIN XL) 300 mg XL tablet       I have discussed the diagnosis with the patient and the intended plan as seen in the above orders. The patient has received an after-visit summary and questions were answered concerning future plans. I have discussed medication side effects and warnings with the patient as well. Patient agreeable with above plan and verbalizes understanding. Follow-up and Dispositions    · Return in about 4 weeks (around 11/26/2019) for anxiety.

## 2019-10-29 NOTE — PROGRESS NOTES
Miguel Angel Betts presents today for   Chief Complaint   Patient presents with    Follow-up       Is someone accompanying this pt? no    Is the patient using any DME equipment during OV? no    Depression Screening:  3 most recent PHQ Screens 1/31/2019   Little interest or pleasure in doing things Several days   Feeling down, depressed, irritable, or hopeless More than half the days   Total Score PHQ 2 3   Trouble falling or staying asleep, or sleeping too much Nearly every day   Feeling tired or having little energy More than half the days   Poor appetite, weight loss, or overeating Not at all   Feeling bad about yourself - or that you are a failure or have let yourself or your family down Not at all   Trouble concentrating on things such as school, work, reading, or watching TV More than half the days   Moving or speaking so slowly that other people could have noticed; or the opposite being so fidgety that others notice Not at all   Thoughts of being better off dead, or hurting yourself in some way Not at all   PHQ 9 Score 10   How difficult have these problems made it for you to do your work, take care of your home and get along with others Very difficult       Learning Assessment:  Learning Assessment 5/18/2018   PRIMARY LEARNER Patient   HIGHEST LEVEL OF EDUCATION - PRIMARY LEARNER  SOME COLLEGE   BARRIERS PRIMARY LEARNER NONE   PRIMARY LANGUAGE ENGLISH   LEARNER PREFERENCE PRIMARY DEMONSTRATION   ANSWERED BY self   RELATIONSHIP SELF       Abuse Screening:  Abuse Screening Questionnaire 5/18/2018   Do you ever feel afraid of your partner? N   Are you in a relationship with someone who physically or mentally threatens you? N   Is it safe for you to go home? Y       Fall Risk  No flowsheet data found. Health Maintenance reviewed and discussed and ordered per Provider.     Health Maintenance Due   Topic Date Due    DTaP/Tdap/Td series (1 - Tdap) 12/12/1992    Influenza Age 5 to Adult  08/01/2019 Coordination of Care:  1. Have you been to the ER, urgent care clinic since your last visit? Hospitalized since your last visit? no    2. Have you seen or consulted any other health care providers outside of the [address] [place] [address] since your last visit? Include any pap smears or colon screening.  no

## 2019-11-04 DIAGNOSIS — M79.7 FIBROMYALGIA: ICD-10-CM

## 2019-11-04 DIAGNOSIS — K21.9 GASTROESOPHAGEAL REFLUX DISEASE, ESOPHAGITIS PRESENCE NOT SPECIFIED: ICD-10-CM

## 2019-11-04 DIAGNOSIS — R52 GENERALIZED PAIN: ICD-10-CM

## 2019-11-04 RX ORDER — GABAPENTIN 800 MG/1
TABLET ORAL
Qty: 90 TAB | Refills: 0 | Status: CANCELLED | OUTPATIENT
Start: 2019-11-04

## 2019-11-04 RX ORDER — ACETAMINOPHEN AND CODEINE PHOSPHATE 300; 30 MG/1; MG/1
1 TABLET ORAL
Qty: 56 TAB | Refills: 0 | Status: CANCELLED | OUTPATIENT
Start: 2019-11-04 | End: 2019-11-18

## 2019-11-04 RX ORDER — ONDANSETRON 8 MG/1
8 TABLET, ORALLY DISINTEGRATING ORAL
Qty: 12 TAB | Refills: 0 | Status: SHIPPED | OUTPATIENT
Start: 2019-11-04 | End: 2019-11-13 | Stop reason: SDUPTHER

## 2019-11-04 RX ORDER — OMEPRAZOLE 20 MG/1
CAPSULE, DELAYED RELEASE ORAL
Qty: 180 CAP | Refills: 0 | Status: SHIPPED | OUTPATIENT
Start: 2019-11-04 | End: 2019-12-03 | Stop reason: SDUPTHER

## 2019-11-13 RX ORDER — ONDANSETRON 8 MG/1
8 TABLET, ORALLY DISINTEGRATING ORAL
Qty: 12 TAB | Refills: 0 | Status: SHIPPED | OUTPATIENT
Start: 2019-11-13 | End: 2019-11-20 | Stop reason: SDUPTHER

## 2019-11-14 RX ORDER — LOVASTATIN 20 MG/1
20 TABLET ORAL
Qty: 90 TAB | Refills: 1 | Status: SHIPPED | OUTPATIENT
Start: 2019-11-14 | End: 2020-04-09 | Stop reason: ALTCHOICE

## 2019-11-20 DIAGNOSIS — R52 GENERALIZED PAIN: ICD-10-CM

## 2019-11-21 RX ORDER — ONDANSETRON 8 MG/1
8 TABLET, ORALLY DISINTEGRATING ORAL
Qty: 12 TAB | Refills: 0 | Status: SHIPPED | OUTPATIENT
Start: 2019-11-21 | End: 2019-12-03 | Stop reason: SDUPTHER

## 2019-11-21 RX ORDER — ACETAMINOPHEN AND CODEINE PHOSPHATE 300; 30 MG/1; MG/1
1 TABLET ORAL
Qty: 42 TAB | Refills: 0 | Status: SHIPPED | OUTPATIENT
Start: 2019-11-21 | End: 2019-12-04 | Stop reason: SDUPTHER

## 2019-12-02 RX ORDER — ONDANSETRON 8 MG/1
8 TABLET, ORALLY DISINTEGRATING ORAL
Qty: 12 TAB | Refills: 0 | Status: CANCELLED | OUTPATIENT
Start: 2019-12-02

## 2019-12-03 ENCOUNTER — OFFICE VISIT (OUTPATIENT)
Dept: FAMILY MEDICINE CLINIC | Age: 49
End: 2019-12-03

## 2019-12-03 ENCOUNTER — HOSPITAL ENCOUNTER (OUTPATIENT)
Dept: LAB | Age: 49
Discharge: HOME | End: 2019-12-03
Payer: COMMERCIAL

## 2019-12-03 VITALS
WEIGHT: 191 LBS | TEMPERATURE: 98.6 F | OXYGEN SATURATION: 98 % | HEART RATE: 90 BPM | DIASTOLIC BLOOD PRESSURE: 86 MMHG | HEIGHT: 62 IN | BODY MASS INDEX: 35.15 KG/M2 | RESPIRATION RATE: 16 BRPM | SYSTOLIC BLOOD PRESSURE: 127 MMHG

## 2019-12-03 DIAGNOSIS — Z86.19 HISTORY OF SHINGLES: ICD-10-CM

## 2019-12-03 DIAGNOSIS — K21.9 GASTROESOPHAGEAL REFLUX DISEASE, ESOPHAGITIS PRESENCE NOT SPECIFIED: ICD-10-CM

## 2019-12-03 DIAGNOSIS — R52 GENERALIZED PAIN: Primary | ICD-10-CM

## 2019-12-03 DIAGNOSIS — R52 GENERALIZED PAIN: ICD-10-CM

## 2019-12-03 LAB — LIPASE SERPL-CCNC: 91 U/L (ref 73–393)

## 2019-12-03 PROCEDURE — 83690 ASSAY OF LIPASE: CPT

## 2019-12-03 PROCEDURE — 36415 COLL VENOUS BLD VENIPUNCTURE: CPT

## 2019-12-03 PROCEDURE — 86695 HERPES SIMPLEX TYPE 1 TEST: CPT

## 2019-12-03 PROCEDURE — 86787 VARICELLA-ZOSTER ANTIBODY: CPT

## 2019-12-03 PROCEDURE — 86694 HERPES SIMPLEX NES ANTBDY: CPT

## 2019-12-03 RX ORDER — OMEPRAZOLE 40 MG/1
40 CAPSULE, DELAYED RELEASE ORAL
Qty: 60 CAP | Refills: 1 | Status: SHIPPED | OUTPATIENT
Start: 2019-12-03 | End: 2020-02-07 | Stop reason: SDUPTHER

## 2019-12-03 RX ORDER — ONDANSETRON 8 MG/1
8 TABLET, ORALLY DISINTEGRATING ORAL
Qty: 30 TAB | Refills: 0 | Status: SHIPPED | OUTPATIENT
Start: 2019-12-03 | End: 2019-12-17 | Stop reason: SDUPTHER

## 2019-12-03 RX ORDER — VALACYCLOVIR HYDROCHLORIDE 1 G/1
1000 TABLET, FILM COATED ORAL DAILY
Qty: 30 TAB | Refills: 1 | Status: SHIPPED | OUTPATIENT
Start: 2019-12-03 | End: 2020-05-06 | Stop reason: SDUPTHER

## 2019-12-03 NOTE — PROGRESS NOTES
HISTORY OF PRESENT ILLNESS  Glenn Martinez is a 53 y.o. female. Patient presents today to follow up on nausea/vomiting/diarrhea for the last several days. States symptoms have not changed but just worse. Reports she has been having to take tylenol #3 and zofran routinely to help with the pain and associated nausea. States she feels like something else is wrong. Comments she would like to have her pancreatic enzymes drawn. Allergies   Allergen Reactions    Apple Anaphylaxis    Cephalexin Hives    Strawberry Anaphylaxis    Wasps [Hymenoptera Allergenic Extract] Anaphylaxis     Current Outpatient Medications   Medication Sig Dispense Refill    acetaminophen-codeine (TYLENOL #3) 300-30 mg per tablet Take 1 Tab by mouth every eight (8) hours as needed for Pain for up to 14 days. Max Daily Amount: 3 Tabs. 42 Tab 0    ondansetron (ZOFRAN ODT) 8 mg disintegrating tablet Take 1 Tab by mouth every eight (8) hours as needed for Nausea for up to 12 doses. 12 Tab 0    lovastatin (MEVACOR) 20 mg tablet Take 1 Tab by mouth nightly. 90 Tab 1    hydrocortisone (HYTONE) 2.5 % topical cream Apply  to affected area two (2) times a day. use thin layer 30 g 0    omeprazole (PRILOSEC) 20 mg capsule TAKE 1 CAPSULE BY MOUTH TWICE DAILY 180 Cap 0    buPROPion XL (WELLBUTRIN XL) 300 mg XL tablet Take 1 Tab by mouth every morning. 90 Tab 0    sertraline (ZOLOFT) 100 mg tablet TAKE 2 TABLETS BY MOUTH ONCE DAILY 60 Tab 2    ergocalciferol (ERGOCALCIFEROL) 50,000 unit capsule Take 1 Cap by mouth every seven (7) days. 4 Cap 2    gabapentin (NEURONTIN) 800 mg tablet TAKE 1 TABLET BY MOUTH THREE TIMES DAILY AS NEEDED AS DIRECTED 90 Tab 0    lidocaine (XYLOCAINE) 5 % ointment Apply  to affected area as needed for Pain.  1 Tube 0    omeprazole (PRILOSEC) 20 mg capsule TAKE 1 CAPSULE BY MOUTH TWICE DAILY as needed 60 Cap 1    cyclobenzaprine (FLEXERIL) 10 mg tablet Take 1 Tab by mouth three (3) times daily as needed for Muscle Spasm(s). 60 Tab 1    dicyclomine (BENTYL) 10 mg capsule Take 10 mg by mouth 4 times daily (before meals and nightly).  valACYclovir (VALTREX) 1 gram tablet Take 1 Tab by mouth daily. 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    IBS (irritable bowel syndrome)     Ill-defined condition 2011    h/o of \"blood clot in my lung, after a picc line\"    Ill-defined condition     painless rectal bleeding    MRSA infection     h/o, not current    Ovarian cyst     Pain, upper back     Psychiatric disorder     depression     Stress incontinence     Tattoo     Unspecified deficiency anemia 1999     Social History     Socioeconomic History    Marital status:      Spouse name: Not on file    Number of children: Not on file    Years of education: Not on file    Highest education level: Not on file   Occupational History    Not on file   Social Needs    Financial resource strain: Not on file    Food insecurity:     Worry: Not on file     Inability: Not on file    Transportation needs:     Medical: Not on file     Non-medical: Not on file   Tobacco Use    Smoking status: Former Smoker     Packs/day: 0.50     Years: 10.00     Pack years: 5.00    Smokeless tobacco: Never Used    Tobacco comment: pt smoked on and off for 10 years and quit 6 years ago   Substance and Sexual Activity    Alcohol use:  Yes     Alcohol/week: 0.8 standard drinks     Types: 1 Cans of beer per week     Comment: social  Drug use: No    Sexual activity: Yes     Partners: Male   Lifestyle    Physical activity:     Days per week: Not on file     Minutes per session: Not on file    Stress: Not on file   Relationships    Social connections:     Talks on phone: Not on file     Gets together: Not on file     Attends Mandaen service: Not on file     Active member of club or organization: Not on file     Attends meetings of clubs or organizations: Not on file     Relationship status: Not on file    Intimate partner violence:     Fear of current or ex partner: Not on file     Emotionally abused: Not on file     Physically abused: Not on file     Forced sexual activity: Not on file   Other Topics Concern    Not on file   Social History Narrative    Not on file     Wt Readings from Last 3 Encounters:   12/03/19 191 lb (86.6 kg)   10/29/19 191 lb (86.6 kg)   09/24/19 188 lb (85.3 kg)     BP Readings from Last 3 Encounters:   12/03/19 127/86   10/29/19 109/81   09/24/19 102/68     Review of Systems   Constitutional: Negative for chills and fever. Respiratory: Negative for shortness of breath. Cardiovascular: Negative for chest pain and palpitations. Gastrointestinal: Positive for abdominal pain (epigastric pain), diarrhea, nausea and vomiting. Neurological: Positive for headaches. /86 (BP 1 Location: Left arm, BP Patient Position: Sitting)   Pulse 90   Temp 98.6 °F (37 °C) (Oral)   Resp 16   Ht 5' 2\" (1.575 m)   Wt 191 lb (86.6 kg)   SpO2 98%   BMI 34.93 kg/m²     Physical Exam  Constitutional:       Appearance: She is well-developed. HENT:      Head: Normocephalic and atraumatic. Neck:      Musculoskeletal: Normal range of motion and neck supple. Cardiovascular:      Rate and Rhythm: Normal rate and regular rhythm. Heart sounds: Normal heart sounds. No murmur. No friction rub. No gallop. Pulmonary:      Effort: Pulmonary effort is normal.      Breath sounds: Normal breath sounds.  No wheezing, rhonchi or rales. Abdominal:      General: Bowel sounds are normal.      Palpations: Abdomen is soft. Tenderness: There is tenderness. Skin:     General: Skin is warm and dry. Neurological:      Mental Status: She is alert and oriented to person, place, and time. ASSESSMENT and PLAN    ICD-10-CM ICD-9-CM    1. Generalized pain R52 780.96 LIPASE   2. Gastroesophageal reflux disease, esophagitis presence not specified K21.9 530.81 omeprazole (PRILOSEC) 40 mg capsule   3. History of shingles Z86.19 V12.09 VARICELLA ZOSTER ABS, IGG/IGM      HSV TYPE-SPECIFIC AB, IGG      HSV 1 AND 2 ABS, IGM     Orders Placed This Encounter    LIPASE    VARICELLA ZOSTER ABS, IGG/IGM    HSV TYPE-SPECIFIC AB, IGG    HSV 1 AND 2 ABS, IGM    omeprazole (PRILOSEC) 40 mg capsule    ondansetron (ZOFRAN ODT) 8 mg disintegrating tablet       I have discussed the diagnosis with the patient and the intended plan as seen in the above orders. The patient has received an after-visit summary and questions were answered concerning future plans. I have discussed medication side effects and warnings with the patient as well. Patient agreeable with above plan and verbalizes understanding. Follow-up and Dispositions    · Return in about 3 months (around 3/3/2020) for HLD.

## 2019-12-03 NOTE — PROGRESS NOTES
Ashley Starch presents today for   Chief Complaint   Patient presents with    Diarrhea    Vomiting    Epigastric Pain       Is someone accompanying this pt? no    Is the patient using any DME equipment during OV? no    Depression Screening:  3 most recent PHQ Screens 1/31/2019   Little interest or pleasure in doing things Several days   Feeling down, depressed, irritable, or hopeless More than half the days   Total Score PHQ 2 3   Trouble falling or staying asleep, or sleeping too much Nearly every day   Feeling tired or having little energy More than half the days   Poor appetite, weight loss, or overeating Not at all   Feeling bad about yourself - or that you are a failure or have let yourself or your family down Not at all   Trouble concentrating on things such as school, work, reading, or watching TV More than half the days   Moving or speaking so slowly that other people could have noticed; or the opposite being so fidgety that others notice Not at all   Thoughts of being better off dead, or hurting yourself in some way Not at all   PHQ 9 Score 10   How difficult have these problems made it for you to do your work, take care of your home and get along with others Very difficult       Learning Assessment:  Learning Assessment 5/18/2018   PRIMARY LEARNER Patient   HIGHEST LEVEL OF EDUCATION - PRIMARY LEARNER  SOME COLLEGE   BARRIERS PRIMARY LEARNER NONE   PRIMARY LANGUAGE ENGLISH   LEARNER PREFERENCE PRIMARY DEMONSTRATION   ANSWERED BY self   RELATIONSHIP SELF       Abuse Screening:  Abuse Screening Questionnaire 5/18/2018   Do you ever feel afraid of your partner? N   Are you in a relationship with someone who physically or mentally threatens you? N   Is it safe for you to go home? Y       Fall Risk  No flowsheet data found. Health Maintenance reviewed and discussed and ordered per Provider.     Health Maintenance Due   Topic Date Due    DTaP/Tdap/Td series (1 - Tdap) 12/12/1982    Influenza Age 5 to Adult  08/01/2019           Coordination of Care:  1. Have you been to the ER, urgent care clinic since your last visit? Hospitalized since your last visit? no    2. Have you seen or consulted any other health care providers outside of the [address] [place] [address] since your last visit? Include any pap smears or colon screening.  no

## 2019-12-04 DIAGNOSIS — R52 GENERALIZED PAIN: ICD-10-CM

## 2019-12-04 LAB
HSV1 IGM TITR SER IF: NORMAL TITER
HSV2 IGM TITR SER IF: NORMAL TITER

## 2019-12-05 RX ORDER — ACETAMINOPHEN AND CODEINE PHOSPHATE 300; 30 MG/1; MG/1
1 TABLET ORAL
Qty: 45 TAB | Refills: 0 | Status: SHIPPED | OUTPATIENT
Start: 2019-12-05 | End: 2019-12-19 | Stop reason: SDUPTHER

## 2019-12-05 RX ORDER — SERTRALINE HYDROCHLORIDE 100 MG/1
TABLET, FILM COATED ORAL
Qty: 60 TAB | Refills: 2 | Status: SHIPPED | OUTPATIENT
Start: 2019-12-05 | End: 2020-04-09 | Stop reason: SDUPTHER

## 2019-12-06 LAB
HSV1 IGG SER QL: NORMAL
VZV IGG SER IA-ACNC: >4000 INDEX
VZV IGM SER IA-ACNC: <0.91 INDEX (ref 0–0.9)

## 2019-12-18 RX ORDER — ONDANSETRON 8 MG/1
8 TABLET, ORALLY DISINTEGRATING ORAL
Qty: 30 TAB | Refills: 0 | Status: SHIPPED | OUTPATIENT
Start: 2019-12-18 | End: 2020-01-02 | Stop reason: SDUPTHER

## 2019-12-26 DIAGNOSIS — M79.7 FIBROMYALGIA: ICD-10-CM

## 2019-12-27 RX ORDER — GABAPENTIN 800 MG/1
TABLET ORAL
Qty: 90 TAB | Refills: 0 | Status: SHIPPED | OUTPATIENT
Start: 2019-12-27 | End: 2020-01-27 | Stop reason: SDUPTHER

## 2020-01-02 DIAGNOSIS — R52 GENERALIZED PAIN: ICD-10-CM

## 2020-01-02 NOTE — H&P
Patient Education     Viral Upper Respiratory Illness with Wheezing (Adult)  You have a viral upper respiratory illness (URI), which is another term for the common cold. When the infection causes a lot of irritation, the air passages can go into spasm. This causes wheezing and shortness of breath.    This illness is contagious during the first few days. It is spread through the air by coughing and sneezing. It may also be spread by direct contact. This could be by touching the sick person and then touching your own eyes, nose, or mouth. Frequent handwashing will decrease the risk.  Most viral illnesses go away within 7 to 10 days with rest and simple home remedies. Sometimes the illness may last for several weeks. Antibiotics will not kill a virus, and they are generally not prescribed for this condition.  Home care  · If symptoms are severe, rest at home for the first 2 to 3 days. When you resume activity, don't let yourself get too tired.  · Stay away from cigarette smoke.  · You may use acetaminophen or ibuprofen to control pain and fever, unless another medicine was prescribed. If you have chronic liver or kidney disease, have ever had a stomach ulcer or gastrointestinal bleeding, or are taking blood-thinning medicines, talk with your healthcare provider before using these medicines. Aspirin should never be given to anyone under 18 years of age who is ill with a viral infection or fever. It may cause severe liver or brain damage.  · Your appetite may be poor, so a light diet is fine. Avoid dehydration by drinking 6 to 8 glasses of fluids per day (water, soft drinks, juices, tea, or soup). Extra fluids will help loosen secretions in the nose and lungs.  · Over-the-counter cold medicines will not shorten the length of time you’re sick, but they may be helpful for the following symptoms: cough, sore throat, and nasal and sinus congestion. Don't use decongestants if you have high blood pressure.  Follow-up  Pre-Admission History and Physical    Patient: Denia Knox   MRN: [id number]   SSN: [id number]   YOB: 1970   Age: 56 y.o. Sex: female     Patient scheduled for: L4/5 right lami. Date of surgery: 12/17/18. [facility] Surgeon: Lazarus Patch, MD    HPI:  Denia Knox is a 56 y.o. female with  back pain and right paracentral back pain bee stinging in her buttock. I had her seen by Dr. Xenia Thurman who agreed that there maybe a measure of shingles to this and she has been placed on chronic acyclovir which has not really helped her much, though rash has been witnessed. He and other doctors have considered that there maybe a dual pathology present here both for stenosis and some sort of viral shingles pathology present. I sat down with her and went over the entire issue in exhaustive detail, the nature of her possible shingles, the nature of her stenosis, her disc protrusion, her pain, her back pain, her radicular components of it to the degree that she has it. The nature of surgery which in my mind if we were to consider would be a right-sided hemilaminotomy decompression discectomy. The possibility of surgery could, as any surgery, make her pathology worse as well as better. Lack of true instability in her back. We went over the details and answered her questions; misgivings. She clearly understands the ramifications and wishes to proceed. After looking at the numerous evaluations she has been through and all the things she has been subjected to, I think it is not unreasonable to consider a simple decompression for stenosis with disc protrusion at this L4-5 segment. She reports a pain level of 5/10. MRI demonstrates  L4-L5 disc bulge with relative stenosis at L4-L5. This patient has failed the presurgical conservative treatments  including physical therapy, spinal block injections and medications.  Pain has impacted the patient's functional ability care  Follow up with your healthcare provider, or as advised.  When to seek medical advice  Call your healthcare provider right away if any of these occur:  · Cough with lots of colored sputum (mucus)  · Severe headache; face, neck, or ear pain  · Difficulty swallowing due to throat pain  · Fever of 100.4ºF (38ºC) or higher, or as directed by your healthcare provider  Call 911  Call 911 if any of these occur:  · Chest pain, shortness of breath, worsening wheezing, or difficulty breathing  · Coughing up blood  · Can't swallow because of throat pain  Date Last Reviewed: 9/13/2015  © 5558-6619 [employer]. [address] [place] [address], [place] [address]. All rights reserved. This information is not intended as a substitute for professional medical care. Always follow your healthcare professional's instructions.            to do her daily activities without pain. She  is being admitted for surgical intervention. 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    Pain, upper back     Psychiatric disorder     depression     Tattoo     Unspecified deficiency anemia 1999     Social History     Socioeconomic History    Marital status:      Spouse name: Not on file    Number of children: Not on file    Years of education: Not on file    Highest education level: Not on file   Tobacco Use    Smoking status: Passive Smoke Exposure - Never Smoker    Smokeless tobacco: Never Used    Tobacco comment: last attempt to quit 1/1/2003   Substance and Sexual Activity    Alcohol use: Yes     Alcohol/week: 0.5 oz     Types: 1 Cans of beer per week    Drug use: No    Sexual activity: Yes     Partners: Male     Past Surgical History:   Procedure Laterality Date    HX APPENDECTOMY  1995    HX CHOLECYSTECTOMY  2002    HX GYN  2018    Abalation    HX GYN      rt ovary removed    HX ORTHOPAEDIC      arthoscopic rt knee    HX ORTHOPAEDIC      left knee meniscis tear     Family History   Problem Relation Age of Onset    Hypertension Mother     Arthritis-osteo Mother     GERD Father     Hypertension Father     Cancer Sister     MS Maternal Grandmother      Allergies   Allergen Reactions    Apple Anaphylaxis    Wasps [Hymenoptera Allergenic Extract] Anaphylaxis    Cephalexin Hives     Current Outpatient Medications   Medication Sig Dispense Refill    cyclobenzaprine (FLEXERIL) 10 mg tablet Take  by mouth three (3) times daily as needed for Muscle Spasm(s).  omeprazole (PRILOSEC) 20 mg capsule Take 1 Cap by mouth two (2) times a day. 60 Cap 1    gabapentin (NEURONTIN) 800 mg tablet Take 1 Tab by mouth three (3) times daily.  90 Tab 3    sertraline (ZOLOFT) 100 mg tablet Take 2 Tabs by mouth daily. 60 Tab 3    lovastatin (MEVACOR) 20 mg tablet Take 1 Tab by mouth nightly. 30 Tab 3    raNITIdine (ZANTAC) 150 mg tablet Take 150 mg by mouth two (2) times a day.  aspirin delayed-release 81 mg tablet Take  by mouth daily.  EPINEPHrine (EPIPEN) 0.3 mg/0.3 mL injection 0.3 mL by IntraMUSCular route once as needed for up to 1 dose. Indications: Anaphylaxis 1 Syringe 2       ROS:  Denies chills, fever,night sweats,  bowel or bladder dysfunction, unexplained weight loss/weight gain, chest pain, sob or anxiety. Physical Examination    Gen: Well developed, well nourished 56 y.o. female   /86 (BP 1 Location: Left arm, BP Patient Position: Sitting)   Pulse 82   Temp 98.2 °F (36.8 °C) (Oral)   Resp 19   Wt 178 lb 6.4 oz (80.9 kg)   BMI 32.63 kg/m²    PAIN SCALE: 5/10     CONSTITUTIONAL: The patient is in no apparent distress and is alert and oriented x 3. HEENT: Normocephalic. Hearing grossly intact. NECK: Supple and symmetric. no tenderness, or masses were felt. RESPIRATORY: No labored breathing. CARDIOVASCULAR: The carotid pulses were normal. Peripheral pulses were 2+. CHEST: Normal AP diameter and normal contour without any kyphoscoliosis. LYMPHATIC: No lymphadenopathy was appreciated in the neck, axillae or groin. SKIN:  Negative for scars, rashes, lesions, or ulcers on the right upper, right lower, left upper, left lower and trunk. NEUROLOGICAL: Alert and oriented x 3. Ambulation without assistive device. FWB. EXTREMITIES: See musculoskeletal.  MUSCULOSKELETAL:  · Head and Neck:  Negative for misalignment, asymmetry, crepitation, defects, tenderness masses or effusions. · Left Upper Extremity: Inspection, percussion and palpation performed. Hymans sign is negative. · Right Upper Extremity: Inspection, percussion and palpation performed. Hymans sign is negative. · Spine, Ribs and Pelvis: Low back pain.  Inspection, percussion and palpation performed. Negative for misalignment, asymmetry, crepitation, defects, tenderness masses or effusions. · Left Lower Extremity: Inspection, percussion and palpation performed. Negative straight leg raise. · Right Lower Extremity: Buttock pain. Inspection, percussion and palpation performed. Negative straight leg raise.           SPINE EXAM:         Lumbar spine: No rash, ecchymosis, or gross obliquity. No focal atrophy is noted.          *    Assessment and Plan    Due to the pt's persistent symptoms unrelieved by conservative measure Sonia Mcknight is being admitted [facility] to undergo surgical intervention. The post-operative plan of care consists of physical therapy, home health and a 2 week f/u office visit. We are pending medical clearance by Dr. Carranza Courser. The risks, benefits, complications and alternatives to surgery have been discussed in detail with the patient. The patient understands and agrees to proceed.      Bandar Pedroza, HEENA-C dictating for Rosanna Brown MD

## 2020-01-04 ENCOUNTER — APPOINTMENT (OUTPATIENT)
Dept: GENERAL RADIOLOGY | Age: 50
End: 2020-01-04
Attending: PHYSICIAN ASSISTANT
Payer: MEDICAID

## 2020-01-04 ENCOUNTER — TELEPHONE (OUTPATIENT)
Dept: FAMILY MEDICINE CLINIC | Age: 50
End: 2020-01-04

## 2020-01-04 ENCOUNTER — HOSPITAL ENCOUNTER (EMERGENCY)
Age: 50
Discharge: HOME | End: 2020-01-04
Attending: EMERGENCY MEDICINE
Payer: MEDICAID

## 2020-01-04 VITALS
SYSTOLIC BLOOD PRESSURE: 125 MMHG | HEART RATE: 66 BPM | HEIGHT: 62 IN | DIASTOLIC BLOOD PRESSURE: 81 MMHG | RESPIRATION RATE: 14 BRPM | OXYGEN SATURATION: 99 % | BODY MASS INDEX: 33.13 KG/M2 | TEMPERATURE: 98.1 F | WEIGHT: 180 LBS

## 2020-01-04 DIAGNOSIS — S61.012A LACERATION OF LEFT THUMB WITHOUT DAMAGE TO NAIL, FOREIGN BODY PRESENCE UNSPECIFIED, INITIAL ENCOUNTER: Primary | ICD-10-CM

## 2020-01-04 DIAGNOSIS — R52 GENERALIZED PAIN: ICD-10-CM

## 2020-01-04 PROCEDURE — 99283 EMERGENCY DEPT VISIT LOW MDM: CPT

## 2020-01-04 PROCEDURE — 74011250636 HC RX REV CODE- 250/636: Performed by: PHYSICIAN ASSISTANT

## 2020-01-04 PROCEDURE — 75810000293 HC SIMP/SUPERF WND  RPR

## 2020-01-04 PROCEDURE — 73140 X-RAY EXAM OF FINGER(S): CPT

## 2020-01-04 PROCEDURE — 90471 IMMUNIZATION ADMIN: CPT

## 2020-01-04 PROCEDURE — 74011250637 HC RX REV CODE- 250/637: Performed by: PHYSICIAN ASSISTANT

## 2020-01-04 PROCEDURE — 90715 TDAP VACCINE 7 YRS/> IM: CPT | Performed by: PHYSICIAN ASSISTANT

## 2020-01-04 RX ORDER — SULFAMETHOXAZOLE AND TRIMETHOPRIM 800; 160 MG/1; MG/1
1 TABLET ORAL 2 TIMES DAILY
Qty: 14 TAB | Refills: 0 | Status: SHIPPED | OUTPATIENT
Start: 2020-01-04 | End: 2020-01-11

## 2020-01-04 RX ORDER — ACETAMINOPHEN 500 MG
1000 TABLET ORAL
Status: COMPLETED | OUTPATIENT
Start: 2020-01-04 | End: 2020-01-04

## 2020-01-04 RX ORDER — ONDANSETRON 8 MG/1
8 TABLET, ORALLY DISINTEGRATING ORAL
Qty: 30 TAB | Refills: 0 | Status: CANCELLED | OUTPATIENT
Start: 2020-01-04

## 2020-01-04 RX ORDER — ACETAMINOPHEN AND CODEINE PHOSPHATE 300; 30 MG/1; MG/1
1 TABLET ORAL
Qty: 45 TAB | Refills: 0 | Status: CANCELLED | OUTPATIENT
Start: 2020-01-04 | End: 2020-01-19

## 2020-01-04 RX ADMIN — TETANUS TOXOID, REDUCED DIPHTHERIA TOXOID AND ACELLULAR PERTUSSIS VACCINE, ADSORBED 0.5 ML: 5; 2.5; 8; 8; 2.5 SUSPENSION INTRAMUSCULAR at 14:06

## 2020-01-04 RX ADMIN — ACETAMINOPHEN 1000 MG: 500 TABLET ORAL at 14:06

## 2020-01-04 NOTE — DISCHARGE INSTRUCTIONS

## 2020-01-04 NOTE — ED TRIAGE NOTES
The patient presents for evaluation of a laceration to her left thumb. Bleeding controlled. Last Td unknown.

## 2020-01-04 NOTE — ED PROVIDER NOTES
EMERGENCY DEPARTMENT HISTORY AND PHYSICAL EXAM    Date: 1/4/2020  Patient Name: Jordi Sullivan    History of Presenting Illness     Chief Complaint   Patient presents with    Laceration         History Provided By: Patient    Chief Complaint: Laceration  Duration: Prior to arrival  Timing: Acute   Location: Left thumb throbbing  Quality: throbbing  Severity: 6 out of 10  Modifying Factors: Worse when trying to use a knife to cut hair on the vacuum   Associated Symptoms: none       Additional History (Context): Jordi Sullivan is a 51 y.o. female with a history of fibromyalgia, GERD, anxiety who presents today for history as listed above. Patient reports she was try to use a knife to clean the hair out of the bottom of the vacuum when she stabbed herself. Patient states tetanus is not up-to-date. Patient has not tried anything for this prior to arrival.      PCP: Arin Jaramillo NP    Current Outpatient Medications   Medication Sig Dispense Refill    trimethoprim-sulfamethoxazole (BACTRIM DS) 160-800 mg per tablet Take 1 Tab by mouth two (2) times a day for 7 days. 14 Tab 0    gabapentin (NEURONTIN) 800 mg tablet TAKE 1 TABLET BY MOUTH THREE TIMES DAILY AS NEEDED AS DIRECTED 90 Tab 0    buPROPion XL (WELLBUTRIN XL) 300 mg XL tablet Take 1 Tab by mouth every morning. 90 Tab 0    acetaminophen-codeine (TYLENOL #3) 300-30 mg per tablet Take 1 Tab by mouth every eight (8) hours as needed for Pain for up to 15 days. Max Daily Amount: 3 Tabs. 45 Tab 0    ondansetron (ZOFRAN ODT) 8 mg disintegrating tablet Take 1 Tab by mouth every eight (8) hours as needed for Nausea. 30 Tab 0    sertraline (ZOLOFT) 100 mg tablet TAKE 2 TABLETS BY MOUTH ONCE DAILY 60 Tab 2    valACYclovir (VALTREX) 1 gram tablet Take 1 Tab by mouth daily. 30 Tab 1    omeprazole (PRILOSEC) 40 mg capsule Take 1 Cap by mouth Before breakfast and dinner.  TAKE 1 CAPSULE BY MOUTH TWICE DAILY 60 Cap 1    lovastatin (MEVACOR) 20 mg tablet Take 1 Tab by mouth nightly. 90 Tab 1    hydrocortisone (HYTONE) 2.5 % topical cream Apply  to affected area two (2) times a day. use thin layer 30 g 0    ergocalciferol (ERGOCALCIFEROL) 50,000 unit capsule Take 1 Cap by mouth every seven (7) days. 4 Cap 2    lidocaine (XYLOCAINE) 5 % ointment Apply  to affected area as needed for Pain. 1 Tube 0    cyclobenzaprine (FLEXERIL) 10 mg tablet Take 1 Tab by mouth three (3) times daily as needed for Muscle Spasm(s). 60 Tab 1    dicyclomine (BENTYL) 10 mg capsule Take 10 mg by mouth 4 times daily (before meals and nightly). Past History     Past Medical History:  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    IBS (irritable bowel syndrome)     Ill-defined condition 2011    h/o of \"blood clot in my lung, after a picc line\"    Ill-defined condition     painless rectal bleeding    MRSA infection     h/o, not current    Ovarian cyst     Pain, upper back     Psychiatric disorder     depression     Stress incontinence     Tattoo     Unspecified deficiency anemia 1999       Past Surgical History:  Past Surgical History:   Procedure Laterality Date    COLONOSCOPY N/A 6/28/2019    DIAGNOSTIC COLONOSCOPY with random bxs performed by Racquel Mason MD at [address] [place] [address] HX CHOLECYSTECTOMY  2002    HX GYN  2018    Abalation    HX GYN      rt ovary removed    HX GYN      left ovary clipped.     HX ORTHOPAEDIC      arthoscopic rt knee    HX ORTHOPAEDIC      left knee meniscis tear    HX ORTHOPAEDIC Right     meniscus repair    NEUROLOGICAL PROCEDURE UNLISTED  12/2018    laminectomy       Family History:  Family History   Problem Relation Age of Onset    Hypertension Mother     Arthritis-osteo Mother     GERD Father     Hypertension Father     Cancer Sister     MS Maternal Grandmother     Diabetes Paternal Grandmother        Social History:  Social History     Tobacco Use    Smoking status: Former Smoker     Packs/day: 0.50     Years: 10.00     Pack years: 5.00    Smokeless tobacco: Never Used    Tobacco comment: pt smoked on and off for 10 years and quit 6 years ago   Substance Use Topics    Alcohol use: Yes     Alcohol/week: 0.8 standard drinks     Types: 1 Cans of beer per week     Comment: social    Drug use: No       Allergies: Allergies   Allergen Reactions    Apple Anaphylaxis    Cephalexin Hives    Strawberry Anaphylaxis    Wasps [Hymenoptera Allergenic Extract] Anaphylaxis         Review of Systems   Review of Systems   Constitutional: Negative for chills and fever. HENT: Negative for congestion, rhinorrhea and sore throat. Respiratory: Negative for cough and shortness of breath. Cardiovascular: Negative for chest pain. Gastrointestinal: Negative for abdominal pain, blood in stool, constipation, diarrhea, nausea and vomiting. Genitourinary: Negative for dysuria, frequency and hematuria. Musculoskeletal: Negative for back pain and myalgias. Skin: Positive for wound. Negative for rash. Neurological: Negative for dizziness and headaches. All other systems reviewed and are negative. All Other Systems Negative  Physical Exam     Vitals:    01/04/20 1353   BP: 125/81   Pulse: 66   Resp: 14   Temp: 98.1 °F (36.7 °C)   SpO2: 99%   Weight: 81.6 kg (180 lb)   Height: 5' 2\" (1.575 m)     Physical Exam  Vitals signs and nursing note reviewed. Constitutional:       General: She is not in acute distress. Appearance: She is well-developed. She is not diaphoretic.    HENT: Head: Normocephalic and atraumatic. Eyes:      Conjunctiva/sclera: Conjunctivae normal.   Neck:      Musculoskeletal: Normal range of motion and neck supple. Cardiovascular:      Rate and Rhythm: Normal rate and regular rhythm. Heart sounds: Normal heart sounds. Pulmonary:      Effort: Pulmonary effort is normal. No respiratory distress. Breath sounds: Normal breath sounds. Chest:      Chest wall: No tenderness. Abdominal:      General: Bowel sounds are normal. There is no distension. Palpations: Abdomen is soft. Tenderness: There is no tenderness. There is no guarding or rebound. Musculoskeletal:         General: No deformity. Skin:     General: Skin is warm and dry. Findings: Laceration present. Neurological:      Mental Status: She is alert and oriented to person, place, and time. Deep Tendon Reflexes: Reflexes are normal and symmetric. Diagnostic Study Results     Labs -   No results found for this or any previous visit (from the past 12 hour(s)). Radiologic Studies -   XR THUMB LT MIN 2 V   Final Result   IMPRESSION: No fracture. CT Results  (Last 48 hours)    None        CXR Results  (Last 48 hours)    None            Medical Decision Making   I am the first provider for this patient. I reviewed the vital signs, available nursing notes, past medical history, past surgical history, family history and social history. Vital Signs-Reviewed the patient's vital signs.       Records Reviewed: Nursing Notes and Old Medical Records     Procedures: None   Wound Repair  Date/Time: 1/4/2020 4:45 PM  Performed by: PAPreparation: skin prepped with Betadine  Location details: left thumb  Wound length:2.6 - 7.5 cm (3.5 cm laceration)  Anesthesia: local infiltration    Anesthesia:  Local Anesthetic: lidocaine 1% without epinephrine  Anesthetic total: 1 mL  Foreign bodies: no foreign bodies  Irrigation solution: saline  Skin closure: 5-0 nylon  Number of sutures: 2  Technique: simple  Approximation: close  Dressing: antibiotic ointment, non-adhesive packing strip and gauze roll  Patient tolerance: Patient tolerated the procedure well with no immediate complications  My total time at bedside, performing this procedure was 46-60 minutes. Provider Notes (Medical Decision Making):     Differential: fracture, dislocation, abrasion, sprain, contusion, laceration      Plan: Will order xray, and update tetanus    4:46 PM  Wound was repaired without any difficulties. No bone involvement. Will discharge home with antibiotics. Have advised Tylenol Motrin as needed for pain. Have discussed proper wound care at home. Have advised patient to return in 2 days as needed for wound check and in 7 days for suture removal. Patient agrees with the plan and management and states all questions have been thoroughly answered and there are no more remaining questions. MED RECONCILIATION:  No current facility-administered medications for this encounter. Current Outpatient Medications   Medication Sig    trimethoprim-sulfamethoxazole (BACTRIM DS) 160-800 mg per tablet Take 1 Tab by mouth two (2) times a day for 7 days.  gabapentin (NEURONTIN) 800 mg tablet TAKE 1 TABLET BY MOUTH THREE TIMES DAILY AS NEEDED AS DIRECTED    buPROPion XL (WELLBUTRIN XL) 300 mg XL tablet Take 1 Tab by mouth every morning.  acetaminophen-codeine (TYLENOL #3) 300-30 mg per tablet Take 1 Tab by mouth every eight (8) hours as needed for Pain for up to 15 days. Max Daily Amount: 3 Tabs.  ondansetron (ZOFRAN ODT) 8 mg disintegrating tablet Take 1 Tab by mouth every eight (8) hours as needed for Nausea.  sertraline (ZOLOFT) 100 mg tablet TAKE 2 TABLETS BY MOUTH ONCE DAILY    valACYclovir (VALTREX) 1 gram tablet Take 1 Tab by mouth daily.  omeprazole (PRILOSEC) 40 mg capsule Take 1 Cap by mouth Before breakfast and dinner.  TAKE 1 CAPSULE BY MOUTH TWICE DAILY    lovastatin (MEVACOR) 20 mg tablet Take 1 Tab by mouth nightly.  hydrocortisone (HYTONE) 2.5 % topical cream Apply  to affected area two (2) times a day. use thin layer    ergocalciferol (ERGOCALCIFEROL) 50,000 unit capsule Take 1 Cap by mouth every seven (7) days.  lidocaine (XYLOCAINE) 5 % ointment Apply  to affected area as needed for Pain.  cyclobenzaprine (FLEXERIL) 10 mg tablet Take 1 Tab by mouth three (3) times daily as needed for Muscle Spasm(s).  dicyclomine (BENTYL) 10 mg capsule Take 10 mg by mouth 4 times daily (before meals and nightly). Disposition:  Home     DISCHARGE NOTE:   Pt has been reexamined. Patient has no new complaints, changes, or physical findings. Care plan outlined and precautions discussed. Results of workup were reviewed with the patient. All medications were reviewed with the patient. All of pt's questions and concerns were addressed. Patient was instructed and agrees to follow up with PCP as well as to return to the ED upon further deterioration. Patient is ready to go home. Follow-up Information     Follow up With Specialties Details Why Contact Info    [facility] EMERGENCY DEPT Emergency Medicine  As needed, For wound re-check [address] [facility] [address]  [phone]    [facility] EMERGENCY DEPT Emergency Medicine In 1 week For suture removal [address] [place] [address]  [phone]          Current Discharge Medication List      START taking these medications    Details   trimethoprim-sulfamethoxazole (BACTRIM DS) 160-800 mg per tablet Take 1 Tab by mouth two (2) times a day for 7 days. Qty: 14 Tab, Refills: 0                 Diagnosis     Clinical Impression:   1. Laceration of left thumb without damage to nail, foreign body presence unspecified, initial encounter          \"Please note that this dictation was completed with [employer] voice recognition software.  Quite often unanticipated grammatical, syntax, homophones, and other interpretive errors are inadvertently transcribed by the computer software. Please disregard these errors. Please excuse any errors that have escaped final proofreading. \"

## 2020-01-06 NOTE — TELEPHONE ENCOUNTER
Pt calling to speak with nurse Rebecca Lepe.  She said re:     Tylenol 3 and Zofran NP Daren Varghese told her she would continue to prescribe these medications until she was able to get in with with specialists and get upper endoscopy     Also per prior encounter, she has changed ins and will have coverage for specialist     Pharmacy is [address] [facility]

## 2020-01-06 NOTE — TELEPHONE ENCOUNTER
Attempted to return call to the patient. Did not leave voicemail. Will attempt later.    Please allow 48-72 hours for all medication refill request. Per chart review, medication refill request was placed on 1/2/2020

## 2020-01-08 DIAGNOSIS — R52 GENERALIZED PAIN: ICD-10-CM

## 2020-01-08 RX ORDER — ACETAMINOPHEN AND CODEINE PHOSPHATE 300; 30 MG/1; MG/1
1 TABLET ORAL
Qty: 45 TAB | Refills: 0 | Status: CANCELLED | OUTPATIENT
Start: 2020-01-08 | End: 2020-01-23

## 2020-01-08 RX ORDER — ONDANSETRON 8 MG/1
8 TABLET, ORALLY DISINTEGRATING ORAL
Qty: 30 TAB | Refills: 0 | Status: CANCELLED | OUTPATIENT
Start: 2020-01-08

## 2020-01-08 RX ORDER — ACETAMINOPHEN AND CODEINE PHOSPHATE 300; 30 MG/1; MG/1
1 TABLET ORAL
Qty: 45 TAB | Refills: 0 | Status: SHIPPED | OUTPATIENT
Start: 2020-01-08 | End: 2020-01-21 | Stop reason: SDUPTHER

## 2020-01-08 RX ORDER — ONDANSETRON 8 MG/1
8 TABLET, ORALLY DISINTEGRATING ORAL
Qty: 30 TAB | Refills: 0 | Status: SHIPPED | OUTPATIENT
Start: 2020-01-08 | End: 2020-01-17 | Stop reason: SDUPTHER

## 2020-01-08 NOTE — TELEPHONE ENCOUNTER
Requested Prescriptions     Pending Prescriptions Disp Refills    ondansetron (ZOFRAN ODT) 8 mg disintegrating tablet 30 Tab 0     Sig: Take 1 Tab by mouth every eight (8) hours as needed for Nausea.  acetaminophen-codeine (TYLENOL #3) 300-30 mg per tablet 45 Tab 0     Sig: Take 1 Tab by mouth every eight (8) hours as needed for Pain for up to 15 days. Max Daily Amount: 3 Tabs. [facility]    Pt feeling extremely frustrated, has been without medication since 4 days for Zofran, Tylenol 3 since Sunday    She said you usually respond back very timely and this was requested 01/02/2020    She said the nurse states message has already been sent to the provider.  She just wants to make sure you are aware, please let her know    Last OV 12/03/2019 (of which you note said return in 3 months)  Next 03/03/2020

## 2020-01-17 RX ORDER — ONDANSETRON 8 MG/1
8 TABLET, ORALLY DISINTEGRATING ORAL
Qty: 30 TAB | Refills: 0 | Status: SHIPPED | OUTPATIENT
Start: 2020-01-17 | End: 2020-01-27 | Stop reason: SDUPTHER

## 2020-01-21 DIAGNOSIS — R52 GENERALIZED PAIN: ICD-10-CM

## 2020-01-21 RX ORDER — ACETAMINOPHEN AND CODEINE PHOSPHATE 300; 30 MG/1; MG/1
1 TABLET ORAL
Qty: 45 TAB | Refills: 0 | Status: SHIPPED | OUTPATIENT
Start: 2020-01-21 | End: 2020-02-03 | Stop reason: SDUPTHER

## 2020-01-21 RX ORDER — HYDROCORTISONE 25 MG/G
CREAM TOPICAL 2 TIMES DAILY
Qty: 30 G | Refills: 0 | Status: SHIPPED | OUTPATIENT
Start: 2020-01-21 | End: 2020-04-08 | Stop reason: SDUPTHER

## 2020-01-21 NOTE — TELEPHONE ENCOUNTER
[facility] for [facility]enol w/Codeine as 1/8/20 for a 15 d/s. UDS done 6/27/19    Last Visit: 12/3/19 with HEENA Ramos  Next Appointment: 3/3/20 with HEENA Ramos  Previous Refill Encounter(s): 11/7/19 Hytone #30g, 1/8/20 Tylenol w/ Codeine #45    Requested Prescriptions     Pending Prescriptions Disp Refills    hydrocortisone (HYTONE) 2.5 % topical cream 30 g 0     Sig: Apply  to affected area two (2) times a day. use thin layer    acetaminophen-codeine (TYLENOL #3) 300-30 mg per tablet 45 Tab 0     Sig: Take 1 Tab by mouth every eight (8) hours as needed for Pain for up to 15 days. Max Daily Amount: 3 Tabs.

## 2020-01-27 DIAGNOSIS — M79.7 FIBROMYALGIA: ICD-10-CM

## 2020-01-27 NOTE — TELEPHONE ENCOUNTER
[facility] for Neurontin as 12/27/19 for a 30 d/s. Last Visit: 12/3/19 with HEENA Frias  Next Appointment: 3/3/20 with HEENA Frias  Previous Refill Encounter(s): 12/27/19 Neurontin #90, 1/17/20 Zofran #30    Requested Prescriptions     Pending Prescriptions Disp Refills    gabapentin (NEURONTIN) 800 mg tablet 90 Tab 0     Sig: Take 1 Tab by mouth three (3) times daily as needed (as directed). Max Daily Amount: 2,400 mg.    ondansetron (ZOFRAN ODT) 8 mg disintegrating tablet 30 Tab 0     Sig: Take 1 Tab by mouth every eight (8) hours as needed for Nausea.

## 2020-01-28 RX ORDER — ONDANSETRON 8 MG/1
8 TABLET, ORALLY DISINTEGRATING ORAL
Qty: 30 TAB | Refills: 0 | Status: SHIPPED | OUTPATIENT
Start: 2020-01-28 | End: 2020-02-07 | Stop reason: SDUPTHER

## 2020-01-28 RX ORDER — GABAPENTIN 800 MG/1
800 TABLET ORAL
Qty: 90 TAB | Refills: 0 | Status: SHIPPED | OUTPATIENT
Start: 2020-01-28 | End: 2020-03-20

## 2020-02-07 DIAGNOSIS — K21.9 GASTROESOPHAGEAL REFLUX DISEASE, ESOPHAGITIS PRESENCE NOT SPECIFIED: ICD-10-CM

## 2020-02-07 RX ORDER — ONDANSETRON 8 MG/1
8 TABLET, ORALLY DISINTEGRATING ORAL
Qty: 30 TAB | Refills: 0 | Status: SHIPPED | OUTPATIENT
Start: 2020-02-07 | End: 2020-02-21 | Stop reason: SDUPTHER

## 2020-02-07 RX ORDER — BUPROPION HYDROCHLORIDE 300 MG/1
300 TABLET ORAL
Qty: 90 TAB | Refills: 0 | Status: SHIPPED | OUTPATIENT
Start: 2020-02-07 | End: 2020-05-06 | Stop reason: SDUPTHER

## 2020-02-07 RX ORDER — OMEPRAZOLE 40 MG/1
40 CAPSULE, DELAYED RELEASE ORAL
Qty: 60 CAP | Refills: 1 | Status: SHIPPED | OUTPATIENT
Start: 2020-02-07 | End: 2020-07-27 | Stop reason: SDUPTHER

## 2020-02-07 NOTE — TELEPHONE ENCOUNTER
Last Visit: 12/3/19 with HEENA Beaver  Next Appointment: 3/3/20 with HEENA Beaver  Previous Refill Encounter(s): 12/3/19 Prilosec #60 with 1 refill, 12/20/19 Wellbutrin #90, 1/28/20 Zofran #30    Requested Prescriptions     Pending Prescriptions Disp Refills    omeprazole (PRILOSEC) 40 mg capsule 60 Cap 1     Sig: Take 1 Cap by mouth Before breakfast and dinner.  buPROPion XL (WELLBUTRIN XL) 300 mg XL tablet 90 Tab 0     Sig: Take 1 Tab by mouth every morning.  ondansetron (ZOFRAN ODT) 8 mg disintegrating tablet 30 Tab 0     Sig: Take 1 Tab by mouth every twelve (12) hours as needed for Nausea.

## 2020-02-19 DIAGNOSIS — R52 GENERALIZED PAIN: ICD-10-CM

## 2020-02-19 NOTE — TELEPHONE ENCOUNTER
[facility] for Tylenol w/Codeine as 2/3/20 for a 15 d/s. UDS done 6/27/19  CSA signed 8/20/18    Last Visit: 12/3/19 with HEENA Padilla  Next Appointment: 3/3/20 HEENA Padilla  Previous Refill Encounter(s): 2/5/20 #45    Requested Prescriptions     Pending Prescriptions Disp Refills    acetaminophen-codeine (TYLENOL #3) 300-30 mg per tablet 45 Tab 0     Sig: Take 1 Tab by mouth every eight (8) hours as needed for Pain for up to 15 days. Max Daily Amount: 3 Tabs.

## 2020-02-20 RX ORDER — ACETAMINOPHEN AND CODEINE PHOSPHATE 300; 30 MG/1; MG/1
1 TABLET ORAL
Qty: 45 TAB | Refills: 0 | OUTPATIENT
Start: 2020-02-20 | End: 2020-03-06

## 2020-02-20 NOTE — TELEPHONE ENCOUNTER
Denied refill request.  Please call this patient.   She was prescribed Tylenol #3 on 2-5-2020 #45 and she will need to make an appointment with her PCP/Brad Zhou DNP, FNP-BC

## 2020-02-20 NOTE — TELEPHONE ENCOUNTER
Troy Munoz, can you please review and assist  Pt calling, received denial message from NP Fátima for tylenol 3    Pt ask please review. She is in compliance with NP Radha Montelongo appt f/u request. Pt has been getting this medication every two weeks since Nov 2019    Stated \"I am completely out of medication.  This medication is used with motrin and gabapentin as NP Kyree Rosa prescribed\"      Pharmacy is [facility]

## 2020-02-21 DIAGNOSIS — R52 GENERALIZED PAIN: ICD-10-CM

## 2020-02-21 RX ORDER — ACETAMINOPHEN AND CODEINE PHOSPHATE 300; 30 MG/1; MG/1
1 TABLET ORAL
Qty: 9 TAB | Refills: 0 | Status: SHIPPED | OUTPATIENT
Start: 2020-02-21 | End: 2020-02-23 | Stop reason: SDUPTHER

## 2020-02-21 RX ORDER — ONDANSETRON 8 MG/1
8 TABLET, ORALLY DISINTEGRATING ORAL
Qty: 6 TAB | Refills: 0 | Status: SHIPPED | OUTPATIENT
Start: 2020-02-21 | End: 2020-02-23 | Stop reason: SDUPTHER

## 2020-02-21 NOTE — TELEPHONE ENCOUNTER
This is ongoing in numerous other encounters. Please review and advise as patient is upset this was denied. Please see notes from 2/19/20 refill encounter regarding compliance.

## 2020-02-21 NOTE — TELEPHONE ENCOUNTER
I reviewed her  and I also reviewed her last urine compliance which was positive for THC. I refilled both prescriptions for three days only and she will have to follow up with her PCP/ Ronda Britton for reevaluation of her prescription.   Milton Meza, DNP, FNP-BC

## 2020-02-23 DIAGNOSIS — R52 GENERALIZED PAIN: ICD-10-CM

## 2020-02-24 RX ORDER — ONDANSETRON 8 MG/1
8 TABLET, ORALLY DISINTEGRATING ORAL
Qty: 30 TAB | Refills: 0 | Status: SHIPPED | OUTPATIENT
Start: 2020-02-24 | End: 2020-03-09 | Stop reason: SDUPTHER

## 2020-02-24 RX ORDER — ACETAMINOPHEN AND CODEINE PHOSPHATE 300; 30 MG/1; MG/1
1 TABLET ORAL
Qty: 45 TAB | Refills: 0 | Status: SHIPPED | OUTPATIENT
Start: 2020-02-24 | End: 2020-03-09 | Stop reason: SDUPTHER

## 2020-03-03 ENCOUNTER — OFFICE VISIT (OUTPATIENT)
Dept: FAMILY MEDICINE CLINIC | Age: 50
End: 2020-03-03

## 2020-03-03 VITALS
SYSTOLIC BLOOD PRESSURE: 115 MMHG | DIASTOLIC BLOOD PRESSURE: 75 MMHG | TEMPERATURE: 98.2 F | WEIGHT: 188 LBS | OXYGEN SATURATION: 95 % | HEIGHT: 62 IN | HEART RATE: 70 BPM | RESPIRATION RATE: 18 BRPM | BODY MASS INDEX: 34.6 KG/M2

## 2020-03-03 DIAGNOSIS — R14.0 ABDOMINAL BLOATING: ICD-10-CM

## 2020-03-03 DIAGNOSIS — E78.00 PURE HYPERCHOLESTEROLEMIA: Primary | ICD-10-CM

## 2020-03-03 DIAGNOSIS — M25.50 ARTHRALGIA, UNSPECIFIED JOINT: ICD-10-CM

## 2020-03-03 DIAGNOSIS — Z79.899 ENCOUNTER FOR LONG-TERM (CURRENT) USE OF HIGH-RISK MEDICATION: ICD-10-CM

## 2020-03-03 RX ORDER — COLESTIPOL HYDROCHLORIDE 5 G/5G
5 GRANULE, FOR SUSPENSION ORAL 2 TIMES DAILY
COMMUNITY
End: 2020-07-16 | Stop reason: SINTOL

## 2020-03-03 NOTE — PATIENT INSTRUCTIONS
Gas and Bloating: Care Instructions  Your Care Instructions    Gas and bloating can be uncomfortable and embarrassing problems. All people pass gas, but some people produce more gas than others, sometimes enough to cause distress. It is normal to pass gas from 6 to 20 times per day. Excess gas usually is not caused by a serious health problem. Gas and bloating usually are caused by something you eat or drink, including some food supplements and medicines. Gas and bloating are usually harmless and go away without treatment. However, changing your diet can help end the problem. Some over-the-counter medicines can help prevent gas and relieve bloating. Follow-up care is a key part of your treatment and safety. Be sure to make and go to all appointments, and call your doctor if you are having problems. It's also a good idea to know your test results and keep a list of the medicines you take. How can you care for yourself at home? · Keep a food diary if you think a food gives you gas. Write down what you eat or drink. Also record when you get gas. If you notice that a food seems to cause your gas each time, avoid it and see if the gas goes away. Examples of foods that cause gas include:  ? Fried and fatty foods. ? Beans. ? Vegetables such as artichokes, asparagus, broccoli, brussels sprouts, cabbage, cauliflower, cucumbers, green peppers, onions, peas, radishes, and raw potatoes. ? Fruits such as apricots, bananas, melons, peaches, pears, prunes, and raw apples. ? Wheat and wheat bran. · Soak dry beans in water overnight, then dump the water and cook the soaked beans in new water. This can help prevent gas and bloating. · If you have problems with lactose, avoid dairy products such as milk and cheese. · Try not to swallow air. Do not drink through a straw, gulp your food, or chew gum. · Take an over-the-counter medicine. Read and follow all instructions on the label. ?  Food enzymes, such as Beano, can be added to gas-producing foods to prevent gas. ? Antacids, such as Maalox Anti-Gas and Mylanta Gas, can relieve bloating by making you burp. Be careful when you take over-the-counter antacid medicines. Many of these medicines have aspirin in them. Read the label to make sure that you are not taking more than the recommended dose. Too much aspirin can be harmful. ? Activated charcoal tablets, such as CharcoCaps, may decrease odor from gas you pass. ? If you have problems with lactose, you can take medicines such as Dairy Ease and Lactaid with dairy products to prevent gas and bloating. · Get some exercise regularly. When should you call for help? Call 911 anytime you think you may need emergency care. For example, call if:    · You have gas and signs of a heart attack, such as:  ? Chest pain or pressure. ? Sweating. ? Shortness of breath. ? Nausea or vomiting. ? Pain that spreads from the chest to the neck, jaw, or one or both shoulders or arms. ? Dizziness or lightheadedness. ? A fast or uneven pulse. After calling 911, chew 1 adult-strength aspirin. Wait for an ambulance. Do not try to drive yourself.    Call your doctor now or seek immediate medical care if:    · You have severe belly pain.     · You have blood in your stool.    Watch closely for changes in your health, and be sure to contact your doctor if:    · You have blood or pus in your urine.     · Your urine is cloudy or smells bad.     · You are burping and have trouble swallowing.     · You feel bloated and have swelling in your belly.     · You do not get better as expected. Where can you learn more? Go to http://steven-alexandru.info/. Enter A583 in the search box to learn more about \"Gas and Bloating: Care Instructions. \"  Current as of: June 26, 2019  Content Version: 12.2  © 5994-3919 [employer].  Care instructions adapted under license by [employer] (which disclaims liability or warranty for this information). If you have questions about a medical condition or this instruction, always ask your healthcare professional. [place] [address] any warranty or liability for your use of this information.

## 2020-03-03 NOTE — PROGRESS NOTES
Subjective:   Rodirgo Strange is a 51 y.o. female with Hypercholesterolemia presents for follow up. Hypercholesterolemia ROS: Medication: lovastatin, which is/are controlled. Cardiovascular ROS - taking medications as instructed, no medication side effects noted, no TIA's, no chest pain on exertion, no dyspnea on exertion, no swelling of ankles  Other symptoms and concerns: patient states she has been having intermittent swelling to bilateral hands with associated itching. States this occurs at least 2 times per moth. Reports she also has intermittent swelling of her eyes. Comments she was informed she does have a hiatal hernia. She was started on Colestid for diarrhea with improvement. Reports she was dx with moderate gastritis. Per patient she was informed to continue with all her current medication. Per patient GI provider informed her sx could be neurological related and inquired if she has had a head CT. Current Outpatient Medications   Medication Sig Dispense Refill    colestipoL (COLESTID) 5 gram packet Take 5 g by mouth two (2) times a day.  acetaminophen-codeine (TYLENOL #3) 300-30 mg per tablet Take 1 Tab by mouth every eight (8) hours as needed for Pain for up to 15 days. Max Daily Amount: 3 Tabs. 45 Tab 0    ondansetron (ZOFRAN ODT) 8 mg disintegrating tablet Take 1 Tab by mouth every twelve (12) hours as needed for Nausea for up to 15 days. 30 Tab 0    omeprazole (PRILOSEC) 40 mg capsule Take 1 Cap by mouth Before breakfast and dinner. 60 Cap 1    buPROPion XL (WELLBUTRIN XL) 300 mg XL tablet Take 1 Tab by mouth every morning. 90 Tab 0    gabapentin (NEURONTIN) 800 mg tablet Take 1 Tab by mouth three (3) times daily as needed (as directed). Max Daily Amount: 2,400 mg. 90 Tab 0    hydrocortisone (HYTONE) 2.5 % topical cream Apply  to affected area two (2) times a day.  use thin layer 30 g 0    sertraline (ZOLOFT) 100 mg tablet TAKE 2 TABLETS BY MOUTH ONCE DAILY 60 Tab 2    valACYclovir (VALTREX) 1 gram tablet Take 1 Tab by mouth daily. 30 Tab 1    lovastatin (MEVACOR) 20 mg tablet Take 1 Tab by mouth nightly. 90 Tab 1    dicyclomine (BENTYL) 10 mg capsule Take 10 mg by mouth 4 times daily (before meals and nightly).  ergocalciferol (ERGOCALCIFEROL) 50,000 unit capsule Take 1 Cap by mouth every seven (7) days.  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    IBS (irritable bowel syndrome)     Ill-defined condition 2011    h/o of \"blood clot in my lung, after a picc line\"    Ill-defined condition     painless rectal bleeding    MRSA infection     h/o, not current    Ovarian cyst     Pain, upper back     Psychiatric disorder     depression     Stress incontinence     Tattoo     Unspecified deficiency anemia 1999     Family History   Problem Relation Age of Onset    Hypertension Mother    [facility] Arthritis-osteo Mother     GERD Father     Hypertension Father     Cancer Sister     MS Maternal Grandmother     Diabetes Paternal Grandmother      Lab Results   Component Value Date/Time    Cholesterol, total 199 05/21/2019 09:43 AM    HDL Cholesterol 42 05/21/2019 09:43 AM    LDL, calculated 127 (H) 05/21/2019 09:43 AM    VLDL, calculated 30 05/21/2019 09:43 AM    Triglyceride 150 (H) 05/21/2019 09:43 AM    CHOL/HDL Ratio 4.7 05/21/2019 09:43 AM     Lab Results   Component Value Date/Time    Sodium 139 07/22/2019 03:15 PM    Potassium 4.1 07/22/2019 03:15 PM    Chloride 106 07/22/2019 03:15 PM    CO2 25 07/22/2019 03:15 PM    Anion gap 8 07/22/2019 03:15 PM    Glucose 95 07/22/2019 03:15 PM    BUN 15 07/22/2019 03:15 PM    Creatinine 1.01 07/22/2019 03:15 PM    BUN/Creatinine ratio 15 07/22/2019 03:15 PM    GFR est AA >60 07/22/2019 03:15 PM    GFR est non-AA 59 (L) 07/22/2019 03:15 PM    Calcium 9.7 09/24/2019 03:29 PM    Bilirubin, total 0.3 07/22/2019 03:15 PM    AST (SGOT) 26 07/22/2019 03:15 PM    Alk. phosphatase 76 07/22/2019 03:15 PM    Protein, total 8.0 07/22/2019 03:15 PM    Albumin 4.4 07/22/2019 03:15 PM    Globulin 3.6 07/22/2019 03:15 PM    A-G Ratio 1.2 07/22/2019 03:15 PM    ALT (SGPT) 40 07/22/2019 03:15 PM     Lab Results   Component Value Date/Time    WBC 11.0 07/22/2019 03:15 PM    HGB 13.1 07/22/2019 03:15 PM    HCT 37.5 07/22/2019 03:15 PM    PLATELET 886 74/73/0197 03:15 PM    MCV 90.4 07/22/2019 03:15 PM     .  Wt Readings from Last 3 Encounters:   03/03/20 188 lb (85.3 kg)   01/04/20 180 lb (81.6 kg)   12/03/19 191 lb (86.6 kg)       Objective:   Visit Vitals  /75   Pulse 70   Temp 98.2 °F (36.8 °C) (Oral)   Resp 18   Ht 5' 2\" (1.575 m)   Wt 188 lb (85.3 kg)   SpO2 95%   BMI 34.39 kg/m²     General appearance - alert, well appearing, and in no distress  Neck - supple, no significant adenopathy, carotids upstroke normal bilaterally, no bruits  Chest - clear to auscultation, no wheezes, rales or rhonchi, symmetric air entry  Heart - normal rate, regular rhythm, normal S1, S2, no murmurs, rubs, clicks or gallops  Extremities - peripheral pulses normal, no pedal edema, no clubbing or cyanosis  Abdomen - soft, nondistended, no masses or organomegaly  tenderness noted mild generalized          Assessment/Plan:      ICD-10-CM ICD-9-CM    1. Pure hypercholesterolemia E78.00 272.0 LIPID PANEL      METABOLIC PANEL, COMPREHENSIVE      METABOLIC PANEL, COMPREHENSIVE      LIPID PANEL   2. Arthralgia, unspecified joint M25.50 719.40 REFERRAL TO PAIN MANAGEMENT   3. Abdominal bloating R14.0 787. 3 XR UGI/BA SWALLOW W SM BOWEL   4.  Encounter for long-term (current) use of high-risk medication Z79.899 V58.69 COMPLIANCE DRUG SCREEN/PRESCRIPTION MONITORING     Orders Placed This Encounter    XR UGI/BA SWALLOW W SM BOWEL    COMPLIANCE DRUG SCREEN/PRESCRIPTION MONITORING    LIPID PANEL    METABOLIC PANEL, COMPREHENSIVE    REFERRAL TO PAIN MANAGEMENT    colestipoL (COLESTID) 5 gram packet         I have discussed the diagnosis with the patient and the intended plan as seen in the above orders. The patient has received an after-visit summary and questions were answered concerning future plans. I have discussed medication side effects and warnings with the patient as well. Pt verbalizes understanding. Patient agreeable with above plan and verbalizes understanding. Follow-up and Dispositions    · Return in about 4 months (around 7/3/2020) for HLD/pain.

## 2020-03-03 NOTE — PROGRESS NOTES
Parish Olguin is a  51 y.o. female presents today for office visit for routine follow up. 1. Have you been to the ER, urgent care clinic or hospitalized since your last visit? YES [facility] Jan 4th 2020    2. Have you seen or consulted any other health care providers outside of the [address] [place] [address] since your last visit (Include any pap smears or colon screening)? NO

## 2020-03-04 LAB
A-G RATIO,AGRAT: 2 RATIO (ref 1.1–2.6)
ALBUMIN SERPL-MCNC: 4.9 G/DL (ref 3.5–5)
ALP SERPL-CCNC: 63 U/L (ref 25–115)
ALT SERPL-CCNC: 39 U/L (ref 5–40)
ANION GAP SERPL CALC-SCNC: 18 MMOL/L
AST SERPL W P-5'-P-CCNC: 25 U/L (ref 10–37)
BILIRUB SERPL-MCNC: 0.2 MG/DL (ref 0.2–1.2)
BUN SERPL-MCNC: 8 MG/DL (ref 6–22)
CALCIUM SERPL-MCNC: 9.7 MG/DL (ref 8.4–10.5)
CHLORIDE SERPL-SCNC: 96 MMOL/L (ref 98–110)
CHOLEST SERPL-MCNC: 222 MG/DL (ref 110–200)
CO2 SERPL-SCNC: 25 MMOL/L (ref 20–32)
CREAT SERPL-MCNC: 1 MG/DL (ref 0.5–1.2)
GFRAA, 66117: >60
GFRNA, 66118: 56.6
GLOBULIN,GLOB: 2.4 G/DL (ref 2–4)
GLUCOSE SERPL-MCNC: 88 MG/DL (ref 70–99)
HDLC SERPL-MCNC: 43 MG/DL
HDLC SERPL-MCNC: 5.2 MG/DL (ref 0–5)
LDL/HDL RATIO,LDHD: 2.7
LDLC SERPL CALC-MCNC: 118 MG/DL (ref 50–99)
NON-HDL CHOLESTEROL, 011976: 179 MG/DL
POTASSIUM SERPL-SCNC: 4.4 MMOL/L (ref 3.5–5.5)
PROT SERPL-MCNC: 7.3 G/DL (ref 6.4–8.3)
SODIUM SERPL-SCNC: 139 MMOL/L (ref 133–145)
TRIGL SERPL-MCNC: 303 MG/DL (ref 40–149)
VLDLC SERPL CALC-MCNC: 61 MG/DL (ref 8–30)

## 2020-03-10 ENCOUNTER — OFFICE VISIT (OUTPATIENT)
Dept: ORTHOPEDIC SURGERY | Age: 50
End: 2020-03-10

## 2020-03-10 VITALS
HEART RATE: 65 BPM | HEIGHT: 62 IN | OXYGEN SATURATION: 99 % | DIASTOLIC BLOOD PRESSURE: 78 MMHG | RESPIRATION RATE: 16 BRPM | WEIGHT: 184 LBS | TEMPERATURE: 98 F | BODY MASS INDEX: 33.86 KG/M2 | SYSTOLIC BLOOD PRESSURE: 118 MMHG

## 2020-03-10 DIAGNOSIS — M54.41 CHRONIC BILATERAL LOW BACK PAIN WITH RIGHT-SIDED SCIATICA: Primary | ICD-10-CM

## 2020-03-10 DIAGNOSIS — G89.29 CHRONIC BILATERAL LOW BACK PAIN WITH RIGHT-SIDED SCIATICA: Primary | ICD-10-CM

## 2020-03-10 RX ORDER — METHYLPREDNISOLONE 4 MG/1
TABLET ORAL
Qty: 1 DOSE PACK | Refills: 0 | Status: SHIPPED | OUTPATIENT
Start: 2020-03-10 | End: 2020-07-16 | Stop reason: ALTCHOICE

## 2020-03-10 RX ORDER — IBUPROFEN 600 MG/1
600 TABLET ORAL
Qty: 90 TAB | Refills: 2 | Status: SHIPPED | OUTPATIENT
Start: 2020-03-10 | End: 2020-10-20 | Stop reason: ALTCHOICE

## 2020-03-10 NOTE — PROGRESS NOTES
Chief complaint   Chief Complaint   Patient presents with    Back Pain     lumbar pain, f/u appt. History of Present Illness:  Tre Vargas is a  51 y.o.  female with right L4-5 laminectomy discectomy on December 17, 2018 by Dr. Katlin Harris. She did great. Her right leg pain resolved. She was last seen December 31, 2018 at her-2 week postop postop appointment at which time she was doing great. She did not follow-up after that. She states that for the last 3 months she has had progressively worsening low back pain with right buttock pain and anterior thigh pain down to her knee. She states that the same type of pain she had prior to surgery. She denies any injury or excessive activity that would have initiated this pain. She states that sometimes her left leg gives out on her. When this happens she usually does rest and that helps she finds pain is becoming more persistent and worsening. She is tried taking over-the-counter ibuprofen 6 to 800 mg twice a day. It really does not seem to help that much. She is also on gabapentin 103 times a day for postherpetic neuralgia. She does not work. She is a non-smoker. She denies ever bowel bladder dysfunction. Physical Exam: Patient is a 43-year-old female who is alert and oriented with normal mood affect. She has a full weightbearing nonantalgic gait without assistive device. 4 out of 5 strength of her bilateral lower extremities. Negative straight leg raise. She has pain with hyperextension lumbar spine. Assessment and Plan: This patient who underwent a L4-5 right-sided laminectomy discectomy back December 7, 2018 he did very well from that surgery. She is now having recurrent pain. We will give her Medrol Dosepak. She knows to take it with food. I will give her a prescription ibuprofen. She knows not to take that while she is on the Medrol Dosepak. She knows to take both with food. I will put in some physical therapy.   We did lumbar AP lateral flexion-extension x-rays that will be read by Dr. Analisa Soto. If her pain does not improve we will move onto an MRI. 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    IBS (irritable bowel syndrome)     Ill-defined condition 2011    h/o of \"blood clot in my lung, after a picc line\"    Ill-defined condition     painless rectal bleeding    MRSA infection     h/o, not current    Ovarian cyst     Pain, upper back     Psychiatric disorder     depression     Stress incontinence     Tattoo     Unspecified deficiency anemia 1999     Past Surgical History:   Procedure Laterality Date    COLONOSCOPY N/A 6/28/2019    DIAGNOSTIC COLONOSCOPY with random bxs performed by Hilaria Multani [facility] HX CHOLECYSTECTOMY  2002    HX GYN  2018    Abalation    HX GYN      rt ovary removed    HX GYN      left ovary clipped.     HX ORTHOPAEDIC      arthoscopic rt knee    HX ORTHOPAEDIC      left knee meniscis tear    HX ORTHOPAEDIC Right     meniscus repair    NEUROLOGICAL PROCEDURE UNLISTED  12/2018    laminectomy     Social History     Socioeconomic History    Marital status:      Spouse name: Not on file    Number of children: Not on file    Years of education: Not on file    Highest education level: Not on file   Occupational History    Not on file   Social Needs    Financial resource strain: Not on file    Food insecurity:     Worry: Not on file     Inability: Not on file  Transportation needs:     Medical: Not on file     Non-medical: Not on file   Tobacco Use    Smoking status: Former Smoker     Packs/day: 0.50     Years: 10.00     Pack years: 5.00    Smokeless tobacco: Never Used    Tobacco comment: pt smoked on and off for 10 years and quit 6 years ago   Substance and Sexual Activity    Alcohol use: Yes     Alcohol/week: 0.8 standard drinks     Types: 1 Cans of beer per week     Comment: social    Drug use: No    Sexual activity: Yes     Partners: Male   Lifestyle    Physical activity:     Days per week: Not on file     Minutes per session: Not on file    Stress: Not on file   Relationships    Social connections:     Talks on phone: Not on file     Gets together: Not on file     Attends Ten[employer] service: Not on file     Active member of club or organization: Not on file     Attends meetings of clubs or organizations: Not on file     Relationship status: Not on file    Intimate partner violence:     Fear of current or ex partner: Not on file     Emotionally abused: Not on file     Physically abused: Not on file     Forced sexual activity: Not on file   Other Topics Concern    Not on file   Social History Narrative    Not on file     Family History   Problem Relation Age of Onset    Hypertension Mother     Arthritis-osteo Mother     GERD Father     Hypertension Father     Cancer Sister     MS Maternal Grandmother     Diabetes Paternal Grandmother        Physical Exam:  Visit Vitals  /78 (BP 1 Location: Left arm, BP Patient Position: Sitting)   Pulse 65   Temp 98 °F (36.7 °C) (Oral)   Resp 16   Ht 5' 2\" (1.575 m)   Wt 184 lb (83.5 kg)   SpO2 99%   BMI 33.65 kg/m²     Pain Scale: 6/10       has been . reviewed and is appropriate          Diagnoses and all orders for this visit:    1. Chronic bilateral low back pain with right-sided sciatica  -     AMB POC XRAY, SPINE, LUMBOSACRAL; 4+  -     ibuprofen (MOTRIN) 600 mg tablet;  Take 1 Tab by mouth every eight (8) hours as needed for Pain. With food  -     REFERRAL TO PHYSICAL THERAPY    Other orders  -     methylPREDNISolone (MEDROL, MEDHAT,) 4 mg tablet; Per dose pack instructions            Follow-up and Dispositions    · Return in about 6 weeks (around 4/21/2020) for with NP.              We have informed Mimi Sanford to notify us for immediate appointment if she has any worsening neurogical symptoms or if an emergency situation presents, then call 911

## 2020-03-18 ENCOUNTER — HOSPITAL ENCOUNTER (EMERGENCY)
Age: 50
Discharge: HOME | End: 2020-03-18
Attending: EMERGENCY MEDICINE
Payer: MEDICAID

## 2020-03-18 ENCOUNTER — APPOINTMENT (OUTPATIENT)
Dept: CT IMAGING | Age: 50
End: 2020-03-18
Attending: NURSE PRACTITIONER
Payer: MEDICAID

## 2020-03-18 VITALS
DIASTOLIC BLOOD PRESSURE: 81 MMHG | BODY MASS INDEX: 33.99 KG/M2 | TEMPERATURE: 97.9 F | HEIGHT: 61 IN | RESPIRATION RATE: 14 BRPM | WEIGHT: 180 LBS | HEART RATE: 79 BPM | SYSTOLIC BLOOD PRESSURE: 127 MMHG | OXYGEN SATURATION: 98 %

## 2020-03-18 DIAGNOSIS — R10.13 ABDOMINAL PAIN, EPIGASTRIC: Primary | ICD-10-CM

## 2020-03-18 DIAGNOSIS — M79.7 FIBROMYALGIA: ICD-10-CM

## 2020-03-18 DIAGNOSIS — M79.18 MYOFASCIAL PAIN: ICD-10-CM

## 2020-03-18 LAB
ALBUMIN SERPL-MCNC: 4.3 G/DL (ref 3.4–5)
ALBUMIN/GLOB SERPL: 1.1 {RATIO} (ref 0.8–1.7)
ALP SERPL-CCNC: 70 U/L (ref 45–117)
ALT SERPL-CCNC: 44 U/L (ref 13–56)
ANION GAP SERPL CALC-SCNC: 5 MMOL/L (ref 3–18)
APPEARANCE UR: NORMAL
AST SERPL-CCNC: 22 U/L (ref 10–38)
BASOPHILS # BLD: 0 K/UL (ref 0–0.1)
BASOPHILS NFR BLD: 0 % (ref 0–2)
BILIRUB SERPL-MCNC: 0.4 MG/DL (ref 0.2–1)
BILIRUB UR QL: NEGATIVE
BUN SERPL-MCNC: 11 MG/DL (ref 7–18)
BUN/CREAT SERPL: 11 (ref 12–20)
CALCIUM SERPL-MCNC: 9.2 MG/DL (ref 8.5–10.1)
CHLORIDE SERPL-SCNC: 108 MMOL/L (ref 100–111)
CO2 SERPL-SCNC: 26 MMOL/L (ref 21–32)
COLOR UR: YELLOW
CREAT SERPL-MCNC: 1.04 MG/DL (ref 0.6–1.3)
DIFFERENTIAL METHOD BLD: NORMAL
EOSINOPHIL # BLD: 0.1 K/UL (ref 0–0.4)
EOSINOPHIL NFR BLD: 1 % (ref 0–5)
ERYTHROCYTE [DISTWIDTH] IN BLOOD BY AUTOMATED COUNT: 12.1 % (ref 11.6–14.5)
GLOBULIN SER CALC-MCNC: 3.9 G/DL (ref 2–4)
GLUCOSE SERPL-MCNC: 91 MG/DL (ref 74–99)
GLUCOSE UR STRIP.AUTO-MCNC: NEGATIVE MG/DL
HCG SERPL QL: NEGATIVE
HCT VFR BLD AUTO: 38.2 % (ref 35–45)
HGB BLD-MCNC: 13.1 G/DL (ref 12–16)
HGB UR QL STRIP: NEGATIVE
KETONES UR QL STRIP.AUTO: NEGATIVE MG/DL
LEUKOCYTE ESTERASE UR QL STRIP.AUTO: NEGATIVE
LIPASE SERPL-CCNC: 116 U/L (ref 73–393)
LYMPHOCYTES # BLD: 2.9 K/UL (ref 0.9–3.6)
LYMPHOCYTES NFR BLD: 29 % (ref 21–52)
MCH RBC QN AUTO: 31 PG (ref 24–34)
MCHC RBC AUTO-ENTMCNC: 34.3 G/DL (ref 31–37)
MCV RBC AUTO: 90.5 FL (ref 74–97)
MONOCYTES # BLD: 0.7 K/UL (ref 0.05–1.2)
MONOCYTES NFR BLD: 7 % (ref 3–10)
NEUTS SEG # BLD: 6.2 K/UL (ref 1.8–8)
NEUTS SEG NFR BLD: 63 % (ref 40–73)
NITRITE UR QL STRIP.AUTO: NEGATIVE
PH UR STRIP: 5 [PH] (ref 5–8)
PLATELET # BLD AUTO: 289 K/UL (ref 135–420)
PMV BLD AUTO: 9.9 FL (ref 9.2–11.8)
POTASSIUM SERPL-SCNC: 3.7 MMOL/L (ref 3.5–5.5)
PROT SERPL-MCNC: 8.2 G/DL (ref 6.4–8.2)
PROT UR STRIP-MCNC: NEGATIVE MG/DL
RBC # BLD AUTO: 4.22 M/UL (ref 4.2–5.3)
SODIUM SERPL-SCNC: 139 MMOL/L (ref 136–145)
SP GR UR REFRACTOMETRY: 1.02 (ref 1–1.03)
UROBILINOGEN UR QL STRIP.AUTO: 0.2 EU/DL (ref 0.2–1)
WBC # BLD AUTO: 9.9 K/UL (ref 4.6–13.2)

## 2020-03-18 PROCEDURE — 83690 ASSAY OF LIPASE: CPT

## 2020-03-18 PROCEDURE — 74011250637 HC RX REV CODE- 250/637: Performed by: NURSE PRACTITIONER

## 2020-03-18 PROCEDURE — 96375 TX/PRO/DX INJ NEW DRUG ADDON: CPT

## 2020-03-18 PROCEDURE — 96374 THER/PROPH/DIAG INJ IV PUSH: CPT

## 2020-03-18 PROCEDURE — 84703 CHORIONIC GONADOTROPIN ASSAY: CPT

## 2020-03-18 PROCEDURE — 74176 CT ABD & PELVIS W/O CONTRAST: CPT

## 2020-03-18 PROCEDURE — 74011250636 HC RX REV CODE- 250/636: Performed by: EMERGENCY MEDICINE

## 2020-03-18 PROCEDURE — 85025 COMPLETE CBC W/AUTO DIFF WBC: CPT

## 2020-03-18 PROCEDURE — 80053 COMPREHEN METABOLIC PANEL: CPT

## 2020-03-18 PROCEDURE — 81003 URINALYSIS AUTO W/O SCOPE: CPT

## 2020-03-18 PROCEDURE — 99282 EMERGENCY DEPT VISIT SF MDM: CPT

## 2020-03-18 PROCEDURE — 74011250636 HC RX REV CODE- 250/636: Performed by: NURSE PRACTITIONER

## 2020-03-18 RX ORDER — KETOROLAC TROMETHAMINE 15 MG/ML
15 INJECTION, SOLUTION INTRAMUSCULAR; INTRAVENOUS
Status: COMPLETED | OUTPATIENT
Start: 2020-03-18 | End: 2020-03-18

## 2020-03-18 RX ORDER — MAG HYDROX/ALUMINUM HYD/SIMETH 200-200-20
30 SUSPENSION, ORAL (FINAL DOSE FORM) ORAL
Status: COMPLETED | OUTPATIENT
Start: 2020-03-18 | End: 2020-03-18

## 2020-03-18 RX ORDER — LIDOCAINE HYDROCHLORIDE 20 MG/ML
15 SOLUTION OROPHARYNGEAL
Status: DISCONTINUED | OUTPATIENT
Start: 2020-03-18 | End: 2020-03-19 | Stop reason: HOSPADM

## 2020-03-18 RX ORDER — MORPHINE SULFATE 2 MG/ML
2 INJECTION, SOLUTION INTRAMUSCULAR; INTRAVENOUS ONCE
Status: COMPLETED | OUTPATIENT
Start: 2020-03-18 | End: 2020-03-18

## 2020-03-18 RX ORDER — ONDANSETRON 2 MG/ML
4 INJECTION INTRAMUSCULAR; INTRAVENOUS ONCE
Status: COMPLETED | OUTPATIENT
Start: 2020-03-18 | End: 2020-03-18

## 2020-03-18 RX ORDER — NALOXONE HYDROCHLORIDE 4 MG/.1ML
SPRAY NASAL
Qty: 1 EACH | Refills: 0 | Status: SHIPPED | OUTPATIENT
Start: 2020-03-18 | End: 2021-05-14 | Stop reason: ALTCHOICE

## 2020-03-18 RX ORDER — OXYCODONE AND ACETAMINOPHEN 5; 325 MG/1; MG/1
1 TABLET ORAL
Status: COMPLETED | OUTPATIENT
Start: 2020-03-18 | End: 2020-03-18

## 2020-03-18 RX ORDER — FAMOTIDINE 10 MG/ML
20 INJECTION INTRAVENOUS
Status: COMPLETED | OUTPATIENT
Start: 2020-03-18 | End: 2020-03-18

## 2020-03-18 RX ORDER — OXYCODONE AND ACETAMINOPHEN 5; 325 MG/1; MG/1
1 TABLET ORAL
Qty: 2 TAB | Refills: 0 | Status: SHIPPED | OUTPATIENT
Start: 2020-03-18 | End: 2020-03-21

## 2020-03-18 RX ORDER — SODIUM CHLORIDE 9 MG/ML
100 INJECTION, SOLUTION INTRAVENOUS CONTINUOUS
Status: DISCONTINUED | OUTPATIENT
Start: 2020-03-18 | End: 2020-03-19 | Stop reason: HOSPADM

## 2020-03-18 RX ADMIN — MORPHINE SULFATE 2 MG: 2 INJECTION, SOLUTION INTRAMUSCULAR; INTRAVENOUS at 19:06

## 2020-03-18 RX ADMIN — OXYCODONE HYDROCHLORIDE AND ACETAMINOPHEN 1 TABLET: 5; 325 TABLET ORAL at 17:57

## 2020-03-18 RX ADMIN — FAMOTIDINE 20 MG: 10 INJECTION INTRAVENOUS at 17:57

## 2020-03-18 RX ADMIN — ALUMINUM HYDROXIDE, MAGNESIUM HYDROXIDE, AND SIMETHICONE 30 ML: 200; 200; 20 SUSPENSION ORAL at 17:30

## 2020-03-18 RX ADMIN — SODIUM CHLORIDE 100 ML/HR: 900 INJECTION, SOLUTION INTRAVENOUS at 17:31

## 2020-03-18 RX ADMIN — KETOROLAC TROMETHAMINE 15 MG: 15 INJECTION, SOLUTION INTRAMUSCULAR; INTRAVENOUS at 17:30

## 2020-03-18 RX ADMIN — ONDANSETRON 4 MG: 2 INJECTION INTRAMUSCULAR; INTRAVENOUS at 19:06

## 2020-03-18 NOTE — ED TRIAGE NOTES
\"I've been having pain to my stomach. I'm scheduled for a barium swallow test on Friday, but I can't wait. My stomach feels full and distended. \"

## 2020-03-18 NOTE — ED NOTES
Transported to CT department, via wheelchair, accompanied by hospital transport, in stable condition.

## 2020-03-18 NOTE — ED PROVIDER NOTES
EMERGENCY DEPARTMENT HISTORY AND PHYSICAL EXAM    7:03 PM      Date: 3/18/2020  Patient Name: Alexsandra Schmitz    History of Presenting Illness     Chief Complaint   Patient presents with    Abdominal Pain     History Provided By: Patient  Chief complaint: acute on chronic epigastric pain, early satiety, and abdominal bloating without n/v. No fevers or chills. No chest pain or chest discomfort. Associated Symptoms: patient has had these symptoms on going X 1.5 years. Is followed by GI Dr. Will Galan. Has undergone colonoscopy and endoscopy with no acute findings per patient. biopsy report pending per patient. She is scheduled for barium swallow on Friday. Additional History (Context): Alexsandra Schmitz is a 51 y.o. female with past medical history significant for IBS, gastroparesis, GERD, depression / anxiety, dyslipidemia and chronic back pain. PCP: Huy Murry NP    Current Outpatient Medications   Medication Sig Dispense Refill    oxyCODONE-acetaminophen (Percocet) 5-325 mg per tablet Take 1 Tab by mouth every four (4) hours as needed for Pain for up to 3 days. Max Daily Amount: 6 Tabs. 2 Tab 0    naloxone (NARCAN) 4 mg/actuation nasal spray Use 1 spray intranasally, then discard. Repeat with new spray every 2 min as needed for opioid overdose symptoms, alternating nostrils. 1 Each 0    ondansetron (ZOFRAN ODT) 8 mg disintegrating tablet Take 1 Tab by mouth every twelve (12) hours as needed for Nausea for up to 15 days. 30 Tab 0    ibuprofen (MOTRIN) 600 mg tablet Take 1 Tab by mouth every eight (8) hours as needed for Pain. With food 90 Tab 2    methylPREDNISolone (MEDROL, MEDHAT,) 4 mg tablet Per dose pack instructions 1 Dose Pack 0    colestipoL (COLESTID) 5 gram packet Take 5 g by mouth two (2) times a day.  omeprazole (PRILOSEC) 40 mg capsule Take 1 Cap by mouth Before breakfast and dinner.  60 Cap 1    buPROPion XL (WELLBUTRIN XL) 300 mg XL tablet Take 1 Tab by mouth every morning. 90 Tab 0    gabapentin (NEURONTIN) 800 mg tablet Take 1 Tab by mouth three (3) times daily as needed (as directed). Max Daily Amount: 2,400 mg. 90 Tab 0    hydrocortisone (HYTONE) 2.5 % topical cream Apply  to affected area two (2) times a day. use thin layer 30 g 0    sertraline (ZOLOFT) 100 mg tablet TAKE 2 TABLETS BY MOUTH ONCE DAILY 60 Tab 2    valACYclovir (VALTREX) 1 gram tablet Take 1 Tab by mouth daily. 30 Tab 1    lovastatin (MEVACOR) 20 mg tablet Take 1 Tab by mouth nightly. 90 Tab 1    ergocalciferol (ERGOCALCIFEROL) 50,000 unit capsule Take 1 Cap by mouth every seven (7) days. 4 Cap 2    dicyclomine (BENTYL) 10 mg capsule Take 10 mg by mouth 4 times daily (before meals and nightly).          Past History     Past Medical History:  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    IBS (irritable bowel syndrome)     Ill-defined condition 2011    h/o of \"blood clot in my lung, after a picc line\"    Ill-defined condition     painless rectal bleeding    MRSA infection     h/o, not current    Ovarian cyst     Pain, upper back     Psychiatric disorder     depression     Stress incontinence     Tattoo     Unspecified deficiency anemia 1999       Past Surgical History:  Past Surgical History:   Procedure Laterality Date    COLONOSCOPY N/A 6/28/2019    DIAGNOSTIC COLONOSCOPY with random bxs performed by Racquel Mason MD at [address] [place] [address] HX CHOLECYSTECTOMY  2002    HX GYN  2018    Abalation    HX GYN      rt ovary removed    HX GYN left ovary clipped.  HX ORTHOPAEDIC      arthoscopic rt knee    HX ORTHOPAEDIC      left knee meniscis tear    HX ORTHOPAEDIC Right     meniscus repair    NEUROLOGICAL PROCEDURE UNLISTED  12/2018    laminectomy       Family History:  Family History   Problem Relation Age of Onset    Hypertension Mother     Arthritis-osteo Mother     GERD Father     Hypertension Father     Cancer Sister     MS Maternal Grandmother     Diabetes Paternal Grandmother        Social History:  Social History     Tobacco Use    Smoking status: Former Smoker     Packs/day: 0.50     Years: 10.00     Pack years: 5.00    Smokeless tobacco: Never Used    Tobacco comment: pt smoked on and off for 10 years and quit 6 years ago   Substance Use Topics    Alcohol use: Yes     Alcohol/week: 0.8 standard drinks     Types: 1 Cans of beer per week     Comment: social    Drug use: No       Allergies: Allergies   Allergen Reactions    Apple Anaphylaxis    Cephalexin Hives    Strawberry Anaphylaxis    Wasps [Hymenoptera Allergenic Extract] Anaphylaxis         Review of Systems       Review of Systems   Constitutional: Negative for chills, diaphoresis, fatigue and fever. HENT: Negative for congestion, ear pain and sore throat. Eyes: Negative for pain. Respiratory: Negative for cough, chest tightness, shortness of breath and wheezing. Cardiovascular: Negative for chest pain, palpitations and leg swelling. Gastrointestinal: Positive for abdominal distention and abdominal pain (eipgastric pain ). Negative for constipation, diarrhea, nausea and vomiting. Genitourinary: Negative for dysuria, flank pain, hematuria, pelvic pain, vaginal bleeding and vaginal discharge. Musculoskeletal: Negative for back pain, joint swelling, neck pain and neck stiffness. Neurological: Negative for dizziness, weakness, light-headedness and headaches.        Physical Exam     Visit Vitals  /81 (BP 1 Location: Left arm, BP Patient Position: At rest)   Pulse 79   Temp 97.9 °F (36.6 °C)   Resp 14   Ht 5' 1\" (1.549 m)   Wt 81.6 kg (180 lb)   SpO2 98%   BMI 34.01 kg/m²         Physical Exam  Vitals signs and nursing note reviewed. Constitutional:       General: She is not in acute distress. Appearance: Normal appearance. She is not ill-appearing, toxic-appearing or diaphoretic. HENT:      Head: Normocephalic and atraumatic. Neck:      Musculoskeletal: Normal range of motion and neck supple. No neck rigidity or muscular tenderness. Cardiovascular:      Rate and Rhythm: Normal rate and regular rhythm. Pulses: Normal pulses. Heart sounds: Normal heart sounds. No murmur. Pulmonary:      Effort: Pulmonary effort is normal. No respiratory distress. Breath sounds: Normal breath sounds. No wheezing. Abdominal:      General: Bowel sounds are normal. There is no distension. Palpations: Abdomen is soft. There is no shifting dullness or fluid wave. Tenderness: There is abdominal tenderness in the epigastric area. There is no right CVA tenderness, left CVA tenderness, guarding or rebound. Negative signs include Loyd's sign. Musculoskeletal: Normal range of motion. Skin:     General: Skin is warm and dry. Capillary Refill: Capillary refill takes less than 2 seconds. Neurological:      General: No focal deficit present. Mental Status: She is alert and oriented to person, place, and time. Psychiatric:         Behavior: Behavior normal.       Diagnostic Study Results     Labs -  No results found for this or any previous visit (from the past 12 hour(s)). Radiologic Studies -   CT ABD PELV WO CONT   Final Result   IMPRESSION:      No CT finding for an acute intra-abdominal or pelvic process. No significant   interval change. .          Medical Decision Making   I am the first provider for this patient.     I reviewed the vital signs, available nursing notes, past medical history, past surgical history, family history and social history. Vital Signs-Reviewed the patient's vital signs. Records Reviewed: Nursing Notes and Old Medical Records (Time of Review: 7:03 PM)    ED Course: Progress Notes, Reevaluation, and Consults:      Provider Notes (Medical Decision Making): This is a 51year old female with past medical history significant for IBS, gastroparesis, GERD, depression / anxiety, dyslipidemia and chronic back pain Who presented to the ED with chief complaint of acute on chronic epigastric pain, early satiety, and abdominal bloating without n/v. No fevers or chills. No chest pain or chest discomfort. Of note, patient has had these symptoms on going X 1.5 years. She Is followed by GI Dr. Suzy Cota. Has undergone colonoscopy and endoscopy with no acute findings per patient. biopsy report pending per patient. She is scheduled for barium swallow on Friday. She is afebrile. In no acute distress. Vitals stable. Lung sounds clear throughout. No work of breathing. No use of accessory muscles. Epigastric pain reproducible, abdomen is otherwise benign. No guarding. No rebound. No CVA tenderness appreciated. CBC, chemistry, lipase, urinalysis, urine pregnancy unremarkable. Low concern for cardiopulmonary event with referred pain. Heart score 0. Wells criteria 0. She has no chest pain, no SOB, no hypoxia, tachycardia. She has had a cholecystectomy and appendectomy in past so no concerns for those. CT abdomen / pelvis is otherwise unremarkable with no acute pathology. She received mylant / lidocaine viscus with \"the edge\" taken off her pain. I suspect pain is d/t to chronic abd pain that is currently under full work up with GI Dr. Italo Costa. Shared discission making with patient with plan to treat acute pain with morphine - which has worked in the past- then follow up outpatient follow up with GI as is already scheduled.  At time of discharge, she requested prescription for percocet, stating that she no longer has any tabs at home for her lower back pain.  [facility] 2 tabs of percocet. Extensive review of return precautions discussed with patient prior to discharge. Discussed importance of PCP, GI follow-up/reassessment and need to return to ED immediately if any of symptoms worsen. Patient verbalized understanding of these instructions and is in agreement with discharge plan. Diagnosis     Clinical Impression:   1. Abdominal pain, epigastric    2. Fibromyalgia    3. Myofascial pain        Disposition: home     7:03 PM  Reviewed results with patient. Discussed need for close outpatient follow-up this week for reassessment. Follow-up Information     Follow up With Specialties Details Why Contact Info    [facility] EMERGENCY DEPT Emergency Medicine  If symptoms worsen [address] [facility]    Jamar Lee NP Nurse Practitioner In 2 days abd pain  [address] [facility]  [address] [place] [address]      Buddie Cogan, MD Gastroenterology  abd pain [id number] [facility] [address]             Discharge Medication List as of 3/18/2020  7:28 PM      START taking these medications    Details   oxyCODONE-acetaminophen (Percocet) 5-325 mg per tablet Take 1 Tab by mouth every four (4) hours as needed for Pain for up to 3 days. Max Daily Amount: 6 Tabs., Print, Disp-2 Tab, R-0      naloxone (NARCAN) 4 mg/actuation nasal spray Use 1 spray intranasally, then discard. Repeat with new spray every 2 min as needed for opioid overdose symptoms, alternating nostrils. , Print, Disp-1 Each, R-0         CONTINUE these medications which have NOT CHANGED    Details   ondansetron (ZOFRAN ODT) 8 mg disintegrating tablet Take 1 Tab by mouth every twelve (12) hours as needed for Nausea for up to 15 days. , Normal, Disp-30 Tab, R-0      ibuprofen (MOTRIN) 600 mg tablet Take 1 Tab by mouth every eight (8) hours as needed for Pain.  With food, Normal, Disp-90 Tab, R-2      methylPREDNISolone (MEDROL, MEDHAT,) 4 mg tablet Per dose pack instructions, Normal, Disp-1 Dose Pack, R-0      colestipoL (COLESTID) 5 gram packet Take 5 g by mouth two (2) times a day., Historical Med      omeprazole (PRILOSEC) 40 mg capsule Take 1 Cap by mouth Before breakfast and dinner., Normal, Disp-60 Cap, R-1      buPROPion XL (WELLBUTRIN XL) 300 mg XL tablet Take 1 Tab by mouth every morning., Normal, Disp-90 Tab, R-0      gabapentin (NEURONTIN) 800 mg tablet Take 1 Tab by mouth three (3) times daily as needed (as directed). Max Daily Amount: 2,400 mg., Normal, Disp-90 Tab, R-0      hydrocortisone (HYTONE) 2.5 % topical cream Apply  to affected area two (2) times a day. use thin layer, Normal, Disp-30 g, R-0      sertraline (ZOLOFT) 100 mg tablet TAKE 2 TABLETS BY MOUTH ONCE DAILY, Normal, Disp-60 Tab, R-2Please consider 90 day supplies to promote better adherence      valACYclovir (VALTREX) 1 gram tablet Take 1 Tab by mouth daily. , Normal, Disp-30 Tab, R-1      lovastatin (MEVACOR) 20 mg tablet Take 1 Tab by mouth nightly., Normal, Disp-90 Tab, R-1      ergocalciferol (ERGOCALCIFEROL) 50,000 unit capsule Take 1 Cap by mouth every seven (7) days. , Normal, Disp-4 Cap, R-2      dicyclomine (BENTYL) 10 mg capsule Take 10 mg by mouth 4 times daily (before meals and nightly). , Historical Med         STOP taking these medications       acetaminophen-codeine (TYLENOL #3) 300-30 mg per tablet Comments:   Reason for Stopping:               Dictation disclaimer:  Please note that this dictation was completed with [employer], the [employer] voice recognition software. Quite often unanticipated grammatical, syntax, homophones, and other interpretive errors are inadvertently transcribed by the computer software. Please disregard these errors. Please excuse any errors that have escaped final proofreading.

## 2020-03-20 ENCOUNTER — HOSPITAL ENCOUNTER (OUTPATIENT)
Dept: GENERAL RADIOLOGY | Age: 50
Discharge: HOME | End: 2020-03-20
Attending: NURSE PRACTITIONER
Payer: MEDICAID

## 2020-03-20 DIAGNOSIS — R14.0 ABDOMINAL BLOATING: ICD-10-CM

## 2020-03-20 PROCEDURE — 74011000250 HC RX REV CODE- 250: Performed by: NURSE PRACTITIONER

## 2020-03-20 PROCEDURE — 74248 X-RAY SM INT F-THRU STD: CPT

## 2020-03-20 PROCEDURE — 74011000255 HC RX REV CODE- 255: Performed by: NURSE PRACTITIONER

## 2020-03-20 RX ADMIN — ANTACID/ANTIFLATULENT 8 G: 380; 550; 10; 10 GRANULE, EFFERVESCENT ORAL at 09:18

## 2020-03-20 RX ADMIN — BARIUM SULFATE 135 ML: 980 POWDER, FOR SUSPENSION ORAL at 09:19

## 2020-03-20 RX ADMIN — BARIUM SULFATE 176 G: 960 POWDER, FOR SUSPENSION ORAL at 09:18

## 2020-03-26 DIAGNOSIS — R52 GENERALIZED PAIN: Primary | ICD-10-CM

## 2020-03-26 RX ORDER — ONDANSETRON 8 MG/1
8 TABLET, ORALLY DISINTEGRATING ORAL
Qty: 30 TAB | Refills: 0 | Status: SHIPPED | OUTPATIENT
Start: 2020-03-26 | End: 2020-04-08 | Stop reason: SDUPTHER

## 2020-03-26 RX ORDER — ACETAMINOPHEN AND CODEINE PHOSPHATE 300; 30 MG/1; MG/1
1 TABLET ORAL
Qty: 45 TAB | Refills: 0 | Status: SHIPPED | OUTPATIENT
Start: 2020-03-26 | End: 2020-04-08 | Stop reason: SDUPTHER

## 2020-03-26 NOTE — TELEPHONE ENCOUNTER
[facility] w/Codeine as 3/10/20 for a 15 d/s. Last Visit: 3/3/20 with NP Eliot Rivera  Next Appointment: none  Previous Refill Encounter(s): 3/10/20 Zofran #30 & Tylenol w/Codeine #45    Requested Prescriptions     Pending Prescriptions Disp Refills    ondansetron (ZOFRAN ODT) 8 mg disintegrating tablet 30 Tab 0     Sig: Take 1 Tab by mouth every twelve (12) hours as needed for Nausea for up to 15 days.  acetaminophen-codeine (TYLENOL #3) 300-30 mg per tablet 45 Tab 0     Sig: Take 1 Tab by mouth every eight (8) hours as needed for Pain for up to 15 days. Max Daily Amount: 3 Tabs.

## 2020-03-26 NOTE — TELEPHONE ENCOUNTER
Requested Prescriptions     Pending Prescriptions Disp Refills    ondansetron (ZOFRAN ODT) 8 mg disintegrating tablet 30 Tab 0     Sig: Take 1 Tab by mouth every twelve (12) hours as needed for Nausea for up to 15 days.         Pt also requesting     acetaminophen-codeine (TYLENOL #3) 300-30 mg per tablet  Not on current medication list.     Stated she has had a recent \"full blown\" shingles attack\"    Stated her pharmacy told her they faxed request for these medications 03/23/2020

## 2020-04-08 DIAGNOSIS — R52 GENERALIZED PAIN: ICD-10-CM

## 2020-04-09 RX ORDER — ACETAMINOPHEN AND CODEINE PHOSPHATE 300; 30 MG/1; MG/1
1 TABLET ORAL
Qty: 45 TAB | Refills: 0 | Status: SHIPPED | OUTPATIENT
Start: 2020-04-09 | End: 2020-04-23 | Stop reason: SDUPTHER

## 2020-04-09 RX ORDER — ATORVASTATIN CALCIUM 40 MG/1
40 TABLET, FILM COATED ORAL
Qty: 90 TAB | Refills: 0 | Status: SHIPPED | OUTPATIENT
Start: 2020-04-09 | End: 2020-09-30

## 2020-04-09 RX ORDER — ONDANSETRON 8 MG/1
8 TABLET, ORALLY DISINTEGRATING ORAL
Qty: 30 TAB | Refills: 0 | Status: SHIPPED | OUTPATIENT
Start: 2020-04-09 | End: 2020-04-23 | Stop reason: SDUPTHER

## 2020-04-09 RX ORDER — SERTRALINE HYDROCHLORIDE 100 MG/1
200 TABLET, FILM COATED ORAL DAILY
Qty: 60 TAB | Refills: 2 | Status: SHIPPED | OUTPATIENT
Start: 2020-04-09 | End: 2020-07-02 | Stop reason: SDUPTHER

## 2020-04-09 RX ORDER — HYDROCORTISONE 25 MG/G
CREAM TOPICAL 2 TIMES DAILY
Qty: 30 G | Refills: 0 | Status: SHIPPED | OUTPATIENT
Start: 2020-04-09 | End: 2020-06-18 | Stop reason: SDUPTHER

## 2020-04-09 NOTE — TELEPHONE ENCOUNTER
Last Visit: 3/3/20 with NP Ruddy Wynn  Next Appointment: advised to f/u in 4 months  Previous Refill Encounter(s): 12/5/19 #60 with 2 refills    Requested Prescriptions     Pending Prescriptions Disp Refills    sertraline (ZOLOFT) 100 mg tablet 60 Tab 2     Sig: Take 2 Tabs by mouth daily.

## 2020-04-20 DIAGNOSIS — M79.7 FIBROMYALGIA: ICD-10-CM

## 2020-04-20 RX ORDER — GABAPENTIN 800 MG/1
TABLET ORAL
Qty: 90 TAB | Refills: 0 | Status: CANCELLED | OUTPATIENT
Start: 2020-04-20

## 2020-04-21 ENCOUNTER — PATIENT MESSAGE (OUTPATIENT)
Dept: FAMILY MEDICINE CLINIC | Age: 50
End: 2020-04-21

## 2020-04-21 NOTE — TELEPHONE ENCOUNTER
----- Message from Chanel VivarChaz Ayala sent at 4/21/2020  8:05 AM EDT -----  Regarding: Prescription Question  Contact: [phone]  Fransisco Oliveira I hope u and ur loved ones r safe. Im writing to ask if u r receiving my refill requests? I am now out of gabapentin i sent a request gina morning to avoid running out but with this virus and im sure ur unbelievable crazy hours our messages might haved crossed eachother? Have a blessed day and a safe one!  Thank you kindly, Mrs Tayler Ayala

## 2020-04-23 DIAGNOSIS — R52 GENERALIZED PAIN: ICD-10-CM

## 2020-04-23 RX ORDER — ACETAMINOPHEN AND CODEINE PHOSPHATE 300; 30 MG/1; MG/1
1 TABLET ORAL
Qty: 45 TAB | Refills: 0 | Status: SHIPPED | OUTPATIENT
Start: 2020-04-23 | End: 2020-05-06 | Stop reason: SDUPTHER

## 2020-04-23 RX ORDER — ONDANSETRON 8 MG/1
8 TABLET, ORALLY DISINTEGRATING ORAL
Qty: 30 TAB | Refills: 0 | Status: SHIPPED | OUTPATIENT
Start: 2020-04-23 | End: 2020-05-06 | Stop reason: SDUPTHER

## 2020-04-23 NOTE — TELEPHONE ENCOUNTER
Last visit 03/03/2020 NP David Geiger   Next appointment 4 months (7/2020)   Previous refill encounter(s) 04/09/2020 Tylenol #3 #45, 04/09/2020 Zofran ODT #30     Requested Prescriptions     Pending Prescriptions Disp Refills    ondansetron (ZOFRAN ODT) 8 mg disintegrating tablet 30 Tab 0     Sig: Take 1 Tab by mouth every twelve (12) hours as needed for Nausea for up to 15 days.  acetaminophen-codeine (TYLENOL #3) 300-30 mg per tablet 45 Tab 0     Sig: Take 1 Tab by mouth every eight (8) hours as needed for Pain for up to 15 days. Max Daily Amount: 3 Tabs.

## 2020-04-23 NOTE — TELEPHONE ENCOUNTER
[facility] reports the last fill date for Tylenol w/Codeine as 4/9/20 for a 15 d/s.

## 2020-04-28 ENCOUNTER — HOSPITAL ENCOUNTER (OUTPATIENT)
Dept: CT IMAGING | Age: 50
Discharge: HOME | End: 2020-04-28
Attending: INTERNAL MEDICINE
Payer: MEDICAID

## 2020-04-28 DIAGNOSIS — R14.2 FLATULENCE, ERUCTATION, AND GAS PAIN: ICD-10-CM

## 2020-04-28 DIAGNOSIS — R14.3 FLATULENCE, ERUCTATION, AND GAS PAIN: ICD-10-CM

## 2020-04-28 DIAGNOSIS — R10.13 ABDOMINAL PAIN, EPIGASTRIC: ICD-10-CM

## 2020-04-28 DIAGNOSIS — R14.1 FLATULENCE, ERUCTATION, AND GAS PAIN: ICD-10-CM

## 2020-04-28 DIAGNOSIS — R11.2 NAUSEA WITH VOMITING: ICD-10-CM

## 2020-04-28 PROCEDURE — 70450 CT HEAD/BRAIN W/O DYE: CPT

## 2020-05-06 DIAGNOSIS — R52 GENERALIZED PAIN: ICD-10-CM

## 2020-05-06 NOTE — TELEPHONE ENCOUNTER
[facility]ol w/Codeine as 4/23/20 for a 15 d/s. Last Visit: 3/3/20 with HEENA Fitch  Next Appointment: Advised to follow-up in 4 months  Previous Refill Encounter(s): 12/3/19 Valtrex #30 with 1 refill, 2/7/20 Wellbutrin #90, 4/23/20 Zofran #30 & Tylenol w/ Codeine #45    Requested Prescriptions     Pending Prescriptions Disp Refills    valACYclovir (VALTREX) 1 gram tablet 30 Tab 1     Sig: Take 1 Tab by mouth daily.  buPROPion XL (WELLBUTRIN XL) 300 mg XL tablet 90 Tab 0     Sig: Take 1 Tab by mouth every morning.  ondansetron (ZOFRAN ODT) 8 mg disintegrating tablet 30 Tab 0     Sig: Take 1 Tab by mouth every twelve (12) hours as needed for Nausea for up to 15 days.  acetaminophen-codeine (TYLENOL #3) 300-30 mg per tablet 45 Tab 0     Sig: Take 1 Tab by mouth every eight (8) hours as needed for Pain for up to 15 days. Max Daily Amount: 3 Tabs.

## 2020-05-07 RX ORDER — ONDANSETRON 8 MG/1
8 TABLET, ORALLY DISINTEGRATING ORAL
Qty: 30 TAB | Refills: 0 | Status: SHIPPED | OUTPATIENT
Start: 2020-05-07 | End: 2020-05-20 | Stop reason: SDUPTHER

## 2020-05-07 RX ORDER — VALACYCLOVIR HYDROCHLORIDE 1 G/1
1000 TABLET, FILM COATED ORAL DAILY
Qty: 30 TAB | Refills: 1 | Status: SHIPPED | OUTPATIENT
Start: 2020-05-07 | End: 2020-09-14 | Stop reason: SDUPTHER

## 2020-05-07 RX ORDER — ACETAMINOPHEN AND CODEINE PHOSPHATE 300; 30 MG/1; MG/1
1 TABLET ORAL
Qty: 45 TAB | Refills: 0 | Status: SHIPPED | OUTPATIENT
Start: 2020-05-07 | End: 2020-05-20 | Stop reason: SDUPTHER

## 2020-05-07 RX ORDER — BUPROPION HYDROCHLORIDE 300 MG/1
300 TABLET ORAL
Qty: 90 TAB | Refills: 0 | Status: SHIPPED | OUTPATIENT
Start: 2020-05-07 | End: 2020-09-10 | Stop reason: SDUPTHER

## 2020-05-20 DIAGNOSIS — R52 GENERALIZED PAIN: ICD-10-CM

## 2020-05-22 RX ORDER — ACETAMINOPHEN AND CODEINE PHOSPHATE 300; 30 MG/1; MG/1
1 TABLET ORAL
Qty: 45 TAB | Refills: 0 | Status: SHIPPED | OUTPATIENT
Start: 2020-05-22 | End: 2020-06-04 | Stop reason: SDUPTHER

## 2020-05-22 RX ORDER — ONDANSETRON 8 MG/1
8 TABLET, ORALLY DISINTEGRATING ORAL
Qty: 30 TAB | Refills: 0 | Status: SHIPPED | OUTPATIENT
Start: 2020-05-22 | End: 2020-06-04 | Stop reason: SDUPTHER

## 2020-06-03 ENCOUNTER — ANESTHESIA EVENT (OUTPATIENT)
Dept: ENDOSCOPY | Age: 50
End: 2020-06-03
Payer: MEDICAID

## 2020-06-04 ENCOUNTER — ANESTHESIA (OUTPATIENT)
Dept: ENDOSCOPY | Age: 50
End: 2020-06-04
Payer: MEDICAID

## 2020-06-04 ENCOUNTER — HOSPITAL ENCOUNTER (OUTPATIENT)
Age: 50
Setting detail: OUTPATIENT SURGERY
Discharge: HOME | End: 2020-06-04
Attending: INTERNAL MEDICINE | Admitting: INTERNAL MEDICINE
Payer: MEDICAID

## 2020-06-04 VITALS
DIASTOLIC BLOOD PRESSURE: 68 MMHG | TEMPERATURE: 97.8 F | HEART RATE: 62 BPM | HEIGHT: 62 IN | BODY MASS INDEX: 33.72 KG/M2 | SYSTOLIC BLOOD PRESSURE: 106 MMHG | WEIGHT: 183.25 LBS | RESPIRATION RATE: 16 BRPM | OXYGEN SATURATION: 99 %

## 2020-06-04 DIAGNOSIS — R52 GENERALIZED PAIN: ICD-10-CM

## 2020-06-04 PROCEDURE — 74011000250 HC RX REV CODE- 250: Performed by: NURSE ANESTHETIST, CERTIFIED REGISTERED

## 2020-06-04 PROCEDURE — 77030019988 HC FCPS ENDOSC DISP BSC -B: Performed by: INTERNAL MEDICINE

## 2020-06-04 PROCEDURE — 77030008565 HC TBNG SUC IRR ERBE -B: Performed by: INTERNAL MEDICINE

## 2020-06-04 PROCEDURE — 76060000031 HC ANESTHESIA FIRST 0.5 HR: Performed by: INTERNAL MEDICINE

## 2020-06-04 PROCEDURE — 76040000019: Performed by: INTERNAL MEDICINE

## 2020-06-04 PROCEDURE — 77030018846 HC SOL IRR STRL H20 ICUM -A: Performed by: INTERNAL MEDICINE

## 2020-06-04 PROCEDURE — 74011250636 HC RX REV CODE- 250/636: Performed by: NURSE ANESTHETIST, CERTIFIED REGISTERED

## 2020-06-04 PROCEDURE — 77030034675 HC CAP BRAVO PH W DEL SYS GVIM -C: Performed by: INTERNAL MEDICINE

## 2020-06-04 RX ORDER — SODIUM CHLORIDE, SODIUM LACTATE, POTASSIUM CHLORIDE, CALCIUM CHLORIDE 600; 310; 30; 20 MG/100ML; MG/100ML; MG/100ML; MG/100ML
50 INJECTION, SOLUTION INTRAVENOUS CONTINUOUS
Status: DISCONTINUED | OUTPATIENT
Start: 2020-06-04 | End: 2020-06-04 | Stop reason: HOSPADM

## 2020-06-04 RX ORDER — SODIUM CHLORIDE 0.9 % (FLUSH) 0.9 %
5-40 SYRINGE (ML) INJECTION AS NEEDED
Status: DISCONTINUED | OUTPATIENT
Start: 2020-06-04 | End: 2020-06-04 | Stop reason: HOSPADM

## 2020-06-04 RX ORDER — SODIUM CHLORIDE 0.9 % (FLUSH) 0.9 %
5-40 SYRINGE (ML) INJECTION EVERY 8 HOURS
Status: DISCONTINUED | OUTPATIENT
Start: 2020-06-04 | End: 2020-06-04 | Stop reason: HOSPADM

## 2020-06-04 RX ORDER — LIDOCAINE HYDROCHLORIDE 20 MG/ML
INJECTION, SOLUTION EPIDURAL; INFILTRATION; INTRACAUDAL; PERINEURAL AS NEEDED
Status: DISCONTINUED | OUTPATIENT
Start: 2020-06-04 | End: 2020-06-04 | Stop reason: HOSPADM

## 2020-06-04 RX ORDER — DEXLANSOPRAZOLE 60 MG/1
60 CAPSULE, DELAYED RELEASE ORAL
COMMUNITY
End: 2021-06-09 | Stop reason: SDUPTHER

## 2020-06-04 RX ORDER — PROPOFOL 10 MG/ML
INJECTION, EMULSION INTRAVENOUS AS NEEDED
Status: DISCONTINUED | OUTPATIENT
Start: 2020-06-04 | End: 2020-06-04 | Stop reason: HOSPADM

## 2020-06-04 RX ADMIN — PROPOFOL 60 MG: 10 INJECTION, EMULSION INTRAVENOUS at 13:33

## 2020-06-04 RX ADMIN — LIDOCAINE HYDROCHLORIDE 80 MG: 20 INJECTION, SOLUTION EPIDURAL; INFILTRATION; INTRACAUDAL; PERINEURAL at 13:32

## 2020-06-04 RX ADMIN — SODIUM CHLORIDE, SODIUM LACTATE, POTASSIUM CHLORIDE, AND CALCIUM CHLORIDE 50 ML/HR: 600; 310; 30; 20 INJECTION, SOLUTION INTRAVENOUS at 11:47

## 2020-06-04 RX ADMIN — PROPOFOL 100 MG: 10 INJECTION, EMULSION INTRAVENOUS at 13:32

## 2020-06-04 RX ADMIN — PROPOFOL 20 MG: 10 INJECTION, EMULSION INTRAVENOUS at 13:35

## 2020-06-04 NOTE — DISCHARGE INSTRUCTIONS
DISCHARGE SUMMARY from Nurse    PATIENT INSTRUCTIONS:    After general anesthesia or intravenous sedation, for 24 hours or while taking prescription Narcotics:  · Limit your activities  · Do not drive and operate hazardous machinery  · Do not make important personal or business decisions  · Do  not drink alcoholic beverages  · If you have not urinated within 8 hours after discharge, please contact your surgeon on call. Report the following to your surgeon:  · Excessive pain, swelling, redness or odor of or around the surgical area  · Temperature over 100.5  · Nausea and vomiting lasting longer than 4 hours or if unable to take medications  · Any signs of decreased circulation or nerve impairment to extremity: change in color, persistent  numbness, tingling, coldness or increase pain  · Any questions    What to do at Home:  Recommended activity: Activity as tolerated and no driving for today. *  Please give a list of your current medications to your Primary Care Provider. *  Please update this list whenever your medications are discontinued, doses are      changed, or new medications (including over-the-counter products) are added. *  Please carry medication information at all times in case of emergency situations. These are general instructions for a healthy lifestyle:    No smoking/ No tobacco products/ Avoid exposure to second hand smoke  Surgeon General's Warning:  Quitting smoking now greatly reduces serious risk to your health. Obesity, smoking, and sedentary lifestyle greatly increases your risk for illness    A healthy diet, regular physical exercise & weight monitoring are important for maintaining a healthy lifestyle    You may be retaining fluid if you have a history of heart failure or if you experience any of the following symptoms:  Weight gain of 3 pounds or more overnight or 5 pounds in a week, increased swelling in our hands or feet or shortness of breath while lying flat in bed. Please call your doctor as soon as you notice any of these symptoms; do not wait until your next office visit. The discharge information has been reviewed with the patient. The patient verbalized understanding. Discharge medications reviewed with the patient and appropriate educational materials and side effects teaching were provided.   ___________________________________________________________________________________________________________________________________

## 2020-06-04 NOTE — ANESTHESIA POSTPROCEDURE EVALUATION
Procedure(s):  UPPER ENDOSCOPY / Maria Sharpe PH Study (4 Day). MAC, total IV anesthesia    Anesthesia Post Evaluation      Multimodal analgesia: multimodal analgesia not used between 6 hours prior to anesthesia start to PACU discharge  Patient location during evaluation: PACU  Patient participation: complete - patient participated  Level of consciousness: sleepy but conscious  Pain score: 3  Pain management: adequate  Airway patency: patent  Anesthetic complications: no  Cardiovascular status: acceptable  Respiratory status: acceptable and room air  Hydration status: acceptable  Post anesthesia nausea and vomiting:  none  Final Post Anesthesia Temperature Assessment:  Normothermia (36.0-37.5 degrees C)      INITIAL Post-op Vital signs:   Vitals Value Taken Time   /61 6/4/2020  2:14 PM   Temp 36.4 °C (97.5 °F) 6/4/2020  1:46 PM   Pulse 57 6/4/2020  2:19 PM   Resp 15 6/4/2020  2:19 PM   SpO2 99 % 6/4/2020  2:19 PM   Vitals shown include unvalidated device data.

## 2020-06-04 NOTE — TELEPHONE ENCOUNTER
[facility] Tylenol w/Codeine as 5/22/20 for a 15 d/s. Last Visit: 3/3/20 with NP Lou Schwarz  Next Appointment: Advised to follow-up in 4 months  Previous Refill Encounter(s): 5/22/20 Zofran #30 & Tylenol w/Codeine #45    Requested Prescriptions     Pending Prescriptions Disp Refills    ondansetron (ZOFRAN ODT) 8 mg disintegrating tablet 30 Tab 0     Sig: Take 1 Tab by mouth every twelve (12) hours as needed for Nausea for up to 15 days.  acetaminophen-codeine (TYLENOL #3) 300-30 mg per tablet 45 Tab 0     Sig: Take 1 Tab by mouth every eight (8) hours as needed for Pain for up to 15 days. Max Daily Amount: 3 Tabs.

## 2020-06-04 NOTE — ANESTHESIA PREPROCEDURE EVALUATION
Relevant Problems   No relevant active problems       Anesthetic History   No history of anesthetic complications            Review of Systems / Medical History  Patient summary reviewed, nursing notes reviewed and pertinent labs reviewed    Pulmonary                   Neuro/Psych         Psychiatric history    Comments: + Anxiety/Depression  + Migraine Has  + Fibromyalgia  + Chronic LBP Cardiovascular  Within defined limits                     GI/Hepatic/Renal     GERD: poorly controlled    Renal disease      Comments: + Epigastric Pain and Bloating  + IBS  + Hx/o C.  Diff Colitis in past  + Hx/o UTIs Endo/Other        Arthritis     Other Findings   Comments: Active Problems  Reconcile with Patient's Chart  Problem Noted Date  Chronic low back pain 05/20/2015  Cellulitis 05/01/2015  Neuralgia, post-herpetic 03/03/2013  Chronic narcotic dependence-contract in media 3/13 03/03/2013  Acute DVT of upper extremity, axillary vein with superficial thrombophlebitis basilic vein due to PICC line 2/24/2013 02/24/2013  Fibromyalgia 09/25/2011  HSV (herpes simplex virus) infection 09/25/2011  Cervical Axial Pain possibly facet joint mediated and levator scapulae pain 04/18/2010  Greater Occipetal nerve pain 04/18/2010  Myofascial Pain left infraspinatus 04/18/2010  Allergic rhinitis due to pollen 04/29/2009  Abnormal liver function 04/29/2009  Depression 12/03/2007  Anxiety state, unspecified 12/03/2007  Irritable bowel syndrome 12/03/2007  Abnormal glandular Papanicolaou smear of cervix 12/03/2007  GERD (gastroesophageal reflux disease)      Resolved Problems    Problem Noted Date Resolved Date  Coagulopathy 03/15/2013 04/01/2013  Pyelonephritis, unspecified 03/14/2013 04/01/2013  Abdominal pain, other specified site 03/03/2013 04/01/2013  Colitis presumed infectious 02/18/2013 04/01/2013  Constipation 02/12/2013 04/01/2013  MRSA in Eyebrow Abscess 02/11/2013 04/01/2013  Abscess of eyelid, right with MRSA 02/11/2013 04/01/2013  Orbital cellulitis on right 02/09/2013 04/01/2013  Dog bite, hand 07/02/2012 04/01/2013  Migraine, unspecified, without mention of intractable migraine without mention of status migrainosus 01/30/2010 02/09/2013           Physical Exam    Airway  Mallampati: II    Neck ROM: normal range of motion   Mouth opening: Normal     Cardiovascular    Rhythm: regular  Rate: normal         Dental      Comments: + few missing teeth, but denies any loose teeth.    Pulmonary  Breath sounds clear to auscultation               Abdominal  GI exam deferred       Other Findings            Anesthetic Plan    ASA: 2  Anesthesia type: MAC and total IV anesthesia          Induction: Intravenous  Anesthetic plan and risks discussed with: Patient

## 2020-06-04 NOTE — H&P
[email or web address]  [phone]      History and Physical    Patient: Eliza Bowden MRN: [id number]  SSN: [id number]    YOB: 1970  Age: 51 y.o. Sex: female      Subjective:      Eliza Bowden is a 51 y.o. female who presents with refractory GERD and hiatal hernia. 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    IBS (irritable bowel syndrome)     Ill-defined condition 2011    h/o of \"blood clot in my lung, after a picc line\"    Ill-defined condition     painless rectal bleeding    MRSA infection     h/o, not current    Ovarian cyst     Pain, upper back     Psychiatric disorder     depression     Stress incontinence     Tattoo     Unspecified deficiency anemia 1999     Past Surgical History:   Procedure Laterality Date    COLONOSCOPY N/A 6/28/2019    DIAGNOSTIC COLONOSCOPY with random bxs performed by Lucía Amador MD at [address] [place] [address] HX CHOLECYSTECTOMY  2002    HX GYN  2018    Abalation    HX GYN      rt ovary removed    HX GYN      left ovary clipped.     HX ORTHOPAEDIC      arthoscopic rt knee    HX ORTHOPAEDIC      left knee meniscis tear    HX ORTHOPAEDIC Right     meniscus repair    NEUROLOGICAL PROCEDURE UNLISTED  12/2018    laminectomy      Family History   Problem Relation Age of Onset    Hypertension Mother     Arthritis-osteo Mother     GERD Father     Hypertension Father     Cancer Sister     MS Maternal Grandmother     Diabetes Paternal Grandmother      Social History     Tobacco Use    Smoking status: Former Smoker     Packs/day: 0.50     Years: 10.00     Pack years: 5.00    Smokeless tobacco: Never Used    Tobacco comment: pt smoked on and off for 10 years and quit 6 years ago   Substance Use Topics    Alcohol use: Yes     Alcohol/week: 0.8 standard drinks     Types: 1 Cans of beer per week     Comment: social      Prior to Admission medications    Medication Sig Start Date End Date Taking? Authorizing Provider   ondansetron (ZOFRAN ODT) 8 mg disintegrating tablet Take 1 Tab by mouth every twelve (12) hours as needed for Nausea for up to 15 days. 5/22/20 6/6/20 Yes Silvester Romberg, NP   valACYclovir (VALTREX) 1 gram tablet Take 1 Tab by mouth daily. 5/7/20   Param NEGRETE NP   buPROPion XL (WELLBUTRIN XL) 300 mg XL tablet Take 1 Tab by mouth every morning. 5/7/20   Param NEGRETE NP   gabapentin (NEURONTIN) 800 mg tablet Take 1 Tab by mouth three (3) times daily. Max Daily Amount: 2,400 mg. As directed 4/21/20  Yes Silvester Romberg, NP   sertraline (ZOLOFT) 100 mg tablet Take 2 Tabs by mouth daily. 4/9/20  Yes Silvester Romberg, NP   atorvastatin (LIPITOR) 40 mg tablet Take 1 Tab by mouth nightly. 4/9/20  Yes Silvester Romberg, NP   colestipoL (COLESTID) 5 gram packet Take 5 g by mouth two (2) times a day. Yes Provider, Historical   ergocalciferol (ERGOCALCIFEROL) 50,000 unit capsule Take 1 Cap by mouth every seven (7) days. 10/4/19  Yes Silvester Romberg, NP   dicyclomine (BENTYL) 10 mg capsule Take 10 mg by mouth 4 times daily (before meals and nightly). Yes Provider, Historical   acetaminophen-codeine (TYLENOL #3) 300-30 mg per tablet Take 1 Tab by mouth every eight (8) hours as needed for Pain for up to 15 days. Max Daily Amount: 3 Tabs. 5/22/20 6/6/20  Param NEGRETE NP   hydrocortisone (HYTONE) 2.5 % topical cream Apply  to affected area two (2) times a day. use thin layer 4/9/20   Param NEGRETE NP   naloxone [facility]) 4 mg/actuation nasal spray Use 1 spray intranasally, then discard.  Repeat with new spray every 2 min as needed for opioid overdose symptoms, alternating nostrils. 3/18/20   Trinh Greenfield NP   ibuprofen (MOTRIN) 600 mg tablet Take 1 Tab by mouth every eight (8) hours as needed for Pain. With food 3/10/20   Yahaira Chatterjee NP   methylPREDNISolone (MEDROL, MEDHAT,) 4 mg tablet Per dose pack instructions 3/10/20   Lyndsey Chatterjee NP   omeprazole (PRILOSEC) 40 mg capsule Take 1 Cap by mouth Before breakfast and dinner. 2/7/20   Gene Fischer NP        Allergies   Allergen Reactions    Apple Anaphylaxis    Cephalexin Hives    Strawberry Anaphylaxis    Wasps [Hymenoptera Allergenic Extract] Anaphylaxis       Review of Systems:  A comprehensive review of systems was negative except for that written in the History of Present Illness. Objective:     Vitals:    05/27/20 0924   Weight: 83.9 kg (185 lb)   Height: 5' 2\" (1.575 m)        Physical Exam:  GENERAL: alert, cooperative, no distress, appears stated age  LUNG: clear to auscultation bilaterally  HEART: regular rate and rhythm, S1, S2 normal, no murmur, click, rub or gallop  ABDOMEN: soft, non-tender. Bowel sounds normal. No masses,  no organomegaly  NEUROLOGIC: alert & oriented x 3    Assessment:     1. Refractory GERD  2. Hiatal hernia    Plan:     1. EGD with Bravo    Signed By: Alison Dove MD     June 4, 2020      Alison Dove MD  [facility] [address] - [phone]  [email or web address]

## 2020-06-04 NOTE — PROCEDURES
[email or web address]  [phone]        Brief Procedure Note    Karen Duque  1970  [id number]    Date of Procedure: 6/4/2020    Preoperative diagnosis: Abdominal pain, epigastric:   789.06 - R10.13  Nausea and vomiting - intractable:   787.01 - R11.2  GERD:   530.81 - K21.9      Postoperative diagnosis: hiatal hernia, Bravo capsule deployed @ 30cm    Description of Findings: same    Sedation/Anesthesia: Monitored Anesthesia Care; See Anesthesia Note    Procedure: Procedure(s) with comments:  UPPER ENDOSCOPY / Nelwyn Williamson PH Study (4 Day) - 30cm    :  Dr. Valentin Ferguson MD    Assistant(s): Endoscopy Technician-1: Kehinde Devlin RN-1: Mike Quevedo RN; Nanci Rivera RN    EBL:None    Specimens: * No specimens in log *    Findings: See printed and scanned procedure note    Complications: None    Tissue Implant Device: Bravo pH capsule    Dr. Valentin Ferguson MD  6/4/2020  1:41 PM    Valentin Ferguson MD  [facility] [address]  [phone]  [facility] - [phone]  [email or web address]

## 2020-06-05 RX ORDER — ACETAMINOPHEN AND CODEINE PHOSPHATE 300; 30 MG/1; MG/1
1 TABLET ORAL
Qty: 45 TAB | Refills: 0 | Status: SHIPPED | OUTPATIENT
Start: 2020-06-05 | End: 2020-06-18 | Stop reason: SDUPTHER

## 2020-06-05 RX ORDER — ONDANSETRON 8 MG/1
8 TABLET, ORALLY DISINTEGRATING ORAL
Qty: 30 TAB | Refills: 0 | Status: SHIPPED | OUTPATIENT
Start: 2020-06-05 | End: 2020-06-18 | Stop reason: SDUPTHER

## 2020-06-16 ENCOUNTER — HOSPITAL ENCOUNTER (OUTPATIENT)
Age: 50
Setting detail: OUTPATIENT SURGERY
Discharge: HOME | End: 2020-06-16
Attending: INTERNAL MEDICINE | Admitting: INTERNAL MEDICINE
Payer: MEDICAID

## 2020-06-16 VITALS
OXYGEN SATURATION: 98 % | DIASTOLIC BLOOD PRESSURE: 82 MMHG | RESPIRATION RATE: 16 BRPM | TEMPERATURE: 98.7 F | HEART RATE: 64 BPM | SYSTOLIC BLOOD PRESSURE: 128 MMHG

## 2020-06-16 PROCEDURE — 91010 ESOPHAGUS MOTILITY STUDY: CPT | Performed by: INTERNAL MEDICINE

## 2020-06-16 PROCEDURE — 74011000250 HC RX REV CODE- 250: Performed by: INTERNAL MEDICINE

## 2020-06-16 RX ORDER — LIDOCAINE HYDROCHLORIDE 20 MG/ML
JELLY TOPICAL AS NEEDED
Status: DISCONTINUED | OUTPATIENT
Start: 2020-06-16 | End: 2020-06-16 | Stop reason: HOSPADM

## 2020-06-16 RX ADMIN — LIDOCAINE HYDROCHLORIDE: 20 JELLY TOPICAL at 12:57

## 2020-06-16 NOTE — H&P
[email or web address]  [phone]      GASTROENTEROLOGY Pre-Procedure H and P      Impression/Plan:   1. This patient is consented for a manometry for chest pain and dysphagia    Addendum: All lab tests orders pertaining to the procedure have been ordered by the anesthesia personnel and results will be addressed by the anesthesia team  Chief Complaint: chest pain and dysphagia      HPI:  Cara Nunez is a 51 y.o. 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    IBS (irritable bowel syndrome)     Ill-defined condition 2011    h/o of \"blood clot in my lung, after a picc line\"    Ill-defined condition     painless rectal bleeding    MRSA infection     h/o, not current    Ovarian cyst     Pain, upper back     Psychiatric disorder     depression     Stress incontinence     Tattoo     Unspecified deficiency anemia 1999       PSH:   Past Surgical History:   Procedure Laterality Date    COLONOSCOPY N/A 6/28/2019    DIAGNOSTIC COLONOSCOPY with random bxs performed by Bethel Carrizales MD at [address] [place] [address] HX CHOLECYSTECTOMY  2002    HX GYN  2018    Abalation    HX GYN      rt ovary removed    HX GYN      left ovary clipped.     HX ORTHOPAEDIC      arthoscopic rt knee    HX ORTHOPAEDIC      left knee meniscis tear    HX ORTHOPAEDIC Right     meniscus repair    NEUROLOGICAL PROCEDURE UNLISTED  12/2018    laminectomy       Social HX:   Social History     Socioeconomic History    Marital status:      Spouse name: Not on file    Number of children: Not on file    Years of education: Not on file    Highest education level: Not on file   Occupational History    Not on file   Social Needs    Financial resource strain: Not on file    Food insecurity     Worry: Not on file     Inability: Not on file    Transportation needs     Medical: Not on file     Non-medical: Not on file   Tobacco Use    Smoking status: Former Smoker     Packs/day: 0.50     Years: 10.00     Pack years: 5.00    Smokeless tobacco: Never Used    Tobacco comment: pt smoked on and off for 10 years and quit 6 years ago   Substance and Sexual Activity    Alcohol use:  Yes     Alcohol/week: 0.8 standard drinks     Types: 1 Cans of beer per week     Comment: social    Drug use: No    Sexual activity: Yes     Partners: Male   Lifestyle    Physical activity     Days per week: Not on file     Minutes per session: Not on file    Stress: Not on file   Relationships    Social connections     Talks on phone: Not on file     Gets together: Not on file     Attends [employer] service: Not on file     Active member of club or organization: Not on file     Attends meetings of clubs or organizations: Not on file     Relationship status: Not on file    Intimate partner violence     Fear of current or ex partner: Not on file     Emotionally abused: Not on file     Physically abused: Not on file     Forced sexual activity: Not on file   Other Topics Concern    Not on file   Social History Narrative    Not on file       FHX:   Family History   Problem Relation Age of Onset    Hypertension Mother     Arthritis-osteo Mother     GERD Father     Hypertension Father    Leo [employer] Sister     MS Maternal Grandmother     Diabetes Paternal Grandmother        Allergy:   Allergies   Allergen Reactions    Apple Anaphylaxis    Cephalexin Hives    Strawberry Anaphylaxis    Wasps [Hymenoptera Allergenic Extract] Anaphylaxis       Home Medications:     Medications Prior to Admission   Medication Sig    valACYclovir (VALTREX) 1 gram tablet Take 1 Tab by mouth daily.  ondansetron (ZOFRAN ODT) 8 mg disintegrating tablet Take 1 Tab by mouth every twelve (12) hours as needed for Nausea for up to 15 days.  acetaminophen-codeine (TYLENOL #3) 300-30 mg per tablet Take 1 Tab by mouth every eight (8) hours as needed for Pain for up to 15 days. Max Daily Amount: 3 Tabs.  dexlansoprazole (Dexilant) 60 mg CpDB capsule (delayed release) Take 60 mg by mouth.  buPROPion XL (WELLBUTRIN XL) 300 mg XL tablet Take 1 Tab by mouth every morning.  gabapentin (NEURONTIN) 800 mg tablet Take 1 Tab by mouth three (3) times daily. Max Daily Amount: 2,400 mg. As directed    hydrocortisone (HYTONE) 2.5 % topical cream Apply  to affected area two (2) times a day. use thin layer    sertraline (ZOLOFT) 100 mg tablet Take 2 Tabs by mouth daily.  atorvastatin (LIPITOR) 40 mg tablet Take 1 Tab by mouth nightly.  naloxone (NARCAN) 4 mg/actuation nasal spray Use 1 spray intranasally, then discard. Repeat with new spray every 2 min as needed for opioid overdose symptoms, alternating nostrils.  ibuprofen (MOTRIN) 600 mg tablet Take 1 Tab by mouth every eight (8) hours as needed for Pain. With food    methylPREDNISolone (MEDROL, MEDHAT,) 4 mg tablet Per dose pack instructions    colestipoL (COLESTID) 5 gram packet Take 5 g by mouth two (2) times a day.  omeprazole (PRILOSEC) 40 mg capsule Take 1 Cap by mouth Before breakfast and dinner.  ergocalciferol (ERGOCALCIFEROL) 50,000 unit capsule Take 1 Cap by mouth every seven (7) days.  dicyclomine (BENTYL) 10 mg capsule Take 10 mg by mouth 4 times daily (before meals and nightly). Review of Systems:     Constitutional: No fevers, chills, weight loss, fatigue. Skin: No rashes, pruritis, jaundice, ulcerations, erythema. HENT: No headaches, nosebleeds, sinus pressure, rhinorrhea, sore throat.    Eyes: No visual changes, blurred vision, eye pain, photophobia, jaundice. Cardiovascular: No chest pain, heart palpitations. Respiratory: No cough, SOB, wheezing, chest discomfort, orthopnea. Gastrointestinal:    Genitourinary: No dysuria, bleeding, discharge, pyuria. Musculoskeletal: No weakness, arthralgias, wasting. Endo: No sweats. Heme: No bruising, easy bleeding. Allergies: As noted. Neurological: Cranial nerves intact. Alert and oriented. Gait not assessed. Psychiatric:  No anxiety, depression, hallucinations. There were no vitals taken for this visit. Physical Assessment:     constitutional: appearance: well developed, well nourished, normal habitus, no deformities, in no acute distress. skin: inspection: no rashes, ulcers, icterus or other lesions; no clubbing or telangiectasias. palpation: no induration or subcutaneos nodules. eyes: inspection: normal conjunctivae and lids; no jaundice pupils: normal  ENMT: mouth: normal oral mucosa,lips and gums; good dentition. oropharynx: normal tongue, hard and soft palate; posterior pharynx without erithema, exudate or lesions. neck: thyroid: normal size, consistency and position; no masses or tenderness. respiratory: effort: normal chest excursion; no intercostal retraction or accessory muscle use. cardiovascular: abdominal aorta: normal size and position; no bruits. palpation: PMI of normal size and position; normal rhythm; no thrill or murmurs. abdominal: abdomen: normal consistency; no tenderness or masses. hernias: no hernias appreciated. liver: normal size and consistency. spleen: not palpable. rectal: hemoccult/guaiac: not performed. musculoskeletal: digits and nails: no clubbing, cyanosis, petechiae or other inflammatory conditions. gait: normal gait and station head and neck: normal range of motion; no pain, crepitation or contracture. spine/ribs/pelvis: normal range of motion; no pain, deformity or contracture. neurologic: cranial nerves: II-XII normal.   psychiatric: judgement/insight: within normal limits. memory: within normal limits for recent and remote events. mood and affect: no evidence of depression, anxiety or agitation. orientation: oriented to time, space and person. Basic Metabolic Profile   No results for input(s): NA, K, CL, CO2, BUN, GLU, CA, MG, PHOS in the last 72 hours. No lab exists for component: CREAT      CBC w/Diff    No results for input(s): WBC, RBC, HGB, HCT, MCV, MCH, MCHC, RDW, PLT, HGBEXT, HCTEXT, PLTEXT in the last 72 hours. No lab exists for component: MPV No results for input(s): GRANS, LYMPH, EOS, PRO, MYELO, METAS, BLAST in the last 72 hours. No lab exists for component: MONO, BASO     Hepatic Function   No results for input(s): ALB, TP, TBILI, AP, AML, LPSE in the last 72 hours. No lab exists for component: DBILI, GPT, SGOT     Coags   No results for input(s): PTP, INR, APTT, INREXT in the last 72 hours. Bob Mariscal MD  [facility] [address] [address] [address] 1946, [facility]  Cell: [phone]  Direct pager: [phone]  [email or web address]  [email or web address]

## 2020-06-16 NOTE — DISCHARGE INSTRUCTIONS
ESOPHAGEAL MANOMETRY DISCHARGE INSTRUCTIONS    Important:  As you prepare for discharge, the following information will help you return to your best level of health. If you are a smoker, we encourage you to explore smoking cessation therapies. For assistance/further information, please call [phone]. If the symptoms of your disease worsen, please contact your primary Care Provider. Your Care Provider is: Dr Storm Garcia     This information is About Your Procedure    ESOPHAGEAL MANOMETRY  Esophageal motility/function studies measure the pressures in the esophagus by passing a catheter through the nose. Call your doctor if you have:  · Chills, fever, pain, nausea, vomiting, or vomiting of any blood  · Any new problems or concerns    Follow these instructions:  · You may eat your regular diet unless otherwise specified by your physician  · You may resume your normal activities after returning home  · Resume home medications unless otherwise instructed      Discharged to:  Johnny Laughlin from [facility]    PATIENT INSTRUCTIONS:    After general anesthesia or intravenous sedation, for 24 hours or while taking prescription Narcotics:  · Limit your activities  · Do not drive and operate hazardous machinery  · Do not make important personal or business decisions  · Do  not drink alcoholic beverages  · If you have not urinated within 8 hours after discharge, please contact your surgeon on call.     Report the following to your surgeon:  · Excessive pain, swelling, redness or odor of or around the surgical area  · Temperature over 100.5  · Nausea and vomiting lasting longer than 4 hours or if unable to take medications  · Any signs of decreased circulation or nerve impairment to extremity: change in color, persistent  numbness, tingling, coldness or increase pain  · Any questions         Nosebleeds: Care Instructions  Your Care Instructions     Nosebleeds are common, especially if you have colds or allergies. Many things can cause a nosebleed. Some nosebleeds stop on their own with pressure. Others need packing. Some get cauterized (sealed). If you have gauze or other packing materials in your nose, you will need to follow up with your doctor to have the packing removed. You may need more treatment if you get nosebleeds a lot. The doctor has checked you carefully, but problems can develop later. If you notice any problems or new symptoms, get medical treatment right away. Follow-up care is a key part of your treatment and safety. Be sure to make and go to all appointments, and call your doctor if you are having problems. It's also a good idea to know your test results and keep a list of the medicines you take. How can you care for yourself at home? · If you get another nosebleed:  ? Sit up and tilt your head slightly forward. This keeps blood from going down your throat. ? Use your thumb and index finger to pinch your nose shut for 10 minutes. Use a clock. Do not check to see if the bleeding has stopped before the 10 minutes are up. If the bleeding has not stopped, pinch your nose shut for another 10 minutes. ? When the bleeding has stopped, try not to pick, rub, or blow your nose for 12 hours. Avoiding these things helps keep your nose from bleeding again. · If your doctor prescribed antibiotics, take them as directed. Do not stop taking them just because you feel better. You need to take the full course of antibiotics. To prevent nosebleeds  · Do not blow your nose too hard. · Try not to lift or strain after a nosebleed. · Raise your head on a pillow while you sleep. · Put a thin layer of a saline- or water-based nasal gel, such as NasoGel, inside your nose. Put it on the septum, which divides your nostrils. This will prevent dryness that can cause nosebleeds. · Use a vaporizer or humidifier to add moisture to your bedroom. Follow the directions for cleaning the machine.   · Do not use aspirin, ibuprofen (Advil, Motrin), or naproxen (Aleve) for 36 to 48 hours after a nosebleed unless your doctor tells you to. You can use acetaminophen (Tylenol) for pain relief. · Talk to your doctor about stopping any other medicines you are taking. Some medicines may make you more likely to get a nosebleed. · Do not use cold medicines or nasal sprays without first talking to your doctor. They can make your nose dry. When should you call for help? [id number] anytime you think you may need emergency care. For example, call if:  · You passed out (lost consciousness). Call your doctor now or seek immediate medical care if:  · You get another nosebleed and your nose is still bleeding after you have applied pressure 3 times for 10 minutes each time (30 minutes total). · There is a lot of blood running down the back of your throat even after you pinch your nose and tilt your head forward. · You have a fever. · You have sinus pain. Watch closely for changes in your health, and be sure to contact your doctor if:  · You get nosebleeds often, even if they stop. · You do not get better as expected. Where can you learn more? Go to http://steven-alexandru.info/  Enter S156 in the search box to learn more about \"Nosebleeds: Care Instructions. \"  Current as of: June 26, 2019               Content Version: 12.5  © 8521-2827 [employer]. Care instructions adapted under license by [employer] (which disclaims liability or warranty for this information). If you have questions about a medical condition or this instruction, always ask your healthcare professional. [place] [address] any warranty or liability for your use of this information.         What to do at Home:  Recommended activity: Activity as tolerated,     If you experience any of the following symptoms extreme shortness of breath, chest pain, new difficulties swallowing, intractible nose bleeds, please follow up with Emergency Services. *  Please give a list of your current medications to your Primary Care Provider. *  Please update this list whenever your medications are discontinued, doses are      changed, or new medications (including over-the-counter products) are added. *  Please carry medication information at all times in case of emergency situations. These are general instructions for a healthy lifestyle:    No smoking/ No tobacco products/ Avoid exposure to second hand smoke  Surgeon General's Warning:  Quitting smoking now greatly reduces serious risk to your health. Obesity, smoking, and sedentary lifestyle greatly increases your risk for illness    A healthy diet, regular physical exercise & weight monitoring are important for maintaining a healthy lifestyle    You may be retaining fluid if you have a history of heart failure or if you experience any of the following symptoms:  Weight gain of 3 pounds or more overnight or 5 pounds in a week, increased swelling in our hands or feet or shortness of breath while lying flat in bed. Please call your doctor as soon as you notice any of these symptoms; do not wait until your next office visit. The discharge information has been reviewed with the patient. The patient verbalized understanding. Discharge medications reviewed with the patient and appropriate educational materials and side effects teaching were provided.   ___________________________________________________________________________________________________________________________________

## 2020-06-18 DIAGNOSIS — M79.7 FIBROMYALGIA: ICD-10-CM

## 2020-06-18 DIAGNOSIS — R52 GENERALIZED PAIN: ICD-10-CM

## 2020-06-18 NOTE — TELEPHONE ENCOUNTER
UDS: 06/28/2019      Last visit 03/03/2020 NP Kiki Salcido   Next appointment 4 months (7/2020)  Previous refill encounter(s)   06/05/2020 Tylenol #3 #45 tabs,  04/20/2020 Neurontin #90 with 2 refills,  06/05/2020 Zofran ODT #30,  04/09/2020 Hytone #15 grams     Requested Prescriptions     Pending Prescriptions Disp Refills    hydrocortisone (HYTONE) 2.5 % topical cream 30 g 0     Sig: Apply  to affected area two (2) times a day. use thin layer    gabapentin (NEURONTIN) 800 mg tablet 90 Tab 2     Sig: Take 1 Tab by mouth three (3) times daily. Max Daily Amount: 2,400 mg. As directed    ondansetron (ZOFRAN ODT) 8 mg disintegrating tablet 30 Tab 0     Sig: Take 1 Tab by mouth every twelve (12) hours as needed for Nausea for up to 15 days.  acetaminophen-codeine (TYLENOL #3) 300-30 mg per tablet 45 Tab 0     Sig: Take 1 Tab by mouth every eight (8) hours as needed for Pain for up to 15 days. Max Daily Amount: 3 Tabs.

## 2020-06-19 RX ORDER — GABAPENTIN 800 MG/1
800 TABLET ORAL 3 TIMES DAILY
Qty: 90 TAB | Refills: 2 | Status: SHIPPED | OUTPATIENT
Start: 2020-06-19 | End: 2020-10-13 | Stop reason: SDUPTHER

## 2020-06-19 RX ORDER — ACETAMINOPHEN AND CODEINE PHOSPHATE 300; 30 MG/1; MG/1
1 TABLET ORAL
Qty: 45 TAB | Refills: 0 | Status: SHIPPED | OUTPATIENT
Start: 2020-06-19 | End: 2020-07-02 | Stop reason: SDUPTHER

## 2020-06-19 RX ORDER — ONDANSETRON 8 MG/1
8 TABLET, ORALLY DISINTEGRATING ORAL
Qty: 30 TAB | Refills: 0 | Status: SHIPPED | OUTPATIENT
Start: 2020-06-19 | End: 2020-07-02 | Stop reason: SDUPTHER

## 2020-06-19 RX ORDER — HYDROCORTISONE 25 MG/G
CREAM TOPICAL 2 TIMES DAILY
Qty: 30 G | Refills: 0 | Status: SHIPPED | OUTPATIENT
Start: 2020-06-19 | End: 2020-09-10 | Stop reason: SDUPTHER

## 2020-06-22 NOTE — PROCEDURES
[email or web address]  [phone]    HIGH RESOLUTION ESOPHAGEAL MANOMETRY REPORT      Indication:   1. Esophageal dysphagia R 13.19  2. Non cardiac chest pain    Date of procedure: 6/16/2020    Procedure:     After confirmation of potential allergies, a topical analgesic was used to numb the nares followed by trans-nasal insertion of a High Resolution Manometry Catheter. Pressure bands of both UES and LES were observed on the color contour. Patient instructed to take deep breath to verify placement of catheter; diaphragmatic pinch noted on inspiration. Patient was assisted to supine position and catheter was stabilized by taping at the nares. Patient wasencouraged to relax while acclimating to catheter for approximately 5 minutes. A 30 second baseline pressure was obtained to identify the UES and LES followed by a series of wet swallows using 5 ccs room temperature normal saline to assess esophageal motility and bolus transit. At the end of the study, a multiple rapid swallow sequence was performed. At the conclusion of the procedure; the catheter was removed. Findings:    1. Esophago-Gastric junction: There is complete swallow induced relaxation of the Esophago-gastric junction. The mean IRP is 9.9 (normal is less than 15). 2. Esophageal body: 9/10 swallows show normal intact contraction patterns and normal peristaltic vigour. There is no suggestion of a hypercontractile contraction (Jackhammer esophagus) or distal esophageal spasm. One of the swallows shows low DCI (>150), but is otherwise ok. 3. Impedance: There is complete bolus clearance on >80% of the swallows    Impression:  1. Normal High resolution esophageal motility study. 2. Complete clearance of the bolus indicating adequate bolus transit. Reference: (Interpretation is based on Independence classification, version 3.0). The Independence Classification of esophageal motility disorders, v3.0.  Tawanna Trejo et.al. International High Resolution Manometry Working Group. Neurogastroenterol Motil. 2015;27(2):160-74.

## 2020-07-02 DIAGNOSIS — R52 GENERALIZED PAIN: ICD-10-CM

## 2020-07-02 RX ORDER — SERTRALINE HYDROCHLORIDE 100 MG/1
200 TABLET, FILM COATED ORAL DAILY
Qty: 180 TAB | Refills: 0 | Status: SHIPPED | OUTPATIENT
Start: 2020-07-02 | End: 2020-10-13 | Stop reason: SDUPTHER

## 2020-07-02 RX ORDER — ONDANSETRON 8 MG/1
8 TABLET, ORALLY DISINTEGRATING ORAL
Qty: 30 TAB | Refills: 0 | Status: SHIPPED | OUTPATIENT
Start: 2020-07-02 | End: 2020-07-16 | Stop reason: SDUPTHER

## 2020-07-02 RX ORDER — ACETAMINOPHEN AND CODEINE PHOSPHATE 300; 30 MG/1; MG/1
1 TABLET ORAL
Qty: 45 TAB | Refills: 0 | Status: SHIPPED | OUTPATIENT
Start: 2020-07-02 | End: 2020-07-16 | Stop reason: SDUPTHER

## 2020-07-02 NOTE — TELEPHONE ENCOUNTER
Please contact patient for scheduling. Thanks    Writer sent patient a message via [phone] to schedule a revisit. UDS: 06/27/2019  CSA: 08/20/2018      Patient states she will leaving to go housesit an sit with a 81 yo and leaving on 7/3/20. Please review and sign if appropriate. Last visit 03/03/2020 NP Elisabet Dee   Next appointment None   Previous refill encounter(s)   06/19/2020 Tylenol #3 #45,  06/19/2020 Zofran ODT #30,   04/09/2020 Zoloft #30 with 2 refills. Requested Prescriptions     Pending Prescriptions Disp Refills    sertraline (ZOLOFT) 100 mg tablet 180 Tab 0     Sig: Take 2 Tabs by mouth daily.  ondansetron (ZOFRAN ODT) 8 mg disintegrating tablet 30 Tab 0     Sig: Take 1 Tab by mouth every twelve (12) hours as needed for Nausea for up to 15 days.  acetaminophen-codeine (TYLENOL #3) 300-30 mg per tablet 45 Tab 0     Sig: Take 1 Tab by mouth every eight (8) hours as needed for Pain for up to 15 days. Max Daily Amount: 3 Tabs.

## 2020-07-16 ENCOUNTER — VIRTUAL VISIT (OUTPATIENT)
Dept: FAMILY MEDICINE CLINIC | Age: 50
End: 2020-07-16

## 2020-07-16 DIAGNOSIS — B02.29 POSTHERPETIC NEURALGIA: ICD-10-CM

## 2020-07-16 DIAGNOSIS — E66.9 OBESITY, CLASS I, BMI 30-34.9: ICD-10-CM

## 2020-07-16 DIAGNOSIS — E78.00 PURE HYPERCHOLESTEROLEMIA: ICD-10-CM

## 2020-07-16 DIAGNOSIS — E78.00 PURE HYPERCHOLESTEROLEMIA: Primary | ICD-10-CM

## 2020-07-16 DIAGNOSIS — B00.89 RECURRENT HERPES SIMPLEX VIRUS (HSV) INFECTION OF BUTTOCK: ICD-10-CM

## 2020-07-16 DIAGNOSIS — M79.7 FIBROMYALGIA: ICD-10-CM

## 2020-07-16 DIAGNOSIS — M25.50 ARTHRALGIA, UNSPECIFIED JOINT: ICD-10-CM

## 2020-07-16 DIAGNOSIS — Z86.19 HISTORY OF SHINGLES: ICD-10-CM

## 2020-07-16 NOTE — PROGRESS NOTES
Gerardo Barahona is a 51 y.o. female who was seen by synchronous (real-time) audio-video technology on 7/16/2020 for Cholesterol Problem and Pain (Chronic) (abd pain)    Assessment & Plan:   Diagnoses and all orders for this visit:    1. Pure hypercholesterolemia  -     LIPID PANEL; Future  -     METABOLIC PANEL, COMPREHENSIVE; Future    2. Fibromyalgia    3. Obesity, Class I, BMI 30-34.9  -     HEMOGLOBIN A1C WITH EAG; Future    4. History of shingles  -     REFERRAL TO RHEUMATOLOGY    5. Arthralgia, unspecified joint  -     REFERRAL TO RHEUMATOLOGY    6. Postherpetic neuralgia  -     REFERRAL TO RHEUMATOLOGY    7. Recurrent herpes simplex virus (HSV) infection of buttock  -     REFERRAL TO RHEUMATOLOGY      I spent at least 20 minutes on this visit with this established patient. 712  Subjective:     Cardiology ROS: taking medications as instructed, no medication side effects noted, no TIA's, no chest pain on exertion, no dyspnea on exertion, no swelling of ankles. States she is in the process of having gastric meal emptying study prior to having hernia surgery. States she was informed she had 2 hernias. Comments she continues to have the persistent nausea/vomiting and constant abd pain. Continues to have recurrent shingles and also rashes. Was not able to follow up with rheumatology due to insurance. Will place referral today. Prior to Admission medications    Medication Sig Start Date End Date Taking? Authorizing Provider   valACYclovir (VALTREX) 1 gram tablet Take 1 Tab by mouth daily. 5/7/20   Rodrigo Martínez T, NP   sertraline (ZOLOFT) 100 mg tablet Take 2 Tabs by mouth daily. 7/2/20   Rodrigo Martínez T, NP   ondansetron (ZOFRAN ODT) 8 mg disintegrating tablet Take 1 Tab by mouth every twelve (12) hours as needed for Nausea for up to 15 days.  7/2/20 7/17/20  Rodrigo Martínez T, NP   acetaminophen-codeine (TYLENOL #3) 300-30 mg per tablet Take 1 Tab by mouth every eight (8) hours as needed for Pain for up to 15 days. Max Daily Amount: 3 Tabs. 7/2/20 7/17/20  Bran NEGRETE NP   hydrocortisone (HYTONE) 2.5 % topical cream Apply  to affected area two (2) times a day. use thin layer 6/19/20   Bran NEGRETE NP   gabapentin (NEURONTIN) 800 mg tablet Take 1 Tab by mouth three (3) times daily. Max Daily Amount: 2,400 mg. As directed 6/19/20   Bran NEGRETE NP   dexlansoprazole (Dexilant) 60 mg CpDB capsule (delayed release) Take 60 mg by mouth. Provider, Historical   buPROPion XL (WELLBUTRIN XL) 300 mg XL tablet Take 1 Tab by mouth every morning. 5/7/20   Bran NEGRETE NP   atorvastatin (LIPITOR) 40 mg tablet Take 1 Tab by mouth nightly. 4/9/20   Mamie Hutchinson NP   naloxone [facility]) 4 mg/actuation nasal spray Use 1 spray intranasally, then discard. Repeat with new spray every 2 min as needed for opioid overdose symptoms, alternating nostrils. 3/18/20   Sasha Martinez NP   ibuprofen (MOTRIN) 600 mg tablet Take 1 Tab by mouth every eight (8) hours as needed for Pain. With food 3/10/20   Anahi Cahtterjee NP   methylPREDNISolone (MEDROL, MEDHAT,) 4 mg tablet Per dose pack instructions 3/10/20   Nati Chatterjee NP   colestipoL (COLESTID) 5 gram packet Take 5 g by mouth two (2) times a day. Provider, Historical   omeprazole (PRILOSEC) 40 mg capsule Take 1 Cap by mouth Before breakfast and dinner. 2/7/20   Mamie Hutchinson NP   ergocalciferol (ERGOCALCIFEROL) 50,000 unit capsule Take 1 Cap by mouth every seven (7) days. 10/4/19   Bran NEGRETE NP   dicyclomine (BENTYL) 10 mg capsule Take 10 mg by mouth 4 times daily (before meals and nightly).     Provider, Historical     Patient Active Problem List   Diagnosis Code    Fibromyalgia M79.7    HSV (herpes simplex virus) infection B00.9    Cervical pain M54.2    Nerve pain M79.2    Myofascial pain M79.18    Migraine G43.909    Allergic rhinitis due to pollen J30.1    Abnormal liver function R94.5    Depressive disorder, not elsewhere classified F32.9    Anxiety state, unspecified F41.1    Irritable bowel syndrome K58.9    Abnormal glandular Papanicolaou smear of cervix R87.619    History of shingles Z86.19    Spinal stenosis, lumbar region with neurogenic claudication M48.062    HNP (herniated nucleus pulposus), lumbar M51.26    Spinal stenosis of lumbar region M48.061     Patient Active Problem List    Diagnosis Date Noted    HNP (herniated nucleus pulposus), lumbar 12/17/2018    Spinal stenosis of lumbar region 12/17/2018    History of shingles 08/07/2018    Spinal stenosis, lumbar region with neurogenic claudication 08/07/2018    Fibromyalgia 04/04/2012    HSV (herpes simplex virus) infection 04/04/2012    Cervical pain 04/04/2012    Nerve pain 04/04/2012    Myofascial pain 04/04/2012    Migraine 04/04/2012    Allergic rhinitis due to pollen 04/04/2012    Abnormal liver function 04/04/2012    Depressive disorder, not elsewhere classified 04/04/2012    Anxiety state, unspecified 04/04/2012    Irritable bowel syndrome 04/04/2012    Abnormal glandular Papanicolaou smear of cervix 04/04/2012     Current Outpatient Medications   Medication Sig Dispense Refill    valACYclovir (VALTREX) 1 gram tablet Take 1 Tab by mouth daily. 30 Tab 1    sertraline (ZOLOFT) 100 mg tablet Take 2 Tabs by mouth daily. 180 Tab 0    ondansetron (ZOFRAN ODT) 8 mg disintegrating tablet Take 1 Tab by mouth every twelve (12) hours as needed for Nausea for up to 15 days. 30 Tab 0    acetaminophen-codeine (TYLENOL #3) 300-30 mg per tablet Take 1 Tab by mouth every eight (8) hours as needed for Pain for up to 15 days. Max Daily Amount: 3 Tabs. 45 Tab 0    hydrocortisone (HYTONE) 2.5 % topical cream Apply  to affected area two (2) times a day. use thin layer 30 g 0    gabapentin (NEURONTIN) 800 mg tablet Take 1 Tab by mouth three (3) times daily. Max Daily Amount: 2,400 mg.  As directed 90 Tab 2    dexlansoprazole (Dexilant) 60 mg CpDB capsule (delayed release) Take 60 mg by mouth.      buPROPion XL (WELLBUTRIN XL) 300 mg XL tablet Take 1 Tab by mouth every morning. 90 Tab 0    atorvastatin (LIPITOR) 40 mg tablet Take 1 Tab by mouth nightly. 90 Tab 0    naloxone (NARCAN) 4 mg/actuation nasal spray Use 1 spray intranasally, then discard. Repeat with new spray every 2 min as needed for opioid overdose symptoms, alternating nostrils. 1 Each 0    ibuprofen (MOTRIN) 600 mg tablet Take 1 Tab by mouth every eight (8) hours as needed for Pain. With food 90 Tab 2    methylPREDNISolone (MEDROL, MEDHAT,) 4 mg tablet Per dose pack instructions 1 Dose Pack 0    colestipoL (COLESTID) 5 gram packet Take 5 g by mouth two (2) times a day.  omeprazole (PRILOSEC) 40 mg capsule Take 1 Cap by mouth Before breakfast and dinner. 60 Cap 1    ergocalciferol (ERGOCALCIFEROL) 50,000 unit capsule Take 1 Cap by mouth every seven (7) days. 4 Cap 2    dicyclomine (BENTYL) 10 mg capsule Take 10 mg by mouth 4 times daily (before meals and nightly).        Allergies   Allergen Reactions    Apple Anaphylaxis    Cephalexin Hives    Strawberry Anaphylaxis    Wasps [Hymenoptera Allergenic Extract] Anaphylaxis     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    IBS (irritable bowel syndrome)     Ill-defined condition 2011    h/o of \"blood clot in my lung, after a picc line\"    Ill-defined condition     painless rectal bleeding    MRSA infection     h/o, not current    Ovarian cyst     Pain, upper back     Psychiatric disorder     depression     Stress incontinence     Tattoo     Unspecified deficiency anemia 1999     Past Surgical History:   Procedure Laterality Date    COLONOSCOPY N/A 6/28/2019    DIAGNOSTIC COLONOSCOPY with random bxs performed by Don Toro MD at [place] [id number]  6/22/2020         HX APPENDECTOMY  1995    HX CHOLECYSTECTOMY  2002    HX GYN  2018    Abalation    HX GYN      rt ovary removed    HX GYN      left ovary clipped.  HX ORTHOPAEDIC      arthoscopic rt knee    HX ORTHOPAEDIC      left knee meniscis tear    HX ORTHOPAEDIC Right     meniscus repair    NEUROLOGICAL PROCEDURE UNLISTED  12/2018    laminectomy     Family History   Problem Relation Age of Onset    Hypertension Mother     Arthritis-osteo Mother     GERD Father     Hypertension Father     Cancer Sister     MS Maternal Grandmother     Diabetes Paternal Grandmother      Social History     Tobacco Use    Smoking status: Former Smoker     Packs/day: 0.50     Years: 10.00     Pack years: 5.00    Smokeless tobacco: Never Used    Tobacco comment: pt smoked on and off for 10 years and quit 6 years ago   Substance Use Topics    Alcohol use: Yes     Alcohol/week: 0.8 standard drinks     Types: 1 Cans of beer per week     Comment: social      Review of Systems   Constitutional: Negative for chills and fever. Respiratory: Negative for shortness of breath. Cardiovascular: Negative for chest pain and palpitations. Gastrointestinal: Positive for abdominal pain and constipation. Neurological: Negative for dizziness and headaches. Objective:   No flowsheet data found.    General: alert, cooperative, no distress   Mental  status: normal mood, behavior, speech, dress, motor activity, and thought processes, able to follow commands   HENT: NCAT   Neck: no visualized mass   Resp: no respiratory distress   Neuro: no gross deficits   Skin: no discoloration or lesions of concern on visible areas   Psychiatric: normal affect, consistent with stated mood, no evidence of hallucinations     Additional exam findings: We discussed the expected course, resolution and complications of the diagnosis(es) in detail. Medication risks, benefits, costs, interactions, and alternatives were discussed as indicated. I advised her to contact the office if her condition worsens, changes or fails to improve as anticipated. She expressed understanding with the diagnosis(es) and plan. Mima Alonso, who was evaluated through a patient-initiated, synchronous (real-time) audio-video encounter, and/or her healthcare decision maker, is aware that it is a billable service, with coverage as determined by her insurance carrier. She provided verbal consent to proceed: Yes, and patient identification was verified. It was conducted pursuant to the emergency declaration under the [address] [place] [address] authority and the [employer] General Act. A caregiver was present when appropriate. Ability to conduct physical exam was limited. I was in the office. The patient was at home.       Ha Guerrero NP

## 2020-08-14 DIAGNOSIS — R52 GENERALIZED PAIN: ICD-10-CM

## 2020-08-17 DIAGNOSIS — R52 GENERALIZED PAIN: ICD-10-CM

## 2020-08-17 RX ORDER — ONDANSETRON 8 MG/1
8 TABLET, ORALLY DISINTEGRATING ORAL
Qty: 60 TAB | Refills: 0 | Status: CANCELLED | OUTPATIENT
Start: 2020-08-17 | End: 2020-09-16

## 2020-08-17 RX ORDER — ACETAMINOPHEN AND CODEINE PHOSPHATE 300; 30 MG/1; MG/1
1 TABLET ORAL
Qty: 90 TAB | Refills: 0 | Status: CANCELLED | OUTPATIENT
Start: 2020-08-17 | End: 2020-09-16

## 2020-08-18 RX ORDER — ACETAMINOPHEN AND CODEINE PHOSPHATE 300; 30 MG/1; MG/1
1 TABLET ORAL
Qty: 90 TAB | Refills: 0 | Status: SHIPPED | OUTPATIENT
Start: 2020-08-18 | End: 2020-09-14 | Stop reason: SDUPTHER

## 2020-08-18 RX ORDER — ONDANSETRON 8 MG/1
8 TABLET, ORALLY DISINTEGRATING ORAL
Qty: 60 TAB | Refills: 0 | Status: SHIPPED | OUTPATIENT
Start: 2020-08-18 | End: 2020-09-14 | Stop reason: SDUPTHER

## 2020-08-18 NOTE — TELEPHONE ENCOUNTER
Pt called to check status of refill for zofran and tylenol, adv pending provider approval, please adv [phone]

## 2020-08-18 NOTE — TELEPHONE ENCOUNTER
[facility]  reports the last fill date for Apap w/ Codeine as 7/17/20 for a 30 d/s. Last Visit: 7/16/20 with NP Joel Tamez  Next Appointment: none  Previous Refill Encounter(s): 7/17/20 Apap w/Cod #90 & Zofran #60    Requested Prescriptions     Pending Prescriptions Disp Refills    ondansetron (ZOFRAN ODT) 8 mg disintegrating tablet 60 Tab 0     Sig: Take 1 Tab by mouth every twelve (12) hours as needed for Nausea for up to 30 days.  acetaminophen-codeine (TYLENOL #3) 300-30 mg per tablet 90 Tab 0     Sig: Take 1 Tab by mouth every eight (8) hours as needed for Pain for up to 30 days. Max Daily Amount: 3 Tabs.

## 2020-09-10 DIAGNOSIS — R52 GENERALIZED PAIN: ICD-10-CM

## 2020-09-10 DIAGNOSIS — K21.9 GASTROESOPHAGEAL REFLUX DISEASE, ESOPHAGITIS PRESENCE NOT SPECIFIED: ICD-10-CM

## 2020-09-11 NOTE — TELEPHONE ENCOUNTER
[facility] for Tylenol w/Codeine as 8/19/20 for a 30 d/s. Last Visit: 7/16/20 with NP Fei Blackwell  Next Appointment: pt cancelled appt on 8/25/20  Previous Refill Encounter(s): 5/7/20 Wellbutrin #90, 6/19/20 Hytone #30, 7/29/20 Prilosec #60, 5/7/20 Valtrex #30 with 1 refill, 8/18/20 Zofran #60 & Tylenol w/Codeine #90    Requested Prescriptions     Pending Prescriptions Disp Refills    buPROPion XL (WELLBUTRIN XL) 300 mg XL tablet 90 Tab 0     Sig: Take 1 Tab by mouth every morning.  hydrocortisone (HYTONE) 2.5 % topical cream 30 g 0     Sig: Apply  to affected area two (2) times a day. use thin layer    omeprazole (PRILOSEC) 40 mg capsule 60 Cap 0     Sig: Take 1 Cap by mouth Before breakfast and dinner.  acetaminophen-codeine (TYLENOL #3) 300-30 mg per tablet 90 Tab 0     Sig: Take 1 Tab by mouth every eight (8) hours as needed for Pain for up to 30 days. Max Daily Amount: 3 Tabs.  valACYclovir (VALTREX) 1 gram tablet 30 Tab 1     Sig: Take 1 Tab by mouth daily.  ondansetron (ZOFRAN ODT) 8 mg disintegrating tablet 60 Tab 0     Sig: Take 1 Tab by mouth every twelve (12) hours as needed for Nausea for up to 30 days.

## 2020-09-14 DIAGNOSIS — R52 GENERALIZED PAIN: ICD-10-CM

## 2020-09-14 DIAGNOSIS — K21.9 GASTROESOPHAGEAL REFLUX DISEASE, ESOPHAGITIS PRESENCE NOT SPECIFIED: ICD-10-CM

## 2020-09-14 RX ORDER — OMEPRAZOLE 40 MG/1
40 CAPSULE, DELAYED RELEASE ORAL
Qty: 60 CAP | Refills: 0 | Status: CANCELLED | OUTPATIENT
Start: 2020-09-14

## 2020-09-14 RX ORDER — VALACYCLOVIR HYDROCHLORIDE 1 G/1
1000 TABLET, FILM COATED ORAL DAILY
Qty: 30 TAB | Refills: 1 | Status: CANCELLED | OUTPATIENT
Start: 2020-09-14

## 2020-09-14 RX ORDER — ONDANSETRON 8 MG/1
8 TABLET, ORALLY DISINTEGRATING ORAL
Qty: 60 TAB | Refills: 0 | Status: CANCELLED | OUTPATIENT
Start: 2020-09-14 | End: 2020-10-14

## 2020-09-14 RX ORDER — ACETAMINOPHEN AND CODEINE PHOSPHATE 300; 30 MG/1; MG/1
1 TABLET ORAL
Qty: 90 TAB | Refills: 0 | Status: CANCELLED | OUTPATIENT
Start: 2020-09-14 | End: 2020-10-14

## 2020-09-14 RX ORDER — HYDROCORTISONE 25 MG/G
CREAM TOPICAL 2 TIMES DAILY
Qty: 30 G | Refills: 0 | Status: CANCELLED | OUTPATIENT
Start: 2020-09-14

## 2020-09-14 RX ORDER — BUPROPION HYDROCHLORIDE 300 MG/1
300 TABLET ORAL
Qty: 90 TAB | Refills: 0 | Status: CANCELLED | OUTPATIENT
Start: 2020-09-14

## 2020-09-15 RX ORDER — BUPROPION HYDROCHLORIDE 300 MG/1
300 TABLET ORAL
Qty: 90 TAB | Refills: 0 | Status: SHIPPED | OUTPATIENT
Start: 2020-09-15 | End: 2021-01-09 | Stop reason: SDUPTHER

## 2020-09-15 RX ORDER — HYDROCORTISONE 25 MG/G
CREAM TOPICAL 2 TIMES DAILY
Qty: 30 G | Refills: 0 | Status: SHIPPED | OUTPATIENT
Start: 2020-09-15 | End: 2021-01-09 | Stop reason: SDUPTHER

## 2020-09-15 RX ORDER — ACETAMINOPHEN AND CODEINE PHOSPHATE 300; 30 MG/1; MG/1
1 TABLET ORAL
Qty: 90 TAB | Refills: 0 | Status: SHIPPED | OUTPATIENT
Start: 2020-09-18 | End: 2020-10-13 | Stop reason: SDUPTHER

## 2020-09-15 RX ORDER — OMEPRAZOLE 40 MG/1
40 CAPSULE, DELAYED RELEASE ORAL
Qty: 60 CAP | Refills: 0 | Status: SHIPPED | OUTPATIENT
Start: 2020-09-15 | End: 2020-10-20 | Stop reason: ALTCHOICE

## 2020-09-15 RX ORDER — ONDANSETRON 8 MG/1
8 TABLET, ORALLY DISINTEGRATING ORAL
Qty: 60 TAB | Refills: 0 | Status: SHIPPED | OUTPATIENT
Start: 2020-09-18 | End: 2020-10-13 | Stop reason: SDUPTHER

## 2020-09-15 RX ORDER — VALACYCLOVIR HYDROCHLORIDE 1 G/1
1000 TABLET, FILM COATED ORAL DAILY
Qty: 30 TAB | Refills: 1 | Status: SHIPPED | OUTPATIENT
Start: 2020-09-15 | End: 2020-11-05 | Stop reason: ALTCHOICE

## 2020-09-30 RX ORDER — ATORVASTATIN CALCIUM 40 MG/1
TABLET, FILM COATED ORAL
Qty: 90 TAB | Refills: 1 | Status: SHIPPED | OUTPATIENT
Start: 2020-09-30 | End: 2021-01-09 | Stop reason: SDUPTHER

## 2020-09-30 RX ORDER — ATORVASTATIN CALCIUM 40 MG/1
40 TABLET, FILM COATED ORAL
Qty: 90 TAB | Refills: 0 | Status: CANCELLED | OUTPATIENT
Start: 2020-09-30

## 2020-10-13 DIAGNOSIS — R52 GENERALIZED PAIN: ICD-10-CM

## 2020-10-13 DIAGNOSIS — M79.7 FIBROMYALGIA: ICD-10-CM

## 2020-10-13 RX ORDER — VALACYCLOVIR HYDROCHLORIDE 1 G/1
1000 TABLET, FILM COATED ORAL DAILY
Qty: 30 TAB | Refills: 1 | Status: CANCELLED | OUTPATIENT
Start: 2020-10-13

## 2020-10-14 NOTE — TELEPHONE ENCOUNTER
Medication Valtrex 1 gram #30 with 1 refill was sent to [facility] on 09/15/2020. Have patient contact pharmacy. UDS: 06/27/2019  CSA: n/a    Last visit 07/16/2020 NP Lee Nguyễn   Next appointment None   Previous refill encounter(s)   06/19/2020 Neurontin #90 with 2 refills,  07/02/2020 Zoloft #180,  09/18/2020 Zofran ODT #60,  09/18/2020 Tylenol #3 #90        Requested Prescriptions     Pending Prescriptions Disp Refills    gabapentin (NEURONTIN) 800 mg tablet 90 Tab 2     Sig: Take 1 Tab by mouth three (3) times daily. Max Daily Amount: 2,400 mg. As directed    sertraline (ZOLOFT) 100 mg tablet 180 Tab 0     Sig: Take 2 Tabs by mouth daily.  acetaminophen-codeine (TYLENOL #3) 300-30 mg per tablet 90 Tab 0     Sig: Take 1 Tab by mouth every eight (8) hours as needed for Pain for up to 30 days. Max Daily Amount: 3 Tabs.  valACYclovir (VALTREX) 1 gram tablet 30 Tab 1     Sig: Take 1 Tab by mouth daily.  ondansetron (ZOFRAN ODT) 8 mg disintegrating tablet 60 Tab 0     Sig: Take 1 Tab by mouth every twelve (12) hours as needed for Nausea for up to 30 days.

## 2020-10-15 RX ORDER — SERTRALINE HYDROCHLORIDE 100 MG/1
200 TABLET, FILM COATED ORAL DAILY
Qty: 180 TAB | Refills: 0 | Status: SHIPPED | OUTPATIENT
Start: 2020-10-15 | End: 2021-05-04 | Stop reason: SDUPTHER

## 2020-10-15 RX ORDER — GABAPENTIN 800 MG/1
800 TABLET ORAL 3 TIMES DAILY
Qty: 90 TAB | Refills: 2 | Status: SHIPPED | OUTPATIENT
Start: 2020-10-15 | End: 2021-01-09 | Stop reason: SDUPTHER

## 2020-10-15 RX ORDER — ONDANSETRON 8 MG/1
8 TABLET, ORALLY DISINTEGRATING ORAL
Qty: 60 TAB | Refills: 0 | Status: SHIPPED | OUTPATIENT
Start: 2020-10-15 | End: 2020-11-05 | Stop reason: ALTCHOICE

## 2020-10-15 RX ORDER — ACETAMINOPHEN AND CODEINE PHOSPHATE 300; 30 MG/1; MG/1
1 TABLET ORAL
Qty: 90 TAB | Refills: 0 | Status: SHIPPED | OUTPATIENT
Start: 2020-10-15 | End: 2020-11-05 | Stop reason: ALTCHOICE

## 2020-10-15 NOTE — TELEPHONE ENCOUNTER
[facility] for Tylenol w/Codeine as 9/16/20 for a 30 d/s & Neurontin as 9/14/20 for a 30 d/s.

## 2020-10-16 ENCOUNTER — HOSPITAL ENCOUNTER (EMERGENCY)
Age: 50
Discharge: HOME | End: 2020-10-17
Attending: EMERGENCY MEDICINE
Payer: MEDICAID

## 2020-10-16 ENCOUNTER — APPOINTMENT (OUTPATIENT)
Dept: CT IMAGING | Age: 50
End: 2020-10-16
Attending: STUDENT IN AN ORGANIZED HEALTH CARE EDUCATION/TRAINING PROGRAM
Payer: MEDICAID

## 2020-10-16 VITALS
RESPIRATION RATE: 18 BRPM | TEMPERATURE: 98.6 F | BODY MASS INDEX: 34.04 KG/M2 | HEART RATE: 70 BPM | SYSTOLIC BLOOD PRESSURE: 110 MMHG | WEIGHT: 185 LBS | DIASTOLIC BLOOD PRESSURE: 61 MMHG | OXYGEN SATURATION: 95 % | HEIGHT: 62 IN

## 2020-10-16 DIAGNOSIS — K59.00 CONSTIPATION, UNSPECIFIED CONSTIPATION TYPE: Primary | ICD-10-CM

## 2020-10-16 LAB
ALBUMIN SERPL-MCNC: 3.6 G/DL (ref 3.4–5)
ALBUMIN/GLOB SERPL: 0.9 {RATIO} (ref 0.8–1.7)
ALP SERPL-CCNC: 61 U/L (ref 45–117)
ALT SERPL-CCNC: 48 U/L (ref 13–56)
ANION GAP SERPL CALC-SCNC: 4 MMOL/L (ref 3–18)
APPEARANCE UR: ABNORMAL
AST SERPL-CCNC: 25 U/L (ref 10–38)
BACTERIA URNS QL MICRO: ABNORMAL /HPF
BASOPHILS # BLD: 0 K/UL (ref 0–0.1)
BASOPHILS NFR BLD: 0 % (ref 0–2)
BILIRUB SERPL-MCNC: 0.4 MG/DL (ref 0.2–1)
BILIRUB UR QL: NEGATIVE
BUN SERPL-MCNC: 13 MG/DL (ref 7–18)
BUN/CREAT SERPL: 12 (ref 12–20)
CALCIUM SERPL-MCNC: 9.1 MG/DL (ref 8.5–10.1)
CHLORIDE SERPL-SCNC: 103 MMOL/L (ref 100–111)
CO2 SERPL-SCNC: 30 MMOL/L (ref 21–32)
COLOR UR: ABNORMAL
CREAT SERPL-MCNC: 1.08 MG/DL (ref 0.6–1.3)
DIFFERENTIAL METHOD BLD: ABNORMAL
EOSINOPHIL # BLD: 0.4 K/UL (ref 0–0.4)
EOSINOPHIL NFR BLD: 3 % (ref 0–5)
EPITH CASTS URNS QL MICRO: ABNORMAL /LPF (ref 0–5)
ERYTHROCYTE [DISTWIDTH] IN BLOOD BY AUTOMATED COUNT: 12.3 % (ref 11.6–14.5)
GLOBULIN SER CALC-MCNC: 3.8 G/DL (ref 2–4)
GLUCOSE SERPL-MCNC: 104 MG/DL (ref 74–99)
GLUCOSE UR STRIP.AUTO-MCNC: NEGATIVE MG/DL
HCG UR QL: NEGATIVE
HCT VFR BLD AUTO: 36.4 % (ref 35–45)
HGB BLD-MCNC: 12.1 G/DL (ref 12–16)
HGB UR QL STRIP: NEGATIVE
KETONES UR QL STRIP.AUTO: ABNORMAL MG/DL
LEUKOCYTE ESTERASE UR QL STRIP.AUTO: ABNORMAL
LIPASE SERPL-CCNC: 108 U/L (ref 73–393)
LYMPHOCYTES # BLD: 1.7 K/UL (ref 0.9–3.6)
LYMPHOCYTES NFR BLD: 11 % (ref 21–52)
MCH RBC QN AUTO: 30.6 PG (ref 24–34)
MCHC RBC AUTO-ENTMCNC: 33.2 G/DL (ref 31–37)
MCV RBC AUTO: 92.2 FL (ref 74–97)
MONOCYTES # BLD: 0.7 K/UL (ref 0.05–1.2)
MONOCYTES NFR BLD: 4 % (ref 3–10)
NEUTS SEG # BLD: 12.3 K/UL (ref 1.8–8)
NEUTS SEG NFR BLD: 82 % (ref 40–73)
NITRITE UR QL STRIP.AUTO: NEGATIVE
PH UR STRIP: 5 [PH] (ref 5–8)
PLATELET # BLD AUTO: 290 K/UL (ref 135–420)
PMV BLD AUTO: 9.7 FL (ref 9.2–11.8)
POTASSIUM SERPL-SCNC: 3.9 MMOL/L (ref 3.5–5.5)
PROT SERPL-MCNC: 7.4 G/DL (ref 6.4–8.2)
PROT UR STRIP-MCNC: ABNORMAL MG/DL
RBC # BLD AUTO: 3.95 M/UL (ref 4.2–5.3)
RBC #/AREA URNS HPF: NEGATIVE /HPF (ref 0–5)
SODIUM SERPL-SCNC: 137 MMOL/L (ref 136–145)
SP GR UR REFRACTOMETRY: >1.03 (ref 1–1.03)
URATE CRY URNS QL MICRO: ABNORMAL
UROBILINOGEN UR QL STRIP.AUTO: 1 EU/DL (ref 0.2–1)
WBC # BLD AUTO: 15.1 K/UL (ref 4.6–13.2)
WBC URNS QL MICRO: ABNORMAL /HPF (ref 0–4)

## 2020-10-16 PROCEDURE — 99283 EMERGENCY DEPT VISIT LOW MDM: CPT

## 2020-10-16 PROCEDURE — 74011000636 HC RX REV CODE- 636: Performed by: EMERGENCY MEDICINE

## 2020-10-16 PROCEDURE — 81001 URINALYSIS AUTO W/SCOPE: CPT

## 2020-10-16 PROCEDURE — 81025 URINE PREGNANCY TEST: CPT

## 2020-10-16 PROCEDURE — 80053 COMPREHEN METABOLIC PANEL: CPT

## 2020-10-16 PROCEDURE — 96374 THER/PROPH/DIAG INJ IV PUSH: CPT

## 2020-10-16 PROCEDURE — 74177 CT ABD & PELVIS W/CONTRAST: CPT

## 2020-10-16 PROCEDURE — 96375 TX/PRO/DX INJ NEW DRUG ADDON: CPT

## 2020-10-16 PROCEDURE — 74011250636 HC RX REV CODE- 250/636: Performed by: STUDENT IN AN ORGANIZED HEALTH CARE EDUCATION/TRAINING PROGRAM

## 2020-10-16 PROCEDURE — 85025 COMPLETE CBC W/AUTO DIFF WBC: CPT

## 2020-10-16 PROCEDURE — 83690 ASSAY OF LIPASE: CPT

## 2020-10-16 PROCEDURE — 74011250637 HC RX REV CODE- 250/637: Performed by: EMERGENCY MEDICINE

## 2020-10-16 RX ORDER — DEXTROMETHORPHAN POLISTIREX 30 MG/5 ML
SUSPENSION, EXTENDED RELEASE 12 HR ORAL AS NEEDED
Status: DISCONTINUED | OUTPATIENT
Start: 2020-10-16 | End: 2020-10-17 | Stop reason: HOSPADM

## 2020-10-16 RX ORDER — MORPHINE SULFATE 4 MG/ML
4 INJECTION, SOLUTION INTRAMUSCULAR; INTRAVENOUS
Status: COMPLETED | OUTPATIENT
Start: 2020-10-16 | End: 2020-10-16

## 2020-10-16 RX ORDER — ONDANSETRON 2 MG/ML
4 INJECTION INTRAMUSCULAR; INTRAVENOUS
Status: COMPLETED | OUTPATIENT
Start: 2020-10-16 | End: 2020-10-16

## 2020-10-16 RX ADMIN — ONDANSETRON 4 MG: 2 INJECTION INTRAMUSCULAR; INTRAVENOUS at 21:44

## 2020-10-16 RX ADMIN — MORPHINE SULFATE 4 MG: 4 INJECTION, SOLUTION INTRAMUSCULAR; INTRAVENOUS at 21:46

## 2020-10-16 RX ADMIN — MINERAL OIL 133 ML: 100 ENEMA RECTAL at 22:32

## 2020-10-16 RX ADMIN — IOPAMIDOL 100 ML: 612 INJECTION, SOLUTION INTRAVENOUS at 19:17

## 2020-10-16 NOTE — ED NOTES
I performed a brief history of the patient here in triage and I have determined that pt will need further treatment and evaluation from the main side ER physician. I have placed initial orders based on the history to help in expediting patients care.        Visit Vitals  /65 (BP 1 Location: Left arm, BP Patient Position: At rest)   Pulse 81   Temp 98.6 °F (37 °C)   Resp 20   Ht 5' 2\" (1.575 m)   Wt 83.9 kg (185 lb)   SpO2 99%   BMI 33.84 kg/m²      DANIS Sabillon

## 2020-10-16 NOTE — ED PROVIDER NOTES
EMERGENCY DEPARTMENT HISTORY AND PHYSICAL EXAM    4:27 PM      Date: 10/16/2020  Patient Name: Zuri Koch    History of Presenting Illness     Chief Complaint: Constipation    History Provided By: Patient  Location/Duration/Severity/Modifying factors   HPI     67-year-old female with past medical history significant for IBS, GERD, C. difficile, and fibromyalgia presents with a chief complaint of 10 days of gradually worsening constipation. Patient states she has been treated for IBS for a prolonged period of time. She describes she told her gastroenterologist earlier today that she was experiencing constipation for 10 days who prescribed her Linzess. Patient describes she is had no relief of constipation after taking Linzess earlier today. She states she is passing gas without difficulty. She states she is also experiencing generalized abdominal pain. Patient denies any nausea, vomiting, fever, chills, urinary symptoms, or headache. PCP: Gracy Ratliff NP    Current Outpatient Medications   Medication Sig Dispense Refill    gabapentin (NEURONTIN) 800 mg tablet Take 1 Tab by mouth three (3) times daily. Max Daily Amount: 2,400 mg. As directed 90 Tab 2    sertraline (ZOLOFT) 100 mg tablet Take 2 Tabs by mouth daily. 180 Tab 0    acetaminophen-codeine (TYLENOL #3) 300-30 mg per tablet Take 1 Tab by mouth every eight (8) hours as needed for Pain for up to 30 days. Max Daily Amount: 3 Tabs. 90 Tab 0    ondansetron (ZOFRAN ODT) 8 mg disintegrating tablet Take 1 Tab by mouth every twelve (12) hours as needed for Nausea for up to 30 days. 60 Tab 0    atorvastatin (LIPITOR) 40 mg tablet TAKE 1 TABLET BY MOUTH EVERY EVENING 90 Tab 1    buPROPion XL (WELLBUTRIN XL) 300 mg XL tablet Take 1 Tab by mouth every morning. 90 Tab 0    hydrocortisone (HYTONE) 2.5 % topical cream Apply  to affected area two (2) times a day.  use thin layer 30 g 0    omeprazole (PRILOSEC) 40 mg capsule Take 1 Cap by mouth Before breakfast and dinner. 60 Cap 0    valACYclovir (VALTREX) 1 gram tablet Take 1 Tab by mouth daily. 30 Tab 1    dexlansoprazole (Dexilant) 60 mg CpDB capsule (delayed release) Take 60 mg by mouth.  naloxone (NARCAN) 4 mg/actuation nasal spray Use 1 spray intranasally, then discard. Repeat with new spray every 2 min as needed for opioid overdose symptoms, alternating nostrils. 1 Each 0    ibuprofen (MOTRIN) 600 mg tablet Take 1 Tab by mouth every eight (8) hours as needed for Pain. With food 90 Tab 2    ergocalciferol (ERGOCALCIFEROL) 50,000 unit capsule Take 1 Cap by mouth every seven (7) days. 4 Cap 2    dicyclomine (BENTYL) 10 mg capsule Take 10 mg by mouth 4 times daily (before meals and nightly).          Past History     Past Medical History:  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    IBS (irritable bowel syndrome)     Ill-defined condition 2011    h/o of \"blood clot in my lung, after a picc line\"    Ill-defined condition     painless rectal bleeding    MRSA infection     h/o, not current    Ovarian cyst     Pain, upper back     Psychiatric disorder     depression     Stress incontinence     Tattoo     Unspecified deficiency anemia 1999       Past Surgical History:  Past Surgical History:   Procedure Laterality Date    COLONOSCOPY N/A 6/28/2019    DIAGNOSTIC COLONOSCOPY with random bxs performed by Ritu Eller MD at [place] [id number]  6/22/2020         HX APPENDECTOMY  1995    HX CHOLECYSTECTOMY  2002    HX GYN  2018    Abalation    HX GYN      rt ovary removed    HX GYN      left ovary clipped.  HX ORTHOPAEDIC      arthoscopic rt knee    HX ORTHOPAEDIC      left knee meniscis tear    HX ORTHOPAEDIC Right     meniscus repair    NEUROLOGICAL PROCEDURE UNLISTED  12/2018    laminectomy       Family History:  Family History   Problem Relation Age of Onset    Hypertension Mother     Arthritis-osteo Mother     GERD Father     Hypertension Father     Cancer Sister     MS Maternal Grandmother     Diabetes Paternal Grandmother        Social History:  Social History     Tobacco Use    Smoking status: Former Smoker     Packs/day: 0.50     Years: 10.00     Pack years: 5.00    Smokeless tobacco: Never Used    Tobacco comment: pt smoked on and off for 10 years and quit 6 years ago   Substance Use Topics    Alcohol use: Yes     Alcohol/week: 0.8 standard drinks     Types: 1 Cans of beer per week     Comment: social    Drug use: No       Allergies: Allergies   Allergen Reactions    Apple Anaphylaxis    Cephalexin Hives    Strawberry Anaphylaxis    Wasps [Hymenoptera Allergenic Extract] Anaphylaxis         Review of Systems     Review of Systems   Constitutional: Negative for chills, fatigue and fever. HENT: Negative for congestion, sore throat and voice change. Eyes: Negative for photophobia, redness and visual disturbance. Respiratory: Negative for cough, chest tightness, shortness of breath, wheezing and stridor. Cardiovascular: Negative for chest pain, palpitations and leg swelling. Gastrointestinal: Positive for abdominal pain (Generalized) and constipation. Negative for abdominal distention, anal bleeding, blood in stool, diarrhea, nausea, rectal pain and vomiting. Endocrine: Negative for polydipsia and polyuria. Genitourinary: Negative for dysuria, flank pain, frequency and urgency. Musculoskeletal: Negative for back pain, myalgias and neck pain. Skin: Negative for pallor and rash.    Neurological: Negative for dizziness, seizures, syncope, speech difficulty, weakness, numbness and headaches. Psychiatric/Behavioral: Negative for confusion and suicidal ideas. The patient is not nervous/anxious. Physical Exam     Visit Vitals  /65 (BP 1 Location: Left arm, BP Patient Position: At rest)   Pulse 81   Temp 98.6 °F (37 °C)   Resp 20   Ht 5' 2\" (1.575 m)   Wt 83.9 kg (185 lb)   SpO2 99%   BMI 33.84 kg/m²       Physical Exam  Vitals signs and nursing note reviewed. Constitutional:       General: She is not in acute distress. Appearance: She is well-developed. She is not ill-appearing, toxic-appearing or diaphoretic. HENT:      Head: Normocephalic and atraumatic. Eyes:      Pupils: Pupils are equal, round, and reactive to light. Neck:      Musculoskeletal: Normal range of motion and neck supple. Cardiovascular:      Rate and Rhythm: Normal rate and regular rhythm. Heart sounds: Normal heart sounds. Pulmonary:      Effort: Pulmonary effort is normal. No tachypnea or respiratory distress. Breath sounds: Normal breath sounds. Chest:      Chest wall: No tenderness or edema. Abdominal:      General: Abdomen is flat. Bowel sounds are normal. There is no distension. Palpations: Abdomen is soft. Tenderness: There is generalized abdominal tenderness (Mild). There is no right CVA tenderness, left CVA tenderness, guarding or rebound. Negative signs include Loyd's sign and McBurney's sign. Genitourinary:     Rectum: No mass, tenderness, anal fissure, external hemorrhoid or internal hemorrhoid. Normal anal tone. Comments: Moderate hard stool noted in rectal vault on exam.  Patient was disimpacted successfully. Chaperone present. Musculoskeletal: Normal range of motion. Right lower leg: No edema. Left lower leg: No edema. Skin:     General: Skin is warm and dry. Capillary Refill: Capillary refill takes less than 2 seconds. Findings: No rash.    Neurological: General: No focal deficit present. Mental Status: She is alert and oriented to person, place, and time. Psychiatric:         Mood and Affect: Mood normal.         Behavior: Behavior normal.           Diagnostic Study Results     Labs -  Recent Results (from the past 12 hour(s))   CBC WITH AUTOMATED DIFF    Collection Time: 10/16/20  4:02 PM   Result Value Ref Range    WBC 15.1 (H) 4.6 - 13.2 K/uL    RBC 3.95 (L) 4.20 - 5.30 M/uL    HGB 12.1 12.0 - 16.0 g/dL    HCT 36.4 35.0 - 45.0 %    MCV 92.2 74.0 - 97.0 FL    MCH 30.6 24.0 - 34.0 PG    MCHC 33.2 31.0 - 37.0 g/dL    RDW 12.3 11.6 - 14.5 %    PLATELET 829 764 - 610 K/uL    MPV 9.7 9.2 - 11.8 FL    NEUTROPHILS 82 (H) 40 - 73 %    LYMPHOCYTES 11 (L) 21 - 52 %    MONOCYTES 4 3 - 10 %    EOSINOPHILS 3 0 - 5 %    BASOPHILS 0 0 - 2 %    ABS. NEUTROPHILS 12.3 (H) 1.8 - 8.0 K/UL    ABS. LYMPHOCYTES 1.7 0.9 - 3.6 K/UL    ABS. MONOCYTES 0.7 0.05 - 1.2 K/UL    ABS. EOSINOPHILS 0.4 0.0 - 0.4 K/UL    ABS. BASOPHILS 0.0 0.0 - 0.1 K/UL    DF AUTOMATED         Radiologic Studies -   No orders to display         Medical Decision Making   I am the first provider for this patient. I reviewed the vital signs, available nursing notes, past medical history, past surgical history, family history and social history. Vital Signs-Reviewed the patient's vital signs. Records Reviewed: Nursing Notes, Old Medical Records, Previous Radiology Studies and Previous Laboratory Studies (Time of Review: 4:27 PM)    ED Course: Progress Notes, Reevaluation, and Consults:  4:27 PM patient seen and evaluated. No acute distress. Mild generalized abdominal tenderness palpation. Abdomen is soft, no peritoneal signs. Vital signs within normal limits. Will order imaging, lab work, reevaluate. 12:29 AM patient underwent manual disimpaction which yielded moderate amount of hard stool from the rectal vault. Chaperone present. Strict return precautions discussed with patient.   She will discharged home with prescription for MiraLAX and magnesium citrate. Provider Notes (Medical Decision Making):   MDM     36-year-old female with past medical history significant for IBS, GERD, C. difficile, and fibromyalgia presents with a chief complaint of 10 days of gradually worsening constipation. She is passing gas. Vital signs within normal months. Mild generalized abdominal tenderness palpation. Abdomen is soft. No evidence of peritoneal signs. Patient given pain medication which provided significant relief. Lab work is unremarkable with exception of noted leukocytosis. She has no symptoms suggest infection. CT of the abdomen and pelvis did reveal mild increase of stool volume inside the colon. Patient was manually disimpacted which did yield moderate amount of hard brown stool. Patient was prescribed magnesium citrate and MiraLAX. Strict return precautions discussed. Patient discharged to home. Procedures      Diagnosis     Clinical Impression: Constipation    Disposition: Discharge    Gillian Mcburney, MD     Follow-up Information    None          Patient's Medications   Start Taking    No medications on file   Continue Taking    ACETAMINOPHEN-CODEINE (TYLENOL #3) 300-30 MG PER TABLET    Take 1 Tab by mouth every eight (8) hours as needed for Pain for up to 30 days. Max Daily Amount: 3 Tabs. ATORVASTATIN (LIPITOR) 40 MG TABLET    TAKE 1 TABLET BY MOUTH EVERY EVENING    BUPROPION XL (WELLBUTRIN XL) 300 MG XL TABLET    Take 1 Tab by mouth every morning. DEXLANSOPRAZOLE (DEXILANT) 60 MG CPDB CAPSULE (DELAYED RELEASE)    Take 60 mg by mouth. DICYCLOMINE (BENTYL) 10 MG CAPSULE    Take 10 mg by mouth 4 times daily (before meals and nightly). ERGOCALCIFEROL (ERGOCALCIFEROL) 50,000 UNIT CAPSULE    Take 1 Cap by mouth every seven (7) days. GABAPENTIN (NEURONTIN) 800 MG TABLET    Take 1 Tab by mouth three (3) times daily. Max Daily Amount: 2,400 mg.  As directed    HYDROCORTISONE (HYTONE) 2.5 % TOPICAL CREAM    Apply  to affected area two (2) times a day. use thin layer    IBUPROFEN (MOTRIN) 600 MG TABLET    Take 1 Tab by mouth every eight (8) hours as needed for Pain. With food    NALOXONE (NARCAN) 4 MG/ACTUATION NASAL SPRAY    Use 1 spray intranasally, then discard. Repeat with new spray every 2 min as needed for opioid overdose symptoms, alternating nostrils. OMEPRAZOLE (PRILOSEC) 40 MG CAPSULE    Take 1 Cap by mouth Before breakfast and dinner. ONDANSETRON (ZOFRAN ODT) 8 MG DISINTEGRATING TABLET    Take 1 Tab by mouth every twelve (12) hours as needed for Nausea for up to 30 days. SERTRALINE (ZOLOFT) 100 MG TABLET    Take 2 Tabs by mouth daily. VALACYCLOVIR (VALTREX) 1 GRAM TABLET    Take 1 Tab by mouth daily. These Medications have changed    No medications on file   Stop Taking    No medications on file     Disclaimer: Sections of this note are dictated using utilizing voice recognition software. Minor typographical errors may be present. If questions arise, please do not hesitate to contact me or call our department.

## 2020-10-16 NOTE — ED TRIAGE NOTES
Pt c/o abdominal / back / rib pain has not had a BM in 10 days pt also has had vomiting and itching all over body sent to be evaluated in ED by her PMD

## 2020-10-17 RX ORDER — POLYETHYLENE GLYCOL 3350 17 G/17G
17 POWDER, FOR SOLUTION ORAL DAILY
Qty: 235 G | Refills: 0 | Status: SHIPPED | OUTPATIENT
Start: 2020-10-17 | End: 2020-11-23 | Stop reason: ALTCHOICE

## 2020-10-17 RX ORDER — MAGNESIUM CITRATE
SOLUTION, ORAL ORAL
Qty: 1 BOTTLE | Refills: 0 | Status: SHIPPED | OUTPATIENT
Start: 2020-10-17 | End: 2020-11-23 | Stop reason: ALTCHOICE

## 2020-10-17 NOTE — ED NOTES
I have reviewed discharge instructions and medications with the patient. The patient verbalized understanding. Patient ambulated out of ED to [facility].

## 2020-10-18 ENCOUNTER — HOSPITAL ENCOUNTER (EMERGENCY)
Age: 50
Discharge: HOME | End: 2020-10-18
Attending: EMERGENCY MEDICINE
Payer: MEDICAID

## 2020-10-18 VITALS
RESPIRATION RATE: 20 BRPM | SYSTOLIC BLOOD PRESSURE: 146 MMHG | DIASTOLIC BLOOD PRESSURE: 84 MMHG | TEMPERATURE: 98.8 F | OXYGEN SATURATION: 97 %

## 2020-10-18 DIAGNOSIS — B02.8 HERPES ZOSTER WITH COMPLICATION: Primary | ICD-10-CM

## 2020-10-18 PROCEDURE — 74011250637 HC RX REV CODE- 250/637: Performed by: EMERGENCY MEDICINE

## 2020-10-18 PROCEDURE — 96372 THER/PROPH/DIAG INJ SC/IM: CPT

## 2020-10-18 PROCEDURE — 74011250636 HC RX REV CODE- 250/636: Performed by: EMERGENCY MEDICINE

## 2020-10-18 PROCEDURE — 99282 EMERGENCY DEPT VISIT SF MDM: CPT

## 2020-10-18 RX ORDER — OXYCODONE AND ACETAMINOPHEN 5; 325 MG/1; MG/1
1 TABLET ORAL
Qty: 10 TAB | Refills: 0 | Status: SHIPPED | OUTPATIENT
Start: 2020-10-18 | End: 2020-10-20 | Stop reason: ALTCHOICE

## 2020-10-18 RX ORDER — IBUPROFEN 800 MG/1
800 TABLET ORAL EVERY 8 HOURS
Qty: 15 TAB | Refills: 0 | Status: SHIPPED | OUTPATIENT
Start: 2020-10-18 | End: 2020-10-18

## 2020-10-18 RX ORDER — KETOROLAC TROMETHAMINE 15 MG/ML
15 INJECTION, SOLUTION INTRAMUSCULAR; INTRAVENOUS
Status: COMPLETED | OUTPATIENT
Start: 2020-10-18 | End: 2020-10-18

## 2020-10-18 RX ORDER — DIPHENHYDRAMINE HYDROCHLORIDE 50 MG/ML
50 INJECTION, SOLUTION INTRAMUSCULAR; INTRAVENOUS ONCE
Status: COMPLETED | OUTPATIENT
Start: 2020-10-18 | End: 2020-10-18

## 2020-10-18 RX ORDER — IBUPROFEN 800 MG/1
800 TABLET ORAL EVERY 8 HOURS
Qty: 15 TAB | Refills: 0 | Status: SHIPPED | OUTPATIENT
Start: 2020-10-18 | End: 2020-10-23

## 2020-10-18 RX ORDER — OXYCODONE AND ACETAMINOPHEN 5; 325 MG/1; MG/1
2 TABLET ORAL
Status: COMPLETED | OUTPATIENT
Start: 2020-10-18 | End: 2020-10-18

## 2020-10-18 RX ORDER — DIPHENHYDRAMINE HCL 25 MG
CAPSULE ORAL
Qty: 16 CAP | Refills: 0 | Status: SHIPPED | OUTPATIENT
Start: 2020-10-18 | End: 2020-10-18

## 2020-10-18 RX ORDER — OXYCODONE AND ACETAMINOPHEN 5; 325 MG/1; MG/1
1 TABLET ORAL
Qty: 12 TAB | Refills: 0 | Status: SHIPPED | OUTPATIENT
Start: 2020-10-18 | End: 2020-10-18

## 2020-10-18 RX ORDER — DIPHENHYDRAMINE HCL 25 MG
CAPSULE ORAL
Qty: 16 CAP | Refills: 0 | Status: SHIPPED | OUTPATIENT
Start: 2020-10-18 | End: 2020-10-20 | Stop reason: ALTCHOICE

## 2020-10-18 RX ADMIN — KETOROLAC TROMETHAMINE 15 MG: 15 INJECTION, SOLUTION INTRAMUSCULAR; INTRAVENOUS at 12:45

## 2020-10-18 RX ADMIN — DIPHENHYDRAMINE HYDROCHLORIDE 50 MG: 50 INJECTION, SOLUTION INTRAMUSCULAR; INTRAVENOUS at 12:45

## 2020-10-18 RX ADMIN — OXYCODONE HYDROCHLORIDE AND ACETAMINOPHEN 2 TABLET: 5; 325 TABLET ORAL at 13:15

## 2020-10-18 NOTE — ED PROVIDER NOTES
EMERGENCY DEPARTMENT HISTORY AND PHYSICAL EXAM    Date: 10/18/2020  Patient Name: Issa Allison    History of Presenting Illness     Chief Complaint   Patient presents with    Rash    Shingles     pain         History Provided By: Patient      Additional History (Context): Issa Allison is a 51 y.o. female with see below who presents with shingles outbreak pain; says began on her right buttocks and now has spread to BUE, chest, BLE. Says she get shingles outbreaks almost every other month. Has seen neurologist Linh Newby previously for above. Only one at home affected. Taking her gabapentin without relief. PCP: Pat Mckee NP    Current Facility-Administered Medications   Medication Dose Route Frequency Provider Last Rate Last Dose    ketorolac tromethamine (TORADOL) 60 mg/2 mL injection 60 mg  60 mg IntraMUSCular NOW Elidia Ingram PA        diphenhydrAMINE (BENADRYL) injection 50 mg  50 mg IntraMUSCular ONCE Elidia Ingram PA         Current Outpatient Medications   Medication Sig Dispense Refill    oxyCODONE-acetaminophen (Percocet) 5-325 mg per tablet Take 1 Tab by mouth every six (6) hours as needed for Pain for up to 3 days. Max Daily Amount: 4 Tabs. 12 Tab 0    ibuprofen (MOTRIN) 800 mg tablet Take 1 Tab by mouth every eight (8) hours for 5 days. 15 Tab 0    diphenhydrAMINE (BenadryL) 25 mg capsule Take 1-2 tabs every 6 hours as needed. 16 Cap 0    polyethylene glycol (Miralax) 17 gram/dose powder Take 17 g by mouth daily. 1 tablespoon with 8 oz of water daily 235 g 0    magnesium citrate solution Take 1/3 of bottle every 8 hours until finished or until you have a bowel movement. 1 Bottle 0    gabapentin (NEURONTIN) 800 mg tablet Take 1 Tab by mouth three (3) times daily. Max Daily Amount: 2,400 mg. As directed 90 Tab 2    sertraline (ZOLOFT) 100 mg tablet Take 2 Tabs by mouth daily.  180 Tab 0    acetaminophen-codeine (TYLENOL #3) 300-30 mg per tablet Take 1 Tab by mouth every eight (8) hours as needed for Pain for up to 30 days. Max Daily Amount: 3 Tabs. 90 Tab 0    ondansetron (ZOFRAN ODT) 8 mg disintegrating tablet Take 1 Tab by mouth every twelve (12) hours as needed for Nausea for up to 30 days. 60 Tab 0    atorvastatin (LIPITOR) 40 mg tablet TAKE 1 TABLET BY MOUTH EVERY EVENING 90 Tab 1    buPROPion XL (WELLBUTRIN XL) 300 mg XL tablet Take 1 Tab by mouth every morning. 90 Tab 0    hydrocortisone (HYTONE) 2.5 % topical cream Apply  to affected area two (2) times a day. use thin layer 30 g 0    omeprazole (PRILOSEC) 40 mg capsule Take 1 Cap by mouth Before breakfast and dinner. 60 Cap 0    valACYclovir (VALTREX) 1 gram tablet Take 1 Tab by mouth daily. 30 Tab 1    dexlansoprazole (Dexilant) 60 mg CpDB capsule (delayed release) Take 60 mg by mouth.  naloxone (NARCAN) 4 mg/actuation nasal spray Use 1 spray intranasally, then discard. Repeat with new spray every 2 min as needed for opioid overdose symptoms, alternating nostrils. 1 Each 0    ibuprofen (MOTRIN) 600 mg tablet Take 1 Tab by mouth every eight (8) hours as needed for Pain. With food 90 Tab 2    ergocalciferol (ERGOCALCIFEROL) 50,000 unit capsule Take 1 Cap by mouth every seven (7) days. 4 Cap 2    dicyclomine (BENTYL) 10 mg capsule Take 10 mg by mouth 4 times daily (before meals and nightly).          Past History     Past Medical History:  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    IBS (irritable bowel syndrome)     Ill-defined condition 2011    h/o of \"blood clot in my lung, after a picc line\"    Ill-defined condition     painless rectal bleeding    MRSA infection     h/o, not current    Ovarian cyst     Pain, upper back     Psychiatric disorder     depression     Stress incontinence     Tattoo     Unspecified deficiency anemia 1999       Past Surgical History:  Past Surgical History:   Procedure Laterality Date    COLONOSCOPY N/A 6/28/2019    DIAGNOSTIC COLONOSCOPY with random bxs performed by [facility] 4  6/22/2020         HX APPENDECTOMY  1995    HX CHOLECYSTECTOMY  2002    HX GYN  2018    Abalation    HX GYN      rt ovary removed    HX GYN      left ovary clipped.  HX ORTHOPAEDIC      arthoscopic rt knee    HX ORTHOPAEDIC      left knee meniscis tear    HX ORTHOPAEDIC Right     meniscus repair    NEUROLOGICAL PROCEDURE UNLISTED  12/2018    laminectomy       Family History:  Family History   Problem Relation Age of Onset    Hypertension Mother     Arthritis-osteo Mother     GERD Father     Hypertension Father     Cancer Sister     MS Maternal Grandmother     Diabetes Paternal Grandmother        Social History:  Social History     Tobacco Use    Smoking status: Former Smoker     Packs/day: 0.50     Years: 10.00     Pack years: 5.00    Smokeless tobacco: Never Used    Tobacco comment: pt smoked on and off for 10 years and quit 6 years ago   Substance Use Topics    Alcohol use: Yes     Alcohol/week: 0.8 standard drinks     Types: 1 Cans of beer per week     Comment: social    Drug use: No       Allergies: Allergies   Allergen Reactions    Apple Anaphylaxis    Cephalexin Hives    Strawberry Anaphylaxis    Wasps [Hymenoptera Allergenic Extract] Anaphylaxis         Review of Systems   Review of Systems   Respiratory: Negative for shortness of breath. Skin: Positive for rash.      All Other Systems Negative  Physical Exam     Vitals:    10/18/20 1203   BP: (!) 146/84   Resp: 20   Temp: 98.8 °F (37.1 °C) SpO2: 97%     Physical Exam  Vitals signs and nursing note reviewed. Constitutional:       General: She is in acute distress. Appearance: She is well-developed. HENT:      Head: Normocephalic and atraumatic. Right Ear: External ear normal.      Left Ear: External ear normal.      Nose: Nose normal.   Eyes:      Conjunctiva/sclera: Conjunctivae normal.      Pupils: Pupils are equal, round, and reactive to light. Neck:      Musculoskeletal: Normal range of motion and neck supple. Vascular: No JVD. Trachea: No tracheal deviation. Cardiovascular:      Rate and Rhythm: Normal rate and regular rhythm. Heart sounds: Normal heart sounds. No murmur. No friction rub. No gallop. Pulmonary:      Effort: Pulmonary effort is normal. No respiratory distress. Breath sounds: Normal breath sounds. No wheezing or rales. Abdominal:      General: Bowel sounds are normal. There is no distension. Palpations: Abdomen is soft. There is no mass. Tenderness: There is no abdominal tenderness. There is no guarding or rebound. Musculoskeletal: Normal range of motion. General: No tenderness. Skin:     General: Skin is warm and dry. Findings: Rash present. Comments: Small oval erythematous singular lesions to right buttocks primarily as well as little bit over midline bilateral upper extremities bilateral lower extremities and chest.   Neurological:      Mental Status: She is alert and oriented to person, place, and time. Cranial Nerves: No cranial nerve deficit. Deep Tendon Reflexes: Reflexes are normal and symmetric. Psychiatric:         Behavior: Behavior normal.            Diagnostic Study Results     Labs -   No results found for this or any previous visit (from the past 12 hour(s)).     Radiologic Studies -   No orders to display     CT Results  (Last 48 hours)               10/16/20 1920  CT ABD PELV W CONT Final result    Impression:  IMPRESSION: Mild increase volume of stool within the colon. Tiny umbilical hernia contains fat. Nonvisualization of the appendix. There is however no right lower quadrant   inflammation. Report provided to the emergency department at [address] [place] [address]. Narrative:  EXAM: CT ABDOMEN AND PELVIS WITH CONTRAST       CLINICAL HISTORY/INDICATION:  10 days constipation. Generalized abdominal   tenderness to palpation  , also complains of abdominal pain, back pain, and rib   pain, vomiting        COMPARISON: CT abdomen and pelvis 3/18/2020. TECHNIQUE: Following the uneventful intravenous administration of 100 cc of   nonionic contrast, dynamic enhanced scanning of the abdomen and pelvis was   performed using standard 5 mm axial images. Coronal and sagittal reformations   obtained. All CT scans at this facility are performed using dose optimization technique as   appropriate to a performed exam, to include automated exposure control,   adjustment of the mA and/or kV according to patient's size (including   appropriate matching for site-specific examinations), or use of iterative   reconstruction technique. FINDINGS:        Abdomen -       The lung bases are clear; no pleural fluid or pneumothorax evident. Tiny umbilical hernia contains fat. The liver and spleen are normal in size and   density. The biliary tree is not dilated. The gallbladder is surgically removed. There is bilateral renal function without obstruction, without cyst, stone or   mass. Adrenals and Pancreas  are of normal CT appearance. There is no free fluid or free air. There is increased stool throughout the colon. No bowel dilatation is   demonstrated. No bowel wall thickening is seen. The appendix is not   demonstrated. There is however no right lower quadrant inflammation. There is no significant adenopathy. Pelvis -       There is no free pelvic fluid. The pelvic viscera are unremarkable. The bladder is incompletely distended but unremarkable. There is no significant adenopathy. Review of osseous structures throughout on bone window settings shows no   significant osseous pathology. CXR Results  (Last 48 hours)    None            Medical Decision Making   I am the first provider for this patient. I reviewed the vital signs, available nursing notes, past medical history, past surgical history, family history and social history. Vital Signs-Reviewed the patient's vital signs. Procedures:  Procedures    Provider Notes (Medical Decision Making): Patient is sure this is not scabies. Patient says this pain is exactly like her shingles pain though very intense. We will treat her now with Toradol Benadryl and write a prescription for her Percocet at home. She started taking the Valtrex yesterday. MED RECONCILIATION:  Current Facility-Administered Medications   Medication Dose Route Frequency    ketorolac tromethamine (TORADOL) 60 mg/2 mL injection 60 mg  60 mg IntraMUSCular NOW    diphenhydrAMINE (BENADRYL) injection 50 mg  50 mg IntraMUSCular ONCE     Current Outpatient Medications   Medication Sig    oxyCODONE-acetaminophen (Percocet) 5-325 mg per tablet Take 1 Tab by mouth every six (6) hours as needed for Pain for up to 3 days. Max Daily Amount: 4 Tabs.  ibuprofen (MOTRIN) 800 mg tablet Take 1 Tab by mouth every eight (8) hours for 5 days.  diphenhydrAMINE (BenadryL) 25 mg capsule Take 1-2 tabs every 6 hours as needed.  polyethylene glycol (Miralax) 17 gram/dose powder Take 17 g by mouth daily. 1 tablespoon with 8 oz of water daily    magnesium citrate solution Take 1/3 of bottle every 8 hours until finished or until you have a bowel movement.  gabapentin (NEURONTIN) 800 mg tablet Take 1 Tab by mouth three (3) times daily. Max Daily Amount: 2,400 mg. As directed    sertraline (ZOLOFT) 100 mg tablet Take 2 Tabs by mouth daily.     acetaminophen-codeine (TYLENOL #3) 300-30 mg per tablet Take 1 Tab by mouth every eight (8) hours as needed for Pain for up to 30 days. Max Daily Amount: 3 Tabs.  ondansetron (ZOFRAN ODT) 8 mg disintegrating tablet Take 1 Tab by mouth every twelve (12) hours as needed for Nausea for up to 30 days.  atorvastatin (LIPITOR) 40 mg tablet TAKE 1 TABLET BY MOUTH EVERY EVENING    buPROPion XL (WELLBUTRIN XL) 300 mg XL tablet Take 1 Tab by mouth every morning.  hydrocortisone (HYTONE) 2.5 % topical cream Apply  to affected area two (2) times a day. use thin layer    omeprazole (PRILOSEC) 40 mg capsule Take 1 Cap by mouth Before breakfast and dinner.  valACYclovir (VALTREX) 1 gram tablet Take 1 Tab by mouth daily.  dexlansoprazole (Dexilant) 60 mg CpDB capsule (delayed release) Take 60 mg by mouth.  naloxone (NARCAN) 4 mg/actuation nasal spray Use 1 spray intranasally, then discard. Repeat with new spray every 2 min as needed for opioid overdose symptoms, alternating nostrils.  ibuprofen (MOTRIN) 600 mg tablet Take 1 Tab by mouth every eight (8) hours as needed for Pain. With food    ergocalciferol (ERGOCALCIFEROL) 50,000 unit capsule Take 1 Cap by mouth every seven (7) days.  dicyclomine (BENTYL) 10 mg capsule Take 10 mg by mouth 4 times daily (before meals and nightly). Disposition:  home    DISCHARGE NOTE:   12:38 PM    Pt has been reexamined. Patient has no new complaints, changes, or physical findings. Care plan outlined and precautions discussed. Results of exam were reviewed with the patient. All medications were reviewed with the patient; will d/c home with see below. All of pt's questions and concerns were addressed. Patient was instructed and agrees to follow up with PCP, as well as to return to the ED upon further deterioration. Patient is ready to go home.     Follow-up Information     Follow up With Specialties Details Why Contact Info    Anabella Willams NP Nurse Practitioner Schedule an appointment as soon as possible for a visit in 1 day  [address]  [address]      [facility] Emergency Medicine  If symptoms worsen return immediately [address] [facility] [address]  [phone]          Current Discharge Medication List      START taking these medications    Details   oxyCODONE-acetaminophen (Percocet) 5-325 mg per tablet Take 1 Tab by mouth every six (6) hours as needed for Pain for up to 3 days. Max Daily Amount: 4 Tabs. Qty: 12 Tab, Refills: 0    Associated Diagnoses: Herpes zoster with complication      !! ibuprofen (MOTRIN) 800 mg tablet Take 1 Tab by mouth every eight (8) hours for 5 days. Qty: 15 Tab, Refills: 0      diphenhydrAMINE (BenadryL) 25 mg capsule Take 1-2 tabs every 6 hours as needed. Qty: 16 Cap, Refills: 0       !! - Potential duplicate medications found. Please discuss with provider. CONTINUE these medications which have NOT CHANGED    Details   !! ibuprofen (MOTRIN) 600 mg tablet Take 1 Tab by mouth every eight (8) hours as needed for Pain. With food  Qty: 90 Tab, Refills: 2    Associated Diagnoses: Chronic bilateral low back pain with right-sided sciatica       ! ! - Potential duplicate medications found. Please discuss with provider. Diagnosis     Clinical Impression:   1.  Herpes zoster with complication

## 2020-10-18 NOTE — DISCHARGE INSTRUCTIONS
Patient Education        Shingles: Care Instructions  Your Care Instructions     Shingles (herpes zoster) causes pain and a blistered rash. The rash can appear anywhere on the body but will be on only one side of the body, the left or right. It will be in a band, a strip, or a small area. The pain can be very severe. Shingles can also cause tingling or itching in the area of the rash. The blisters scab over after a few days and heal in 2 to 4 weeks. Medicines can help you feel better and may help prevent more serious problems caused by shingles. Shingles is caused by the same virus that causes chickenpox. When you have chickenpox, the virus gets into your nerve roots and stays there (becomes dormant) long after you get over the chickenpox. If the virus becomes active again, it can cause shingles. Follow-up care is a key part of your treatment and safety. Be sure to make and go to all appointments, and call your doctor if you are having problems. It's also a good idea to know your test results and keep a list of the medicines you take. How can you care for yourself at home? · Be safe with medicines. Take your medicines exactly as prescribed. Call your doctor if you think you are having a problem with your medicine. Antiviral medicine helps you get better faster. · Try not to scratch or pick at the blisters. They will crust over and fall off on their own if you leave them alone. · Put cool, wet cloths on the area to relieve pain and itching. You can also use calamine lotion. Try not to use so much lotion that it cakes and is hard to get off. · Put cornstarch or baking soda on the sores to help dry them out so they heal faster. · Do not use thick ointment, such as petroleum jelly, on the sores. This will keep them from drying and healing. · To help remove loose crusts, soak them in tap water. This can help decrease oozing, and dry and soothe the skin.   · Take an over-the-counter pain medicine, such as acetaminophen (Tylenol), ibuprofen (Advil, Motrin), or naproxen (Aleve). Read and follow all instructions on the label. · Avoid close contact with people until the blisters have healed. It is very important for you to avoid contact with anyone who has never had chickenpox or the chickenpox vaccine. Pregnant women, young babies, and anyone else who has a hard time fighting infection (such as someone with HIV, diabetes, or cancer) is especially at risk. When should you call for help? Call your doctor now or seek immediate medical care if:    · You have a new or higher fever.     · You have a severe headache and a stiff neck.     · You lose the ability to think clearly.     · The rash spreads to your forehead, nose, eyes, or eyelids.     · You have eye pain, or your vision gets worse.     · You have new pain in your face, or you cannot move the muscles in your face.     · Blisters spread to new parts of your body. Watch closely for changes in your health, and be sure to contact your doctor if:    · The rash has not healed after 2 to 4 weeks.     · You still have pain after the rash has healed. Where can you learn more? Go to http://www.gray.com/  Enter X176 in the search box to learn more about \"Shingles: Care Instructions. \"  Current as of: February 11, 2020               Content Version: 12.6  © 2001-6595 [employer]. Care instructions adapted under license by [employer] (which disclaims liability or warranty for this information). If you have questions about a medical condition or this instruction, always ask your healthcare professional. [place] [address] any warranty or liability for your use of this information.

## 2020-10-20 ENCOUNTER — VIRTUAL VISIT (OUTPATIENT)
Dept: FAMILY MEDICINE CLINIC | Age: 50
End: 2020-10-20
Payer: MEDICAID

## 2020-10-20 DIAGNOSIS — R79.89 ABNORMAL CBC MEASUREMENT: ICD-10-CM

## 2020-10-20 DIAGNOSIS — L50.9 URTICARIA OF UNKNOWN ORIGIN: ICD-10-CM

## 2020-10-20 DIAGNOSIS — B02.29 POSTHERPETIC NEURALGIA: ICD-10-CM

## 2020-10-20 DIAGNOSIS — R52 GENERALIZED PAIN: Primary | ICD-10-CM

## 2020-10-20 PROCEDURE — 99213 OFFICE O/P EST LOW 20 MIN: CPT | Performed by: NURSE PRACTITIONER

## 2020-10-20 RX ORDER — FAMOTIDINE 10 MG/1
10 TABLET ORAL 2 TIMES DAILY
Qty: 30 TAB | Refills: 2 | Status: SHIPPED | OUTPATIENT
Start: 2020-10-20 | End: 2021-06-09 | Stop reason: SDUPTHER

## 2020-10-20 RX ORDER — PROMETHAZINE HYDROCHLORIDE AND CODEINE PHOSPHATE 6.25; 1 MG/5ML; MG/5ML
10 SOLUTION ORAL
Qty: 240 BOTTLE | Refills: 0 | Status: SHIPPED | OUTPATIENT
Start: 2020-10-20 | End: 2020-10-27

## 2020-10-20 RX ORDER — CETIRIZINE HCL 10 MG
10 TABLET ORAL 2 TIMES DAILY
Qty: 60 TAB | Refills: 2 | Status: SHIPPED | OUTPATIENT
Start: 2020-10-20 | End: 2021-11-15 | Stop reason: ALTCHOICE

## 2020-10-20 NOTE — PROGRESS NOTES
Lidia Rucker is a 51 y.o. female who was seen by synchronous (real-time) audio-video technology on 10/20/2020 for Rash; Skin Problem; and Pain (Chronic)    Pt is following-up on chronic issues. Pt states she is having another shingles flare, along with a full body petechial rash with uticaria, nausea, vomiting, sweating, chills, bruising of unknown origin, and an 11 lb weight decrease over the last 4 weeks. Pt states she has been to multiple specialties and none have seemed to diagnose her symptoms. She was recently seen at the ED for a 10 day bout with constipation for which she had to be disimpacted. Pt states that she knows her labs were abnormal and would like to have some answers for the abnormalities. Pt states that she is in constant pain and nothing is helping her pain. Now with her nausea and vomiting she cannot keep the pain medications down. Pt states that the nausea medication only works if she takes double the dose. She was advised against this practice and says that she will comply. She states that she was supposed to be referred to pain management but states that when she called the office they told her they had not received her paperwork from our office. Assessment & Plan:   Diagnoses and all orders for this visit:    1. Generalized pain  -     promethazine-codeine (PHENERGAN with CODEINE) 6.25-10 mg/5 mL syrup; Take 10 mL by mouth four (4) times daily as needed for Cough for up to 7 days. Max Daily Amount: 40 mL. (Patient taking differently: Take 10 mL by mouth four (4) times daily as needed.)    2. Postherpetic neuralgia  -     promethazine-codeine (PHENERGAN with CODEINE) 6.25-10 mg/5 mL syrup; Take 10 mL by mouth four (4) times daily as needed for Cough for up to 7 days. Max Daily Amount: 40 mL. (Patient taking differently: Take 10 mL by mouth four (4) times daily as needed. )  -     REFERRAL TO PAIN MANAGEMENT    3.  Urticaria of unknown origin  -     cetirizine (ZYRTEC) 10 mg tablet; Take 1 Tab by mouth two (2) times a day. -     famotidine (PEPCID) 10 mg tablet; Take 1 Tab by mouth two (2) times a day. 4. Abnormal CBC measurement  -     REFERRAL TO HEMATOLOGY ONCOLOGY  -     SED RATE (ESR); Future  -     ANTINUCLEAR ANTIBODIES, IFA; Future      Follow-up and Dispositions    · Return in about 6 weeks (around 12/1/2020) for Chronic Disease Management. 712  Subjective:       Prior to Admission medications    Medication Sig Start Date End Date Taking? Authorizing Provider   cetirizine (ZYRTEC) 10 mg tablet Take 1 Tab by mouth two (2) times a day. 10/20/20  Yes Moriah Stoll NP   famotidine (PEPCID) 10 mg tablet Take 1 Tab by mouth two (2) times a day. 10/20/20  Yes Moriah Stoll NP   promethazine-codeine [facility] with CODEINE) 6.25-10 mg/5 mL syrup Take 10 mL by mouth four (4) times daily as needed for Cough for up to 7 days. Max Daily Amount: 40 mL. Patient taking differently: Take 10 mL by mouth four (4) times daily as needed. 10/20/20 10/27/20 Yes Moriah Stoll NP   ibuprofen (MOTRIN) 800 mg tablet Take 1 Tab by mouth every eight (8) hours for 5 days. 10/18/20 10/23/20 Yes Elidia Ingram PA   gabapentin (NEURONTIN) 800 mg tablet Take 1 Tab by mouth three (3) times daily. Max Daily Amount: 2,400 mg. As directed 10/15/20  Yes Alexis Méndez NP   sertraline (ZOLOFT) 100 mg tablet Take 2 Tabs by mouth daily. 10/15/20  Yes Alexis Méndez NP   ondansetron (ZOFRAN ODT) 8 mg disintegrating tablet Take 1 Tab by mouth every twelve (12) hours as needed for Nausea for up to 30 days. 10/15/20 11/14/20 Yes Alexis Méndez NP   atorvastatin (LIPITOR) 40 mg tablet TAKE 1 TABLET BY MOUTH EVERY EVENING 9/30/20  Yes Alexsi Méndez NP   buPROPion XL (WELLBUTRIN XL) 300 mg XL tablet Take 1 Tab by mouth every morning. 9/15/20  Yes Alexis Méndez NP   hydrocortisone (HYTONE) 2.5 % topical cream Apply  to affected area two (2) times a day.  use thin layer 9/15/20  Yes Alexis Méndez NP valACYclovir (VALTREX) 1 gram tablet Take 1 Tab by mouth daily. 9/15/20  Yes Kameron Riggins NP   dexlansoprazole (Dexilant) 60 mg CpDB capsule (delayed release) Take 60 mg by mouth. Yes Provider, Historical   naloxone (NARCAN) 4 mg/actuation nasal spray Use 1 spray intranasally, then discard. Repeat with new spray every 2 min as needed for opioid overdose symptoms, alternating nostrils. 3/18/20  Yes Jade Cole NP   dicyclomine (BENTYL) 10 mg capsule Take 10 mg by mouth 4 times daily (before meals and nightly). Yes Provider, Historical   diphenhydrAMINE (BenadryL) 25 mg capsule Take 1-2 tabs every 6 hours as needed. 10/18/20 10/20/20  Desma Bloch, PA   oxyCODONE-acetaminophen (Percocet) 5-325 mg per tablet Take 1 Tab by mouth every six (6) hours as needed for Pain for up to 3 days. Max Daily Amount: 4 Tabs. 10/18/20 10/20/20  Desma Bloch, PA   polyethylene glycol (Miralax) 17 gram/dose powder Take 17 g by mouth daily. 1 tablespoon with 8 oz of water daily 10/17/20   Marcus Carroll MD   magnesium citrate solution Take 1/3 of bottle every 8 hours until finished or until you have a bowel movement. 10/17/20   Marcus Carroll MD   acetaminophen-codeine (TYLENOL #3) 300-30 mg per tablet Take 1 Tab by mouth every eight (8) hours as needed for Pain for up to 30 days. Max Daily Amount: 3 Tabs. 10/15/20 11/14/20  Yuriy NEGRTEE NP   omeprazole (PRILOSEC) 40 mg capsule Take 1 Cap by mouth Before breakfast and dinner. 9/15/20 10/20/20  Yuriy NEGRETE NP   ibuprofen (MOTRIN) 600 mg tablet Take 1 Tab by mouth every eight (8) hours as needed for Pain. With food 3/10/20 10/20/20  Wicho Chatterjee NP   ergocalciferol (ERGOCALCIFEROL) 50,000 unit capsule Take 1 Cap by mouth every seven (7) days.  10/4/19   Dewight Gilson, NP     Patient Active Problem List   Diagnosis Code    Fibromyalgia M79.7    HSV (herpes simplex virus) infection B00.9    Cervical pain M54.2    Nerve pain M79.2    Myofascial pain M79.18  Migraine G43.909    Allergic rhinitis due to pollen J30.1    Abnormal liver function R94.5    Depressive disorder, not elsewhere classified F32.9    Anxiety state, unspecified F41.1    Irritable bowel syndrome K58.9    Abnormal glandular Papanicolaou smear of cervix R87.619    History of shingles Z86.19    Spinal stenosis, lumbar region with neurogenic claudication M48.062    HNP (herniated nucleus pulposus), lumbar M51.26    Spinal stenosis of lumbar region M48.061     Current Outpatient Medications   Medication Sig Dispense Refill    cetirizine (ZYRTEC) 10 mg tablet Take 1 Tab by mouth two (2) times a day. 60 Tab 2    famotidine (PEPCID) 10 mg tablet Take 1 Tab by mouth two (2) times a day. 30 Tab 2    promethazine-codeine (PHENERGAN with CODEINE) 6.25-10 mg/5 mL syrup Take 10 mL by mouth four (4) times daily as needed for Cough for up to 7 days. Max Daily Amount: 40 mL. (Patient taking differently: Take 10 mL by mouth four (4) times daily as needed.) 240 Bottle 0    ibuprofen (MOTRIN) 800 mg tablet Take 1 Tab by mouth every eight (8) hours for 5 days. 15 Tab 0    gabapentin (NEURONTIN) 800 mg tablet Take 1 Tab by mouth three (3) times daily. Max Daily Amount: 2,400 mg. As directed 90 Tab 2    sertraline (ZOLOFT) 100 mg tablet Take 2 Tabs by mouth daily. 180 Tab 0    ondansetron (ZOFRAN ODT) 8 mg disintegrating tablet Take 1 Tab by mouth every twelve (12) hours as needed for Nausea for up to 30 days. 60 Tab 0    atorvastatin (LIPITOR) 40 mg tablet TAKE 1 TABLET BY MOUTH EVERY EVENING 90 Tab 1    buPROPion XL (WELLBUTRIN XL) 300 mg XL tablet Take 1 Tab by mouth every morning. 90 Tab 0    hydrocortisone (HYTONE) 2.5 % topical cream Apply  to affected area two (2) times a day. use thin layer 30 g 0    valACYclovir (VALTREX) 1 gram tablet Take 1 Tab by mouth daily. 30 Tab 1    dexlansoprazole (Dexilant) 60 mg CpDB capsule (delayed release) Take 60 mg by mouth.       naloxone (NARCAN) 4 mg/actuation nasal spray Use 1 spray intranasally, then discard. Repeat with new spray every 2 min as needed for opioid overdose symptoms, alternating nostrils. 1 Each 0    dicyclomine (BENTYL) 10 mg capsule Take 10 mg by mouth 4 times daily (before meals and nightly).  polyethylene glycol (Miralax) 17 gram/dose powder Take 17 g by mouth daily. 1 tablespoon with 8 oz of water daily 235 g 0    magnesium citrate solution Take 1/3 of bottle every 8 hours until finished or until you have a bowel movement. 1 Bottle 0    acetaminophen-codeine (TYLENOL #3) 300-30 mg per tablet Take 1 Tab by mouth every eight (8) hours as needed for Pain for up to 30 days. Max Daily Amount: 3 Tabs. 90 Tab 0    ergocalciferol (ERGOCALCIFEROL) 50,000 unit capsule Take 1 Cap by mouth every seven (7) days. 4 Cap 2     Allergies   Allergen Reactions    Apple Anaphylaxis    Cephalexin Hives    Strawberry Anaphylaxis    Wasps [Hymenoptera Allergenic Extract] Anaphylaxis       Review of Systems   Gastrointestinal: Positive for nausea and vomiting. Musculoskeletal: Positive for myalgias. Skin: Positive for itching and rash. Objective:     Patient-Reported Vitals 10/19/2020   Patient-Reported Weight 179   Patient-Reported Height 5'2   Patient-Reported Pulse 20   Patient-Reported Temperature 98.8   Patient-Reported SpO2 95   Patient-Reported Systolic  410   Patient-Reported Diastolic 84      General: alert, cooperative, no distress   Mental  status: normal mood, behavior, speech, dress, motor activity, and thought processes, able to follow commands   HENT: NCAT   Neck: no visualized mass   Resp: no respiratory distress   Neuro: no gross deficits   Skin: no discoloration or lesions of concern on visible areas   Psychiatric: normal affect, consistent with stated mood, no evidence of hallucinations     Additional exam findings: N/A      We discussed the expected course, resolution and complications of the diagnosis(es) in detail. Medication risks, benefits, costs, interactions, and alternatives were discussed as indicated. I advised her to contact the office if her condition worsens, changes or fails to improve as anticipated. She expressed understanding with the diagnosis(es) and plan. Duane Slate, who was evaluated through a patient-initiated, synchronous (real-time) audio-video encounter, and/or her healthcare decision maker, is aware that it is a billable service, with coverage as determined by her insurance carrier. She provided verbal consent to proceed: Yes, and patient identification was verified. It was conducted pursuant to the emergency declaration under the [address] [place] [address], [address] [place] [address] authority and the [employer] Act. A caregiver was present when appropriate. Ability to conduct physical exam was limited. I was in the office. The patient was at home.       Pina Chapman NP

## 2020-10-23 ENCOUNTER — TELEPHONE (OUTPATIENT)
Dept: FAMILY MEDICINE CLINIC | Age: 50
End: 2020-10-23

## 2020-10-23 NOTE — TELEPHONE ENCOUNTER
Spoke with the patient. Per provider patient can take the syrup and wait 30 mins then take gabapentin. She verbalized understanding.

## 2020-10-23 NOTE — TELEPHONE ENCOUNTER
Patient called stating she is still having pain at a 10 and she is having trouble keeping her medications down. She stated the syrup isn't strong enough and she thinks she is going to end up in the Emergency room but she doesn't want to go.  Patient requesting triage

## 2020-10-27 ENCOUNTER — PATIENT MESSAGE (OUTPATIENT)
Dept: FAMILY MEDICINE CLINIC | Age: 50
End: 2020-10-27

## 2020-10-27 DIAGNOSIS — E78.00 PURE HYPERCHOLESTEROLEMIA: ICD-10-CM

## 2020-10-27 DIAGNOSIS — R79.89 ABNORMAL CBC MEASUREMENT: ICD-10-CM

## 2020-10-27 DIAGNOSIS — Z79.899 ENCOUNTER FOR LONG-TERM (CURRENT) USE OF HIGH-RISK MEDICATION: Primary | ICD-10-CM

## 2020-10-27 DIAGNOSIS — E66.9 OBESITY, CLASS I, BMI 30-34.9: ICD-10-CM

## 2020-10-29 ENCOUNTER — HOSPITAL ENCOUNTER (OUTPATIENT)
Dept: LAB | Age: 50
Discharge: HOME | End: 2020-10-29

## 2020-10-29 LAB — SENTARA SPECIMEN COL,SENBCF: NORMAL

## 2020-10-29 PROCEDURE — 99001 SPECIMEN HANDLING PT-LAB: CPT

## 2020-10-30 LAB
A-G RATIO,AGRAT: 2 RATIO (ref 1.1–2.6)
ALBUMIN SERPL-MCNC: 4.8 G/DL (ref 3.5–5)
ALP SERPL-CCNC: 61 U/L (ref 25–115)
ALT SERPL-CCNC: 28 U/L (ref 5–40)
ANION GAP SERPL CALC-SCNC: 14 MMOL/L (ref 3–15)
AST SERPL W P-5'-P-CCNC: 27 U/L (ref 10–37)
AVG GLU, 10930: 117 MG/DL (ref 91–123)
BILIRUB SERPL-MCNC: 0.3 MG/DL (ref 0.2–1.2)
BUN SERPL-MCNC: 9 MG/DL (ref 6–22)
CALCIUM SERPL-MCNC: 9.9 MG/DL (ref 8.4–10.5)
CHLORIDE SERPL-SCNC: 98 MMOL/L (ref 98–110)
CHOLEST SERPL-MCNC: 200 MG/DL (ref 110–200)
CO2 SERPL-SCNC: 26 MMOL/L (ref 20–32)
CREAT SERPL-MCNC: 0.8 MG/DL (ref 0.5–1.2)
GFRAA, 66117: >60
GFRNA, 66118: >60
GLOBULIN,GLOB: 2.4 G/DL (ref 2–4)
GLUCOSE SERPL-MCNC: 94 MG/DL (ref 70–99)
HBA1C MFR BLD HPLC: 5.7 % (ref 4.8–5.6)
HDLC SERPL-MCNC: 37 MG/DL
HDLC SERPL-MCNC: 5.4 MG/DL (ref 0–5)
LDL/HDL RATIO,LDHD: 3.1
LDLC SERPL CALC-MCNC: 116 MG/DL (ref 50–99)
NON-HDL CHOLESTEROL, 011976: 163 MG/DL
POTASSIUM SERPL-SCNC: 4.3 MMOL/L (ref 3.5–5.5)
PROT SERPL-MCNC: 7.2 G/DL (ref 6.4–8.3)
SED RATE (ESR): 10 MM/HR (ref 0–20)
SODIUM SERPL-SCNC: 138 MMOL/L (ref 133–145)
TRIGL SERPL-MCNC: 234 MG/DL (ref 40–149)
VLDLC SERPL CALC-MCNC: 47 MG/DL (ref 8–30)

## 2020-11-02 LAB
ANA HOMOGENEOUS, 8012: NEGATIVE TITER
ANA NUCLEOLAR, 8013: NEGATIVE TITER
ANA PERIPHERAL, 8014: NEGATIVE TITER
ANA SPECKLED, 8011: NEGATIVE TITER
CENTROMERE ANTIBODIES, 8254: NEGATIVE TITER

## 2020-11-04 ENCOUNTER — VIRTUAL VISIT (OUTPATIENT)
Dept: FAMILY MEDICINE CLINIC | Age: 50
End: 2020-11-04

## 2020-11-04 DIAGNOSIS — Z91.199 NO-SHOW FOR APPOINTMENT: Primary | ICD-10-CM

## 2020-11-05 ENCOUNTER — VIRTUAL VISIT (OUTPATIENT)
Dept: FAMILY MEDICINE CLINIC | Age: 50
End: 2020-11-05
Payer: MEDICAID

## 2020-11-05 DIAGNOSIS — R52 GENERALIZED PAIN: ICD-10-CM

## 2020-11-05 DIAGNOSIS — L50.9 URTICARIA OF UNKNOWN ORIGIN: Primary | ICD-10-CM

## 2020-11-05 DIAGNOSIS — Z86.19 HISTORY OF SHINGLES: ICD-10-CM

## 2020-11-05 DIAGNOSIS — R11.2 NAUSEA AND VOMITING, INTRACTABILITY OF VOMITING NOT SPECIFIED, UNSPECIFIED VOMITING TYPE: ICD-10-CM

## 2020-11-05 PROCEDURE — 99442 PR PHYS/QHP TELEPHONE EVALUATION 11-20 MIN: CPT | Performed by: NURSE PRACTITIONER

## 2020-11-05 RX ORDER — PROMETHAZINE HYDROCHLORIDE AND CODEINE PHOSPHATE 6.25; 1 MG/5ML; MG/5ML
10 SOLUTION ORAL
Qty: 473 ML | Refills: 0 | Status: SHIPPED | OUTPATIENT
Start: 2020-11-05 | End: 2020-11-11 | Stop reason: SDUPTHER

## 2020-11-05 RX ORDER — PREDNISONE 10 MG/1
TABLET ORAL
Qty: 21 TAB | Refills: 0 | Status: SHIPPED | OUTPATIENT
Start: 2020-11-05 | End: 2020-11-23 | Stop reason: ALTCHOICE

## 2020-11-05 RX ORDER — ACYCLOVIR 800 MG/1
800 TABLET ORAL
Qty: 35 TAB | Refills: 0 | Status: SHIPPED | OUTPATIENT
Start: 2020-11-05 | End: 2020-11-12

## 2020-11-05 NOTE — PROGRESS NOTES
Bev Abreu is a 51 y.o. female, evaluated via audio-only technology on 11/5/2020 for Rash and Pain (Chronic)      Pt is complaining of continued pain, N/V, weight-loss, deep bone pain since 2018, weight-loss, and rash over multiple areas of her body. She is scheduled to see hematology next week for lab abnormalities, and is awaitng a call from Dr. Roberto Frias and pain management for an appointment. Pt is also c/o waking up with chills and perfuse sweating on multple occassions. She has found relief with phenergan and codeine mixture and is requesting this be her main medication for pain, nausea and vomiting. Assessment & Plan:   Diagnoses and all orders for this visit:    1. Urticaria of unknown origin  -     predniSONE (STERAPRED DS) 10 mg dose pack; See administration instruction per 10mg dose pack  -     acyclovir (ZOVIRAX) 800 mg tablet; Take 1 Tab by mouth five (5) times daily for 7 days. 2. History of shingles  -     predniSONE (STERAPRED DS) 10 mg dose pack; See administration instruction per 10mg dose pack  -     acyclovir (ZOVIRAX) 800 mg tablet; Take 1 Tab by mouth five (5) times daily for 7 days. 3. Nausea and vomiting, intractability of vomiting not specified, unspecified vomiting type  -     promethazine-codeine (PHENERGAN with CODEINE) 6.25-10 mg/5 mL syrup; Take 10 mL by mouth four (4) times daily as needed for Cough for up to 12 days. Max Daily Amount: 40 mL. 4. Generalized pain      Follow-up and Dispositions    · Return in about 4 weeks (around 12/3/2020) for after hematology and pain management appointments. 12  Subjective:       Prior to Admission medications    Medication Sig Start Date End Date Taking? Authorizing Provider   predniSONE (STERAPRED DS) 10 mg dose pack See administration instruction per 10mg dose pack 11/5/20  Yes Althea Schaefer NP   acyclovir (ZOVIRAX) 800 mg tablet Take 1 Tab by mouth five (5) times daily for 7 days.  11/5/20 11/12/20 Yes Julito Buckley Gia Holman NP   promethazine-codeine (PHENERGAN with CODEINE) 6.25-10 mg/5 mL syrup Take 10 mL by mouth four (4) times daily as needed for Cough for up to 12 days. Max Daily Amount: 40 mL. 11/5/20 11/17/20 Yes Cr Hunter NP   cetirizine (ZYRTEC) 10 mg tablet Take 1 Tab by mouth two (2) times a day. 10/20/20  Yes Cr Hunter NP   famotidine (PEPCID) 10 mg tablet Take 1 Tab by mouth two (2) times a day. 10/20/20  Yes Cr Hunter NP   gabapentin (NEURONTIN) 800 mg tablet Take 1 Tab by mouth three (3) times daily. Max Daily Amount: 2,400 mg. As directed 10/15/20  Yes Laura Priest NP   sertraline (ZOLOFT) 100 mg tablet Take 2 Tabs by mouth daily. 10/15/20  Yes Laura Priest NP   atorvastatin (LIPITOR) 40 mg tablet TAKE 1 TABLET BY MOUTH EVERY EVENING 9/30/20  Yes Laura Priest NP   buPROPion XL (WELLBUTRIN XL) 300 mg XL tablet Take 1 Tab by mouth every morning. 9/15/20  Yes Laura Priest NP   hydrocortisone (HYTONE) 2.5 % topical cream Apply  to affected area two (2) times a day. use thin layer 9/15/20  Yes Laura Priest NP   dexlansoprazole (Dexilant) 60 mg CpDB capsule (delayed release) Take 60 mg by mouth. Yes Provider, Historical   naloxone (NARCAN) 4 mg/actuation nasal spray Use 1 spray intranasally, then discard. Repeat with new spray every 2 min as needed for opioid overdose symptoms, alternating nostrils. 3/18/20  Yes Aba Awad NP   dicyclomine (BENTYL) 10 mg capsule Take 10 mg by mouth 4 times daily (before meals and nightly). Yes Provider, Historical   polyethylene glycol (Miralax) 17 gram/dose powder Take 17 g by mouth daily. 1 tablespoon with 8 oz of water daily 10/17/20   Thong Johnson MD   magnesium citrate solution Take 1/3 of bottle every 8 hours until finished or until you have a bowel movement. 10/17/20   Thong Johnson MD   ergocalciferol (ERGOCALCIFEROL) 50,000 unit capsule Take 1 Cap by mouth every seven (7) days.  10/4/19   Laura Priest NP Patient Active Problem List   Diagnosis Code    Fibromyalgia M79.7    HSV (herpes simplex virus) infection B00.9    Cervical pain M54.2    Nerve pain M79.2    Myofascial pain M79.18    Migraine G43.909    Allergic rhinitis due to pollen J30.1    Abnormal liver function R94.5    Depressive disorder, not elsewhere classified F32.9    Anxiety state, unspecified F41.1    Irritable bowel syndrome K58.9    Abnormal glandular Papanicolaou smear of cervix R87.619    History of shingles Z86.19    Spinal stenosis, lumbar region with neurogenic claudication M48.062    HNP (herniated nucleus pulposus), lumbar M51.26    Spinal stenosis of lumbar region M48.061     Current Outpatient Medications   Medication Sig Dispense Refill    predniSONE (STERAPRED DS) 10 mg dose pack See administration instruction per 10mg dose pack 21 Tab 0    acyclovir (ZOVIRAX) 800 mg tablet Take 1 Tab by mouth five (5) times daily for 7 days. 35 Tab 0    promethazine-codeine (PHENERGAN with CODEINE) 6.25-10 mg/5 mL syrup Take 10 mL by mouth four (4) times daily as needed for Cough for up to 12 days. Max Daily Amount: 40 mL. 473 mL 0    cetirizine (ZYRTEC) 10 mg tablet Take 1 Tab by mouth two (2) times a day. 60 Tab 2    famotidine (PEPCID) 10 mg tablet Take 1 Tab by mouth two (2) times a day. 30 Tab 2    gabapentin (NEURONTIN) 800 mg tablet Take 1 Tab by mouth three (3) times daily. Max Daily Amount: 2,400 mg. As directed 90 Tab 2    sertraline (ZOLOFT) 100 mg tablet Take 2 Tabs by mouth daily. 180 Tab 0    atorvastatin (LIPITOR) 40 mg tablet TAKE 1 TABLET BY MOUTH EVERY EVENING 90 Tab 1    buPROPion XL (WELLBUTRIN XL) 300 mg XL tablet Take 1 Tab by mouth every morning. 90 Tab 0    hydrocortisone (HYTONE) 2.5 % topical cream Apply  to affected area two (2) times a day. use thin layer 30 g 0    dexlansoprazole (Dexilant) 60 mg CpDB capsule (delayed release) Take 60 mg by mouth.       naloxone (NARCAN) 4 mg/actuation nasal spray Use 1 spray intranasally, then discard. Repeat with new spray every 2 min as needed for opioid overdose symptoms, alternating nostrils. 1 Each 0    dicyclomine (BENTYL) 10 mg capsule Take 10 mg by mouth 4 times daily (before meals and nightly).  polyethylene glycol (Miralax) 17 gram/dose powder Take 17 g by mouth daily. 1 tablespoon with 8 oz of water daily 235 g 0    magnesium citrate solution Take 1/3 of bottle every 8 hours until finished or until you have a bowel movement. 1 Bottle 0    ergocalciferol (ERGOCALCIFEROL) 50,000 unit capsule Take 1 Cap by mouth every seven (7) days. 4 Cap 2     Allergies   Allergen Reactions    Apple Anaphylaxis    Cephalexin Hives    Strawberry Anaphylaxis    Wasps [Hymenoptera Allergenic Extract] Anaphylaxis       Review of Systems   Gastrointestinal: Positive for nausea and vomiting. Musculoskeletal: Positive for joint pain and myalgias. Skin: Positive for itching and rash. Patient-Reported Vitals 10/19/2020   Patient-Reported Weight 179   Patient-Reported Height 5'2   Patient-Reported Pulse 20   Patient-Reported Temperature 98.8   Patient-Reported SpO2 95   Patient-Reported Systolic  246   Patient-Reported Diastolic 84        Duane Slate, who was evaluated through a patient-initiated, synchronous (real-time) audio only encounter, and/or her healthcare decision maker, is aware that it is a billable service, with coverage as determined by her insurance carrier. She provided verbal consent to proceed: Yes. She has not had a related appointment within my department in the past 7 days or scheduled within the next 24 hours.       Total Time: minutes: 11-20 minutes    Pina Chapman NP

## 2020-11-11 DIAGNOSIS — R11.2 NAUSEA AND VOMITING, INTRACTABILITY OF VOMITING NOT SPECIFIED, UNSPECIFIED VOMITING TYPE: ICD-10-CM

## 2020-11-11 LAB
MISCELLANEOUS TEST,99000: NORMAL
SENT TO, 434: NORMAL
TEST NAME, 435: NORMAL

## 2020-11-12 ENCOUNTER — OFFICE VISIT (OUTPATIENT)
Dept: ONCOLOGY | Age: 50
End: 2020-11-12
Payer: MEDICAID

## 2020-11-12 ENCOUNTER — PATIENT MESSAGE (OUTPATIENT)
Dept: FAMILY MEDICINE CLINIC | Age: 50
End: 2020-11-12

## 2020-11-12 VITALS
HEART RATE: 72 BPM | OXYGEN SATURATION: 96 % | SYSTOLIC BLOOD PRESSURE: 123 MMHG | TEMPERATURE: 98.7 F | BODY MASS INDEX: 33.47 KG/M2 | DIASTOLIC BLOOD PRESSURE: 87 MMHG | WEIGHT: 183 LBS | RESPIRATION RATE: 20 BRPM

## 2020-11-12 DIAGNOSIS — D72.829 LEUKOCYTOSIS, UNSPECIFIED TYPE: Primary | ICD-10-CM

## 2020-11-12 PROCEDURE — 99204 OFFICE O/P NEW MOD 45 MIN: CPT | Performed by: INTERNAL MEDICINE

## 2020-11-12 NOTE — PROGRESS NOTES
Hematology/Oncology Consultation Note      Date: 2020    Name: Eric Harding  : 1970        Harlan Oakes NP         Subjective:     Chief complaint: Leukocytosis    History of Present Illness:   Ms. Perry Cedeno is a most pleasant 51y.o. year old female who was seen for consultation of Leukocytosis. The patient has a past medical history significant of IBS, GERD, C. difficile, anxiety disorder, and fibromyalgia. 10/16/2020 CBC reported total WBC of 15.1K, with ANC of 12.3K, hemoglobin of 12.1, hematocrit of 36.4%, and platelet of 853,481. The patient reported she has multiple episodes of shingles and skin rashes. She went to Dermatologist and underwent skin biopsy. She has chronic pain and fibromyalgia. She has follows up wit her PCP for anxiety disorder. The patient otherwise has no other complaints. Denied fever, chills, night sweat, unintentional weight loss, skin lumps or bumps, acute bleeding or bruising issues. No acute bleeding, blood in stool, dark stool, melena, hematochezia, hemoptysis, dark urine. Denied headache, acute vision change, dizziness, chest pain, worsen shortness of breath, palpitation, productive cough, nausea, vomiting, abdominal pain, altered bowel habits, dysuria, new bone pain or back pain, focal numbness or weakness. Oncologic History:  Oncology History    No history exists.        Past Medical History, Family History, and Social History:    Past Medical History:   Diagnosis Date    Abnormal Papanicolaou smear of cervix     LEEPs    Anxiety     Anxiety     Arthritis     Bruises easily     Chest pain     Clostridium difficile colitis 2007    Diarrhea     Dry mouth     Endometriosis     resolved with surgery    Esophageal reflux     Fatigue     Fibromyalgia     GERD (gastroesophageal reflux disease)     High cholesterol     History of shingles     not current    HSV-2 infection 2003    IBS (irritable bowel syndrome)     Ill-defined condition 2011    h/o of \"blood clot in my lung, after a picc line\"    Ill-defined condition     painless rectal bleeding    MRSA infection     h/o, not current    Ovarian cyst     Pain, upper back     Psychiatric disorder     depression     Stress incontinence     Tattoo     Unspecified deficiency anemia 1999     Past Surgical History:   Procedure Laterality Date    COLONOSCOPY N/A 6/28/2019    DIAGNOSTIC COLONOSCOPY with random bxs performed by Jimbo Brar MD at [place] [id number]  6/22/2020         [id number]    HX CHOLECYSTECTOMY  2002    HX GYN  2018    Abalation    HX GYN      rt ovary removed    HX GYN      left ovary clipped.  HX ORTHOPAEDIC      arthoscopic rt knee    HX ORTHOPAEDIC      left knee meniscis tear    HX ORTHOPAEDIC Right     meniscus repair    NEUROLOGICAL PROCEDURE UNLISTED  12/2018    laminectomy     Social History     Socioeconomic History    Marital status:      Spouse name: Not on file    Number of children: Not on file    Years of education: Not on file    Highest education level: Not on file   Occupational History    Not on file   Social Needs    Financial resource strain: Not on file    Food insecurity     Worry: Not on file     Inability: Not on file    Transportation needs     Medical: Not on file     Non-medical: Not on file   Tobacco Use    Smoking status: Former Smoker     Packs/day: 0.50     Years: 10.00     Pack years: 5.00    Smokeless tobacco: Never Used    Tobacco comment: pt smoked on and off for 10 years and quit 6 years ago   Substance and Sexual Activity    Alcohol use:  Yes     Alcohol/week: 0.8 standard drinks     Types: 1 Cans of beer per week     Comment: social    Drug use: No    Sexual activity: Yes     Partners: Male   Lifestyle    Physical activity     Days per week: Not on file     Minutes per session: Not on file    Stress: Not on file   Relationships    Social connections Talks on phone: Not on file     Gets together: Not on file     Attends [employer] service: Not on file     Active member of club or organization: Not on file     Attends meetings of clubs or organizations: Not on file     Relationship status: Not on file    Intimate partner violence     Fear of current or ex partner: Not on file     Emotionally abused: Not on file     Physically abused: Not on file     Forced sexual activity: Not on file   Other Topics Concern    Not on file   Social History Narrative    Not on file     Family History   Problem Relation Age of Onset    Hypertension Mother     Arthritis-osteo Mother     GERD Father     Hypertension Father     Cancer Sister     MS Maternal Grandmother     Diabetes Paternal Grandmother      Current Outpatient Medications   Medication Sig Dispense Refill    predniSONE (STERAPRED DS) 10 mg dose pack See administration instruction per 10mg dose pack 21 Tab 0    acyclovir (ZOVIRAX) 800 mg tablet Take 1 Tab by mouth five (5) times daily for 7 days. 35 Tab 0    promethazine-codeine (PHENERGAN with CODEINE) 6.25-10 mg/5 mL syrup Take 10 mL by mouth four (4) times daily as needed for Cough for up to 12 days. Max Daily Amount: 40 mL. 473 mL 0    cetirizine (ZYRTEC) 10 mg tablet Take 1 Tab by mouth two (2) times a day. 60 Tab 2    famotidine (PEPCID) 10 mg tablet Take 1 Tab by mouth two (2) times a day. 30 Tab 2    polyethylene glycol (Miralax) 17 gram/dose powder Take 17 g by mouth daily. 1 tablespoon with 8 oz of water daily 235 g 0    magnesium citrate solution Take 1/3 of bottle every 8 hours until finished or until you have a bowel movement. 1 Bottle 0    gabapentin (NEURONTIN) 800 mg tablet Take 1 Tab by mouth three (3) times daily. Max Daily Amount: 2,400 mg. As directed 90 Tab 2    sertraline (ZOLOFT) 100 mg tablet Take 2 Tabs by mouth daily.  180 Tab 0    atorvastatin (LIPITOR) 40 mg tablet TAKE 1 TABLET BY MOUTH EVERY EVENING 90 Tab 1    buPROPion XL (WELLBUTRIN XL) 300 mg XL tablet Take 1 Tab by mouth every morning. 90 Tab 0    hydrocortisone (HYTONE) 2.5 % topical cream Apply  to affected area two (2) times a day. use thin layer 30 g 0    dexlansoprazole (Dexilant) 60 mg CpDB capsule (delayed release) Take 60 mg by mouth.  naloxone (NARCAN) 4 mg/actuation nasal spray Use 1 spray intranasally, then discard. Repeat with new spray every 2 min as needed for opioid overdose symptoms, alternating nostrils. 1 Each 0    ergocalciferol (ERGOCALCIFEROL) 50,000 unit capsule Take 1 Cap by mouth every seven (7) days. 4 Cap 2    dicyclomine (BENTYL) 10 mg capsule Take 10 mg by mouth 4 times daily (before meals and nightly). Review of Systems   Constitutional: Negative for chills, diaphoresis, fever, malaise/fatigue and weight loss. Respiratory: Negative for cough, hemoptysis, shortness of breath and wheezing. Cardiovascular: Negative for chest pain, palpitations and leg swelling. Gastrointestinal: Negative for abdominal pain, diarrhea, heartburn, nausea and vomiting. Genitourinary: Negative for dysuria, frequency, hematuria and urgency. Musculoskeletal: Positive for myalgias. Negative for joint pain. Skin: Negative for itching and rash. Neurological: Negative for dizziness, seizures, weakness and headaches. Psychiatric/Behavioral: Negative for depression. The patient is nervous/anxious. The patient does not have insomnia. Objective:     Visit Vitals  /87 (BP Patient Position: Sitting)   Pulse 72   Temp 98.7 °F (37.1 °C) (Oral)   Resp 20   Wt 83 kg (183 lb)   SpO2 96%   BMI 33.47 kg/m²           Physical Exam  Constitutional:       Appearance: Normal appearance. HENT:      Head: Normocephalic and atraumatic. Eyes:      Pupils: Pupils are equal, round, and reactive to light. Neck:      Musculoskeletal: Neck supple. Cardiovascular:      Rate and Rhythm: Normal rate and regular rhythm.       Heart sounds: Normal heart sounds. Pulmonary:      Effort: Pulmonary effort is normal.      Breath sounds: Normal breath sounds. Abdominal:      General: Bowel sounds are normal.      Palpations: Abdomen is soft. Tenderness: There is no abdominal tenderness. There is no guarding. Musculoskeletal: Normal range of motion. Right lower leg: No edema. Left lower leg: No edema. Skin:     General: Skin is warm. Neurological:      General: No focal deficit present. Mental Status: She is alert and oriented to person, place, and time. Mental status is at baseline. Diagnostics:      No results found for this or any previous visit (from the past 96 hour(s)). Imaging:  No results found for this or any previous visit. Results for orders placed during the hospital encounter of 03/20/20   XR UGI SERIES AIR CONT/SMALL BOWEL/BA SWALLOW    Narrative PROCEDURE: Upper GI Series with Small Bowel Follow-Through. CLINICAL INDICATION/HISTORY: Chronic abdominal pain, bloating and vomiting. Reported history of hiatal hernia. TECHNIQUE: A  radiograph of the abdomen was obtained. Following the  administration of effervescent granules and an oral barium solution, double  contrast examination of the esophagus, stomach, and duodenum was performed. Multiple spot fluoroscopic and overhead views of the esophagus, stomach,  duodenum, and subsequently the small bowel were obtained. Spot fluoroscopic  views of the terminal ileum were also obtained. COMPARISONS: CT abdomen/pelvis March 18, 2020. FINDINGS:    The  radiograph of the abdomen demonstrates an unremarkable bowel gas  pattern without evidence of obstruction or ileus. Surgical clips noted in the  right upper quadrant. No acute osseous abnormality is appreciated. Esophagus demonstrates normal morphology and mucosal detail. Motility is within  normal limits. No evidence for hiatal hernia.   There is no appreciable filling  defect, stricture or dilatation in the esophagus. Spontaneous gastroesophageal  reflux is observed to the level of the thoracic inlet during the course of this  examination. Stomach also demonstrates normal morphology and mucosal detail. There is a  small diverticulum at the second portion of duodenum. The duodenal bulb and and  remainder of the c-loop are unremarkable. .        Small bowel follow-through examination demonstrates contrast flowing from the  stomach to cecum between 10 minutes to 30 minutes. Bowel loops are within  normal limits of caliber. The mucosal fold pattern appears normal. The terminal  ileum is unremarkable in appearance. Fluoroscopy Time =  3 minutes 24 seconds  Total Number of Fluoroscopic Images saved =  57      Impression IMPRESSION:  1. Gastroesophageal reflux. 2.  Small duodenal diverticulum; otherwise, unremarkable small bowel  follow-through. Results for orders placed during the hospital encounter of 10/16/20   CT ABD PELV W CONT    Narrative EXAM: CT ABDOMEN AND PELVIS WITH CONTRAST    CLINICAL HISTORY/INDICATION:  10 days constipation. Generalized abdominal  tenderness to palpation  , also complains of abdominal pain, back pain, and rib  pain, vomiting     COMPARISON: CT abdomen and pelvis 3/18/2020. TECHNIQUE: Following the uneventful intravenous administration of 100 cc of  nonionic contrast, dynamic enhanced scanning of the abdomen and pelvis was  performed using standard 5 mm axial images. Coronal and sagittal reformations  obtained. All CT scans at this facility are performed using dose optimization technique as  appropriate to a performed exam, to include automated exposure control,  adjustment of the mA and/or kV according to patient's size (including  appropriate matching for site-specific examinations), or use of iterative  reconstruction technique. FINDINGS:     Abdomen -    The lung bases are clear; no pleural fluid or pneumothorax evident.     Tiny umbilical hernia contains fat. The liver and spleen are normal in size and  density. The biliary tree is not dilated. The gallbladder is surgically removed. There is bilateral renal function without obstruction, without cyst, stone or  mass. Adrenals and Pancreas  are of normal CT appearance. There is no free fluid or free air. There is increased stool throughout the colon. No bowel dilatation is  demonstrated. No bowel wall thickening is seen. The appendix is not  demonstrated. There is however no right lower quadrant inflammation. There is no significant adenopathy. Pelvis -    There is no free pelvic fluid. The pelvic viscera are unremarkable. The bladder is incompletely distended but unremarkable. There is no significant adenopathy. Review of osseous structures throughout on bone window settings shows no  significant osseous pathology. Impression IMPRESSION:     Mild increase volume of stool within the colon. Tiny umbilical hernia contains fat. Nonvisualization of the appendix. There is however no right lower quadrant  inflammation. Report provided to the emergency department at [address] [place] [address] hrs. Assessment:                                        1. Leukocytosis, unspecified type        Plan:                                        # Leukocytosis  # Neutrophilia  -- The patient has a past medical history significant of IBS, GERD, C. difficile, anxiety disorder, and fibromyalgia. The patient reported she has multiple episodes of shingles and skin rashes. She went to Dermatologist and underwent skin biopsy. She has chronic pain and fibromyalgia. She has follows up wit her PCP for anxiety disorder. -- 10/16/2020 CBC reported total WBC of 15.1K, with ANC of 12.3K, hemoglobin of 12.1, hematocrit of 36.4%, and platelet of 252,274.  -- I have reviewed with the patient about her recent Leukocytosis. Her leukocytosis could be reactive process versus bone marrow disorders.   We will obtain initial work-up to include CBC with differential, CMP, ESR/CRP, peripheral smear, BCR ABL 1, and peripheral flow cytometry. Further workup will be guided by results from aforementioned initial study. --The patient will follow up with her primary care physician for chronic pain management and anxiety disorder. -- I will see the patient back in clinic in about 4 weeks. Always sooner if required. Orders Placed This Encounter    METABOLIC PANEL, COMPREHENSIVE     Standing Status:   Future     Number of Occurrences:   1     Standing Expiration Date:   11/13/2021    CBC WITH AUTOMATED DIFF     Standing Status:   Future     Number of Occurrences:   1     Standing Expiration Date:   11/13/2021    C REACTIVE PROTEIN, QT     Standing Status:   Future     Number of Occurrences:   1     Standing Expiration Date:   11/12/2021    SED RATE (ESR)     Standing Status:   Future     Number of Occurrences:   1     Standing Expiration Date:   11/12/2021    IMMUNOPHENOTYPING PROFILE     Standing Status:   Future     Number of Occurrences:   1     Standing Expiration Date:   11/13/2021     Order Specific Question:   Specimen source     Answer:   Blood [2]    BCR-ABL1, PCR, QT     Standing Status:   Future     Number of Occurrences:   1     Standing Expiration Date:   11/12/2021    PERIPHERAL SMEAR     Standing Status:   Future     Number of Occurrences:   1     Standing Expiration Date:   11/12/2021    PATHOLOGIST REVIEW    RHEUMATOID FACTOR, QL    FLOW CYTOMETRY, PERIPHERAL BLD           Ms. Chauncey Greco has a reminder for a \"due or due soon\" health maintenance. I have asked that she contact her primary care provider for follow-up on this health maintenance. All of patient's questions answered to their apparent satisfaction. They verbally show understanding and agreement with aforementioned plan. I would like to thank Nelson Noriega NP  for allowing me to participate in the care of this very pleasant patient.  If I can be of further assistance please do not hesitate to call. Alexia Avery MD  11/12/2020          About 45 minutes were spent for this encounter with more than 50% of the time spent in face-to-face counseling, discussing on diagnosis and management plan going forward, and co-ordination of care. Parts of this document has been produced using Dragon dictation system. Unrecognized errors in transcription may be present. Please do not hesitate to reach out for any questions or clarifications.     CC: Jose Pulido NP

## 2020-11-12 NOTE — PROGRESS NOTES
Patient here for a new patient visit. 8/10 pain all over in bones. Describes it as deep bone pain. Tearful and frustrated.

## 2020-11-13 LAB
A-G RATIO,AGRAT: 2 RATIO (ref 1.1–2.6)
ABSOLUTE LYMPHOCYTE COUNT, 10803: 1.8 K/UL (ref 1–4.8)
ALBUMIN SERPL-MCNC: 4.3 G/DL (ref 3.5–5)
ALP SERPL-CCNC: 67 U/L (ref 25–115)
ALT SERPL-CCNC: 26 U/L (ref 5–40)
ANION GAP SERPL CALC-SCNC: 7.9 MMOL/L (ref 3–15)
AST SERPL W P-5'-P-CCNC: 16 U/L (ref 10–37)
BASOPHILS # BLD: 0.1 K/UL (ref 0–0.2)
BASOPHILS NFR BLD: 1 % (ref 0–2)
BILIRUB SERPL-MCNC: 0.1 MG/DL (ref 0.2–1.2)
BUN SERPL-MCNC: 15 MG/DL (ref 6–22)
C-REACTIVE PROTEIN, QT, 006627: 0.1 MG/DL (ref 0–0.5)
CALCIUM SERPL-MCNC: 9.3 MG/DL (ref 8.4–10.5)
CHLORIDE SERPL-SCNC: 101 MMOL/L (ref 98–110)
CO2 SERPL-SCNC: 33 MMOL/L (ref 20–32)
CREAT SERPL-MCNC: 0.9 MG/DL (ref 0.5–1.2)
EOSINOPHIL # BLD: 0.2 K/UL (ref 0–0.5)
EOSINOPHIL NFR BLD: 3 % (ref 0–6)
ERYTHROCYTE [DISTWIDTH] IN BLOOD BY AUTOMATED COUNT: 11.9 % (ref 10–15.5)
GFRAA, 66117: >60
GFRNA, 66118: >60
GLOBULIN,GLOB: 2.2 G/DL (ref 2–4)
GLUCOSE SERPL-MCNC: 105 MG/DL (ref 70–99)
GRANULOCYTES,GRANS: 67 % (ref 40–75)
HCT VFR BLD AUTO: 40.1 % (ref 35.1–48)
HGB BLD-MCNC: 12.2 G/DL (ref 11.7–16)
LYMPHOCYTES, LYMLT: 22 % (ref 20–45)
MCH RBC QN AUTO: 30 PG (ref 26–34)
MCHC RBC AUTO-ENTMCNC: 30 G/DL (ref 31–36)
MCV RBC AUTO: 98 FL (ref 81–99)
MONOCYTES # BLD: 0.6 K/UL (ref 0.1–1)
MONOCYTES NFR BLD: 8 % (ref 3–12)
NEUTROPHILS # BLD AUTO: 5.4 K/UL (ref 1.8–7.7)
PLATELET # BLD AUTO: 307 K/UL (ref 140–440)
PMV BLD AUTO: 9.9 FL (ref 9–13)
POTASSIUM SERPL-SCNC: 4 MMOL/L (ref 3.5–5.5)
PROT SERPL-MCNC: 6.5 G/DL (ref 6.4–8.3)
RBC # BLD AUTO: 4.09 M/UL (ref 3.8–5.2)
RHEUMATOID FACTOR QUANT, IMMUNOTURBIDIMETRIC: <20 IU/ML (ref 0–20)
SED RATE (ESR): <=2 MM/HR (ref 0–20)
SODIUM SERPL-SCNC: 142 MMOL/L (ref 133–145)
WBC # BLD AUTO: 8.1 K/UL (ref 4–11)

## 2020-11-13 RX ORDER — PROMETHAZINE HYDROCHLORIDE AND CODEINE PHOSPHATE 6.25; 1 MG/5ML; MG/5ML
10 SOLUTION ORAL
Qty: 473 ML | Refills: 0 | Status: SHIPPED | OUTPATIENT
Start: 2020-11-13 | End: 2020-11-23 | Stop reason: SDUPTHER

## 2020-11-16 LAB — PATH REVIEW OF SMEAR, 12050: NORMAL

## 2020-11-18 LAB
CLINICAL INFORMATION, 18161: NORMAL
COMMENT FLOW, 438: NORMAL
FLOW CYTOMETRY REPORT, 67174: NORMAL
FLOW INTERPRETATION: NORMAL
IMMUNOMARKER INTERPRETATION, 441: NORMAL
IMMUNOMARKERS OBTAINED, 440: NORMAL
LDT STATEMENT: NORMAL
MORPHOLOGIC DESCRIPTION: NORMAL

## 2020-11-21 LAB
B2B2 TRANSCRIPT: NORMAL %
B3B2 TRANSCRIPT: NORMAL %
BACKGROUND BCR-ABL: NORMAL
DIRECTOR REVIEW BCR-ABL: NORMAL
E1A2 TRANSCRIPT 480502: NORMAL %
INTERPRETATION BCR-ABL: NORMAL
METHODOLOGY BCR-ABL1: NORMAL

## 2020-11-23 ENCOUNTER — VIRTUAL VISIT (OUTPATIENT)
Dept: FAMILY MEDICINE CLINIC | Age: 50
End: 2020-11-23
Payer: MEDICAID

## 2020-11-23 DIAGNOSIS — M25.562 ACUTE PAIN OF LEFT KNEE: ICD-10-CM

## 2020-11-23 DIAGNOSIS — R11.2 NAUSEA AND VOMITING, INTRACTABILITY OF VOMITING NOT SPECIFIED, UNSPECIFIED VOMITING TYPE: ICD-10-CM

## 2020-11-23 DIAGNOSIS — M25.511 ACUTE PAIN OF RIGHT SHOULDER: ICD-10-CM

## 2020-11-23 DIAGNOSIS — F41.9 ANXIETY AND DEPRESSION: Primary | ICD-10-CM

## 2020-11-23 DIAGNOSIS — F32.A ANXIETY AND DEPRESSION: Primary | ICD-10-CM

## 2020-11-23 PROCEDURE — 99213 OFFICE O/P EST LOW 20 MIN: CPT | Performed by: NURSE PRACTITIONER

## 2020-11-23 RX ORDER — CLONAZEPAM 0.5 MG/1
0.5 TABLET ORAL
Qty: 30 TAB | Refills: 0 | Status: SHIPPED | OUTPATIENT
Start: 2020-11-23 | End: 2020-12-21 | Stop reason: SDUPTHER

## 2020-11-23 RX ORDER — PROMETHAZINE HYDROCHLORIDE AND CODEINE PHOSPHATE 6.25; 1 MG/5ML; MG/5ML
10 SOLUTION ORAL
Qty: 473 ML | Refills: 0 | Status: SHIPPED | OUTPATIENT
Start: 2020-11-23 | End: 2020-12-06

## 2020-11-23 NOTE — PROGRESS NOTES
Lino Martinez is a 51 y.o. female who was seen by synchronous (real-time) audio-video technology on 11/23/2020 for No chief complaint on file. Pain Review:  She presents do to pain of her right arm, left knee and abdominal pain that is secondary to no known injury and started 2 weeks for arm, 6 months for her knee, and 1 month for abd/torso (from panic attacks). Since it started her pain has worsened. She describes the pain as sharp, shooting for abd, and shoulder, and knee with movement. It is constant. The pain radiates: no where. She pain in arm, knee, and abd (aching, sharp, stabbing, shooting in character; 7/10 in severity), numbness in arm. Exacerbating factors identifiable by patient are lifting her arm, bending at the knee. She has tried the following: meditation, relaxation, pain medication, ice, heating pad and stretches . These have been: no .  Previous workup:  Has been seen by hematology, infectious disease, dermatology.  reviewed: yes     Mental Health Review:    Patient is being evaluated for anxiety disorder. Current treatment includes Zoloft, Wellbutrin and no other therapies. Ongoing anxiety symptoms include: racing thoughts, feelings of losing control, difficulty concentrating. Patient denies: suicidal ideation. Reported side effects from the treatment: none. Triggers include: Chronic pain, not feeling good enough parent or wife, PTSD, daily life events, and traumas from 11years old. Psychological ROS:     positive for - anxiety  negative for - suicidal ideation    GAD7 Scores:    JOSEFINA 7 11/23/2020 1/31/2019 11/19/2018   Over the past 2 weeks how often have you felt nervous, anxious, or on edge? - 3 3   Over the past 2 weeks how often have you not been able to stop or control worrying? 2 2 2   In the past 2 weeks how often have you worried too much about different things? 2 2 2   Over the past 2 weeks, how often have you had trouble relaxing?  3 2 3   Over the last 2 weeks how often have you been so restless that it is hard to sit still? 2 2 0   Over the last 2 weeks how often have you been easily annoyed or irritable? 2 1 1   Over the last 2 weeks, how often have you felt afraid as if something awful might happen? 2 0 0   BSHSI AMB JOSEFINA-7 SCORE - 12 11   If your score is 1 or more, how difficult have these made it for you to do your work, take care of things at home. or get along with people? Very difficult Extremely difficult Extremely difficult       Anxiety Severity:     0-4 None/minimal, 5-9 mild, 10-14 moderate, 15-21 severe. Assessment & Plan:     Diagnoses and all orders for this visit:    1. Anxiety and depression  -     REFERRAL TO PSYCHIATRY  -     clonazePAM (KlonoPIN) 0.5 mg tablet; Take 1 Tab by mouth nightly as needed for Anxiety. Max Daily Amount: 0.5 mg. Indications: panic disorder    2. Nausea and vomiting, intractability of vomiting not specified, unspecified vomiting type  -     promethazine-codeine (PHENERGAN with CODEINE) 6.25-10 mg/5 mL syrup; Take 10 mL by mouth four (4) times daily as needed for Cough for up to 14 days. Max Daily Amount: 40 mL. 3. Acute pain of right shoulder  -     XR SHOULDER RT AP/LAT MIN 2 V; Future  -     11-DRUG SCREEN, URINE; Future    4. Acute pain of left knee  -     REFERRAL TO ORTHOPEDICS  -     11-DRUG SCREEN, URINE; Future      Follow-up and Dispositions    · Return in 6 weeks (on 1/4/2021) for Shoulder Pain (Virtual Visit ). 712  Subjective:       Prior to Admission medications    Medication Sig Start Date End Date Taking? Authorizing Provider   promethazine-codeine [facility] with CODEINE) 6.25-10 mg/5 mL syrup Take 10 mL by mouth four (4) times daily as needed for Cough for up to 14 days. Max Daily Amount: 40 mL. 11/23/20 12/7/20 Yes Shruti Milligan, NP   clonazePAM (KlonoPIN) 0.5 mg tablet Take 1 Tab by mouth nightly as needed for Anxiety. Max Daily Amount: 0.5 mg.  Indications: panic disorder 11/23/20  Yes Moriah Stoll NP   cetirizine (ZYRTEC) 10 mg tablet Take 1 Tab by mouth two (2) times a day. 10/20/20  Yes Moriah Stoll NP   famotidine (PEPCID) 10 mg tablet Take 1 Tab by mouth two (2) times a day. 10/20/20  Yes Moriah Stoll NP   gabapentin (NEURONTIN) 800 mg tablet Take 1 Tab by mouth three (3) times daily. Max Daily Amount: 2,400 mg. As directed 10/15/20  Yes Alexis Méndez NP   sertraline (ZOLOFT) 100 mg tablet Take 2 Tabs by mouth daily. 10/15/20  Yes Alexis Méndez NP   atorvastatin (LIPITOR) 40 mg tablet TAKE 1 TABLET BY MOUTH EVERY EVENING 9/30/20  Yes Alexis Méndez NP   buPROPion XL (WELLBUTRIN XL) 300 mg XL tablet Take 1 Tab by mouth every morning. 9/15/20  Yes Alexis Méndez NP   hydrocortisone (HYTONE) 2.5 % topical cream Apply  to affected area two (2) times a day. use thin layer 9/15/20  Yes Alexis Méndez NP   dexlansoprazole (Dexilant) 60 mg CpDB capsule (delayed release) Take 60 mg by mouth. Yes Provider, Historical   dicyclomine (BENTYL) 10 mg capsule Take 10 mg by mouth 4 times daily (before meals and nightly). Yes Provider, Historical   promethazine-codeine (PHENERGAN with CODEINE) 6.25-10 mg/5 mL syrup Take 10 mL by mouth four (4) times daily as needed for Cough for up to 14 days. Max Daily Amount: 40 mL. 11/13/20 11/23/20  Moriah Stoll NP   predniSONE [facility]) 10 mg dose pack See administration instruction per 10mg dose pack 11/5/20 11/23/20  Moriah Stoll NP   polyethylene glycol (Miralax) 17 gram/dose powder Take 17 g by mouth daily. 1 tablespoon with 8 oz of water daily 10/17/20 11/23/20  Violeta Schwab MD   magnesium citrate solution Take 1/3 of bottle every 8 hours until finished or until you have a bowel movement. 10/17/20 11/23/20  Violeta Schwab MD   naloxone [facility]) 4 mg/actuation nasal spray Use 1 spray intranasally, then discard. Repeat with new spray every 2 min as needed for opioid overdose symptoms, alternating nostrils.  3/18/20 Lakshmi Molina NP   ergocalciferol (ERGOCALCIFEROL) 50,000 unit capsule Take 1 Cap by mouth every seven (7) days. 10/4/19 11/23/20  Vishnu Abraham NP     Patient Active Problem List   Diagnosis Code    Fibromyalgia M79.7    HSV (herpes simplex virus) infection B00.9    Cervical pain M54.2    Nerve pain M79.2    Myofascial pain M79.18    Migraine G43.909    Allergic rhinitis due to pollen J30.1    Abnormal liver function R94.5    Depressive disorder, not elsewhere classified F32.9    Anxiety state, unspecified F41.1    Irritable bowel syndrome K58.9    Abnormal glandular Papanicolaou smear of cervix R87.619    History of shingles Z86.19    Spinal stenosis, lumbar region with neurogenic claudication M48.062    HNP (herniated nucleus pulposus), lumbar M51.26    Spinal stenosis of lumbar region M48.061     Current Outpatient Medications   Medication Sig Dispense Refill    promethazine-codeine (PHENERGAN with CODEINE) 6.25-10 mg/5 mL syrup Take 10 mL by mouth four (4) times daily as needed for Cough for up to 14 days. Max Daily Amount: 40 mL. 473 mL 0    clonazePAM (KlonoPIN) 0.5 mg tablet Take 1 Tab by mouth nightly as needed for Anxiety. Max Daily Amount: 0.5 mg. Indications: panic disorder 30 Tab 0    cetirizine (ZYRTEC) 10 mg tablet Take 1 Tab by mouth two (2) times a day. 60 Tab 2    famotidine (PEPCID) 10 mg tablet Take 1 Tab by mouth two (2) times a day. 30 Tab 2    gabapentin (NEURONTIN) 800 mg tablet Take 1 Tab by mouth three (3) times daily. Max Daily Amount: 2,400 mg. As directed 90 Tab 2    sertraline (ZOLOFT) 100 mg tablet Take 2 Tabs by mouth daily. 180 Tab 0    atorvastatin (LIPITOR) 40 mg tablet TAKE 1 TABLET BY MOUTH EVERY EVENING 90 Tab 1    buPROPion XL (WELLBUTRIN XL) 300 mg XL tablet Take 1 Tab by mouth every morning. 90 Tab 0    hydrocortisone (HYTONE) 2.5 % topical cream Apply  to affected area two (2) times a day.  use thin layer 30 g 0    dexlansoprazole (Dexilant) 60 mg CpDB capsule (delayed release) Take 60 mg by mouth.  dicyclomine (BENTYL) 10 mg capsule Take 10 mg by mouth 4 times daily (before meals and nightly).  naloxone (NARCAN) 4 mg/actuation nasal spray Use 1 spray intranasally, then discard. Repeat with new spray every 2 min as needed for opioid overdose symptoms, alternating nostrils. 1 Each 0     Allergies   Allergen Reactions    Apple Anaphylaxis    Cephalexin Hives    Strawberry Anaphylaxis    Wasps [Hymenoptera Allergenic Extract] Anaphylaxis       ROS    Objective:     Patient-Reported Vitals 10/19/2020   Patient-Reported Weight 179   Patient-Reported Height 5'2   Patient-Reported Pulse 20   Patient-Reported Temperature 98.8   Patient-Reported SpO2 95   Patient-Reported Systolic  572   Patient-Reported Diastolic 84      General: alert, cooperative, no distress   Mental  status: normal mood, behavior, speech, dress, motor activity, and thought processes, able to follow commands   HENT: NCAT   Neck: no visualized mass   Resp: no respiratory distress   Neuro: no gross deficits   Skin: no discoloration or lesions of concern on visible areas   Psychiatric: normal affect, consistent with stated mood, no evidence of hallucinations     Additional exam findings: N/A      We discussed the expected course, resolution and complications of the diagnosis(es) in detail. Medication risks, benefits, costs, interactions, and alternatives were discussed as indicated. I advised her to contact the office if her condition worsens, changes or fails to improve as anticipated. She expressed understanding with the diagnosis(es) and plan. Duane Slate, who was evaluated through a patient-initiated, synchronous (real-time) audio-video encounter, and/or her healthcare decision maker, is aware that it is a billable service, with coverage as determined by her insurance carrier. She provided verbal consent to proceed: Yes, and patient identification was verified. It was conducted pursuant to the emergency declaration under the [facility], [address] [place] [address] and the [employer] General Act. A caregiver was present when appropriate. Ability to conduct physical exam was limited. I was at home. The patient was in the office.       Rachael Harrell NP

## 2020-12-05 DIAGNOSIS — R11.2 NAUSEA AND VOMITING, INTRACTABILITY OF VOMITING NOT SPECIFIED, UNSPECIFIED VOMITING TYPE: ICD-10-CM

## 2020-12-06 RX ORDER — PROMETHAZINE HYDROCHLORIDE AND CODEINE PHOSPHATE 6.25; 1 MG/5ML; MG/5ML
10 SOLUTION ORAL
Qty: 473 ML | Refills: 0 | Status: SHIPPED | OUTPATIENT
Start: 2020-12-06 | End: 2020-12-21 | Stop reason: SDUPTHER

## 2020-12-21 DIAGNOSIS — F32.A ANXIETY AND DEPRESSION: ICD-10-CM

## 2020-12-21 DIAGNOSIS — F41.9 ANXIETY AND DEPRESSION: ICD-10-CM

## 2020-12-21 DIAGNOSIS — R11.2 NAUSEA AND VOMITING, INTRACTABILITY OF VOMITING NOT SPECIFIED, UNSPECIFIED VOMITING TYPE: ICD-10-CM

## 2020-12-22 NOTE — TELEPHONE ENCOUNTER
[facility] Kl[facility]opin as 11/23/20 for a 30 d/s & Phenergan w/Codeine as 12/7/20 for a 12 d/s. Last Visit: 11/23/20 with HEENA Salas  Next Appointment: 1/5/21 with HEENA Salas  Previous Refill Encounter(s): 11/23/20 Klonopin #30, 12/6/20 Phenergan w/Codeine #473ml    Requested Prescriptions     Pending Prescriptions Disp Refills    clonazePAM (KlonoPIN) 0.5 mg tablet 30 Tab 0     Sig: Take 1 Tab by mouth nightly as needed for Anxiety. Max Daily Amount: 0.5 mg. Indications: panic disorder    promethazine-codeine (PHENERGAN with CODEINE) 6.25-10 mg/5 mL syrup 473 mL 0     Sig: Take 10 mL by mouth four (4) times daily as needed for Cough for up to 14 days. Max Daily Amount: 40 mL.

## 2020-12-23 RX ORDER — PROMETHAZINE HYDROCHLORIDE AND CODEINE PHOSPHATE 6.25; 1 MG/5ML; MG/5ML
10 SOLUTION ORAL
Qty: 473 ML | Refills: 0 | Status: SHIPPED | OUTPATIENT
Start: 2020-12-23 | End: 2021-01-07 | Stop reason: ALTCHOICE

## 2020-12-23 RX ORDER — CLONAZEPAM 1 MG/1
1 TABLET ORAL
Qty: 30 TAB | Refills: 0 | Status: SHIPPED | OUTPATIENT
Start: 2020-12-23 | End: 2021-01-25

## 2020-12-29 DIAGNOSIS — Z79.899 ENCOUNTER FOR LONG-TERM (CURRENT) USE OF HIGH-RISK MEDICATION: ICD-10-CM

## 2021-01-05 ENCOUNTER — VIRTUAL VISIT (OUTPATIENT)
Dept: FAMILY MEDICINE CLINIC | Age: 51
End: 2021-01-05

## 2021-01-05 NOTE — PROGRESS NOTES
This encounter was created in error - please disregard. No response, no show for MyChart appointment .

## 2021-01-07 ENCOUNTER — VIRTUAL VISIT (OUTPATIENT)
Dept: FAMILY MEDICINE CLINIC | Age: 51
End: 2021-01-07
Payer: MEDICAID

## 2021-01-07 DIAGNOSIS — R52 GENERALIZED PAIN: Primary | ICD-10-CM

## 2021-01-07 DIAGNOSIS — M79.7 FIBROMYALGIA: ICD-10-CM

## 2021-01-07 PROCEDURE — 99213 OFFICE O/P EST LOW 20 MIN: CPT | Performed by: NURSE PRACTITIONER

## 2021-01-07 NOTE — PROGRESS NOTES
Sherlynn Bloch is a 53 y.o. female who was seen by synchronous (real-time) audio-video technology on 1/7/2021 for Medication Refill and Pain (Chronic)    Pt is following up for chronic pain. She has not been able to give a urine sample due to a family member testing positive for COVID. Pt was informed that we would no longer be able to provide the phenergan w/codeine for her pain, as there is no medical justification to continue pain treatment in that manner. Pt did verbalize understanding and agreement. Pt was also informed that she would need to try on her own to find a pain management practice that could take her case, as she was denied for Dr. Norah Abarca practice. Pt was informed that once she found a provider, we would place a new referral and send over her records to the office. Pt again, verbilized understanding and agreement. Assessment & Plan:   Diagnoses and all orders for this visit:    1. Generalized pain    2. Fibromyalgia      Follow-up and Dispositions    · Return in about 1 month (around 2/7/2021) for Chronic Pain. 712  Subjective:       Prior to Admission medications    Medication Sig Start Date End Date Taking? Authorizing Provider   clonazePAM (KlonoPIN) 1 mg tablet Take 1 Tab by mouth nightly as needed for Anxiety. Max Daily Amount: 1 mg. Indications: panic disorder 12/23/20  Yes Leatha Rios NP   cetirizine (ZYRTEC) 10 mg tablet Take 1 Tab by mouth two (2) times a day. 10/20/20  Yes Leatha Rios NP   famotidine (PEPCID) 10 mg tablet Take 1 Tab by mouth two (2) times a day. 10/20/20  Yes Leatha Rios NP   gabapentin (NEURONTIN) 800 mg tablet Take 1 Tab by mouth three (3) times daily. Max Daily Amount: 2,400 mg. As directed 10/15/20  Yes Neal Councilman, NP   sertraline (ZOLOFT) 100 mg tablet Take 2 Tabs by mouth daily.  10/15/20  Yes Neal Councilman, NP   atorvastatin (LIPITOR) 40 mg tablet TAKE 1 TABLET BY MOUTH EVERY EVENING 9/30/20  Yes Neal Councilman, NP   buPROPion XL (WELLBUTRIN XL) 300 mg XL tablet Take 1 Tab by mouth every morning. 9/15/20  Yes Lake Black NP   hydrocortisone (HYTONE) 2.5 % topical cream Apply  to affected area two (2) times a day. use thin layer 9/15/20  Yes Lake Black NP   dexlansoprazole (Dexilant) 60 mg CpDB capsule (delayed release) Take 60 mg by mouth. Yes Provider, Historical   dicyclomine (BENTYL) 10 mg capsule Take 10 mg by mouth 4 times daily (before meals and nightly). Yes Provider, Historical   promethazine-codeine (PHENERGAN with CODEINE) 6.25-10 mg/5 mL syrup Take 10 mL by mouth four (4) times daily as needed for Cough for up to 14 days. Max Daily Amount: 40 mL. 12/23/20 1/7/21  Bethany Ortiz NP   naloxone [facility]) 4 mg/actuation nasal spray Use 1 spray intranasally, then discard. Repeat with new spray every 2 min as needed for opioid overdose symptoms, alternating nostrils. 3/18/20   Isabel Alexandra NP     Patient Active Problem List   Diagnosis Code    Fibromyalgia M79.7    HSV (herpes simplex virus) infection B00.9    Cervical pain M54.2    Nerve pain M79.2    Myofascial pain M79.18    Migraine G43.909    Allergic rhinitis due to pollen J30.1    Abnormal liver function R94.5    Depressive disorder, not elsewhere classified F32.9    Anxiety state, unspecified F41.1    Irritable bowel syndrome K58.9    Abnormal glandular Papanicolaou smear of cervix R87.619    History of shingles Z86.19    Spinal stenosis, lumbar region with neurogenic claudication M48.062    HNP (herniated nucleus pulposus), lumbar M51.26    Spinal stenosis of lumbar region M48.061     Current Outpatient Medications   Medication Sig Dispense Refill    clonazePAM (KlonoPIN) 1 mg tablet Take 1 Tab by mouth nightly as needed for Anxiety. Max Daily Amount: 1 mg. Indications: panic disorder 30 Tab 0    cetirizine (ZYRTEC) 10 mg tablet Take 1 Tab by mouth two (2) times a day.  60 Tab 2    famotidine (PEPCID) 10 mg tablet Take 1 Tab by mouth two (2) times a day. 30 Tab 2    gabapentin (NEURONTIN) 800 mg tablet Take 1 Tab by mouth three (3) times daily. Max Daily Amount: 2,400 mg. As directed 90 Tab 2    sertraline (ZOLOFT) 100 mg tablet Take 2 Tabs by mouth daily. 180 Tab 0    atorvastatin (LIPITOR) 40 mg tablet TAKE 1 TABLET BY MOUTH EVERY EVENING 90 Tab 1    buPROPion XL (WELLBUTRIN XL) 300 mg XL tablet Take 1 Tab by mouth every morning. 90 Tab 0    hydrocortisone (HYTONE) 2.5 % topical cream Apply  to affected area two (2) times a day. use thin layer 30 g 0    dexlansoprazole (Dexilant) 60 mg CpDB capsule (delayed release) Take 60 mg by mouth.  dicyclomine (BENTYL) 10 mg capsule Take 10 mg by mouth 4 times daily (before meals and nightly).  naloxone (NARCAN) 4 mg/actuation nasal spray Use 1 spray intranasally, then discard. Repeat with new spray every 2 min as needed for opioid overdose symptoms, alternating nostrils. 1 Each 0     Allergies   Allergen Reactions    Apple Anaphylaxis    Cephalexin Hives    Strawberry Anaphylaxis    Wasps [Hymenoptera Allergenic Extract] Anaphylaxis       ROS    Objective:     Patient-Reported Vitals 10/19/2020   Patient-Reported Weight 179   Patient-Reported Height 5'2   Patient-Reported Pulse 20   Patient-Reported Temperature 98.8   Patient-Reported SpO2 95   Patient-Reported Systolic  737   Patient-Reported Diastolic 84      General: alert, cooperative, no distress   Mental  status: normal mood, behavior, speech, dress, motor activity, and thought processes, able to follow commands   HENT: NCAT   Neck: no visualized mass   Resp: no respiratory distress   Neuro: no gross deficits   Skin: no discoloration or lesions of concern on visible areas   Psychiatric: normal affect, consistent with stated mood, no evidence of hallucinations     Additional exam findings: N/A      We discussed the expected course, resolution and complications of the diagnosis(es) in detail.   Medication risks, benefits, costs, interactions, and alternatives were discussed as indicated. I advised her to contact the office if her condition worsens, changes or fails to improve as anticipated. She expressed understanding with the diagnosis(es) and plan. Tony Sena, who was evaluated through a patient-initiated, synchronous (real-time) audio-video encounter, and/or her healthcare decision maker, is aware that it is a billable service, with coverage as determined by her insurance carrier. She provided verbal consent to proceed: Yes, and patient identification was verified. It was conducted pursuant to the emergency declaration under the [address] [place] [address], [address] [place] [address] authority and the [employer] General Act. A caregiver was present when appropriate. Ability to conduct physical exam was limited. I was in the office. The patient was at home.       Lena Simons NP

## 2021-01-09 DIAGNOSIS — M79.7 FIBROMYALGIA: ICD-10-CM

## 2021-01-11 NOTE — TELEPHONE ENCOUNTER
[facility]ontin as 12/10/20 for a 30 d/s. Last Visit: 1/7/21 with HEENA Gould  Next Appointment: Aline Giraldo to follow-up in 1 months  Previous Refill Encounter(s): 9/15/20 Wellbutrin #90 & Hytone #30g, 9/30/20 Lipitor #90 with 1 refill, 10/15/20 Neurontin #90 with 2 refills    Requested Prescriptions     Pending Prescriptions Disp Refills    buPROPion XL (WELLBUTRIN XL) 300 mg XL tablet 90 Tab 0     Sig: Take 1 Tab by mouth every morning.  hydrocortisone (HYTONE) 2.5 % topical cream 30 g 0     Sig: Apply  to affected area two (2) times a day. use thin layer    atorvastatin (LIPITOR) 40 mg tablet 90 Tab 0     Sig: Take 1 Tab by mouth every evening.  gabapentin (NEURONTIN) 800 mg tablet 90 Tab 0     Sig: Take 1 Tab by mouth three (3) times daily. Max Daily Amount: 2,400 mg.  As directed

## 2021-01-12 RX ORDER — ATORVASTATIN CALCIUM 40 MG/1
40 TABLET, FILM COATED ORAL EVERY EVENING
Qty: 90 TAB | Refills: 0 | Status: SHIPPED | OUTPATIENT
Start: 2021-01-12 | End: 2021-12-08 | Stop reason: SDUPTHER

## 2021-01-12 RX ORDER — HYDROCORTISONE 25 MG/G
CREAM TOPICAL 2 TIMES DAILY
Qty: 30 G | Refills: 0 | Status: SHIPPED | OUTPATIENT
Start: 2021-01-12 | End: 2021-03-09 | Stop reason: SDUPTHER

## 2021-01-12 RX ORDER — BUPROPION HYDROCHLORIDE 300 MG/1
300 TABLET ORAL
Qty: 90 TAB | Refills: 0 | Status: SHIPPED | OUTPATIENT
Start: 2021-01-12 | End: 2021-04-21

## 2021-01-12 RX ORDER — GABAPENTIN 800 MG/1
800 TABLET ORAL 3 TIMES DAILY
Qty: 90 TAB | Refills: 0 | Status: SHIPPED | OUTPATIENT
Start: 2021-01-12 | End: 2021-01-28

## 2021-01-22 DIAGNOSIS — F32.A ANXIETY AND DEPRESSION: ICD-10-CM

## 2021-01-22 DIAGNOSIS — F41.9 ANXIETY AND DEPRESSION: ICD-10-CM

## 2021-01-25 RX ORDER — CLONAZEPAM 1 MG/1
TABLET ORAL
Qty: 30 TAB | Refills: 0 | Status: SHIPPED | OUTPATIENT
Start: 2021-01-25 | End: 2021-02-23

## 2021-01-26 DIAGNOSIS — M79.7 FIBROMYALGIA: ICD-10-CM

## 2021-01-28 RX ORDER — GABAPENTIN 800 MG/1
TABLET ORAL
Qty: 90 TAB | Refills: 2 | Status: SHIPPED | OUTPATIENT
Start: 2021-01-28 | End: 2021-05-05

## 2021-01-29 ENCOUNTER — PATIENT MESSAGE (OUTPATIENT)
Dept: FAMILY MEDICINE CLINIC | Age: 51
End: 2021-01-29

## 2021-01-29 RX ORDER — ONDANSETRON 8 MG/1
8 TABLET, ORALLY DISINTEGRATING ORAL
Qty: 20 TAB | Refills: 2 | Status: SHIPPED | OUTPATIENT
Start: 2021-01-29 | End: 2021-02-15 | Stop reason: SDUPTHER

## 2021-02-16 RX ORDER — ONDANSETRON 8 MG/1
8 TABLET, ORALLY DISINTEGRATING ORAL
Qty: 20 TAB | Refills: 2 | Status: SHIPPED | OUTPATIENT
Start: 2021-02-16 | End: 2021-03-09 | Stop reason: SDUPTHER

## 2021-02-23 DIAGNOSIS — F32.A ANXIETY AND DEPRESSION: ICD-10-CM

## 2021-02-23 DIAGNOSIS — F41.9 ANXIETY AND DEPRESSION: ICD-10-CM

## 2021-02-23 RX ORDER — CLONAZEPAM 1 MG/1
TABLET ORAL
Qty: 30 TAB | Refills: 1 | Status: SHIPPED | OUTPATIENT
Start: 2021-02-23 | End: 2021-03-30 | Stop reason: SDUPTHER

## 2021-02-23 RX ORDER — CLONAZEPAM 1 MG/1
TABLET ORAL
Qty: 30 TAB | Refills: 0 | Status: CANCELLED | OUTPATIENT
Start: 2021-02-23

## 2021-03-09 NOTE — TELEPHONE ENCOUNTER
Last Visit: 1/7/21 with NP Ronal Chris  Next Appointment: Advised to follow-up in 1 month  Previous Refill Encounter(s): 1/12/21 Hytone #30g, 2/16/21 Zofran #20 with 2 refills    Requested Prescriptions     Pending Prescriptions Disp Refills    hydrocortisone (HYTONE) 2.5 % topical cream 30 g 0     Sig: Apply  to affected area two (2) times a day. use thin layer    ondansetron (ZOFRAN ODT) 8 mg disintegrating tablet 20 Tab 2     Sig: Take 1 Tab by mouth every eight (8) hours as needed for Nausea or Vomiting.

## 2021-03-10 RX ORDER — HYDROCORTISONE 25 MG/G
CREAM TOPICAL 2 TIMES DAILY
Qty: 30 G | Refills: 0 | Status: SHIPPED | OUTPATIENT
Start: 2021-03-10 | End: 2021-10-26

## 2021-03-10 RX ORDER — ONDANSETRON 8 MG/1
8 TABLET, ORALLY DISINTEGRATING ORAL
Qty: 20 TAB | Refills: 2 | Status: SHIPPED | OUTPATIENT
Start: 2021-03-10 | End: 2021-05-03 | Stop reason: SDUPTHER

## 2021-03-12 DIAGNOSIS — F41.9 ANXIETY AND DEPRESSION: ICD-10-CM

## 2021-03-12 DIAGNOSIS — F32.A ANXIETY AND DEPRESSION: ICD-10-CM

## 2021-03-12 RX ORDER — CLONAZEPAM 1 MG/1
TABLET ORAL
Qty: 30 TAB | Refills: 1 | Status: CANCELLED | OUTPATIENT
Start: 2021-03-12

## 2021-03-29 ENCOUNTER — VIRTUAL VISIT (OUTPATIENT)
Dept: FAMILY MEDICINE CLINIC | Age: 51
End: 2021-03-29

## 2021-03-30 ENCOUNTER — VIRTUAL VISIT (OUTPATIENT)
Dept: FAMILY MEDICINE CLINIC | Age: 51
End: 2021-03-30
Payer: MEDICAID

## 2021-03-30 DIAGNOSIS — F32.A ANXIETY AND DEPRESSION: ICD-10-CM

## 2021-03-30 DIAGNOSIS — B00.9 HSV INFECTION: Primary | ICD-10-CM

## 2021-03-30 DIAGNOSIS — F41.9 ANXIETY AND DEPRESSION: ICD-10-CM

## 2021-03-30 PROCEDURE — 99214 OFFICE O/P EST MOD 30 MIN: CPT | Performed by: NURSE PRACTITIONER

## 2021-03-30 RX ORDER — CLONAZEPAM 2 MG/1
TABLET ORAL
Qty: 90 TAB | Refills: 0 | Status: SHIPPED | OUTPATIENT
Start: 2021-03-30 | End: 2021-03-31 | Stop reason: SDUPTHER

## 2021-03-30 RX ORDER — VALACYCLOVIR HYDROCHLORIDE 1 G/1
1000 TABLET, FILM COATED ORAL DAILY
Qty: 90 TAB | Refills: 3 | Status: SHIPPED | OUTPATIENT
Start: 2021-03-30 | End: 2021-11-15 | Stop reason: ALTCHOICE

## 2021-03-30 NOTE — PROGRESS NOTES
Brad Muse is a 53 y.o. female who was seen by synchronous (real-time) audio-video technology on 3/30/2021 for Skin Infection, Anxiety, and Depression    Depression and Anxiety Review:    Patient is seen for depression and anxiety disorder. Current treatment includes Zoloft, Wellbutrin, clonazepam and no other therapies. Ongoing depressive symptoms include depressed mood, fatigue and difficulty concentrating. Ongoig anxiety symptoms include: palpitations, sweating, shortness of breath, paresthesias, racing thoughts, difficulty concentrating. Patient denies:  insomnia, recurrent thoughts of death and suicidal thoughts without plan and  racing thoughts, feelings of losing control. Reported side effects from the treatment: none. Triggers include: Pain, HSV outbreaks, losing her mother-in-law, taking care of her grandchildren.      Psychological ROS:   positive for - anxiety and depression  negative for - suicidal ideation    3 most recent PHQ Screens 3/30/2021   Little interest or pleasure in doing things More than half the days   Feeling down, depressed, irritable, or hopeless Several days   Total Score PHQ 2 3   Trouble falling or staying asleep, or sleeping too much Not at all   Feeling tired or having little energy Nearly every day   Poor appetite, weight loss, or overeating Not at all   Feeling bad about yourself - or that you are a failure or have let yourself or your family down Not at all   Trouble concentrating on things such as school, work, reading, or watching TV Several days   Moving or speaking so slowly that other people could have noticed; or the opposite being so fidgety that others notice Not at all   Thoughts of being better off dead, or hurting yourself in some way Not at all   PHQ 9 Score 7   How difficult have these problems made it for you to do your work, take care of your home and get along with others Somewhat difficult        JOSEFINA 7 3/30/2021 11/23/2020 1/31/2019 11/19/2018   Over the past 2 weeks how often have you felt nervous, anxious, or on edge? 3 - 3 3   Over the past 2 weeks how often have you not been able to stop or control worrying? 2 2 2 2   In the past 2 weeks how often have you worried too much about different things? 3 2 2 2   Over the past 2 weeks, how often have you had trouble relaxing? 3 3 2 3   Over the last 2 weeks how often have you been so restless that it is hard to sit still? 3 2 2 0   Over the last 2 weeks how often have you been easily annoyed or irritable? 1 2 1 1   Over the last 2 weeks, how often have you felt afraid as if something awful might happen? 0 2 0 0   BSHSI AMB JOSEFINA-7 SCORE 15 - 12 11   If your score is 1 or more, how difficult have these made it for you to do your work, take care of things at home. or get along with people? Somewhat difficult Very difficult Extremely difficult Extremely difficult       Depression Severity:   0-4 None, 5-9 mild, 10-14 moderate, 15-19 moderately severe, 20-27 severe. Anxiety Severity:   0-4 None/minimal, 5-9 mild, 10-14 moderate, 15-21 severe. Over 50% of the 30 minutes spent face to face with Obie Morrow   consisted of counseling and/or discussing treatment plans in reference to her diagnoses of:        ICD-10-CM ICD-9-CM    1. HSV infection  B00.9 054.9 valACYclovir (VALTREX) 1 gram tablet   2. Anxiety and depression  F41.9 300.00 REFERRAL TO PSYCHIATRY    F32.9 311 DISCONTINUED: clonazePAM (KlonoPIN) 2 mg tablet   . Assessment & Plan:   Diagnoses and all orders for this visit:    1. HSV infection  -     valACYclovir (VALTREX) 1 gram tablet; Take 1 Tab by mouth daily. 2. Anxiety and depression  -     REFERRAL TO PSYCHIATRY      Follow-up and Dispositions    · Return in about 6 weeks (around 5/11/2021) for Depression/Anxiety, (VV). Routing History      712  Subjective:       Prior to Admission medications    Medication Sig Start Date End Date Taking?  Authorizing Provider   valACYclovir (VALTREX) 1 gram tablet Take 1 Tab by mouth daily. 3/30/21  Yes Tiara Espinoza NP   clonazePAM (KlonoPIN) 2 mg tablet TAKE 1 TABLET BY MOUTH NIGHTLY AS NEEDED FOR ANXIETY **MAX DAILY AMOUNT OF 1MG 3/30/21 3/31/21 Yes Tiara Espinoza NP   clonazePAM (KlonoPIN) 2 mg tablet TAKE 1 TABLET BY MOUTH NIGHTLY AS NEEDED FOR ANXIETY **MAX DAILY AMOUNT OF 2MG 3/31/21   Tiara Espinoza NP   hydrocortisone (HYTONE) 2.5 % topical cream Apply  to affected area two (2) times a day. use thin layer 3/10/21   Tiara Espinoza NP   ondansetron (ZOFRAN ODT) 8 mg disintegrating tablet Take 1 Tab by mouth every eight (8) hours as needed for Nausea or Vomiting. 3/10/21   Tiara Espinoza NP   gabapentin (NEURONTIN) 800 mg tablet TAKE 1 TABLET BY MOUTH THREE TIMES DAILY MAX DAILY AMOUNT 2,400MG AS DIRECTED 1/28/21   Tiara Espinoza NP   buPROPion XL (WELLBUTRIN XL) 300 mg XL tablet Take 1 Tab by mouth every morning. 1/12/21   Tiara Espinoza NP   atorvastatin (LIPITOR) 40 mg tablet Take 1 Tab by mouth every evening. 1/12/21   Tiara Espinoza NP   cetirizine (ZYRTEC) 10 mg tablet Take 1 Tab by mouth two (2) times a day. 10/20/20   Tiara Espinoza NP   famotidine (PEPCID) 10 mg tablet Take 1 Tab by mouth two (2) times a day. 10/20/20   Tiara Espinoza NP   sertraline (ZOLOFT) 100 mg tablet Take 2 Tabs by mouth daily. 10/15/20   Bucky NEGRETE NP   dexlansoprazole (Dexilant) 60 mg CpDB capsule (delayed release) Take 60 mg by mouth. Provider, Historical   naloxone (NARCAN) 4 mg/actuation nasal spray Use 1 spray intranasally, then discard. Repeat with new spray every 2 min as needed for opioid overdose symptoms, alternating nostrils. 3/18/20   Trell Dia NP   dicyclomine (BENTYL) 10 mg capsule Take 10 mg by mouth 4 times daily (before meals and nightly).     Provider, Historical     Patient Active Problem List   Diagnosis Code    Fibromyalgia M79.7    HSV (herpes simplex virus) infection B00.9    Cervical pain M54.2    Nerve pain M79.2    Myofascial pain M79.18    Migraine G43.909    Allergic rhinitis due to pollen J30.1    Abnormal liver function R94.5    Depressive disorder, not elsewhere classified F32.9    Anxiety state, unspecified F41.1    Irritable bowel syndrome K58.9    Abnormal glandular Papanicolaou smear of cervix R87.619    History of shingles Z86.19    Spinal stenosis, lumbar region with neurogenic claudication M48.062    HNP (herniated nucleus pulposus), lumbar M51.26    Spinal stenosis of lumbar region M48.061     Current Outpatient Medications   Medication Sig Dispense Refill    valACYclovir (VALTREX) 1 gram tablet Take 1 Tab by mouth daily. 90 Tab 3    clonazePAM (KlonoPIN) 2 mg tablet TAKE 1 TABLET BY MOUTH NIGHTLY AS NEEDED FOR ANXIETY **MAX DAILY AMOUNT OF 2MG 30 Tab 0    hydrocortisone (HYTONE) 2.5 % topical cream Apply  to affected area two (2) times a day. use thin layer 30 g 0    ondansetron (ZOFRAN ODT) 8 mg disintegrating tablet Take 1 Tab by mouth every eight (8) hours as needed for Nausea or Vomiting. 20 Tab 2    gabapentin (NEURONTIN) 800 mg tablet TAKE 1 TABLET BY MOUTH THREE TIMES DAILY MAX DAILY AMOUNT 2,400MG AS DIRECTED 90 Tab 2    buPROPion XL (WELLBUTRIN XL) 300 mg XL tablet Take 1 Tab by mouth every morning. 90 Tab 0    atorvastatin (LIPITOR) 40 mg tablet Take 1 Tab by mouth every evening. 90 Tab 0    cetirizine (ZYRTEC) 10 mg tablet Take 1 Tab by mouth two (2) times a day. 60 Tab 2    famotidine (PEPCID) 10 mg tablet Take 1 Tab by mouth two (2) times a day. 30 Tab 2    sertraline (ZOLOFT) 100 mg tablet Take 2 Tabs by mouth daily. 180 Tab 0    dexlansoprazole (Dexilant) 60 mg CpDB capsule (delayed release) Take 60 mg by mouth.  naloxone (NARCAN) 4 mg/actuation nasal spray Use 1 spray intranasally, then discard. Repeat with new spray every 2 min as needed for opioid overdose symptoms, alternating nostrils.  1 Each 0    dicyclomine (BENTYL) 10 mg capsule Take 10 mg by mouth 4 times daily (before meals and nightly). Allergies   Allergen Reactions    Apple Anaphylaxis    Cephalexin Hives    Strawberry Anaphylaxis    Wasps [Hymenoptera Allergenic Extract] Anaphylaxis       ROS    Objective:     Patient-Reported Vitals 10/19/2020   Patient-Reported Weight 179   Patient-Reported Height 5'2   Patient-Reported Pulse 20   Patient-Reported Temperature 98.8   Patient-Reported SpO2 95   Patient-Reported Systolic  971   Patient-Reported Diastolic 84      General: alert, cooperative, no distress   Mental  status: normal mood, behavior, speech, dress, motor activity, and thought processes, able to follow commands   HENT: NCAT   Neck: no visualized mass   Resp: no respiratory distress   Neuro: no gross deficits   Skin: no discoloration or lesions of concern on visible areas   Psychiatric: normal affect, consistent with stated mood, no evidence of hallucinations     Additional exam findings: N/A      We discussed the expected course, resolution and complications of the diagnosis(es) in detail. Medication risks, benefits, costs, interactions, and alternatives were discussed as indicated. I advised her to contact the office if her condition worsens, changes or fails to improve as anticipated. She expressed understanding with the diagnosis(es) and plan. Jessi Perez, who was evaluated through a patient-initiated, synchronous (real-time) audio-video encounter, and/or her healthcare decision maker, is aware that it is a billable service, with coverage as determined by her insurance carrier. She provided verbal consent to proceed: Yes, and patient identification was verified. It was conducted pursuant to the emergency declaration under the [facility], [address] [place] [address] authority and the [employer] and [employer] Act. A caregiver was present when appropriate. Ability to conduct physical exam was limited.  I was in the office. The patient was at home.       Mohan Mitchell NP

## 2021-03-31 DIAGNOSIS — F41.9 ANXIETY AND DEPRESSION: ICD-10-CM

## 2021-03-31 DIAGNOSIS — F32.A ANXIETY AND DEPRESSION: ICD-10-CM

## 2021-03-31 RX ORDER — CLONAZEPAM 2 MG/1
TABLET ORAL
Qty: 30 TAB | Refills: 0 | Status: SHIPPED | OUTPATIENT
Start: 2021-03-31 | End: 2021-05-05

## 2021-04-13 ENCOUNTER — PATIENT MESSAGE (OUTPATIENT)
Dept: FAMILY MEDICINE CLINIC | Age: 51
End: 2021-04-13

## 2021-04-21 RX ORDER — BUPROPION HYDROCHLORIDE 300 MG/1
TABLET ORAL
Qty: 90 TAB | Refills: 0 | Status: SHIPPED | OUTPATIENT
Start: 2021-04-21 | End: 2021-05-05

## 2021-04-23 ENCOUNTER — PATIENT MESSAGE (OUTPATIENT)
Dept: FAMILY MEDICINE CLINIC | Age: 51
End: 2021-04-23

## 2021-04-23 DIAGNOSIS — N60.09 CYST OF BREAST, UNSPECIFIED LATERALITY: ICD-10-CM

## 2021-04-23 DIAGNOSIS — B00.9 HSV INFECTION: Primary | ICD-10-CM

## 2021-04-28 RX ORDER — ACETAMINOPHEN AND CODEINE PHOSPHATE 300; 30 MG/1; MG/1
1 TABLET ORAL
Qty: 42 TAB | Refills: 0 | Status: SHIPPED | OUTPATIENT
Start: 2021-04-28 | End: 2021-05-05 | Stop reason: SDUPTHER

## 2021-05-03 ENCOUNTER — TELEPHONE (OUTPATIENT)
Dept: FAMILY MEDICINE CLINIC | Age: 51
End: 2021-05-03

## 2021-05-03 RX ORDER — SERTRALINE HYDROCHLORIDE 100 MG/1
200 TABLET, FILM COATED ORAL DAILY
Qty: 180 TAB | Refills: 0 | Status: CANCELLED | OUTPATIENT
Start: 2021-05-03

## 2021-05-04 DIAGNOSIS — M79.7 FIBROMYALGIA: ICD-10-CM

## 2021-05-04 DIAGNOSIS — F41.9 ANXIETY AND DEPRESSION: ICD-10-CM

## 2021-05-04 DIAGNOSIS — B00.9 HSV INFECTION: ICD-10-CM

## 2021-05-04 DIAGNOSIS — F32.A ANXIETY AND DEPRESSION: ICD-10-CM

## 2021-05-04 RX ORDER — GABAPENTIN 800 MG/1
TABLET ORAL
Qty: 90 TAB | Refills: 2 | Status: CANCELLED | OUTPATIENT
Start: 2021-05-04

## 2021-05-04 RX ORDER — CLONAZEPAM 2 MG/1
TABLET ORAL
Qty: 30 TAB | Refills: 0 | Status: CANCELLED | OUTPATIENT
Start: 2021-05-04

## 2021-05-04 NOTE — TELEPHONE ENCOUNTER
Last Visit: 3/30/21 with HEENA Tomlinson  Next Appointment: 5/10/21 with HEENA Tomlinson  Previous Refill Encounter(s): 3/10/21 Zofran #20 with 2 refills, 10/15/20 Zoloft #180    Requested Prescriptions     Pending Prescriptions Disp Refills    ondansetron (ZOFRAN ODT) 8 mg disintegrating tablet 20 Tab 2     Sig: Take 1 Tab by mouth every eight (8) hours as needed for Nausea or Vomiting.  sertraline (ZOLOFT) 100 mg tablet 180 Tab 1     Sig: Take 2 Tabs by mouth daily.

## 2021-05-05 DIAGNOSIS — F32.A ANXIETY AND DEPRESSION: ICD-10-CM

## 2021-05-05 DIAGNOSIS — F41.9 ANXIETY AND DEPRESSION: ICD-10-CM

## 2021-05-05 DIAGNOSIS — M79.7 FIBROMYALGIA: ICD-10-CM

## 2021-05-05 DIAGNOSIS — B00.9 HSV INFECTION: ICD-10-CM

## 2021-05-05 RX ORDER — CLONAZEPAM 1 MG/1
TABLET ORAL
Qty: 30 TAB | Refills: 0 | OUTPATIENT
Start: 2021-05-05

## 2021-05-05 RX ORDER — ACETAMINOPHEN AND CODEINE PHOSPHATE 300; 30 MG/1; MG/1
TABLET ORAL
Qty: 42 TAB | OUTPATIENT
Start: 2021-05-05

## 2021-05-05 RX ORDER — ONDANSETRON 8 MG/1
TABLET, ORALLY DISINTEGRATING ORAL
Qty: 20 TAB | Refills: 1 | OUTPATIENT
Start: 2021-05-05

## 2021-05-05 RX ORDER — GABAPENTIN 800 MG/1
TABLET ORAL
Qty: 90 TAB | Refills: 1 | OUTPATIENT
Start: 2021-05-05

## 2021-05-07 RX ORDER — GABAPENTIN 800 MG/1
TABLET ORAL
Qty: 270 TAB | Refills: 1 | Status: SHIPPED | OUTPATIENT
Start: 2021-05-07 | End: 2021-07-09 | Stop reason: SDUPTHER

## 2021-05-07 RX ORDER — ACETAMINOPHEN AND CODEINE PHOSPHATE 300; 30 MG/1; MG/1
1 TABLET ORAL
Qty: 42 TAB | Refills: 0 | Status: SHIPPED | OUTPATIENT
Start: 2021-05-07 | End: 2021-05-14

## 2021-05-07 RX ORDER — SERTRALINE HYDROCHLORIDE 100 MG/1
200 TABLET, FILM COATED ORAL DAILY
Qty: 180 TAB | Refills: 1 | Status: SHIPPED | OUTPATIENT
Start: 2021-05-07 | End: 2021-07-09 | Stop reason: SDUPTHER

## 2021-05-07 RX ORDER — CLONAZEPAM 2 MG/1
TABLET ORAL
Qty: 30 TAB | Refills: 0 | Status: SHIPPED | OUTPATIENT
Start: 2021-05-07 | End: 2021-06-07 | Stop reason: SDUPTHER

## 2021-05-07 RX ORDER — BUPROPION HYDROCHLORIDE 300 MG/1
TABLET ORAL
Qty: 90 TAB | Refills: 1 | Status: SHIPPED | OUTPATIENT
Start: 2021-05-07 | End: 2021-08-12

## 2021-05-07 RX ORDER — ONDANSETRON 8 MG/1
8 TABLET, ORALLY DISINTEGRATING ORAL
Qty: 20 TAB | Refills: 2 | Status: SHIPPED | OUTPATIENT
Start: 2021-05-07 | End: 2021-07-09 | Stop reason: SINTOL

## 2021-05-14 ENCOUNTER — OFFICE VISIT (OUTPATIENT)
Dept: SURGERY | Age: 51
End: 2021-05-14
Payer: MEDICAID

## 2021-05-14 VITALS
WEIGHT: 184 LBS | HEART RATE: 74 BPM | RESPIRATION RATE: 18 BRPM | DIASTOLIC BLOOD PRESSURE: 74 MMHG | OXYGEN SATURATION: 99 % | SYSTOLIC BLOOD PRESSURE: 110 MMHG | BODY MASS INDEX: 33.86 KG/M2 | HEIGHT: 62 IN | TEMPERATURE: 97.7 F

## 2021-05-14 DIAGNOSIS — R10.84 GENERALIZED ABDOMINAL PAIN: Primary | ICD-10-CM

## 2021-05-14 DIAGNOSIS — N60.01 CYST (SOLITARY) OF BREAST, RIGHT: ICD-10-CM

## 2021-05-14 PROCEDURE — 99204 OFFICE O/P NEW MOD 45 MIN: CPT | Performed by: SURGERY

## 2021-05-14 NOTE — PROGRESS NOTES
HPI     Patient is 52 y/o female who was supposedly sent to see me for chest wall cyst. Patient says that she has this cyst in the middle of her chest wall that comes and goes. Right now, there is no cyst or abscess and it has compeletly drained. The patient has recent mammogram and ultrasound showing a likely sebacous cyst of the right chest. Otherwise BIRADs 1. While in the office, the patient had a journal and list of abdominal issues. Patient was very tearful during the visit. She has history of IBS for 16 years. Patient has also had five abdominal surgeries in the past, including removal of appendix, gallbladder, ovaries and left fallopian tube. Patient complains of initial IBS diarrhea, but now has consistent severe abdominal pain that she attributed to constipation and passing of stool. Patient also complains of reflux and regurgitation and loss of appetite. She is being followed by GI at [facility] and has undergone EGD, colonoscopy, manometry, CT scans. Patient is noted to have small hiatal hernia and umbilical hernia. She asked me if scar tissues from previous surgeries are causing her abdominal pain and wants a diagnostic laparoscopy. She was recently seen by another surgeon for evaluation of her hiatal hernia. She said that no surgery was offered for her hernia.    She says she has lower PO intake, but has not lost weight     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    IBS (irritable bowel syndrome)     Ill-defined condition 2011    h/o of \"blood clot in my lung, after a picc line\"    Ill-defined condition     painless rectal bleeding    MRSA infection     h/o, not current    Ovarian cyst     Pain, upper back     Psychiatric disorder     depression     Stress incontinence     Tattoo     Unspecified deficiency anemia 1999        Past Surgical History:   Procedure Laterality Date    COLONOSCOPY N/A 6/28/2019    DIAGNOSTIC COLONOSCOPY with random bxs performed by Linette Chow MD at [facility] [address]  6/22/2020         [id number]    HX CHOLECYSTECTOMY  2002    HX GYN  2018    Abalation    HX GYN      rt ovary removed    HX GYN      left ovary clipped.  HX ORTHOPAEDIC      arthoscopic rt knee    HX ORTHOPAEDIC      left knee meniscis tear    HX ORTHOPAEDIC Right     meniscus repair    NEUROLOGICAL PROCEDURE UNLISTED  12/2018    laminectomy        Current Outpatient Medications   Medication Sig Dispense Refill    ondansetron (ZOFRAN ODT) 8 mg disintegrating tablet Take 1 Tab by mouth every eight (8) hours as needed for Nausea or Vomiting. 20 Tab 2    sertraline (ZOLOFT) 100 mg tablet Take 2 Tabs by mouth daily. 180 Tab 1    gabapentin (NEURONTIN) 800 mg tablet TAKE 1 TABLET BY MOUTH THREE TIMES DAILY MAX DAILY AMOUNT 2,400MG AS DIRECTED 270 Tab 1    clonazePAM (KlonoPIN) 2 mg tablet TAKE 1 TABLET BY MOUTH NIGHTLY AS NEEDED FOR ANXIETY **MAX DAILY AMOUNT OF 2MG 30 Tab 0    buPROPion XL (WELLBUTRIN XL) 300 mg XL tablet TAKE 1 TABLET BY MOUTH EVERY MORNING 90 Tab 1    acetaminophen-codeine (Tylenol-Codeine #3) 300-30 mg per tablet Take 1 Tab by mouth every four (4) hours as needed for Pain for up to 7 days. Max Daily Amount: 6 Tabs. 42 Tab 0    valACYclovir (VALTREX) 1 gram tablet Take 1 Tab by mouth daily. 90 Tab 3    hydrocortisone (HYTONE) 2.5 % topical cream Apply  to affected area two (2) times a day. use thin layer 30 g 0    atorvastatin (LIPITOR) 40 mg tablet Take 1 Tab by mouth every evening.  90 Tab 0    cetirizine (ZYRTEC) 10 mg tablet Take 1 Tab by mouth two (2) times a day. (Patient taking differently: Take 10 mg by mouth daily.) 60 Tab 2    dexlansoprazole (Dexilant) 60 mg CpDB capsule (delayed release) Take 60 mg by mouth.  famotidine (PEPCID) 10 mg tablet Take 1 Tab by mouth two (2) times a day. 30 Tab 2    naloxone (NARCAN) 4 mg/actuation nasal spray Use 1 spray intranasally, then discard. Repeat with new spray every 2 min as needed for opioid overdose symptoms, alternating nostrils. 1 Each 0    dicyclomine (BENTYL) 10 mg capsule Take 10 mg by mouth 4 times daily (before meals and nightly). Allergies   Allergen Reactions    Apple Anaphylaxis    Cephalexin Hives    Strawberry Anaphylaxis    Wasps [Hymenoptera Allergenic Extract] Anaphylaxis        Social History     Socioeconomic History    Marital status:      Spouse name: Not on file    Number of children: Not on file    Years of education: Not on file    Highest education level: Not on file   Occupational History    Not on file   Social Needs    Financial resource strain: Not on file    Food insecurity     Worry: Not on file     Inability: Not on file    Transportation needs     Medical: Not on file     Non-medical: Not on file   Tobacco Use    Smoking status: Former Smoker     Packs/day: 0.50     Years: 10.00     Pack years: 5.00     Quit date: 2016     Years since quittin.0    Smokeless tobacco: Never Used    Tobacco comment: pt smoked on and off for 10 years and quit 6 years ago   Substance and Sexual Activity    Alcohol use:  Yes     Alcohol/week: 0.8 standard drinks     Types: 1 Cans of beer per week     Comment: very rarely    Drug use: No    Sexual activity: Yes     Partners: Male   Lifestyle    Physical activity     Days per week: Not on file     Minutes per session: Not on file    Stress: Not on file   Relationships    Social connections     Talks on phone: Not on file     Gets together: Not on file Attends [employer] service: Not on file     Active member of club or organization: Not on file     Attends meetings of clubs or organizations: Not on file     Relationship status: Not on file    Intimate partner violence     Fear of current or ex partner: Not on file     Emotionally abused: Not on file     Physically abused: Not on file     Forced sexual activity: Not on file   Other Topics Concern    Not on file   Social History Narrative    Not on file        Family History   Problem Relation Age of Onset    Hypertension Mother     Arthritis-osteo Mother     GERD Father     Hypertension Father     Cancer Sister     MS Maternal Grandmother     Diabetes Paternal Grandmother            Visit Vitals  /74   Pulse 74   Temp 97.7 °F (36.5 °C) (Temporal)   Resp 18   Ht 5' 2\" (1.575 m)   Wt 83.5 kg (184 lb)   SpO2 99%   BMI 33.65 kg/m²        Review of Systems   Constitutional: Negative. HENT: Negative. Respiratory: Negative. Cardiovascular: Negative. Gastrointestinal: Positive for abdominal pain, constipation, diarrhea, heartburn and vomiting. Genitourinary: Negative. Musculoskeletal: Negative. Skin: Negative. Neurological: Negative. Psychiatric/Behavioral: Negative. Physical Exam  Constitutional:       Appearance: She is obese. HENT:      Head: Normocephalic and atraumatic. Nose: Nose normal.      Mouth/Throat:      Mouth: Mucous membranes are moist.      Pharynx: Oropharynx is clear. Eyes:      Extraocular Movements: Extraocular movements intact. Conjunctiva/sclera: Conjunctivae normal.      Pupils: Pupils are equal, round, and reactive to light. Neck:      Musculoskeletal: Normal range of motion and neck supple. Cardiovascular:      Rate and Rhythm: Normal rate and regular rhythm. Pulses: Normal pulses. Heart sounds: Normal heart sounds. Pulmonary:      Effort: Pulmonary effort is normal.      Breath sounds: Normal breath sounds.    Chest: Comments: No cysts noted at this time  Abdominal:      General: Abdomen is flat. Bowel sounds are normal.      Palpations: Abdomen is soft. Comments: Generalized tenderness   Musculoskeletal: Normal range of motion. Skin:     General: Skin is warm and dry. Neurological:      General: No focal deficit present. Mental Status: She is alert. Mental status is at baseline. Psychiatric:         Thought Content: Thought content normal.      Comments: Very emotional and tearful        Diagnoses and all orders for this visit:    1. Generalized abdominal pain    2. Cyst (solitary) of breast, right     Patient is 54 y/o female who was schedule to see me about a chest wall cyst. She has no cyst collection at this time. No palpable lesion. Her mammogram was reviewed with findings above. Patient was extremely emotional and tearful due to the fact that she has been battling generalized abdominal pain of unknown etiology for awhile. Patient has undergone multiple GI work-ups with no true findings. Patient wants to know if she can have diagnostic laparoscopy for possible scar tissues. I explained to the patient that adhesions generally cause SBO. SBO resolve on their own 70% of time and clearly visible on CT scan. I do not believe patient has any symptoms of SBO at this time. I explained to her further abdominal surgery, even diagnostic lap can further increased adhesions in the future. Then she wanted to know whether if we can just fix her hiatal hernia, since there's nothing else wrong with her. I told her that every operation comes with risks and we do not perform surgeries unless they are truly indicated. Fixing a hiatal hernia would not fix any of her generalized lower abdominal pain. It would not fix her constipation, causing her severe pain. I apologized to the patient that I can not help her at this time. I do not believe any surgeries procedures would help correct her symptoms at this time. No visits with results within 3 Month(s) from this visit. Latest known visit with results is:   Office Visit on 11/12/2020   Component Date Value Ref Range Status    B2B2 TRANSCRIPT 11/12/2020 Comment  % Final    Comment:            <0.0032 %   (sensitivity limit of assay)      B3B2 TRANSCRIPT 11/12/2020 Comment  % Final    Comment:            <0.0032 %   (sensitivity limit of assay)      e1a2 transcript 11/12/2020 Comment  % Final    Comment:            <0.0032 %   (sensitivity limit of assay)      INTERPRETATION BCR-ABL 11/12/2020 Comment   Final    Comment: NEGATIVE for the BCR-ABL1 e1a2 (p190), e13a2 (b2a2, p210) and e14a2  (b3a2, p210) fusion transcripts. These results do not rule out the  presence of rare BCR-ABL1 transcripts not detected by this assay.  DIRECTOR REVIEW BCR-ABL 11/12/2020 Comment   Final    Comment: Isidro Lino, PhD, [facility].                 Director, [address] [place] [address] [employer]                 Biology and [address], [place]                 [phone]      BACKGROUND BCR-ABL 11/12/2020 Comment   Final    Comment: This assay can detect three different types of BCR-ABL1 fusion  transcripts associated with CML, ALL, and AML: e13a2 (previously  b2a2) and e14a2 (previously b3a2) (major breakpoint, p210), as  well as e1a2 (minor breakpoint, p190). The e13a2 and e14a2 transcript  values are titrated to the current International Scale (IS). The  standardized baseline is 100% BCR-ABL1 (IS) and major molecular  response (MMR) is equivalent to 0.1% BCR-ABL1 (IS) corresponding  to a 3-log reduction. Results should be correlated with appropriate  clinical and laboratory information as indicated.  METHODOLOGY BCR-ABL1 11/12/2020 Comment   Final    Comment: Total RNA is isolated from the sample and subject to a real-time,  reverse transcriptase polymerase chain reaction (RT-PCR).  The PCR  primers and probes are specific for BCR-ABL1 e13a2, e14a2 and e1a2  fusion transcripts. The ABL1 transcript is amplified as the control  for cDNA quantity and quality. Serial dilutions of a validated  positive control RNA with known t(9;22) BCR-ABL1 are used as  reference for quantification of BCR-ABL1 relative to ABL1. The  numeric BCR-ABL1 level is reported as % BCR-ABL1/ABL1 and the  detection sensitivity is 4.5 log below the standard baseline  (<0.0032%). This test was developed and its performance characteristics determined  by [employer]. It has not been cleared or approved by the Food and Drug  Administration. Performed at:  [address] [place] [address]  [id number]  : Marco Apayton [facility], Phone:  [phone]  Performed at:  [address] [place] [address]  [id number]  : Quyen Hermosillo                           [facility], Phone:  [phone]      Sed rate (ESR) 11/12/2020 <=2  0 - 20 mm/hr Final    WBC 11/12/2020 8.1  4.0 - 11.0 K/uL Final    RBC 11/12/2020 4.09  3.80 - 5.20 M/uL Final    HGB 11/12/2020 12.2  11.7 - 16.0 g/dL Final    HCT 11/12/2020 40.1  35.1 - 48.0 % Final    MCV 11/12/2020 98  81 - 99 fL Final    MCH 11/12/2020 30  26 - 34 pg Final    MCHC 11/12/2020 30* 31 - 36 g/dL Final    RDW 11/12/2020 11.9  10.0 - 15.5 % Final    PLATELET 42/11/6855 646  140 - 440 K/uL Final    MPV 11/12/2020 9.9  9.0 - 13.0 fL Final    NEUTROPHILS 11/12/2020 67  40 - 75 % Final    Lymphocytes 11/12/2020 22  20 - 45 % Final    MONOCYTES 11/12/2020 8  3 - 12 % Final    EOSINOPHILS 11/12/2020 3  0 - 6 % Final    BASOPHILS 11/12/2020 1  0 - 2 % Final    ABS. NEUTROPHILS 11/12/2020 5.4  1.8 - 7.7 K/uL Final    ABSOLUTE LYMPHOCYTE COUNT 11/12/2020 1.8  1.0 - 4.8 K/uL Final    ABS. MONOCYTES 11/12/2020 0.6  0.1 - 1.0 K/uL Final    ABS. EOSINOPHILS 11/12/2020 0.2  0.0 - 0.5 K/uL Final    ABS.  BASOPHILS 11/12/2020 0.1  0.0 - 0.2 K/uL Final    C-Reactive Protein, Qt 11/12/2020 0.1 0.0 - 0.5 mg/dL Final    Glucose 11/12/2020 105* 70 - 99 mg/dL Final    BUN 11/12/2020 15  6 - 22 mg/dL Final    Creatinine 11/12/2020 0.9  0.5 - 1.2 mg/dL Final    Sodium 11/12/2020 142  133 - 145 mmol/L Final    Potassium 11/12/2020 4.0  3.5 - 5.5 mmol/L Final    Chloride 11/12/2020 101  98 - 110 mmol/L Final    CO2 11/12/2020 33* 20 - 32 mmol/L Final    AST (SGOT) 11/12/2020 16  10 - 37 U/L Final    ALT (SGPT) 11/12/2020 26  5 - 40 U/L Final    Alk. phosphatase 11/12/2020 67  25 - 115 U/L Final    Bilirubin, total 11/12/2020 0.1* 0.2 - 1.2 mg/dL Final    Calcium 11/12/2020 9.3  8.4 - 10.5 mg/dL Final    Protein, total 11/12/2020 6.5  6.4 - 8.3 g/dL Final    Albumin 11/12/2020 4.3  3.5 - 5.0 g/dL Final    A-G Ratio 11/12/2020 2.0  1.1 - 2.6 ratio Final    Globulin 11/12/2020 2.2  2.0 - 4.0 g/dL Final    Anion gap 11/12/2020 7.9  3.0 - 15.0 mmol/L Final    Comment: Anion Gap calculation based on electrolyte reference ranges. Test includes Albumin, Alkaline Phosphatase, ALT, AST, BUN, Calcium, CO2,  Chloride, Creatinine, Glucose, Potassium, Sodium, Total Bilirubin and Total  Protein. Estimated GFR results are reported in mL/min/1.73 sq.m. by the MDRD equation. This eGFR is validated for stable chronic renal failure patients. This   equation  is unreliable in acute illness or patients with normal renal function.  GFRAA 11/12/2020 >60.0  >60.0 Final    GFRNA 11/12/2020 >60.0  >60.0 Final    PATH REVIEW OF SMEAR 11/12/2020 See Comment   Final    Comment: Normochromic/normocytic RBCs without significant morphologic abnormalities. WBC count and differential are normal.  Platelet count and morphology are  unremarkable. Correlate clinically. Electronic Signature: Ava Silva M.D. ([phone])  CPT: 10000S8      RHEUMATOID FACTOR QUANT, IMMUNOTUR* 11/12/2020 <20  0 - 20 IU/mL Final    FLOW CYTOMETRY REPORT 11/12/2020 Flow Cytometry                                    Case: [id number] Final    Comment: Authorizing Provider:  Nadia Alexander MD             Collected:  11/12/2020 1346  Ordering Location:     [facility]    Received:  11/16/2020 [address] Laboratory  Pathologist:           Vaughn Ramirez MD  Specimen:    Peripheral Blood      CLINICAL INFORMATION 11/12/2020 Patient is a 50 yo female [WBC 8.1]. Final    Flow Interpretation 11/12/2020 PERIPHERAL BLOOD (FLOW CYTOMETRY):   Final    Comment:  - NO DEFINITIVE IMMUNOPHENOTYPIC EVIDENCE OF MYELOID NEOPLASIA       OR LYMPHOPROLIFERATIVE DISORDER  Electronic Signature: Marie Boston. Fabio Whatley M.D. ([phone])  CPT: 01194W7  Electronically signed by Vaughn Ramirez MD on 11/18/2020 at  2:53 PM      COMMENT FLOW 11/12/2020 Flow cytometry demonstrates no overt myeloid-associated antigenic aberrancy,   Final    Comment: circulating myeloblasts, or abnormal subset of lymphoid cells. It should be  noted, however, that some myeloid neoplasms (e.g., myeloproliferative   neoplasms  in chronic phase, low-grade myelodysplasia) may not demonstrate specific  immunophenotypic alterations by this methodology. Bone marrow pathologic  evaluation may be of further value as directed clinically.  MORPHOLOGIC DESCRIPTION 11/12/2020 Review of the Walsh-Giemsa stained pre-processing direct peripheral blood and   Final    Comment: post-processing, post-washing cytospin preparations is consistent with the   flow  cytometry data.       IMMUNOMARKERS OBTAINED 11/12/2020 Markers analyzed: 25   Final    Comment: CD2, CD3, CD4, CD5, CD7, CD8, CD10, CD11b, CD13, CD14, CD15, CD16, CD19, CD20,  CD33, CD34, CD38, CD45, CD56, CD64, , , HLADR, Kappa, Lambda      IMMUNOMARKER INTERPRETATION 11/12/2020 Data produced using correlated Ag density vs light scatter properties, gating   Final    Comment: on cell populations listed (approximate %s of all cells analyzed)  Results  Abnormal cells:   - no immunophenotypic abnormalities detected  Other cells:   - <1% Myeloblasts [CD34,,HLA-DR],   - 3% B-cells [CD19,CD20,Kappa,Lambda], Kappa:Lambda ratio = 1.3:1,   - 13% Small T-cells, CD4:CD8 ratio = 3.0:1 [CD2,CD3,CD4,CD5,CD7,CD8],   - <1% Natural Killer (NK) cells [CD11b,CD16,CD56],   - 8% Monocytes [CD14,CD16,CD33,CD64],   - 75% Neutrophils [CD13,CD15,CD16,CD33],   - 1% Eosinophils [CD13,CD15],   - <1% Basophils [CD13,], and   - <1% Plasma Cells. - Erythroid cells, CD45 [-] cells & debris comprise the remaining events. Viability: 99%  Cellularity: 6.0 x10^9 cells / L,  6.0 mL total specimen volume      LDT STATEMENT 11/12/2020 These tests were developed and their performance characteristics determined by   Final    Comment: [address] [place] [address]. They have not been  cleared or approved by the [address] [place] [address]. [place] FDA approval is not required for a  laboratory developed test as per 63VNB078.95(N ). This laboratory is regulated  under CLIA as qualified to perform high-complexity testing. These tests are  used for clinical purposes and should not be regarded as investigational or   for  research. Ms. John Martin has a reminder for a \"due or due soon\" health maintenance. I have asked that she contact her primary care provider for follow-up on this health maintenance.        Frank Kern MD

## 2021-05-14 NOTE — PROGRESS NOTES
Damián Heath is a 53 y.o. female   Chief Complaint   Patient presents with    New Patient     cyst on mid right chest wall by righ breast. referred by TYSON Ruiz

## 2021-05-17 DIAGNOSIS — B00.9 HSV INFECTION: ICD-10-CM

## 2021-05-17 RX ORDER — ACETAMINOPHEN AND CODEINE PHOSPHATE 300; 30 MG/1; MG/1
1 TABLET ORAL
Qty: 42 TAB | Refills: 0 | Status: CANCELLED | OUTPATIENT
Start: 2021-05-17 | End: 2021-05-24

## 2021-05-17 RX ORDER — ONDANSETRON 8 MG/1
8 TABLET, ORALLY DISINTEGRATING ORAL
Qty: 20 TAB | Refills: 2 | Status: CANCELLED | OUTPATIENT
Start: 2021-05-17

## 2021-05-25 ENCOUNTER — VIRTUAL VISIT (OUTPATIENT)
Dept: FAMILY MEDICINE CLINIC | Age: 51
End: 2021-05-25
Payer: MEDICAID

## 2021-05-25 DIAGNOSIS — Z86.19 HISTORY OF SHINGLES: Primary | ICD-10-CM

## 2021-05-25 DIAGNOSIS — R79.89 ABNORMAL CBC MEASUREMENT: ICD-10-CM

## 2021-05-25 DIAGNOSIS — R94.5 ABNORMAL LIVER FUNCTION: ICD-10-CM

## 2021-05-25 PROCEDURE — 99214 OFFICE O/P EST MOD 30 MIN: CPT | Performed by: NURSE PRACTITIONER

## 2021-05-25 NOTE — PROGRESS NOTES
Tom Willard is a 53 y.o. female who was seen by synchronous (real-time) audio-video technology on 5/25/2021 for Skin Infection and Skin Problem    Pt is still having outbreaks of something, for which we are unclear of the source. Patient is still getting multiple rash like open areas on her skin from her arms, legs, buttocks, and vagina. She stopped taking her Valtrex daily because she was unclear of the directions. Patient was put on suppressive therapy 1 g Valtrex daily back in March. Patient has seen multiple providers including ID, neurology, allergist, dermatology, all without resolution to current skin issues. Patient also complains of pain in the areas of breakout. It was explained to patient that ongoing opioid pain medication could not be continued at this time. Patient was also referred to pain management but was denied due to no available treatment regimen per patient. Patient is also concerned about her mental health. Her anxiety and depression she states is keeping her from healing physically. Referral was placed for [facility] back in March, patient's record was refaxed back on 5/20/2021. Patient has still not received call back from their office, will try again today. Assessment & Plan:     Diagnoses and all orders for this visit:    1. History of shingles  -     VZV AB, IGM; Future  -     IMMUNOGLOBULINS, G/A/M, QT.; Future    2. Abnormal liver function  -     HEPATIC FUNCTION PANEL; Future    3. Abnormal CBC measurement  -     CBC WITH AUTOMATED DIFF; Future      Follow-up and Dispositions    · Return in about 4 weeks (around 6/22/2021) for Rash, (In Office). Routing History        712  Subjective:       Prior to Admission medications    Medication Sig Start Date End Date Taking? Authorizing Provider   ondansetron (ZOFRAN ODT) 8 mg disintegrating tablet Take 1 Tab by mouth every eight (8) hours as needed for Nausea or Vomiting.  5/7/21   Khadar Prather NP sertraline (ZOLOFT) 100 mg tablet Take 2 Tabs by mouth daily. 5/7/21   Khadar Prather NP   gabapentin (NEURONTIN) 800 mg tablet TAKE 1 TABLET BY MOUTH THREE TIMES DAILY MAX DAILY AMOUNT 2,400MG AS DIRECTED 5/7/21   Khadar Prather NP   clonazePAM (KlonoPIN) 2 mg tablet TAKE 1 TABLET BY MOUTH NIGHTLY AS NEEDED FOR ANXIETY **MAX DAILY AMOUNT OF 2MG 5/7/21   Khadar Prather NP   buPROPion XL (WELLBUTRIN XL) 300 mg XL tablet TAKE 1 TABLET BY MOUTH EVERY MORNING 5/7/21   Khadar Prather NP   valACYclovir (VALTREX) 1 gram tablet Take 1 Tab by mouth daily. 3/30/21   Khadar Prather NP   hydrocortisone (HYTONE) 2.5 % topical cream Apply  to affected area two (2) times a day. use thin layer 3/10/21   Khadar Prather NP   atorvastatin (LIPITOR) 40 mg tablet Take 1 Tab by mouth every evening. 1/12/21   Khadar Prather NP   cetirizine (ZYRTEC) 10 mg tablet Take 1 Tab by mouth two (2) times a day. Patient taking differently: Take 10 mg by mouth daily. 10/20/20   Khadar Prather NP   famotidine (PEPCID) 10 mg tablet Take 1 Tab by mouth two (2) times a day. 10/20/20   Khadar Prather NP   dexlansoprazole (Dexilant) 60 mg CpDB capsule (delayed release) Take 60 mg by mouth. Provider, Historical   dicyclomine (BENTYL) 10 mg capsule Take 10 mg by mouth 4 times daily (before meals and nightly).     Provider, Historical     Patient Active Problem List   Diagnosis Code    Fibromyalgia M79.7    HSV (herpes simplex virus) infection B00.9    Cervical pain M54.2    Nerve pain M79.2    Myofascial pain M79.18    Migraine G43.909    Allergic rhinitis due to pollen J30.1    Abnormal liver function R94.5    Depressive disorder, not elsewhere classified F32.9    Anxiety state, unspecified F41.1    Irritable bowel syndrome K58.9    Abnormal glandular Papanicolaou smear of cervix R87.619    History of shingles Z86.19    Spinal stenosis, lumbar region with neurogenic claudication M48.062    HNP (herniated nucleus pulposus), lumbar M51.26    Spinal stenosis of lumbar region M48.061     Current Outpatient Medications   Medication Sig Dispense Refill    ondansetron (ZOFRAN ODT) 8 mg disintegrating tablet Take 1 Tab by mouth every eight (8) hours as needed for Nausea or Vomiting. 20 Tab 2    sertraline (ZOLOFT) 100 mg tablet Take 2 Tabs by mouth daily. 180 Tab 1    gabapentin (NEURONTIN) 800 mg tablet TAKE 1 TABLET BY MOUTH THREE TIMES DAILY MAX DAILY AMOUNT 2,400MG AS DIRECTED 270 Tab 1    clonazePAM (KlonoPIN) 2 mg tablet TAKE 1 TABLET BY MOUTH NIGHTLY AS NEEDED FOR ANXIETY **MAX DAILY AMOUNT OF 2MG 30 Tab 0    buPROPion XL (WELLBUTRIN XL) 300 mg XL tablet TAKE 1 TABLET BY MOUTH EVERY MORNING 90 Tab 1    valACYclovir (VALTREX) 1 gram tablet Take 1 Tab by mouth daily. 90 Tab 3    hydrocortisone (HYTONE) 2.5 % topical cream Apply  to affected area two (2) times a day. use thin layer 30 g 0    atorvastatin (LIPITOR) 40 mg tablet Take 1 Tab by mouth every evening. 90 Tab 0    cetirizine (ZYRTEC) 10 mg tablet Take 1 Tab by mouth two (2) times a day. (Patient taking differently: Take 10 mg by mouth daily.) 60 Tab 2    famotidine (PEPCID) 10 mg tablet Take 1 Tab by mouth two (2) times a day. 30 Tab 2    dexlansoprazole (Dexilant) 60 mg CpDB capsule (delayed release) Take 60 mg by mouth.  dicyclomine (BENTYL) 10 mg capsule Take 10 mg by mouth 4 times daily (before meals and nightly).        Allergies   Allergen Reactions    Apple Anaphylaxis    Cephalexin Hives    Strawberry Anaphylaxis    Wasps [Hymenoptera Allergenic Extract] Anaphylaxis       ROS    Objective:     Patient-Reported Vitals 10/19/2020   Patient-Reported Weight 179   Patient-Reported Height 5'2   Patient-Reported Pulse 20   Patient-Reported Temperature 98.8   Patient-Reported SpO2 95   Patient-Reported Systolic  925   Patient-Reported Diastolic 84      General: alert, cooperative, no distress   Mental  status: normal mood, behavior, speech, dress, motor activity, and thought processes, able to follow commands   HENT: NCAT   Neck: no visualized mass   Resp: no respiratory distress   Neuro: no gross deficits   Skin: no discoloration or lesions of concern on visible areas   Psychiatric: normal affect, consistent with stated mood, no evidence of hallucinations     Additional exam findings: N/A      We discussed the expected course, resolution and complications of the diagnosis(es) in detail. Medication risks, benefits, costs, interactions, and alternatives were discussed as indicated. I advised her to contact the office if her condition worsens, changes or fails to improve as anticipated. She expressed understanding with the diagnosis(es) and plan. Yari Menon, who was evaluated through a patient-initiated, synchronous (real-time) audio-video encounter, and/or her healthcare decision maker, is aware that it is a billable service, with coverage as determined by her insurance carrier. She provided verbal consent to proceed: Yes, and patient identification was verified. It was conducted pursuant to the emergency declaration under the [address] [place] [address] authority and the "[employer] and [employer] General Act. A caregiver was present when appropriate. Ability to conduct physical exam was limited. I was in the office. The patient was at home.       Ileana Barros NP

## 2021-06-07 DIAGNOSIS — F32.A ANXIETY AND DEPRESSION: ICD-10-CM

## 2021-06-07 DIAGNOSIS — F41.9 ANXIETY AND DEPRESSION: ICD-10-CM

## 2021-06-07 RX ORDER — CLONAZEPAM 2 MG/1
2 TABLET ORAL
Qty: 30 TABLET | Refills: 0 | Status: SHIPPED | OUTPATIENT
Start: 2021-06-07 | End: 2021-07-09 | Stop reason: SDUPTHER

## 2021-06-07 NOTE — TELEPHONE ENCOUNTER
UDS done 10/27/20    Last Visit: 5/25/21 with HEENA Deluna  Next Appointment: 7/9/21 with HEENA Deluna  Previous Refill Encounter(s): 5/7/21 #30    Requested Prescriptions     Pending Prescriptions Disp Refills    clonazePAM (KlonoPIN) 2 mg tablet 30 Tablet 0     Sig: Take 1 Tablet by mouth nightly as needed (anxiety). Max Daily Amount: 2 mg.

## 2021-07-07 DIAGNOSIS — F32.A ANXIETY AND DEPRESSION: ICD-10-CM

## 2021-07-07 DIAGNOSIS — F41.9 ANXIETY AND DEPRESSION: ICD-10-CM

## 2021-07-07 RX ORDER — CLONAZEPAM 2 MG/1
2 TABLET ORAL
Qty: 30 TABLET | Refills: 0 | Status: CANCELLED | OUTPATIENT
Start: 2021-07-07

## 2021-07-07 RX ORDER — ONDANSETRON 8 MG/1
8 TABLET, ORALLY DISINTEGRATING ORAL
Qty: 20 TABLET | Refills: 2 | Status: CANCELLED | OUTPATIENT
Start: 2021-07-07

## 2021-07-08 ENCOUNTER — TELEPHONE (OUTPATIENT)
Dept: FAMILY MEDICINE CLINIC | Age: 51
End: 2021-07-08

## 2021-07-09 ENCOUNTER — OFFICE VISIT (OUTPATIENT)
Dept: FAMILY MEDICINE CLINIC | Age: 51
End: 2021-07-09
Payer: MEDICAID

## 2021-07-09 VITALS
SYSTOLIC BLOOD PRESSURE: 115 MMHG | HEART RATE: 68 BPM | OXYGEN SATURATION: 96 % | DIASTOLIC BLOOD PRESSURE: 67 MMHG | WEIGHT: 183.4 LBS | TEMPERATURE: 96.9 F | RESPIRATION RATE: 18 BRPM | HEIGHT: 62 IN | BODY MASS INDEX: 33.75 KG/M2

## 2021-07-09 DIAGNOSIS — R21 RASH AND NONSPECIFIC SKIN ERUPTION: ICD-10-CM

## 2021-07-09 DIAGNOSIS — F32.A ANXIETY AND DEPRESSION: ICD-10-CM

## 2021-07-09 DIAGNOSIS — F41.9 ANXIETY AND DEPRESSION: ICD-10-CM

## 2021-07-09 DIAGNOSIS — M79.10 MUSCLE PAIN: Primary | ICD-10-CM

## 2021-07-09 DIAGNOSIS — M79.7 FIBROMYALGIA: ICD-10-CM

## 2021-07-09 DIAGNOSIS — L50.1 IDIOPATHIC URTICARIA: ICD-10-CM

## 2021-07-09 PROCEDURE — 99214 OFFICE O/P EST MOD 30 MIN: CPT | Performed by: NURSE PRACTITIONER

## 2021-07-09 RX ORDER — GABAPENTIN 800 MG/1
TABLET ORAL
Qty: 270 TABLET | Refills: 3 | Status: SHIPPED | OUTPATIENT
Start: 2021-07-09 | End: 2021-12-08 | Stop reason: SDUPTHER

## 2021-07-09 RX ORDER — SERTRALINE HYDROCHLORIDE 100 MG/1
200 TABLET, FILM COATED ORAL DAILY
Qty: 180 TABLET | Refills: 3 | Status: SHIPPED | OUTPATIENT
Start: 2021-07-09

## 2021-07-09 RX ORDER — BUPRENORPHINE AND NALOXONE 8; 2 MG/1; MG/1
1 FILM, SOLUBLE BUCCAL; SUBLINGUAL DAILY
COMMUNITY

## 2021-07-09 RX ORDER — CLOBETASOL PROPIONATE 0.5 MG/G
OINTMENT TOPICAL 2 TIMES DAILY
Qty: 15 G | Refills: 0 | Status: SHIPPED | OUTPATIENT
Start: 2021-07-09 | End: 2021-11-15 | Stop reason: ALTCHOICE

## 2021-07-09 RX ORDER — CLONAZEPAM 2 MG/1
2 TABLET ORAL
Qty: 30 TABLET | Refills: 0 | Status: SHIPPED | OUTPATIENT
Start: 2021-07-09 | End: 2021-08-17

## 2021-07-09 RX ORDER — HYDROXYCHLOROQUINE SULFATE 200 MG/1
200 TABLET, FILM COATED ORAL 2 TIMES DAILY
Qty: 60 TABLET | Refills: 0 | Status: SHIPPED | OUTPATIENT
Start: 2021-07-09 | End: 2021-09-20 | Stop reason: SDUPTHER

## 2021-07-09 NOTE — PROGRESS NOTES
Rachel Viramonteslpine presents today for   Chief Complaint   Patient presents with    Rash     Back    Pain (Chronic)     knees , arms       Is someone accompanying this pt? no    Is the patient using any DME equipment during OV?no    Depression Screening:  3 most recent PHQ Screens 5/25/2021   Little interest or pleasure in doing things Not at all   Feeling down, depressed, irritable, or hopeless Not at all   Total Score PHQ 2 0   Trouble falling or staying asleep, or sleeping too much -   Feeling tired or having little energy -   Poor appetite, weight loss, or overeating -   Feeling bad about yourself - or that you are a failure or have let yourself or your family down -   Trouble concentrating on things such as school, work, reading, or watching TV -   Moving or speaking so slowly that other people could have noticed; or the opposite being so fidgety that others notice -   Thoughts of being better off dead, or hurting yourself in some way -   PHQ 9 Score -   How difficult have these problems made it for you to do your work, take care of your home and get along with others -       Learning Assessment:  Learning Assessment 5/18/2018   PRIMARY LEARNER Patient   HIGHEST LEVEL OF EDUCATION - PRIMARY LEARNER  SOME COLLEGE   BARRIERS PRIMARY LEARNER NONE   PRIMARY LANGUAGE ENGLISH   LEARNER PREFERENCE PRIMARY DEMONSTRATION   ANSWERED BY self   RELATIONSHIP SELF       Health Maintenance reviewed and discussed and ordered per Provider. Health Maintenance Due   Topic Date Due    COVID-19 Vaccine (1) Never done    PAP AKA CERVICAL CYTOLOGY  09/25/2021   . Coordination of Care:  1. Have you been to the ER, urgent care clinic since your last visit? Hospitalized since your last visit? no    2. Have you seen or consulted any other health care providers outside of the [address] [place] [address] since your last visit? Include any pap smears or colon screening.  no

## 2021-07-09 NOTE — PROGRESS NOTES
General Office Visit Note      Patient:  Franco Soto    Subjective:     Lydia Houston is a 53 y.o. y.o. female who complains of   Chief Complaint   Patient presents with    Rash     Back    Pain (Chronic)     knees , arms     Patient is following up for chronic pain and multiple recurrent rash eruptions, muscle pain, joint pain. Patient is seeing [facility] Laurent, [facility], for her addiction issues. Pt thinks that she has Dermatomyositis. Pt states she has all the symptoms associated with the disease process and would like to see a rheumatologist for testing. Patient would like to start treatment as soon as possible. Patient also states that she no longer would like to see her current psychiatric nurse practitioner, and would like to seek out someone else.     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    IBS (irritable bowel syndrome)     Ill-defined condition 2011    h/o of \"blood clot in my lung, after a picc line\"    Ill-defined condition     painless rectal bleeding    MRSA infection     h/o, not current    Ovarian cyst     Pain, upper back     Psychiatric disorder     depression     Stress incontinence     Tattoo     Unspecified deficiency anemia 1999     Past Surgical History:   Procedure Laterality Date    COLONOSCOPY N/A 6/28/2019    DIAGNOSTIC COLONOSCOPY with random bxs performed by [facility]bin Jeff [facility] 4  6/22/2020         HX APPENDECTOMY  1995    HX CHOLECYSTECTOMY  2002    HX GYN  2018 Abalation    HX GYN      rt ovary removed    HX GYN      left ovary clipped.  HX ORTHOPAEDIC      arthoscopic rt knee    HX ORTHOPAEDIC      left knee meniscis tear    HX ORTHOPAEDIC Right     meniscus repair    NEUROLOGICAL PROCEDURE UNLISTED  2018    laminectomy     Social History     Socioeconomic History    Marital status:      Spouse name: Not on file    Number of children: Not on file    Years of education: Not on file    Highest education level: Not on file   Tobacco Use    Smoking status: Former Smoker     Packs/day: 0.50     Years: 10.00     Pack years: 5.00     Quit date: 2016     Years since quittin.1    Smokeless tobacco: Never Used    Tobacco comment: pt smoked on and off for 10 years and quit 6 years ago   Vaping Use    Vaping Use: Never used   Substance and Sexual Activity    Alcohol use: Yes     Alcohol/week: 0.8 standard drinks     Types: 1 Cans of beer per week     Comment: very rarely    Drug use: No    Sexual activity: Yes     Partners: Male     Social Determinants of Health     Financial Resource Strain:     Difficulty of Paying Living Expenses:    Food Insecurity:     Worried About Running Out of Food in the Last Year:     [address] in the Last Year:    Transportation Needs:     Lack of Transportation (Medical):  Lack of Transportation (Non-Medical):    Physical Activity:     Days of Exercise per Week:     Minutes of Exercise per Session:    Stress:     Feeling of Stress :    Social Connections:     Frequency of Communication with Friends and Family:     Frequency of Social Gatherings with Friends and Family:     Attends Hindu Services:     Active Member of Clubs or Organizations:     Attends Club or Organization Meetings:     Marital Status:      Current Outpatient Medications   Medication Sig Dispense Refill    buprenorphine-naloxone (SUBOXONE) 8-2 mg film sublingaul film 1 Film by SubLINGual route daily.       hydrOXYchloroQUINE (PLAQUENIL) 200 mg tablet Take 1 Tablet by mouth two (2) times a day. 60 Tablet 0    clobetasoL (TEMOVATE) 0.05 % ointment Apply  to affected area two (2) times a day. 15 g 0    clonazePAM (KlonoPIN) 2 mg tablet Take 1 Tablet by mouth nightly as needed (anxiety). Max Daily Amount: 2 mg. 30 Tablet 0    gabapentin (NEURONTIN) 800 mg tablet TAKE 1 TABLET BY MOUTH THREE TIMES DAILY MAX DAILY AMOUNT 2,400MG AS DIRECTED 270 Tablet 3    sertraline (ZOLOFT) 100 mg tablet Take 2 Tablets by mouth daily. 180 Tablet 3    atorvastatin (LIPITOR) 40 mg tablet Take 1 Tab by mouth every evening. 90 Tab 0    dicyclomine (BENTYL) 10 mg capsule Take 10 mg by mouth 4 times daily (before meals and nightly).  omeprazole (PRILOSEC) 40 mg capsule TAKE 1 CAPSULE BY MOUTH DAILY BEFORE BREAKFAST AND DINNER 90 Capsule 3    buPROPion XL (WELLBUTRIN XL) 300 mg XL tablet TAKE 1 TABLET BY MOUTH EVERY MORNING (Patient not taking: Reported on 7/9/2021) 90 Tab 1    valACYclovir (VALTREX) 1 gram tablet Take 1 Tab by mouth daily. 90 Tab 3    hydrocortisone (HYTONE) 2.5 % topical cream Apply  to affected area two (2) times a day. use thin layer 30 g 0    cetirizine (ZYRTEC) 10 mg tablet Take 1 Tab by mouth two (2) times a day.  (Patient taking differently: Take 10 mg by mouth daily.) 60 Tab 2     Allergies   Allergen Reactions    Apple Anaphylaxis    Cephalexin Hives    Strawberry Anaphylaxis    Wasps [Hymenoptera Allergenic Extract] Anaphylaxis     The patient has a family history of    REVIEW OF SYSTEMS  ROS    Objective:     Visit Vitals  /67   Pulse 68   Temp 96.9 °F (36.1 °C) (Temporal)   Resp 18   Ht 5' 2\" (1.575 m)   Wt 183 lb 6.4 oz (83.2 kg)   SpO2 96%   BMI 33.54 kg/m²       Current Outpatient Medications   Medication Instructions    atorvastatin (LIPITOR) 40 mg, Oral, EVERY EVENING    buprenorphine-naloxone (SUBOXONE) 8-2 mg film sublingaul film 1 Film, SubLINGual, DAILY    buPROPion XL [facility] XL) 300 mg XL tablet TAKE 1 TABLET BY MOUTH EVERY MORNING    cetirizine (ZYRTEC) 10 mg, Oral, 2 TIMES DAILY    clobetasoL (TEMOVATE) 0.05 % ointment Topical, 2 TIMES DAILY    clonazePAM (KLONOPIN) 2 mg, Oral, BEDTIME PRN    dicyclomine (BENTYL) 10 mg, Oral, 4 TIMES DAILY BEFORE MEALS & NIGHTLY    gabapentin (NEURONTIN) 800 mg tablet TAKE 1 TABLET BY MOUTH THREE TIMES DAILY MAX DAILY AMOUNT 2,400MG AS DIRECTED    hydrocortisone (HYTONE) 2.5 % topical cream Topical, 2 TIMES DAILY, use thin layer    hydrOXYchloroQUINE (PLAQUENIL) 200 mg, Oral, 2 TIMES DAILY    omeprazole (PRILOSEC) 40 mg capsule TAKE 1 CAPSULE BY MOUTH DAILY BEFORE BREAKFAST AND DINNER    sertraline (ZOLOFT) 200 mg, Oral, DAILY    valACYclovir (VALTREX) 1,000 mg, Oral, DAILY        PHYSICAL EXAM  Physical Exam  Vitals and nursing note reviewed. Constitutional:       Appearance: Normal appearance. Cardiovascular:      Rate and Rhythm: Normal rate and regular rhythm. Pulses: Normal pulses. Heart sounds: Normal heart sounds. Pulmonary:      Effort: Pulmonary effort is normal.      Breath sounds: Normal breath sounds. Musculoskeletal:         General: Normal range of motion. Right elbow: Tenderness present. Left elbow: Tenderness present. Right hand: Swelling and tenderness present. Decreased strength. Left hand: Swelling and tenderness present. Decreased strength. Cervical back: Normal range of motion and neck supple. Right knee: Tenderness present. Left knee: Tenderness present. Skin:     General: Skin is warm and dry. Neurological:      Mental Status: She is alert and oriented to person, place, and time. Psychiatric:         Mood and Affect: Mood normal.         Behavior: Behavior normal.         Assessment/Plan:     Diagnoses and all orders for this visit:    1. Muscle pain  -     REFERRAL TO RHEUMATOLOGY  -     hydrOXYchloroQUINE (PLAQUENIL) 200 mg tablet;  Take 1 Tablet by mouth two (2) times a day. -     CK; Future    2. Idiopathic urticaria  -     REFERRAL TO RHEUMATOLOGY  -     hydrOXYchloroQUINE (PLAQUENIL) 200 mg tablet; Take 1 Tablet by mouth two (2) times a day. 3. Rash and nonspecific skin eruption  -     REFERRAL TO RHEUMATOLOGY  -     hydrOXYchloroQUINE (PLAQUENIL) 200 mg tablet; Take 1 Tablet by mouth two (2) times a day. -     clobetasoL (TEMOVATE) 0.05 % ointment; Apply  to affected area two (2) times a day. 4. Anxiety and depression  -     clonazePAM (KlonoPIN) 2 mg tablet; Take 1 Tablet by mouth nightly as needed (anxiety). Max Daily Amount: 2 mg.    5. Fibromyalgia  -     gabapentin (NEURONTIN) 800 mg tablet; TAKE 1 TABLET BY MOUTH THREE TIMES DAILY MAX DAILY AMOUNT 2,400MG AS DIRECTED    Other orders  -     sertraline (ZOLOFT) 100 mg tablet; Take 2 Tablets by mouth daily. Follow-up and Dispositions    · Return in about 3 months (around 10/9/2021) for muscle pain , (In Office). Disclaimer:    I have discussed the diagnosis with the patient and the intended plan as seen above. The patient understands our medical plan. The risks, benefits and significant side effects of all medications have been reviewed. Anticipated time course and progression of condition reviewed. All questions have been addressed. She received an after visit summary, with information reviewed, and questions answered. Where appropriate, she is instructed to call the clinic if she has not been notified either by phone or through [address] with the results of her tests or with an appointment plan for any referrals within 1 week(s). The patient  is to call if her condition worsens or fails to improve or if significant side effects are experienced.        Daxa Bennett NP

## 2021-07-10 LAB — CK SERPL-CCNC: 149 U/L (ref 30–165)

## 2021-07-15 DIAGNOSIS — M79.18 MYOFASCIAL PAIN: Primary | ICD-10-CM

## 2021-07-15 DIAGNOSIS — M79.10 MYALGIA: ICD-10-CM

## 2021-07-15 DIAGNOSIS — M79.10 MUSCLE PAIN: ICD-10-CM

## 2021-08-12 ENCOUNTER — OFFICE VISIT (OUTPATIENT)
Dept: ORTHOPEDIC SURGERY | Age: 51
End: 2021-08-12
Payer: MEDICAID

## 2021-08-12 VITALS
TEMPERATURE: 97.1 F | WEIGHT: 179 LBS | BODY MASS INDEX: 35.14 KG/M2 | HEART RATE: 97 BPM | OXYGEN SATURATION: 99 % | HEIGHT: 60 IN | RESPIRATION RATE: 15 BRPM

## 2021-08-12 DIAGNOSIS — M25.562 LEFT KNEE PAIN, UNSPECIFIED CHRONICITY: ICD-10-CM

## 2021-08-12 DIAGNOSIS — M25.562 CHRONIC PAIN OF LEFT KNEE: ICD-10-CM

## 2021-08-12 DIAGNOSIS — G89.29 CHRONIC PAIN OF LEFT KNEE: ICD-10-CM

## 2021-08-12 DIAGNOSIS — M70.61 TROCHANTERIC BURSITIS OF RIGHT HIP: Primary | ICD-10-CM

## 2021-08-12 DIAGNOSIS — M25.552 LEFT HIP PAIN: ICD-10-CM

## 2021-08-12 DIAGNOSIS — M25.561 RIGHT KNEE PAIN, UNSPECIFIED CHRONICITY: ICD-10-CM

## 2021-08-12 DIAGNOSIS — M70.62 TROCHANTERIC BURSITIS OF LEFT HIP: ICD-10-CM

## 2021-08-12 PROCEDURE — 73564 X-RAY EXAM KNEE 4 OR MORE: CPT | Performed by: PHYSICIAN ASSISTANT

## 2021-08-12 PROCEDURE — 99204 OFFICE O/P NEW MOD 45 MIN: CPT | Performed by: PHYSICIAN ASSISTANT

## 2021-08-12 PROCEDURE — 20611 DRAIN/INJ JOINT/BURSA W/US: CPT | Performed by: PHYSICIAN ASSISTANT

## 2021-08-12 RX ORDER — BETAMETHASONE SODIUM PHOSPHATE AND BETAMETHASONE ACETATE 3; 3 MG/ML; MG/ML
12 INJECTION, SUSPENSION INTRA-ARTICULAR; INTRALESIONAL; INTRAMUSCULAR; SOFT TISSUE ONCE
Status: COMPLETED | OUTPATIENT
Start: 2021-08-12 | End: 2021-08-12

## 2021-08-12 RX ADMIN — BETAMETHASONE SODIUM PHOSPHATE AND BETAMETHASONE ACETATE 12 MG: 3; 3 INJECTION, SUSPENSION INTRA-ARTICULAR; INTRALESIONAL; INTRAMUSCULAR; SOFT TISSUE at 15:51

## 2021-08-12 NOTE — PROGRESS NOTES
[address] [place] [address]  [phone]           Patient: Roz Walsh                MRN: [id number]       SSN: [id number]  YOB: 1970        AGE: 53 y.o. SEX: female  Body mass index is 34.96 kg/m². PCP: Gilson Kulkarni NP  08/12/21            REVIEW OF SYSTEMS:  Constitutional: Negative for fever, chills, weight loss and malaise/fatigue. HENT: Negative. Eyes: Negative. Respiratory: Negative. Cardiovascular: Negative. Gastrointestinal: No bowel incontinence or constipation. Genitourinary: No bladder incontinence or saddle anesthesia. Skin: Negative. Neurological: Negative. Endo/Heme/Allergies: Negative. Psychiatric/Behavioral: Negative. Musculoskeletal: As per HPI above. 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 , Disp: , Rfl:     hydrOXYchloroQUINE (PLAQUENIL) 200 mg tablet, Take 1 Tablet by mouth two (2) times a day., Disp: 60 Tablet, Rfl: 0    clobetasoL (TEMOVATE) 0.05 % ointment, Apply  to affected area two (2) times a day., Disp: 15 g, Rfl: 0    clonazePAM (KlonoPIN) 2 mg tablet, Take 1 Tablet by mouth nightly as needed (anxiety). Max Daily Amount: 2 mg., Disp: 30 Tablet, Rfl: 0    gabapentin (NEURONTIN) 800 mg tablet, TAKE 1 TABLET BY MOUTH THREE TIMES DAILY MAX DAILY AMOUNT 2,400MG AS DIRECTED, Disp: 270 Tablet, Rfl: 3    sertraline (ZOLOFT) 100 mg tablet, Take 2 Tablets by mouth daily. , Disp: 180 Tablet, Rfl: 3    omeprazole (PRILOSEC) 40 mg capsule, TAKE 1 CAPSULE BY MOUTH DAILY BEFORE BREAKFAST AND DINNER, Disp: 90 Capsule, Rfl: 3    valACYclovir (VALTREX) 1 gram tablet, Take 1 Tab by mouth daily. , Disp: 90 Tab, Rfl: 3    hydrocortisone (HYTONE) 2.5 % topical cream, Apply  to affected area two (2) times a day. use thin layer, Disp: 30 g, Rfl: 0    atorvastatin (LIPITOR) 40 mg tablet, Take 1 Tab by mouth every evening., Disp: 90 Tab, Rfl: 0    cetirizine (ZYRTEC) 10 mg tablet, Take 1 Tab by mouth two (2) times a day. (Patient taking differently: Take 10 mg by mouth daily.), Disp: 60 Tab, Rfl: 2    dicyclomine (BENTYL) 10 mg capsule, Take 10 mg by mouth 4 times daily (before meals and nightly). , Disp: , Rfl:     buPROPion XL (WELLBUTRIN XL) 300 mg XL tablet, TAKE 1 TABLET BY MOUTH EVERY MORNING (Patient not taking: Reported on 7/9/2021), Disp: 90 Tab, Rfl: 1    Current Facility-Administered Medications:     betamethasone (CELESTONE) injection 12 mg, 12 mg, IntraBURSal, ONCE, Cadne Diaz PA-C    Allergies   Allergen Reactions    Apple Anaphylaxis    Cephalexin Hives    Strawberry Anaphylaxis    Wasps [Hymenoptera Allergenic Extract] Anaphylaxis       Social History     Socioeconomic History    Marital status:      Spouse name: Not on file    Number of children: Not on file    Years of education: Not on file    Highest education level: Not on file   Occupational History    Not on file   Tobacco Use    Smoking status: Former Smoker     Packs/day: 0.50     Years: 10.00     Pack years: 5.00     Quit date: 2016     Years since quittin.2    Smokeless tobacco: Never Used    Tobacco comment: pt smoked on and off for 10 years and quit 6 years ago   Vaping Use    Vaping Use: Never used   Substance and Sexual Activity    Alcohol use: Yes     Alcohol/week: 0.8 standard drinks     Types: 1 Cans of beer per week     Comment: very rarely    Drug use: No    Sexual activity: Yes     Partners: Male   Other Topics Concern    Not on file   Social History Narrative    Not on file     Social Determinants of Health     Financial Resource Strain:     Difficulty of Paying Living Expenses:    Food Insecurity:     Worried About Running Out of Food in the Last Year:     [address] in the Last Year:    Transportation Needs:     Lack of Transportation (Medical):  Lack of Transportation (Non-Medical):    Physical Activity:     Days of Exercise per Week:     Minutes of Exercise per Session:    Stress:     Feeling of Stress :    Social Connections:     Frequency of Communication with Friends and Family:     Frequency of Social Gatherings with Friends and Family:     Attends Sabianism Services:     Active Member of Clubs or Organizations:     Attends Club or Organization Meetings:     Marital Status:    Intimate Partner Violence:     Fear of Current or Ex-Partner:     Emotionally Abused:     Physically Abused:     Sexually Abused:        Past Surgical History:   Procedure Laterality Date    COLONOSCOPY N/A 2019    DIAGNOSTIC COLONOSCOPY with random bxs performed by Pascale Bentley MD at [facility]  2020         [id number]    HX CHOLECYSTECTOMY  2002    HX GYN  2018    Abalation    HX GYN      rt ovary removed    HX GYN      left ovary clipped.     HX ORTHOPAEDIC      arthoscopic rt knee    HX ORTHOPAEDIC      left knee meniscis tear    HX ORTHOPAEDIC Right meniscus repair    NEUROLOGICAL PROCEDURE UNLISTED  12/2018    laminectomy         Patient seen and evaluated today for pain advice regarding bilateral hip pain as well as bilateral knee pain. She reports lateral based hip discomfort when she is ambulating as well as with lying on either side at night. She denies any radiating pain down the lower extremities. Does have problems with each of her knees with the left one being little bit worse. Does have a history of arthroscopy in the past approximate 7 or 8 years ago by Dr. Alan Angel. She has trouble in from a chair. She has trouble with stairs. She has pseudomechanical symptoms of each of her knees. The knees want to give way on her at times. She is currently being worked up for dermatomyositis in which she is currently taking hydroxychloroquine. She is unable take anti-inflammatories with her hiatal hernia and GERD. Patient denies recent fevers, chills, chest pain, SOB, or injuries. No recent systemic changes noted. A 12-point review of systems is performed today. Pertinent positives are noted. All other systems reviewed and otherwise are negative. Physical exam: General: Alert and oriented x3, nad.  well-developed, well nourished. normal affect, AF. NC/AT, EOMI, neck supple, trachea midline, no JVD present. Breathing is non-labored. Examination of lower extremities was pain-free range of motion the hips. She does have discomfort to palpation trochanter bursa bilaterally. Negative straight leg raise. Negative calf tenderness. Negative Homans. No signs of DVT present. Each of the knees reveal skin intact with there is no erythema, ecchymosis or warmth. There are no signs of infection or cellulitis present. She does have discomfort to palpation to the medial joint line as well as patellofemoral grinding crepitus anteriorly with range of motion activities noted.   She does have discomfort Asia's test with the click present medially. Radiographs obtained office today 2021-08-12 at the [facility] location include AP, tunnel, lateral, skyline of the left knee shows moderate arthritis to the medial patella from reticulation without acute abnormalities noted. Assessment: #1 bilateral hip trochanter bursitis, #2 bilateral knee mild to moderate arthritis with likely meniscal pathology    Plan: At this point, we will move forward with a cortisone injection for each of her hips, trochanteric bursa. After informed consent, under aseptic conditions, with US guided assitance, each hip was prepped with betadine and a mxiture of 3ml 1% lidocaine and 6mg of celestone was injected without complications. The patient tolerated the injection well. The patient is instructed on post-injection care. We will move forth obtain an MRI of each of her knees for further evaluation of meniscal pathology given her history as well as symptomatology. She will continue with her care regarding her work-up for dermatomyositis. Chart reviewed for the following:  Rashid MEDRANO PA-C, have reviewed the History, Physical and updated the Allergic reactions for Rana Goyo?     TIME OUT performed immediately prior to start of procedure:  Rashid MEDRANO PA-C, have performed the following reviews on Rana Goyo prior to the start of the procedure:  ????????  * Patient was identified by name and date of birth   * Agreement on procedure being performed was verified  * Risks and Benefits explained to the patient  * Procedure site verified and marked as necessary  * Patient was positioned for comfort  * Consent was signed and verified    Time:3:42 PM    Body part: bilateral hips, intra-bursal    Medication & Dose: 3ml 1% lidocaine and 6mg celestone each    Date of procedure: 08/12/21    Procedure performed by: Rashid Hardy PA-C    Provider assisted by: none    Patient assisted by: self    How tolerated by patient: tolerated the procedure well with no complications    Post Procedural Pain Scale: 5    Comments:   [address] using a frequency of 10MHz with a 12L-RS transducer head was used to confirm needle placement.   Ultrasound images captured using [address] Ultrasound machine and scanned into patient's chart       Narinder Patterson PA-C, ATC

## 2021-08-20 ENCOUNTER — TELEPHONE (OUTPATIENT)
Dept: ORTHOPEDIC SURGERY | Age: 51
End: 2021-08-20

## 2021-08-20 NOTE — TELEPHONE ENCOUNTER
Patient is requesting we change MRI order from without contrast to with contrast.   She feels this would be more beneficial and states she's never had an issue with contrast in the past.  Her hip pain is radiating to her right knee and she also reports some swelling in the right knee. She's currently scheduled for MRI 8/27.   Please review and advise patient if this change is approved, [phone]

## 2021-08-26 NOTE — TELEPHONE ENCOUNTER
Patient called in requesting a copy of lab orders to be sent to [facility].  Orders have been faxed and confirmation sent Patient's wife Elana Lin called stating her FMLA forms were dropped off on 08/20/21 for Dr. Meenakshi Alcazar to fill out since she's the patient's caretaker and she was checking on the status of this. She states she needs the forms before she goes back to work next Tuesday 08/31/21. Please advise patient back regarding this at [phone].

## 2021-08-27 ENCOUNTER — HOSPITAL ENCOUNTER (OUTPATIENT)
Age: 51
Discharge: HOME | End: 2021-08-27
Attending: PHYSICIAN ASSISTANT
Payer: MEDICAID

## 2021-08-27 DIAGNOSIS — M25.561 RIGHT KNEE PAIN, UNSPECIFIED CHRONICITY: ICD-10-CM

## 2021-08-27 DIAGNOSIS — M25.562 LEFT KNEE PAIN, UNSPECIFIED CHRONICITY: ICD-10-CM

## 2021-08-27 PROCEDURE — 73721 MRI JNT OF LWR EXTRE W/O DYE: CPT

## 2021-08-30 ENCOUNTER — TELEPHONE (OUTPATIENT)
Dept: ORTHOPEDIC SURGERY | Age: 51
End: 2021-08-30

## 2021-08-30 NOTE — TELEPHONE ENCOUNTER
Patient called in to request provider call her in to discuss MRI results.      Contact #: [phone]

## 2021-08-31 NOTE — TELEPHONE ENCOUNTER
Provided patient with MRI results and told her to make a follow up appointment with Dr. Tony Interiano.

## 2021-09-07 ENCOUNTER — OFFICE VISIT (OUTPATIENT)
Dept: ORTHOPEDIC SURGERY | Age: 51
End: 2021-09-07
Payer: MEDICAID

## 2021-09-07 ENCOUNTER — HOSPITAL ENCOUNTER (EMERGENCY)
Age: 51
Discharge: HOME | End: 2021-09-07
Attending: EMERGENCY MEDICINE
Payer: MEDICAID

## 2021-09-07 VITALS
RESPIRATION RATE: 16 BRPM | HEART RATE: 68 BPM | TEMPERATURE: 97.8 F | OXYGEN SATURATION: 98 % | WEIGHT: 184 LBS | BODY MASS INDEX: 36.12 KG/M2 | HEIGHT: 60 IN

## 2021-09-07 VITALS
OXYGEN SATURATION: 96 % | RESPIRATION RATE: 18 BRPM | TEMPERATURE: 97.8 F | SYSTOLIC BLOOD PRESSURE: 116 MMHG | HEART RATE: 60 BPM | DIASTOLIC BLOOD PRESSURE: 85 MMHG

## 2021-09-07 DIAGNOSIS — S83.242A TEAR OF MEDIAL MENISCUS OF LEFT KNEE, CURRENT, UNSPECIFIED TEAR TYPE, INITIAL ENCOUNTER: ICD-10-CM

## 2021-09-07 DIAGNOSIS — M17.11 PRIMARY OSTEOARTHRITIS OF RIGHT KNEE: Primary | ICD-10-CM

## 2021-09-07 DIAGNOSIS — K04.7 PERIAPICAL ABSCESS: ICD-10-CM

## 2021-09-07 DIAGNOSIS — R22.0 FACIAL SWELLING: Primary | ICD-10-CM

## 2021-09-07 PROCEDURE — 99283 EMERGENCY DEPT VISIT LOW MDM: CPT

## 2021-09-07 PROCEDURE — 99214 OFFICE O/P EST MOD 30 MIN: CPT | Performed by: ORTHOPAEDIC SURGERY

## 2021-09-07 PROCEDURE — 20611 DRAIN/INJ JOINT/BURSA W/US: CPT | Performed by: ORTHOPAEDIC SURGERY

## 2021-09-07 RX ORDER — AMOXICILLIN AND CLAVULANATE POTASSIUM 875; 125 MG/1; MG/1
1 TABLET, FILM COATED ORAL 2 TIMES DAILY
Qty: 20 TABLET | Refills: 0 | Status: SHIPPED | OUTPATIENT
Start: 2021-09-07 | End: 2021-09-17

## 2021-09-07 RX ORDER — TRIAMCINOLONE ACETONIDE 40 MG/ML
40 INJECTION, SUSPENSION INTRA-ARTICULAR; INTRAMUSCULAR ONCE
Status: COMPLETED | OUTPATIENT
Start: 2021-09-07 | End: 2021-09-07

## 2021-09-07 RX ADMIN — TRIAMCINOLONE ACETONIDE 40 MG: 40 INJECTION, SUSPENSION INTRA-ARTICULAR; INTRAMUSCULAR at 09:57

## 2021-09-07 NOTE — PROGRESS NOTES
HISTORY AND PHYSICAL          Patient: Sarah Gomez                MRN: [id number]       SSN: [id number]  YOB: 1970          AGE: 53 y.o. SEX: female      Patient scheduled for:  Arthroscopic left partial medial meniscectomy    Surgeon: Bia Horvath MD    ANESTHESIA TYPE:  General    HISTORY:     The patient was seen in the office today for a preoperative history and physical for an upcoming above listed surgery. Referred by Shola Swan. She rates her pain 6/10 today. Pain has been present for a couple years. Has been feeling a lot recently and her knee gives out on her. Notes the pain is limiting and has night pain. Hx of arthroscopic surgery right knee for a growth and left menisectomy. Patient describes the pain as locking, stiff, shooting, and swelling that is Constant in nature. Symptoms are worse with walking, standing, and stairs and is better with  nothing. Associated symptoms include Swelling. Since problem started, it: has worsened. Pain does wake patient up at night. Has taken no meds for the problem. Has tried following treatments: Injections:NO; Brace:NO; Therapy:NO; Cane/Crutch:NO         Due to the current findings, affected activity of daily living and continued pain and discomfort, surgical intervention is indicated. The alternatives, risks, and complications, including but not limited to infection, blood loss, need for blood transfusion, neurovascular damage, horace-incisional numbness, subcutaneous hematoma, bone fracture, anesthetic complications, DVT, PE, death, RSD, postoperative stiffness and pain, possible surgical scar, delayed healing and nonhealing, reflexive sympathetic dystrophy, damage to blood vessels and nerves, need for more surgery, MI, and stroke,  failure of hardware, gait disturbances,have been discussed. The patient understands and wishes to proceed with surgery.      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  gabapentin (NEURONTIN) 800 mg tablet TAKE 1 TABLET BY MOUTH THREE TIMES DAILY MAX DAILY AMOUNT 2,400MG AS DIRECTED 270 Tablet 3    sertraline (ZOLOFT) 100 mg tablet Take 2 Tablets by mouth daily. 180 Tablet 3    atorvastatin (LIPITOR) 40 mg tablet Take 1 Tab by mouth every evening. 90 Tab 0    dexlansoprazole (Dexilant) 60 mg CpDB capsule (delayed release)       buPROPion SR (WELLBUTRIN, ZYBAN) 200 mg SR tablet Take 200 mg by mouth two (2) times a day.       hydrocortisone (HYTONE) 2.5 % topical cream APPLY A THIN LAYER TO AFFECTED AREA TWO TIMES A DAY 28 g 0    hydrOXYchloroQUINE (PLAQUENIL) 200 mg tablet TAKE 1 TABLET BY MOUTH TWO TIMES A DAY 60 Tablet 0    clonazePAM (KlonoPIN) 2 mg tablet TAKE 1 TABLET BY MOUTH NIGHT AS NEEDED FOR ANXIETY 30 Tablet 0       ALLERGIES:     Allergies   Allergen Reactions    Apple Anaphylaxis    Cephalexin Hives    Strawberry Anaphylaxis    Wasps [Hymenoptera Allergenic Extract] Anaphylaxis SURGICAL HISTORY:     Past Surgical History:   Procedure Laterality Date    COLONOSCOPY N/A 2019    DIAGNOSTIC COLONOSCOPY with random bxs performed by Angelica Montenegro MD at [place] [address]  2020         HX APPENDECTOMY  1995    HX CHOLECYSTECTOMY  2002    HX GYN  2018    Abalation    HX GYN      rt ovary removed    HX GYN      left ovary clipped.  HX ORTHOPAEDIC      arthoscopic rt knee    HX ORTHOPAEDIC      left knee meniscis tear    HX ORTHOPAEDIC Right     meniscus repair    NEUROLOGICAL PROCEDURE UNLISTED  2018    laminectomy       SOCIAL HISTORY:     Social History     Socioeconomic History    Marital status:    Tobacco Use    Smoking status: Former Smoker     Packs/day: 0.50     Years: 10.00     Pack years: 5.00     Quit date: 2016     Years since quittin.5    Smokeless tobacco: Never Used    Tobacco comment: pt smoked on and off for 10 years and quit 6 years ago   Vaping Use    Vaping Use: Never used   Substance and Sexual Activity    Alcohol use: Yes     Alcohol/week: 0.8 standard drinks     Types: 1 Cans of beer per week     Comment: very rarely    Drug use: No    Sexual activity: Yes     Partners: Male       FAMILY HISTORY:     Family History   Problem Relation Age of Onset    Hypertension Mother     Arthritis-osteo Mother     GERD Father     Hypertension Father     Cancer Sister     Mult Sclerosis Maternal Grandmother     Diabetes Paternal Grandmother        REVIEW OF SYSTEMS:     Negative for fevers, chills, chest pain, shortness of breath, weight loss, recent illness     General: Negative for fever and chills. No unexpected change in weight. Denies fatigue. No change in appetite. Skin: Negative for rash or itching. HEENT: Negative for congestion, sore throat, neck pain and neck stiffness. No change in vision or hearing. Hasn't noted any enlarged lymph nodes in the neck.   Cardiovascular:  Negative for chest pain and palpitations. Has not noted pedal edema. Respiratory: Negative for cough, colds, sinus, hemoptysis, shortness of breath and wheezing. Gastrointestinal: Negative for nausea and vomiting, rectal bleeding, coffee ground emesis, abdominal pain, diarrhea and constipation. Genitourinary: Negative for dysuria, frequency urgency, or burning on micturition. No flank pain, no foul smelling urine, no difficulty with initiating urination. Hematological: Negative for bleeding or easy bruising. Musculoskeletal: Negative  for arthralgias, back pain or neck pain. Neurological: Negative for dizziness, seizures or syncopal episodes. Denies headaches. Endocrine: Denies excessive thirst.  No heat/cold intolerance. Psychiatric: Negative for depression or insomnia. PHYSICAL EXAMINATION:     VITALS:   Visit Vitals  Pulse 68   Temp 97.8 °F (36.6 °C)   Resp 16   Ht 5' (1.524 m)   Wt 184 lb (83.5 kg)   SpO2 98%   BMI 35.94 kg/m²     GEN:  Well developed, well nourished 53 y.o. female in no acute distress. HEENT: Normocephalic and atraumatic. Eyes: Conjunctivae and EOM are normal.Pupils are equal, round, and reactive to light. External ear normal appearance, external nose normal appearing. Mouth/Throat: Oropharynx is clear and moist, able to handle oral secretions w/out difficulty, airway patent  NECK: Supple. Normal ROM, No lymphadenopathy. Trachea is midline. No bruising, swelling or deformity  RESP: Clear to auscultation bilaterally. No wheezes, rales, rhonchi. Normal effort and breath sounds. No respiratory distress  CARDIO:  Normal rate, regular rhythm and normal heart sounds. No MGR. ABDOMEN: Soft, non-tender, non-distended, normoactive bowel sounds in all four quadrants. There is no tenderness. There is no rebound and no guarding.    BACK: No CVA or spinal tenderness  BREAST:  Deferred  PELVIC:    Deferred   RECTAL:  Deferred   :           Deferred  EXTREMITIES: EXAMINATION OF: left knee  Examination Left knee   Skin Intact   Range of motion    Effusion +   Medial joint line tenderness +   Lateral joint line tenderness +   Tenderness Pes Bursa -   Tenderness insertion MCL -   Tenderness insertion LCL -   Asias +   Patella crepitus +   Patella grind +   Lachman -   Pivot shift -   Anterior drawer -   Posterior drawer -   Varus stress -   Valgus stress -   Neurovascular Intact   Calf Swelling and Tenderness to Palpation -   Radha's Test -   Hamstring Cord Tightness -     NEUROVASCULAR: Sensation intact to light touch and strength grossly intact and symmetrical. No nystagmus. Positive distal pulses and capillary refill. DVT ASSESSMENT:  There is not  calf tenderness. No evidence of DVT seen on physical exam.  MOTOR: In tact  PSYCH: Alert an oriented to person, place and time. Mood, memory, affect, behavior and judgment normal       RADIOGRAPHS & DIAGNOSTIC STUDIES:     MRI of the left knee dated 8/27/2021 was reviewed and read by Dr. Donell Donaldson:   IMPRESSION  Complex tear of the periphery of the posterior horn and body of the medial  meniscus with associated 0.9 x 1.6 x 2.7 cm parameniscal cyst superficial to the  medial collateral ligament.     Low-grade tricompartmental chondrosis. MRI of the right knee dated 8/27/2021 was reviewed and read by Dr. Donell Donaldson:   IMPRESSION  Medial meniscal degeneration with mild extrusion.  No displaced meniscal  fragment.     Mild/moderate medial compartment chondrosis.     Moderate joint effusion.     Small Baker's cyst.        LABS:       @CBC:   Lab Results   Component Value Date/Time    WBC 8.1 11/12/2020 01:46 PM    RBC 4.09 11/12/2020 01:46 PM    HGB 12.2 11/12/2020 01:46 PM    HCT 40.1 11/12/2020 01:46 PM    PLATELET 210 43/92/2597 01:46 PM    and BMP:   Lab Results   Component Value Date/Time    Glucose 105 (H) 11/12/2020 01:46 PM    Sodium 142 11/12/2020 01:46 PM    Potassium 4.0 11/12/2020 01:46 PM    Chloride 101 11/12/2020 01:46 PM    CO2 33 (H) 11/12/2020 01:46 PM    BUN 15 11/12/2020 01:46 PM    Creatinine 0.9 11/12/2020 01:46 PM    Calcium 9.3 11/12/2020 01:46 PM   @    Preoperative labs were reviewed and are substantially within normal limits   EKG: please see chart      ASSESSMENT:       Encounter Diagnoses   Name Primary?  Tear of medial meniscus of left knee, current, unspecified tear type, initial encounter     Primary osteoarthritis of right knee Yes       PLAN:     Again, the alternatives, risks, and complications, as well as expected outcome were discussed. The patient understands and agrees to proceed with Arthroscopic left partial medial meniscectomy. The patient was counseled at length about the risks of hank Covid-19 during their perioperative period and any recovery window from their procedure. The patient was made aware that hank Covid-19  may worsen their prognosis for recovering from their procedure and lend to a higher morbidity and/or mortality risk. All material risks, benefits, and reasonable alternatives including postponing the procedure were discussed. The patient does  wish to proceed with the procedure at this time.    Patient given orders listed below:    Orders Placed This Encounter    ARTHROCENTESIS ASPIR&/INJ MAJOR JT/BURSA W/US    triamcinolone acetonide (KENALOG-40) 40 mg/mL injection 40 mg           Keith Monahan M.D.   [facility]     11/18/2021  11:48 AM

## 2021-09-07 NOTE — ED TRIAGE NOTES
Pt reports right sided facial swelling since yesterday. Pt states the swelling increases this morning.

## 2021-09-07 NOTE — ED PROVIDER NOTES
EMERGENCY DEPARTMENT HISTORY AND PHYSICAL EXAM    12:42 PM      Date: 9/7/2021  Patient Name: Aissatou Olivarez    History of Presenting Illness     Chief Complaint   Patient presents with    Facial Pain         History Provided By: Patient    Additional History (Context): Aissatou Olivarez is a 53 y.o. female with past medical history significant for anxiety, endometriosis, fibromyalgia, GERD, MRSA, depression, anemia who presents with complaints of left-sided facial swelling which was mild yesterday and worse today. She is concerned about a possible dental infection. She has a known bad tooth and multiple pulled teeth on the left upper side as well. She denies any injury, trauma, or fall. She denies fever or chills or any drainage from the mouth. No neck pain or stiffness, rash, headache, dizziness, or ear pain. PCP: Anirudh Osuna NP    Current Outpatient Medications   Medication Sig Dispense Refill    amoxicillin-clavulanate (Augmentin) 875-125 mg per tablet Take 1 Tablet by mouth two (2) times a day for 10 days. 20 Tablet 0    clonazePAM (KlonoPIN) 2 mg tablet TAKE 1 TABLET BY MOUTH NIGHTLY AS NEEDED FOR ANXIETY **MAX DAILY OF 1 TABLET 30 Tablet 0    buprenorphine-naloxone (SUBOXONE) 8-2 mg film sublingaul film 1 Film by SubLINGual route daily.  hydrOXYchloroQUINE (PLAQUENIL) 200 mg tablet Take 1 Tablet by mouth two (2) times a day. 60 Tablet 0    clobetasoL (TEMOVATE) 0.05 % ointment Apply  to affected area two (2) times a day. 15 g 0    gabapentin (NEURONTIN) 800 mg tablet TAKE 1 TABLET BY MOUTH THREE TIMES DAILY MAX DAILY AMOUNT 2,400MG AS DIRECTED 270 Tablet 3    sertraline (ZOLOFT) 100 mg tablet Take 2 Tablets by mouth daily. 180 Tablet 3    omeprazole (PRILOSEC) 40 mg capsule TAKE 1 CAPSULE BY MOUTH DAILY BEFORE BREAKFAST AND DINNER 90 Capsule 3    valACYclovir (VALTREX) 1 gram tablet Take 1 Tab by mouth daily.  90 Tab 3    hydrocortisone (HYTONE) 2.5 % topical cream Apply  to affected area two (2) times a day. use thin layer 30 g 0    atorvastatin (LIPITOR) 40 mg tablet Take 1 Tab by mouth every evening. 90 Tab 0    cetirizine (ZYRTEC) 10 mg tablet Take 1 Tab by mouth two (2) times a day. (Patient taking differently: Take 10 mg by mouth daily.) 60 Tab 2    dicyclomine (BENTYL) 10 mg capsule Take 10 mg by mouth 4 times daily (before meals and nightly). Past History     Past Medical History:  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    IBS (irritable bowel syndrome)     Ill-defined condition 2011    h/o of \"blood clot in my lung, after a picc line\"    Ill-defined condition     painless rectal bleeding    MRSA infection     h/o, not current    Ovarian cyst     Pain, upper back     Psychiatric disorder     depression     Stress incontinence     Tattoo     Unspecified deficiency anemia 1999       Past Surgical History:  Past Surgical History:   Procedure Laterality Date    COLONOSCOPY N/A 6/28/2019    DIAGNOSTIC COLONOSCOPY with random bxs performed by Jamaica Velazquez MD at [place] [id number]  6/22/2020         HX APPENDECTOMY  1995    HX CHOLECYSTECTOMY  2002    HX GYN  2018    Abalation    HX GYN      rt ovary removed    HX GYN      left ovary clipped.     HX ORTHOPAEDIC      arthoscopic rt knee    HX ORTHOPAEDIC      left knee meniscis tear    HX ORTHOPAEDIC Right     meniscus repair    NEUROLOGICAL PROCEDURE UNLISTED  12/2018    laminectomy       Family History:  Family History   Problem Relation Age of Onset    Hypertension Mother     Arthritis-osteo Mother     GERD Father     Hypertension Father     Cancer Sister     MS Maternal Grandmother     Diabetes Paternal Grandmother        Social History:  Social History     Tobacco Use    Smoking status: Former Smoker     Packs/day: 0.50     Years: 10.00     Pack years: 5.00     Quit date: 2016     Years since quittin.3    Smokeless tobacco: Never Used    Tobacco comment: pt smoked on and off for 10 years and quit 6 years ago   Vaping Use    Vaping Use: Never used   Substance Use Topics    Alcohol use: Yes     Alcohol/week: 0.8 standard drinks     Types: 1 Cans of beer per week     Comment: very rarely    Drug use: No       Allergies: Allergies   Allergen Reactions    Apple Anaphylaxis    Cephalexin Hives    Strawberry Anaphylaxis    Wasps [Hymenoptera Allergenic Extract] Anaphylaxis         Review of Systems       Review of Systems   Constitutional: Negative. Negative for chills and fever. HENT: Positive for dental problem, facial swelling and sinus pressure. Negative for congestion, ear discharge, ear pain, postnasal drip, rhinorrhea, sinus pain, sneezing, sore throat, trouble swallowing and voice change. Eyes: Negative. Negative for pain and redness. Respiratory: Negative. Negative for cough and shortness of breath. Cardiovascular: Negative. Negative for chest pain, palpitations and leg swelling. Gastrointestinal: Negative. Negative for abdominal pain, constipation, diarrhea, nausea and vomiting. Genitourinary: Negative. Negative for dysuria, frequency, hematuria and urgency. Musculoskeletal: Negative. Negative for back pain, gait problem, joint swelling and neck pain. Skin: Negative. Negative for rash and wound. Neurological: Negative. Negative for dizziness, seizures, speech difficulty, weakness, light-headedness and headaches. Hematological: Negative for adenopathy. Does not bruise/bleed easily.    All other systems reviewed and are negative. Physical Exam     Visit Vitals  /85   Pulse 60   Temp 97.8 °F (36.6 °C)   Resp 18   SpO2 96%         Physical Exam  Vitals and nursing note reviewed. Constitutional:       General: She is not in acute distress. Appearance: Normal appearance. She is not ill-appearing, toxic-appearing or diaphoretic. HENT:      Head: Normocephalic and atraumatic. Jaw: There is normal jaw occlusion. No trismus, tenderness, swelling, pain on movement or malocclusion. Salivary Glands: Right salivary gland is not diffusely enlarged or tender. Left salivary gland is not diffusely enlarged or tender. Right Ear: Tympanic membrane, ear canal and external ear normal. There is no impacted cerumen. Left Ear: Tympanic membrane, ear canal and external ear normal. There is no impacted cerumen. Nose: No septal deviation, congestion or rhinorrhea. Right Nostril: No septal hematoma. Left Nostril: No septal hematoma. Left Sinus: Maxillary sinus tenderness present. Mouth/Throat:      Mouth: Mucous membranes are moist.      Dentition: Abnormal dentition. Dental caries present. Pharynx: Oropharynx is clear. Uvula midline. No pharyngeal swelling, oropharyngeal exudate, posterior oropharyngeal erythema or uvula swelling. Tonsils: No tonsillar exudate or tonsillar abscesses. Eyes:      General: Lids are normal. Vision grossly intact. Right eye: No discharge. Left eye: No discharge. Conjunctiva/sclera: Conjunctivae normal.      Pupils: Pupils are equal, round, and reactive to light. Cardiovascular:      Rate and Rhythm: Normal rate and regular rhythm. Pulses: Normal pulses. Heart sounds: Normal heart sounds. Pulmonary:      Effort: Pulmonary effort is normal.      Breath sounds: Normal breath sounds. Musculoskeletal:         General: Normal range of motion.       Cervical back: Full passive range of motion without pain, normal range of motion and neck supple. No edema or erythema. No muscular tenderness. Lymphadenopathy:      Cervical: No cervical adenopathy. Skin:     General: Skin is warm and dry. Capillary Refill: Capillary refill takes less than 2 seconds. Neurological:      General: No focal deficit present. Mental Status: She is alert and oriented to person, place, and time. Psychiatric:         Mood and Affect: Mood normal.         Behavior: Behavior normal. Behavior is cooperative. Diagnostic Study Results     Labs -  No results found for this or any previous visit (from the past 12 hour(s)). Radiologic Studies -   No orders to display         Medical Decision Making   I am the first provider for this patient. I reviewed available nursing notes, past medical history, past surgical history, family history and social history. Vital Signs-Reviewed the patient's vital signs. Records Reviewed: Nursing Notes and Old Medical Records (Time of Review: 12:42 PM)    Pulse Oximetry Analysis - 96% on RA- normal     ED Course: Progress Notes, Reevaluation, and Consults:  12:42 PM  Initial assessment performed. The patients presenting problems have been discussed, and they/their family are in agreement with the care plan formulated and outlined with them. I have encouraged them to ask questions as they arise throughout their visit. Provider Notes (Medical Decision Making):     Differential diagnosis includes: Periapical abscess, pulpitis, odontogenic infection, sinusitis, trauma, alveolar osteitis, necrotizing gingivitis, retropharyngeal abscess, peritonsillar abscess. Patient presents ambulatory, no acute distress. Patient is well-hydrated and nontoxic in appearance. Exam reveals very minimal pain to the left maxillary area with overlying tenderness. Very poor dentition with multiple caries along the surface of her first molar with appropriate tenderness on palpation.   No localized induration or fluctuance to suggest drainable abscess, although I do suspect an early developing abscess. No sublingual/submandibular induration, trismus, or stridor. Floor of mouth is soft. Nasolabial folds are soft with no facial swelling. Normal speech. Handling oral secretions without difficulty. Patient has full range of motion of the neck. Low suspicion for Carlos's angina or deep space infection. Will discharge home with antibiotics and medication for pain. Patient is advised to follow-up with a dentist/oral surgeon to further manage this condition. Diagnosis     Clinical Impression:   1. Facial swelling    2. Periapical abscess        Disposition: Discharged home in stable condition    DISCHARGE NOTE:     Patient has been reexamined. Patient has no new complaints, changes, or physical findings. Care plan outlined and precautions discussed. Results of physical exam findings were reviewed with the patient. All medications were reviewed with the patient; will discharge home with Augmentin. All of patient's questions and concerns were addressed. Patient was instructed and agrees to follow up with dentist/oral surgery, as well as to return to the ED upon further deterioration. Patient is ready to go home. Follow-up Information     Follow up With Specialties Details Why Contact Info    [address]  Schedule an appointment as soon as possible for a visit  Follow-up from the Emergency Department Hanna Perea [phone] [facility]    [address] [facility] EMERGENCY DEPT Emergency Medicine  As needed, If symptoms worsen [address] [facility] [place] [address]  [phone]           Discharge Medication List as of 9/7/2021 12:46 PM      START taking these medications    Details   amoxicillin-clavulanate (Augmentin) 875-125 mg per tablet Take 1 Tablet by mouth two (2) times a day for 10 days. , Normal, Disp-20 Tablet, R-0         CONTINUE these medications which have NOT CHANGED    Details clonazePAM (KlonoPIN) 2 mg tablet TAKE 1 TABLET BY MOUTH NIGHTLY AS NEEDED FOR ANXIETY **MAX DAILY OF 1 TABLET, Normal, Disp-30 Tablet, R-0      buprenorphine-naloxone (SUBOXONE) 8-2 mg film sublingaul film 1 Film by SubLINGual route daily. , Historical Med      hydrOXYchloroQUINE (PLAQUENIL) 200 mg tablet Take 1 Tablet by mouth two (2) times a day., Normal, Disp-60 Tablet, R-0      clobetasoL (TEMOVATE) 0.05 % ointment Apply  to affected area two (2) times a day., Normal, Disp-15 g, R-0      gabapentin (NEURONTIN) 800 mg tablet TAKE 1 TABLET BY MOUTH THREE TIMES DAILY MAX DAILY AMOUNT 2,400MG AS DIRECTED, Normal, Disp-270 Tablet, R-3      sertraline (ZOLOFT) 100 mg tablet Take 2 Tablets by mouth daily. , Normal, Disp-180 Tablet, R-3      omeprazole (PRILOSEC) 40 mg capsule TAKE 1 CAPSULE BY MOUTH DAILY BEFORE BREAKFAST AND DINNER, Normal, Disp-90 Capsule, R-3      valACYclovir (VALTREX) 1 gram tablet Take 1 Tab by mouth daily. , Normal, Disp-90 Tab, R-3      hydrocortisone (HYTONE) 2.5 % topical cream Apply  to affected area two (2) times a day. use thin layer, Normal, Disp-30 g, R-0      atorvastatin (LIPITOR) 40 mg tablet Take 1 Tab by mouth every evening., Normal, Disp-90 Tab, R-0refills needed      cetirizine (ZYRTEC) 10 mg tablet Take 1 Tab by mouth two (2) times a day., Normal, Disp-60 Tab,R-2      dicyclomine (BENTYL) 10 mg capsule Take 10 mg by mouth 4 times daily (before meals and nightly). , Historical Med               Dictation disclaimer:  Please note that this dictation was completed with [employer], the [email or web address] voice recognition software. Quite often unanticipated grammatical, syntax, homophones, and other interpretive errors are inadvertently transcribed by the computer software. Please disregard these errors. Please excuse any errors that have escaped final proofreading.

## 2021-09-07 NOTE — DISCHARGE INSTRUCTIONS
Follow-up with one of the provided dental clinics below:    [place] [address] [phone]  [place] [address] [address] [phone]    Call to schedule an appointment.

## 2021-09-20 DIAGNOSIS — R21 RASH AND NONSPECIFIC SKIN ERUPTION: ICD-10-CM

## 2021-09-20 DIAGNOSIS — L50.1 IDIOPATHIC URTICARIA: ICD-10-CM

## 2021-09-20 DIAGNOSIS — M79.10 MUSCLE PAIN: ICD-10-CM

## 2021-09-20 DIAGNOSIS — F32.A ANXIETY AND DEPRESSION: ICD-10-CM

## 2021-09-20 DIAGNOSIS — F41.9 ANXIETY AND DEPRESSION: ICD-10-CM

## 2021-09-20 DIAGNOSIS — M79.7 FIBROMYALGIA: ICD-10-CM

## 2021-09-20 RX ORDER — GABAPENTIN 800 MG/1
TABLET ORAL
Qty: 270 TABLET | Refills: 3 | Status: CANCELLED | OUTPATIENT
Start: 2021-09-20

## 2021-09-20 NOTE — TELEPHONE ENCOUNTER
Neurontin was sent on 7/9/21 #270 with 3 refills    Last Visit: 7/9/21 with NP Alena Lucero  Next Appointment: Rudy Rosales to follow-up in 3 months  Previous Refill Encounter(s): 7/6/21 Plaquenil #60, 8/17/21 Klonopin #30    Requested Prescriptions     Pending Prescriptions Disp Refills    hydrOXYchloroQUINE (PLAQUENIL) 200 mg tablet 60 Tablet 0     Sig: Take 1 Tablet by mouth two (2) times a day.  clonazePAM (KlonoPIN) 2 mg tablet 30 Tablet 0     Sig: Take 1 Tablet by mouth nightly as needed (anxiety). Max Daily Amount: 2 mg.

## 2021-09-21 RX ORDER — HYDROXYCHLOROQUINE SULFATE 200 MG/1
200 TABLET, FILM COATED ORAL 2 TIMES DAILY
Qty: 60 TABLET | Refills: 0 | Status: SHIPPED | OUTPATIENT
Start: 2021-09-21 | End: 2021-10-26

## 2021-09-21 RX ORDER — CLONAZEPAM 2 MG/1
2 TABLET ORAL
Qty: 30 TABLET | Refills: 0 | Status: SHIPPED | OUTPATIENT
Start: 2021-09-21 | End: 2021-10-22

## 2021-10-19 DIAGNOSIS — R21 RASH AND NONSPECIFIC SKIN ERUPTION: ICD-10-CM

## 2021-10-19 DIAGNOSIS — F41.9 ANXIETY AND DEPRESSION: ICD-10-CM

## 2021-10-19 DIAGNOSIS — F32.A ANXIETY AND DEPRESSION: ICD-10-CM

## 2021-10-19 DIAGNOSIS — M79.10 MUSCLE PAIN: ICD-10-CM

## 2021-10-19 DIAGNOSIS — L50.1 IDIOPATHIC URTICARIA: ICD-10-CM

## 2021-10-22 NOTE — TELEPHONE ENCOUNTER
[facility] date for Klonopin as 9/22/21 for a 30 d/s.      Last Visit: 7/9/21 with HEENA Arias  Next Appointment: no show 10/4/21  Previous Refill Encounter(s): 9/21/21 30 d/s    Requested Prescriptions     Pending Prescriptions Disp Refills    hydrOXYchloroQUINE (PLAQUENIL) 200 mg tablet [Pharmacy Med Name: HYDROXYCHLOROQUINE 200 MG TAB] 60 Tablet 0     Sig: TAKE 1 TABLET BY MOUTH TWO TIMES A DAY    clonazePAM (KlonoPIN) 2 mg tablet [Pharmacy Med Name: CLONAZEPAM 2 MG TABLET] 30 Tablet 0     Sig: TAKE 1 TABLET BY MOUTH NIGHT AS NEEDED FOR ANXIETY

## 2021-10-26 RX ORDER — CLONAZEPAM 2 MG/1
TABLET ORAL
Qty: 30 TABLET | Refills: 0 | Status: SHIPPED | OUTPATIENT
Start: 2021-10-26 | End: 2021-11-25 | Stop reason: SDUPTHER

## 2021-10-26 RX ORDER — HYDROXYCHLOROQUINE SULFATE 200 MG/1
TABLET, FILM COATED ORAL
Qty: 60 TABLET | Refills: 0 | Status: SHIPPED | OUTPATIENT
Start: 2021-10-26 | End: 2021-12-08 | Stop reason: SDUPTHER

## 2021-10-26 NOTE — TELEPHONE ENCOUNTER
Last Visit: 7/9/21 with NP Jerad May  Next Appointment: no show 10/4/21  Previous Refill Encounter(s): 3/10/21 #1    Requested Prescriptions     Pending Prescriptions Disp Refills    hydrocortisone (HYTONE) 2.5 % topical cream [Pharmacy Med Name: HYDROCORTISONE 2.5% CREAM] 28 g 0     Sig: APPLY A THIN LAYER TO AFFECTED AREA TWO TIMES A DAY

## 2021-10-26 NOTE — TELEPHONE ENCOUNTER
Requested Prescriptions     Pending Prescriptions Disp Refills    hydrocortisone (HYTONE) 2.5 % topical cream [Pharmacy Med Name: HYDROCORTISONE 2.5% CREAM] 28 g      Sig: APPLY A THIN LAYER TO AFFECTED AREA TWO TIMES A DAY

## 2021-10-28 RX ORDER — HYDROCORTISONE 25 MG/G
CREAM TOPICAL
Qty: 28 G | Refills: 0 | Status: SHIPPED | OUTPATIENT
Start: 2021-10-28 | End: 2022-05-15 | Stop reason: SDUPTHER

## 2021-11-10 DIAGNOSIS — Z01.818 PREOP EXAMINATION: Primary | ICD-10-CM

## 2021-11-15 ENCOUNTER — HOSPITAL ENCOUNTER (OUTPATIENT)
Dept: PREADMISSION TESTING | Age: 51
Discharge: HOME | End: 2021-11-15
Payer: MEDICAID

## 2021-11-15 ENCOUNTER — OFFICE VISIT (OUTPATIENT)
Dept: FAMILY MEDICINE CLINIC | Age: 51
End: 2021-11-15
Payer: MEDICAID

## 2021-11-15 VITALS
WEIGHT: 178.6 LBS | OXYGEN SATURATION: 96 % | HEART RATE: 62 BPM | BODY MASS INDEX: 35.06 KG/M2 | RESPIRATION RATE: 16 BRPM | DIASTOLIC BLOOD PRESSURE: 81 MMHG | HEIGHT: 60 IN | SYSTOLIC BLOOD PRESSURE: 130 MMHG | TEMPERATURE: 96.8 F

## 2021-11-15 DIAGNOSIS — Z01.818 PREOP EXAMINATION: ICD-10-CM

## 2021-11-15 DIAGNOSIS — F41.9 ANXIETY AND DEPRESSION: ICD-10-CM

## 2021-11-15 DIAGNOSIS — S83.8X2A INJURY OF MENISCUS OF LEFT KNEE, INITIAL ENCOUNTER: ICD-10-CM

## 2021-11-15 DIAGNOSIS — F32.A ANXIETY AND DEPRESSION: ICD-10-CM

## 2021-11-15 DIAGNOSIS — M79.7 FIBROMYALGIA: Primary | ICD-10-CM

## 2021-11-15 LAB — SARS-COV-2, NAA: NOT DETECTED

## 2021-11-15 PROCEDURE — 99214 OFFICE O/P EST MOD 30 MIN: CPT | Performed by: FAMILY MEDICINE

## 2021-11-15 PROCEDURE — U0003 INFECTIOUS AGENT DETECTION BY NUCLEIC ACID (DNA OR RNA); SEVERE ACUTE RESPIRATORY SYNDROME CORONAVIRUS 2 (SARS-COV-2) (CORONAVIRUS DISEASE [COVID-19]), AMPLIFIED PROBE TECHNIQUE, MAKING USE OF HIGH THROUGHPUT TECHNOLOGIES AS DESCRIBED BY CMS-2020-01-R: HCPCS

## 2021-11-15 RX ORDER — BUPROPION HYDROCHLORIDE 200 MG/1
200 TABLET, EXTENDED RELEASE ORAL 2 TIMES DAILY
COMMUNITY
End: 2022-08-17 | Stop reason: ALTCHOICE

## 2021-11-15 RX ORDER — DEXLANSOPRAZOLE 60 MG/1
CAPSULE, DELAYED RELEASE ORAL
COMMUNITY
Start: 2021-08-14 | End: 2022-08-17 | Stop reason: ALTCHOICE

## 2021-11-15 NOTE — PROGRESS NOTES
Preoperative Evaluation    Date of Exam: 11/15/2021    Charles Foster is a 53 y.o. female (:1970) who presents for preoperative evaluation. Procedure/Surgery:left partial meniscectomy   Date of Procedure/Surgery: 2021  Surgeon: [facility] Facility: [facility]  Primary Physician: [place] NP  Latex Allergy: none    Patient has chronic osteoarthritis of the left knee. She is scheduled for a partial left knee meniscectomy    She has no new complaints today.       Problem List:     Patient Active Problem List    Diagnosis Date Noted    HNP (herniated nucleus pulposus), lumbar 2018    Spinal stenosis of lumbar region 2018    History of shingles 2018    Spinal stenosis, lumbar region with neurogenic claudication 2018    Fibromyalgia 2012    HSV (herpes simplex virus) infection 2012    Cervical pain 2012    Nerve pain 2012    Myofascial pain 2012    Migraine 2012    Allergic rhinitis due to pollen 2012    Abnormal liver function 2012    Depressive disorder, not elsewhere classified 2012    Anxiety state, unspecified 2012    Irritable bowel syndrome 2012    Abnormal glandular Papanicolaou smear of cervix 2012     Medical History:     Past Medical History:   Diagnosis Date    Abnormal Papanicolaou smear of cervix     LEEPs    Anxiety     Anxiety     Arthritis     Bruises easily     Chest pain     Clostridium difficile colitis 2007    Diarrhea     Dry mouth     Endometriosis     resolved with surgery    Esophageal reflux     Fatigue     Fibromyalgia     GERD (gastroesophageal reflux disease)     High cholesterol     History of shingles     not current    HSV-2 infection 2003    IBS (irritable bowel syndrome)     Ill-defined condition     h/o of \"blood clot in my lung, after a picc line\"    Ill-defined condition     painless rectal bleeding    MRSA infection     h/o, not current    Ovarian cyst     Pain, upper back     Psychiatric disorder     depression     Stress incontinence     Tattoo     Unspecified deficiency anemia 1999     Allergies: Allergies   Allergen Reactions    Apple Anaphylaxis    Cephalexin Hives    Strawberry Anaphylaxis    Wasps [Hymenoptera Allergenic Extract] Anaphylaxis      Medications:     Current Outpatient Medications   Medication Sig    dexlansoprazole (Dexilant) 60 mg CpDB capsule (delayed release)     buPROPion SR (WELLBUTRIN, ZYBAN) 200 mg SR tablet Take 200 mg by mouth two (2) times a day.  hydrocortisone (HYTONE) 2.5 % topical cream APPLY A THIN LAYER TO AFFECTED AREA TWO TIMES A DAY    hydrOXYchloroQUINE (PLAQUENIL) 200 mg tablet TAKE 1 TABLET BY MOUTH TWO TIMES A DAY    clonazePAM (KlonoPIN) 2 mg tablet TAKE 1 TABLET BY MOUTH NIGHT AS NEEDED FOR ANXIETY    buprenorphine-naloxone (SUBOXONE) 8-2 mg film sublingaul film 1 Film by SubLINGual route daily.  gabapentin (NEURONTIN) 800 mg tablet TAKE 1 TABLET BY MOUTH THREE TIMES DAILY MAX DAILY AMOUNT 2,400MG AS DIRECTED    sertraline (ZOLOFT) 100 mg tablet Take 2 Tablets by mouth daily.  atorvastatin (LIPITOR) 40 mg tablet Take 1 Tab by mouth every evening. No current facility-administered medications for this visit. Surgical History:     Past Surgical History:   Procedure Laterality Date    COLONOSCOPY N/A 6/28/2019    DIAGNOSTIC COLONOSCOPY with random bxs performed by Yaquelin Alberts MD at [place] [address]  6/22/2020         HX APPENDECTOMY  1995    HX CHOLECYSTECTOMY  2002    HX GYN  2018    Abalation    HX GYN      rt ovary removed    HX GYN      left ovary clipped.     HX ORTHOPAEDIC      arthoscopic rt knee    HX ORTHOPAEDIC      left knee meniscis tear    HX ORTHOPAEDIC Right     meniscus repair    NEUROLOGICAL PROCEDURE UNLISTED  12/2018    laminectomy     Social History:     Social History Socioeconomic History    Marital status:    Tobacco Use    Smoking status: Former Smoker     Packs/day: 0.50     Years: 10.00     Pack years: 5.00     Quit date: 2016     Years since quittin.5    Smokeless tobacco: Never Used    Tobacco comment: pt smoked on and off for 10 years and quit 6 years ago   Vaping Use    Vaping Use: Never used   Substance and Sexual Activity    Alcohol use: Yes     Alcohol/week: 0.8 standard drinks     Types: 1 Cans of beer per week     Comment: very rarely    Drug use: No    Sexual activity: Yes     Partners: Male       Recent use of: No recent use of aspirin (ASA), NSAIDS or steroids    Tetanus up to date: tetanus status unknown to the patient      Anesthesia Complications: None  History of abnormal bleeding : None  History of Blood Transfusions: no  Health Care Directive or Living Will: no    REVIEW OF SYSTEMS:  A comprehensive review of systems was negative except for that written in the HPI. EXAM:   Visit Vitals  /81 (BP 1 Location: Right arm, BP Patient Position: Sitting, BP Cuff Size: Large adult)   Pulse 62   Temp 96.8 °F (36 °C) (Temporal)   Resp 16   Ht 5' (1.524 m)   Wt 178 lb 9.6 oz (81 kg)   SpO2 96%   BMI 34.88 kg/m²     General appearance - alert, well appearing, and in no distress  Ears - bilateral TM's and external ear canals normal  Lymphatics - no palpable lymphadenopathy, no hepatosplenomegaly  Chest - clear to auscultation, no wheezes, rales or rhonchi, symmetric air entry  Heart - normal rate, regular rhythm, normal S1, S2, no murmurs, rubs, clicks or gallops  Abdomen - soft, nontender, nondistended, no masses or organomegaly        Neurological - grossly nonfocal    Extremities - no pedal edema noted  Skin - normal coloration and turgor, no rashes, no suspicious skin lesions noted      DIAGNOSTICS:   1. EKG: EKG FINDINGS - not ordered   2. CXR: not ordered  3.  Labs: not ordered    IMPRESSION:   Fibromyalgia  OA knee  Preop exam      No contraindications to planned surgery    Melanie Carrasco MD   11/15/2021

## 2021-11-15 NOTE — PROGRESS NOTES
Chief Complaint   Patient presents with    Pre-op Exam     partial left knee arthoscopic with Dr. Luly Snider        Pt preferred language for health care discussion is english.     Is someone accompanying this pt? no    Is the patient using any DME equipment during OV? no    Depression Screening:  3 most recent PHQ Screens 5/25/2021 3/30/2021 3/29/2021 1/31/2019 11/19/2018   Little interest or pleasure in doing things Not at all More than half the days Not at all Several days Several days   Feeling down, depressed, irritable, or hopeless Not at all Several days Not at all More than half the days Several days   Total Score PHQ 2 0 3 0 3 2   Trouble falling or staying asleep, or sleeping too much - Not at all - Nearly every day Nearly every day   Feeling tired or having little energy - Nearly every day - More than half the days Nearly every day   Poor appetite, weight loss, or overeating - Not at all - Not at all More than half the days   Feeling bad about yourself - or that you are a failure or have let yourself or your family down - Not at all - Not at all Not at all   Trouble concentrating on things such as school, work, reading, or watching TV - Several days - More than half the days Several days   Moving or speaking so slowly that other people could have noticed; or the opposite being so fidgety that others notice - Not at all - Not at all Not at all   Thoughts of being better off dead, or hurting yourself in some way - Not at all - Not at all Not at all   PHQ 9 Score - 7 - 10 11   How difficult have these problems made it for you to do your work, take care of your home and get along with others - Somewhat difficult - Very difficult Extremely difficult       Learning Assessment:  Learning Assessment 5/18/2018   PRIMARY LEARNER Patient   HIGHEST LEVEL OF EDUCATION - PRIMARY LEARNER  SOME COLLEGE   BARRIERS PRIMARY LEARNER NONE   PRIMARY LANGUAGE ENGLISH   LEARNER PREFERENCE PRIMARY DEMONSTRATION   ANSWERED BY self RELATIONSHIP SELF         Health Maintenance reviewed and discussed per provider. Yes        Advance Directive:  1. Do you have an advance directive in place? Patient Reply:no    2. If not, would you like material regarding how to put one in place? Patient Reply: no    Coordination of Care:  1. Have you been to the ER, urgent care clinic since your last visit? Hospitalized since your last visit? no    2. Have you seen or consulted any other health care providers outside of the [address] [place] [address] since your last visit? Include any pap smears, colon screening or mammograms?   Yes, NP Caryl Rodriguez

## 2021-11-18 ENCOUNTER — ANESTHESIA EVENT (OUTPATIENT)
Dept: SURGERY | Age: 51
End: 2021-11-18
Payer: MEDICAID

## 2021-11-18 NOTE — PATIENT INSTRUCTIONS
Fibromyalgia: Care Instructions  Overview     Fibromyalgia is a painful condition that is not completely understood by medical experts. The cause of fibromyalgia is not known. It can make you feel tired and ache all over. It causes tender spots at specific points of the body that hurt only when you press on them. You may have trouble sleeping, as well as other symptoms. These problems can upset your work and home life. Symptoms tend to come and go, although they may never go away completely. Fibromyalgia does not harm your muscles, joints, or organs. Follow-up care is a key part of your treatment and safety. Be sure to make and go to all appointments, and call your doctor if you are having problems. It's also a good idea to know your test results and keep a list of the medicines you take. How can you care for yourself at home? · Exercise often. Walk, swim, or bike to help with pain and sleep problems and to make you feel better. · Try to get a good night's sleep. Go to bed and get up at the same time each day, whether you feel rested or not. Make sure you have a good mattress and pillow. · Reduce stress. Avoid things that cause you stress, if you can. If not, work at making them less stressful. Learn to use biofeedback, guided imagery, meditation, or other methods to relax. · Make healthy changes. Eat a balanced diet, quit smoking, and limit alcohol and caffeine. · Use a heating pad set on low or take warm baths or showers for pain. Using cold packs for up to 20 minutes at a time can also relieve pain. Put a thin cloth between the cold pack and your skin. A gentle massage might help too. · Be safe with medicines. Take your medicines exactly as prescribed. Call your doctor if you think you are having a problem with your medicine. Your doctor may talk to you about taking antidepressant medicines. These medicines may improve sleep, relieve pain, and in some cases treat depression.   · Learn about fibromyalgia. This makes coping easier. Then, take an active role in your treatment. · Think about joining a support group with others who have fibromyalgia to learn more and get support. When should you call for help? Watch closely for changes in your health, and be sure to contact your doctor if:    · You feel sad, helpless, or hopeless; lose interest in things you used to enjoy; or have other symptoms of depression.     · Your fibromyalgia symptoms get worse. Where can you learn more? Go to http://www.gray.com/  Enter V003 in the search box to learn more about \"Fibromyalgia: Care Instructions. \"  Current as of: April 8, 2021               Content Version: 13.0  © 2975-1358 [employer]. Care instructions adapted under license by [employer] (which disclaims liability or warranty for this information). If you have questions about a medical condition or this instruction, always ask your healthcare professional. [address] any warranty or liability for your use of this information.

## 2021-11-19 ENCOUNTER — HOSPITAL ENCOUNTER (OUTPATIENT)
Age: 51
Setting detail: OUTPATIENT SURGERY
Discharge: HOME | End: 2021-11-19
Attending: ORTHOPAEDIC SURGERY | Admitting: ORTHOPAEDIC SURGERY
Payer: MEDICAID

## 2021-11-19 ENCOUNTER — ANESTHESIA (OUTPATIENT)
Dept: SURGERY | Age: 51
End: 2021-11-19
Payer: MEDICAID

## 2021-11-19 VITALS
OXYGEN SATURATION: 97 % | SYSTOLIC BLOOD PRESSURE: 98 MMHG | HEIGHT: 60 IN | WEIGHT: 177 LBS | BODY MASS INDEX: 34.75 KG/M2 | RESPIRATION RATE: 12 BRPM | HEART RATE: 57 BPM | TEMPERATURE: 98 F | DIASTOLIC BLOOD PRESSURE: 66 MMHG

## 2021-11-19 DIAGNOSIS — S83.232D COMPLEX TEAR OF MEDIAL MENISCUS OF LEFT KNEE AS CURRENT INJURY, SUBSEQUENT ENCOUNTER: Primary | ICD-10-CM

## 2021-11-19 LAB — HCG UR QL: NEGATIVE

## 2021-11-19 PROCEDURE — 76210000020 HC REC RM PH II FIRST 0.5 HR: Performed by: ORTHOPAEDIC SURGERY

## 2021-11-19 PROCEDURE — 29881 ARTHRS KNE SRG MNISECTMY M/L: CPT | Performed by: ORTHOPAEDIC SURGERY

## 2021-11-19 PROCEDURE — 76942 ECHO GUIDE FOR BIOPSY: CPT | Performed by: ANESTHESIOLOGY

## 2021-11-19 PROCEDURE — 74011250636 HC RX REV CODE- 250/636: Performed by: ORTHOPAEDIC SURGERY

## 2021-11-19 PROCEDURE — 77030002933 HC SUT MCRYL J&J -A: Performed by: ORTHOPAEDIC SURGERY

## 2021-11-19 PROCEDURE — 64450 NJX AA&/STRD OTHER PN/BRANCH: CPT | Performed by: ANESTHESIOLOGY

## 2021-11-19 PROCEDURE — 77030040361 HC SLV COMPR DVT MDII -B: Performed by: ORTHOPAEDIC SURGERY

## 2021-11-19 PROCEDURE — 74011250636 HC RX REV CODE- 250/636: Performed by: NURSE ANESTHETIST, CERTIFIED REGISTERED

## 2021-11-19 PROCEDURE — 77030013079 HC BLNKT BAIR HGGR 3M -A: Performed by: ANESTHESIOLOGY

## 2021-11-19 PROCEDURE — 76210000016 HC OR PH I REC 1 TO 1.5 HR: Performed by: ORTHOPAEDIC SURGERY

## 2021-11-19 PROCEDURE — 76010000138 HC OR TIME 0.5 TO 1 HR: Performed by: ORTHOPAEDIC SURGERY

## 2021-11-19 PROCEDURE — 81025 URINE PREGNANCY TEST: CPT

## 2021-11-19 PROCEDURE — 74011250637 HC RX REV CODE- 250/637: Performed by: NURSE ANESTHETIST, CERTIFIED REGISTERED

## 2021-11-19 PROCEDURE — 77030040922 HC BLNKT HYPOTHRM STRY -A: Performed by: ORTHOPAEDIC SURGERY

## 2021-11-19 PROCEDURE — 77030000032 HC CUF TRNQT ZIMM -B: Performed by: ORTHOPAEDIC SURGERY

## 2021-11-19 PROCEDURE — 76060000032 HC ANESTHESIA 0.5 TO 1 HR: Performed by: ORTHOPAEDIC SURGERY

## 2021-11-19 PROCEDURE — 2709999900 HC NON-CHARGEABLE SUPPLY: Performed by: ORTHOPAEDIC SURGERY

## 2021-11-19 PROCEDURE — 77030039266 HC ADH SKN EXOFIN S2SG -A: Performed by: ORTHOPAEDIC SURGERY

## 2021-11-19 PROCEDURE — 77030012510 HC MSK AIRWY LMA TELE -B: Performed by: ANESTHESIOLOGY

## 2021-11-19 PROCEDURE — 01400 ANES OPN/ARTHRS KNEE JT NOS: CPT | Performed by: ANESTHESIOLOGY

## 2021-11-19 PROCEDURE — 74011000250 HC RX REV CODE- 250: Performed by: NURSE ANESTHETIST, CERTIFIED REGISTERED

## 2021-11-19 PROCEDURE — 01400 ANES OPN/ARTHRS KNEE JT NOS: CPT | Performed by: NURSE ANESTHETIST, CERTIFIED REGISTERED

## 2021-11-19 PROCEDURE — 77030033005 HC TBNG ARTHSC PMP STRY -B: Performed by: ORTHOPAEDIC SURGERY

## 2021-11-19 RX ORDER — MAGNESIUM SULFATE 100 %
4 CRYSTALS MISCELLANEOUS AS NEEDED
Status: DISCONTINUED | OUTPATIENT
Start: 2021-11-19 | End: 2021-11-19 | Stop reason: HOSPADM

## 2021-11-19 RX ORDER — FAMOTIDINE 20 MG/1
20 TABLET, FILM COATED ORAL ONCE
Status: COMPLETED | OUTPATIENT
Start: 2021-11-19 | End: 2021-11-19

## 2021-11-19 RX ORDER — LIDOCAINE HYDROCHLORIDE 20 MG/ML
INJECTION, SOLUTION EPIDURAL; INFILTRATION; INTRACAUDAL; PERINEURAL AS NEEDED
Status: DISCONTINUED | OUTPATIENT
Start: 2021-11-19 | End: 2021-11-19 | Stop reason: HOSPADM

## 2021-11-19 RX ORDER — FENTANYL CITRATE 50 UG/ML
25 INJECTION, SOLUTION INTRAMUSCULAR; INTRAVENOUS AS NEEDED
Status: DISCONTINUED | OUTPATIENT
Start: 2021-11-19 | End: 2021-11-19 | Stop reason: HOSPADM

## 2021-11-19 RX ORDER — SODIUM CHLORIDE, SODIUM LACTATE, POTASSIUM CHLORIDE, CALCIUM CHLORIDE 600; 310; 30; 20 MG/100ML; MG/100ML; MG/100ML; MG/100ML
25 INJECTION, SOLUTION INTRAVENOUS CONTINUOUS
Status: DISCONTINUED | OUTPATIENT
Start: 2021-11-19 | End: 2021-11-19 | Stop reason: HOSPADM

## 2021-11-19 RX ORDER — DEXTROSE 50 % IN WATER (D50W) INTRAVENOUS SYRINGE
25-50 AS NEEDED
Status: DISCONTINUED | OUTPATIENT
Start: 2021-11-19 | End: 2021-11-19 | Stop reason: HOSPADM

## 2021-11-19 RX ORDER — MIDAZOLAM HYDROCHLORIDE 1 MG/ML
INJECTION, SOLUTION INTRAMUSCULAR; INTRAVENOUS AS NEEDED
Status: DISCONTINUED | OUTPATIENT
Start: 2021-11-19 | End: 2021-11-19 | Stop reason: HOSPADM

## 2021-11-19 RX ORDER — ONDANSETRON 2 MG/ML
INJECTION INTRAMUSCULAR; INTRAVENOUS AS NEEDED
Status: DISCONTINUED | OUTPATIENT
Start: 2021-11-19 | End: 2021-11-19 | Stop reason: HOSPADM

## 2021-11-19 RX ORDER — FENTANYL CITRATE 50 UG/ML
INJECTION, SOLUTION INTRAMUSCULAR; INTRAVENOUS AS NEEDED
Status: DISCONTINUED | OUTPATIENT
Start: 2021-11-19 | End: 2021-11-19 | Stop reason: HOSPADM

## 2021-11-19 RX ORDER — OXYCODONE AND ACETAMINOPHEN 5; 325 MG/1; MG/1
1 TABLET ORAL AS NEEDED
Status: DISCONTINUED | OUTPATIENT
Start: 2021-11-19 | End: 2021-11-19 | Stop reason: HOSPADM

## 2021-11-19 RX ORDER — HYDROCODONE BITARTRATE AND ACETAMINOPHEN 7.5; 325 MG/1; MG/1
1-2 TABLET ORAL
Qty: 40 TABLET | Refills: 0 | Status: SHIPPED | OUTPATIENT
Start: 2021-11-19 | End: 2021-11-26

## 2021-11-19 RX ORDER — PROPOFOL 10 MG/ML
INJECTION, EMULSION INTRAVENOUS AS NEEDED
Status: DISCONTINUED | OUTPATIENT
Start: 2021-11-19 | End: 2021-11-19 | Stop reason: HOSPADM

## 2021-11-19 RX ORDER — FENTANYL CITRATE 50 UG/ML
50 INJECTION, SOLUTION INTRAMUSCULAR; INTRAVENOUS
Status: COMPLETED | OUTPATIENT
Start: 2021-11-19 | End: 2021-11-19

## 2021-11-19 RX ADMIN — PROPOFOL 200 MG: 10 INJECTION, EMULSION INTRAVENOUS at 13:09

## 2021-11-19 RX ADMIN — FENTANYL CITRATE 100 MCG: 50 INJECTION, SOLUTION INTRAMUSCULAR; INTRAVENOUS at 13:24

## 2021-11-19 RX ADMIN — FAMOTIDINE 20 MG: 20 TABLET ORAL at 11:51

## 2021-11-19 RX ADMIN — VANCOMYCIN HYDROCHLORIDE 1000 MG: 1 INJECTION, POWDER, LYOPHILIZED, FOR SOLUTION INTRAVENOUS at 12:10

## 2021-11-19 RX ADMIN — SODIUM CHLORIDE, SODIUM LACTATE, POTASSIUM CHLORIDE, AND CALCIUM CHLORIDE 25 ML/HR: 600; 310; 30; 20 INJECTION, SOLUTION INTRAVENOUS at 11:00

## 2021-11-19 RX ADMIN — LIDOCAINE HYDROCHLORIDE 40 MG: 20 INJECTION, SOLUTION EPIDURAL; INFILTRATION; INTRACAUDAL; PERINEURAL at 13:09

## 2021-11-19 RX ADMIN — FENTANYL CITRATE 50 MCG: 50 INJECTION, SOLUTION INTRAMUSCULAR; INTRAVENOUS at 13:09

## 2021-11-19 RX ADMIN — OXYCODONE HYDROCHLORIDE AND ACETAMINOPHEN 1 TABLET: 5; 325 TABLET ORAL at 15:14

## 2021-11-19 RX ADMIN — MIDAZOLAM HYDROCHLORIDE 2 MG: 2 INJECTION, SOLUTION INTRAMUSCULAR; INTRAVENOUS at 12:59

## 2021-11-19 RX ADMIN — FENTANYL CITRATE 50 MCG: 50 INJECTION INTRAMUSCULAR; INTRAVENOUS at 14:00

## 2021-11-19 RX ADMIN — FENTANYL CITRATE 50 MCG: 50 INJECTION INTRAMUSCULAR; INTRAVENOUS at 14:09

## 2021-11-19 RX ADMIN — ONDANSETRON 4 MG: 2 INJECTION INTRAMUSCULAR; INTRAVENOUS at 13:32

## 2021-11-19 RX ADMIN — FENTANYL CITRATE 50 MCG: 50 INJECTION, SOLUTION INTRAMUSCULAR; INTRAVENOUS at 13:17

## 2021-11-19 NOTE — ANESTHESIA PREPROCEDURE EVALUATION
Relevant Problems   RESPIRATORY SYSTEM   (+) History of shingles      NEUROLOGY   (+) Depressive disorder, not elsewhere classified   (+) Migraine       Anesthetic History   No history of anesthetic complications            Review of Systems / Medical History  Patient summary reviewed and pertinent labs reviewed    Pulmonary  Within defined limits                 Neuro/Psych         Psychiatric history     Cardiovascular                  Exercise tolerance: >4 METS     GI/Hepatic/Renal     GERD: well controlled           Endo/Other        Arthritis     Other Findings              Physical Exam    Airway  Mallampati: III  TM Distance: < 4 cm  Neck ROM: normal range of motion   Mouth opening: Normal     Cardiovascular    Rhythm: regular  Rate: normal         Dental  No notable dental hx       Pulmonary  Breath sounds clear to auscultation               Abdominal  GI exam deferred       Other Findings            Anesthetic Plan    ASA: 2  Anesthesia type: general          Induction: Intravenous  Anesthetic plan and risks discussed with: Patient

## 2021-11-19 NOTE — OP NOTES
Operative Report    Patient: Brad Muse MRN: [id number]  SSN: [id number]    YOB: 1970  Age: 53 y.o. Sex: female       Date of Surgery: 2021     Preoperative Diagnosis:   Tear of medial meniscus of left knee, current, unspecified tear type, initial encounter [S83.242A]     Postoperative Diagnosis:   Tear of medial meniscus of left knee, current, unspecified tear type, initial encounter [L21.037M]     Surgeon(s) and Role:     Mandi Gonzales MD - Primary    Anesthesia: General     Procedure: Procedure(s):  LEFT KNEE ARTHROSCOPIC PARTIAL MEDIAL MENISCECTOMY     Procedure in Detail: Patient was taken to the operating room to some general trach anesthesia with anesthesia staff. Placed in a standard arthroscopy ramirez left leg was prepped with ChloraPrep solution draped free sterile field. Anterolateral portal was used arthroscopy portal anteromedial portals were portal portals were made 11 blade followed by blunt trochars. Once the arthroscope was in place knee was systematically evaluated sinus suprapatellar pouch patellofemoral joint where mild degenerative changes were noted. Medial compartment diffuse degenerative changes grade 3 4 changes with a radial tear of the posterior third of the medial meniscus was debrided using straight basket forceps 3 5 shaver leaving a stable rim. Anterior crucial ligament was intact the lateral compartment no evidence of tears. Fluid was suctioned out the knee was injected with Marcaine portals were closed 4-0 Monocryl.   Sterile dressings were applied patient tolerated procedure well was taken to recovery room without problems      Estimated Blood Loss: Minimal    Tourniquet Time: * Missing tourniquet times found for documented tourniquets in lo *      Implants: * No implants in log *            Specimens: * No specimens in log *        Drains: None                Complications: None    Counts: Sponge and needle counts were correct times two.     Signed By:  Jadyn Covarrubias MD     November 19, 2021

## 2021-11-19 NOTE — ANESTHESIA POSTPROCEDURE EVALUATION
Procedure(s):  LEFT KNEE ARTHROSCOPIC PARTIAL MEDIAL MENISCECTOMY. general    Anesthesia Post Evaluation      Multimodal analgesia: multimodal analgesia used between 6 hours prior to anesthesia start to PACU discharge  Patient location during evaluation: PACU  Patient participation: complete - patient participated  Level of consciousness: awake and alert  Pain management: adequate  Airway patency: patent  Anesthetic complications: no  Cardiovascular status: acceptable  Respiratory status: acceptable  Hydration status: acceptable  Post anesthesia nausea and vomiting:  none  Final Post Anesthesia Temperature Assessment:  Normothermia (36.0-37.5 degrees C)      INITIAL Post-op Vital signs:   Vitals Value Taken Time   BP 98/66 11/19/21 1434   Temp 36.7 °C (98 °F) 11/19/21 1347   Pulse 59 11/19/21 1439   Resp 19 11/19/21 1439   SpO2 98 % 11/19/21 1441   Vitals shown include unvalidated device data.

## 2021-11-19 NOTE — PROGRESS NOTES
Date of Surgery Update:  Isiah Culp was seen and examined. History and physical has been reviewed. The patient has been examined. There have been no significant clinical changes since the completion of the originally dated History and Physical.    Signed By: Cj Garg MD     November 19, 2021 12:10 PM     The above patient was independently seen and examined by myself. The case was then discussed and a proper diagnosis/plan was made. I agree with the above assessment.

## 2021-11-19 NOTE — DISCHARGE INSTRUCTIONS
Dr. Kayleen Bernal Knee Arthroscopy  Postoperative Information    You will be given a prescription for pain medication. It may be taken every 4-6 hours as needed for the first 4-5 days. You may elevate your leg and place an ice pack on top of the dressing in  order to prevent swelling. A soft bandage was placed on your knee to soak up blood and fluid. You may take the bandage off the day after surgery, carefully cleaning the incision sites with peroxide. Band-Aids should be used for the first 4-5 days over each incision. There are 2 small incisions in your knee that may be sore and develop bruising over the next several days. This should resolve over the next few weeks. No special care will be needed. You should expect swelling in the area. You may elevate your leg and apply an icepack on top of the dressing to help minimize the swelling. Deep massage to the lower leg may also be utilized. It is safe to take a shower two days after surgery. You may begin bearing weight on your leg with the use of crutches for the first 4-5 days. Apply as much weight as tolerated so that at the end of 4-5 days, you can walk without crutches. Avoid prolonged walking or standing during the first few weeks after surgery. During your first week please work on gentle range of motion of your knee. Even though your incisions are small, there has been an operation inside and around the knee joint. Complete healing may take several months. If you have a high temperature, unexpected pain, redness or swelling, or any drainage around your knee area, please call my office immediately. Please make an appointment to return to my office in one week. Dr. Kayleen Bernal office number [phone]    Patient Education        Knee Arthroscopy: What to Expect at Home  Your Recovery     Arthroscopy is a way to find problems and do surgery inside a joint without making a large cut (incision).  Your doctor put a lighted tube with a tiny camera--called an arthroscope, or scope--and surgical tools through small incisions in your knee. You will feel tired for several days. Your knee will be swollen. And you may notice that your skin is a different color near the cuts (incisions). The swelling is normal and will start to go away in a few days. Keeping your leg higher than your heart will help with swelling and pain. You will probably need about 6 weeks to recover. If your doctor repaired damaged tissue, recovery will take longer. You may have to limit your activity until your knee strength and movement are back to normal. You may also be in a physical rehabilitation (rehab) program.  You may be able to return to a desk job or your normal routine in a few days. But if you do physical labor, it may be as long as 2 months before you can go back to work. This care sheet gives you a general idea about how long it will take for you to recover. But each person recovers at a different pace. Follow the steps below to get better as quickly as possible. How can you care for yourself at home? Activity    · Rest when you feel tired. Getting enough sleep will help you recover. Use pillows to raise your ankle and leg above the level of your heart.     · Try to walk each day, after your doctor has said you can. Start by walking a little more than you did the day before. Bit by bit, increase the amount you walk. Walking boosts blood flow and helps prevent pneumonia and constipation.     · You may have a brace or crutches or both.     · Your doctor will tell you how often and how much you can move your leg and knee.     · If you have a desk job, you may be able to return to work a few days after the surgery. If you lift things or stand or walk a lot at work, it may be as long as 2 months before you can return.     · You can take a shower 48 to 72 hours after surgery and clean the incisions with regular soap and water.  Do not take a bath or soak your knee until your doctor says it is okay.     · Ask your doctor when you can drive again.     · If you had a repair of torn tissue, follow your doctor's instructions for lifting things or moving your knee. Diet    · You can eat your normal diet. If your stomach is upset, try bland, low-fat foods like plain rice, broiled chicken, toast, and yogurt.     · Drink plenty of fluids, unless your doctor tells you not to.     · You may notice that your bowel movements are not regular right after your surgery. This is common. Try to avoid constipation and straining with bowel movements. You may want to take a fiber supplement every day. If you have not had a bowel movement after a couple of days, ask your doctor about taking a mild laxative. Medicines    · Your doctor will tell you if and when you can restart your medicines. He or she will also give you instructions about taking any new medicines.     · If you take aspirin or some other blood thinner, ask your doctor if and when to start taking it again. Make sure that you understand exactly what your doctor wants you to do.     · Be safe with medicines. Take pain medicines exactly as directed. ? If the doctor gave you a prescription medicine for pain, take it as prescribed. ? If you are not taking a prescription pain medicine, ask your doctor if you can take an over-the-counter medicine.     · If you think your pain medicine is making you sick to your stomach:  ? Take your medicine after meals (unless your doctor has told you not to). ? Ask your doctor for a different pain medicine.     · If your doctor prescribed antibiotics, take them as directed. Do not stop taking them just because you feel better. You need to take the full course of antibiotics. Incision care    · If you have a dressing over your cuts (incisions), keep it clean and dry.  You may remove it 48 to 72 hours after the surgery.     · If your incisions are open to the air, keep the area clean and dry.     · If you have strips of tape on the incisions, leave the tape on for a week or until it falls off. Exercise    · Move your toes and ankle as much as your bandages will allow.     · Bend and straighten your knee slowly several times during the day.     · Depending on why you had the surgery, you may have to do ankle and leg exercises. Your doctor or physical therapist will give you exercises as part of a rehabilitation program.     · Stop any activity that causes sharp pain. Talk to your doctor or physical therapist about what sports or other exercise you can do. Ice    · To reduce swelling and pain, put ice or a cold pack on your knee for 10 to 20 minutes at a time. Do this every 1 to 2 hours. Put a thin cloth between the ice and your skin. Follow-up care is a key part of your treatment and safety. Be sure to make and go to all appointments, and call your doctor if you are having problems. It's also a good idea to know your test results and keep a list of the medicines you take. When should you call for help? Call 911 anytime you think you may need emergency care. For example, call if:    · You passed out (lost consciousness).     · You have severe trouble breathing.     · You have sudden chest pain and shortness of breath, or you cough up blood. Call your doctor now or seek immediate medical care if:    · Your foot or toes are numb or tingling.     · Your foot is cool or pale, or it changes color.     · You have signs of a blood clot, such as:  ? Pain in your calf, back of the knee, thigh, or groin. ? Redness and swelling in your leg or groin.     · You are sick to your stomach or cannot keep fluids down.     · You have pain that does not get better after you take pain medicine.     · You have loose stitches, or your incision comes open.     · Bright red blood has soaked through the bandage over your incision.     · You have signs of infection, such as:  ? Increased pain, swelling, warmth, or redness.   ? Red streaks leading from the incisions. ? Pus draining from the incisions. ? A fever. Watch closely for any changes in your health, and be sure to contact your doctor if:    · You do not have a bowel movement after taking a laxative. Where can you learn more? Go to http://www.gray.com/  Enter J262 in the search box to learn more about \"Knee Arthroscopy: What to Expect at Home. \"  Current as of: July 1, 2021               Content Version: 13.0  © 2006-2021 [employer]. Care instructions adapted under license by [employer] (which disclaims liability or warranty for this information). If you have questions about a medical condition or this instruction, always ask your healthcare professional. [address] any warranty or liability for your use of this information. Patient Education        Meniscus Surgery: What to Expect at Home  Your Recovery  Meniscus surgery removes or fixes the cartilage (meniscus) between the bones in the knee. Each knee has two of these rubbery pads of cartilage, one on either side. Meniscus repair is usually done with arthroscopic surgery. Your doctor put a lighted tube--called an arthroscope or scope--and other surgical tools through small cuts (incisions) in your knee. The incisions leave scars that usually fade in time. You will feel tired for several days. Your knee will be swollen. And you may have numbness around the cuts the doctor made (incisions) on your knee. You can put ice on the knee to reduce swelling. Most of this will go away in a few days. You should soon start seeing improvement in your knee. You may be able to return to most of your regular activities within a few weeks. But it will be several months before you have complete use of your knee. It may take as long as 6 months before your knee is strong enough for hard physical work or certain sports.  You will need to build your strength and the motion of your joint with rehabilitation (rehab) exercises. In time, your knee will likely be stronger and more stable than it was before the surgery. How soon you can return to sports or exercise depends on how well you follow your rehab program and how well your knee heals. Your doctor or physical therapist will give you an idea of when you can return to these activities. If you had a partial meniscectomy, you might be able to play sports in about 4 to 6 weeks. If you had meniscus repair, it may be 3 to 6 months before you can play sports. This care sheet gives you a general idea about how long it will take for you to recover. But each person recovers at a different pace. Follow the steps below to get better as quickly as possible. How can you care for yourself at home? Activity    · Rest when you feel tired. Getting enough sleep will help you recover. Sleep with your knee raised, but not bent. Put a pillow under your foot.     · Keep your leg raised as much as possible for the first few days.     · You may shower 24 to 48 hours after surgery, if your doctor okays it. When you shower, keep your bandage and incisions dry by taping a sheet of plastic to cover them. If you have a brace, take it off if your doctor says it is okay. It might help to sit on a shower stool.     · You will be able to stand if you have a brace or use crutches. Do not put weight on your leg until your doctor says you can. You can move around the house to do daily tasks.     · If you have a brace, leave it on except when you exercise your knee or you shower. Be careful not to put the brace on too tight.  You will use it for about 2 to 6 weeks.     · If your doctor does not want you to shower or remove your brace, you can take a sponge bath.     · Wait 2 weeks or until your doctor says it is okay before you take a bath, swim, use a hot tub, or soak your leg.     · You can drive when you are no longer using crutches or a knee brace, are no longer taking prescription pain medicine, and have some control over your knee. This usually takes 1 to 2 weeks.     · How soon you can return to work depends on your job. If you sit at work, you may be able to go back in 1 to 2 weeks. But if you are on your feet at work, it may take 4 to 6 weeks. If you are very physically active in your job, it may take 3 to 6 months. Diet    · You can eat your normal diet. If your stomach is upset, try bland, low-fat foods like plain rice, broiled chicken, toast, and yogurt. Drink plenty of fluids.     · You may notice that your bowel movements are not regular right after your surgery. This is common. Try to avoid constipation and straining with bowel movements. You may want to take a fiber supplement every day. If you have not had a bowel movement after a couple of days, ask your doctor about taking a mild laxative. Medicines    · Your doctor will tell you if and when you can restart your medicines. He or she will also give you instructions about taking any new medicines.     · If you take aspirin or some other blood thinner, ask your doctor if and when to start taking it again. Make sure that you understand exactly what your doctor wants you to do.     · Be safe with medicines. Take pain medicines exactly as directed. ? If the doctor gave you a prescription medicine for pain, take it as prescribed. ? If you are not taking a prescription pain medicine, ask your doctor if you can take an over-the-counter medicine.     · If your doctor prescribed antibiotics, take them as directed. Do not stop taking them just because you feel better. You need to take the full course of antibiotics.     · If you think your pain medicine is making you sick to your stomach:  ? Take your medicine after meals (unless your doctor has told you not to). ? Ask your doctor for a different pain medicine. Incision care    · If you have a bandage over your incisions, keep the bandage clean and dry.  Follow your doctor's instructions. Some doctors want to see you before you take it off, while others may let you take it off 48 to 72 hours after your surgery.     · If you have strips of tape on the incisions, leave the tape on for a week or until it falls off. Keep the area clean and dry. Exercise    · Rehab exercises are an important part of your treatment. Your first exercises will help you improve your knee's movement and regain your muscle strength. Ice and elevation    · To reduce swelling and pain, put ice or a cold pack on your knee for 10 to 20 minutes at a time. Do this every few hours. Put a thin cloth between the ice and your skin.     · For 3 days after surgery, prop up the sore leg on a pillow when you ice it or anytime you sit or lie down. Try to keep it above the level of your heart. This will help reduce swelling.     · If your doctor gave you support stockings, wear them as long as he or she tells you to. These help prevent blood clots. Follow-up care is a key part of your treatment and safety. Be sure to make and go to all appointments, and call your doctor if you are having problems. It's also a good idea to know your test results and keep a list of the medicines you take. When should you call for help? Call 911 anytime you think you may need emergency care. For example, call if:    · You passed out (lost consciousness).     · You have severe trouble breathing.     · You have sudden chest pain and shortness of breath, or you cough up blood. Call your doctor now or seek immediate medical care if:    · You have pain that does not go away after you take pain medicine.     · You have loose stitches, or your incisions come open.     · Bright red blood has soaked through the bandage over your incision.     · You have signs of infection, such as:  ? Increased pain, swelling, warmth, or redness. ? Red streaks leading from the incision. ? Pus draining from the incision. ?  Swollen lymph nodes in your neck, armpits, or groin. ? A fever.     · You have signs of a blood clot, such as:  ? Pain in your calf, back of the knee, thigh, or groin. ? Redness and swelling in your leg or groin. Watch closely for any changes in your health, and be sure to contact your doctor if:    · You feel a catching or locking in your knee.     · You are sick to your stomach or cannot keep fluids down.     · You have swelling, tingling, pain, or numbness in your toes that does not go away when you raise your knee above the level of your heart.     · You do not have a bowel movement after taking a laxative. Where can you learn more? Go to http://www.gray.com/  Enter H324 in the search box to learn more about \"Meniscus Surgery: What to Expect at Home. \"  Current as of: July 1, 2021               Content Version: 13.0  © 7463-3923 [employer]. Care instructions adapted under license by [employer] (which disclaims liability or warranty for this information). If you have questions about a medical condition or this instruction, always ask your healthcare professional. [place] [address] any warranty or liability for your use of this information. DISCHARGE SUMMARY from Nurse    PATIENT INSTRUCTIONS:    After general anesthesia or intravenous sedation, for 24 hours or while taking prescription Narcotics:  · Limit your activities  · Do not drive and operate hazardous machinery  · Do not make important personal or business decisions  · Do  not drink alcoholic beverages  · If you have not urinated within 8 hours after discharge, please contact your surgeon on call.     Report the following to your surgeon:  · Excessive pain, swelling, redness or odor of or around the surgical area  · Temperature over 100.5  · Nausea and vomiting lasting longer than 4 hours or if unable to take medications  · Any signs of decreased circulation or nerve impairment to extremity: change in color, persistent  numbness, tingling, coldness or increase pain  · Any questions    What to do at Home:      *  Please update this list whenever your medications are discontinued, doses are      changed, or new medications (including over-the-counter products) are added. *  Please carry medication information at all times in case of emergency situations. These are general instructions for a healthy lifestyle:    No smoking/ No tobacco products/ Avoid exposure to second hand smoke  Surgeon General's Warning:  Quitting smoking now greatly reduces serious risk to your health. Obesity, smoking, and sedentary lifestyle greatly increases your risk for illness    A healthy diet, regular physical exercise & weight monitoring are important for maintaining a healthy lifestyle    You may be retaining fluid if you have a history of heart failure or if you experience any of the following symptoms:  Weight gain of 3 pounds or more overnight or 5 pounds in a week, increased swelling in our hands or feet or shortness of breath while lying flat in bed. Please call your doctor as soon as you notice any of these symptoms; do not wait until your next office visit. The discharge information has been reviewed with the patient. The patient verbalized understanding. Discharge medications reviewed with the patient and appropriate educational materials and side effects teaching were provided.   ___________________________________________________________________________________________________________________________________

## 2021-11-19 NOTE — BRIEF OP NOTE
Brief Postoperative Note    Patient: Gerardo Llamas  YOB: 1970  MRN: [id number]    Date of Procedure: 11/19/2021     Pre-Op Diagnosis:  Tear of medial meniscus of left knee, current, unspecified tear type, initial encounter [S83.242A]    Post-Op Diagnosis: Same as preoperative diagnosis.       Procedure(s):  LEFT KNEE ARTHROSCOPIC PARTIAL MEDIAL MENISCECTOMY    Surgeon(s):  Anuj Espinoza MD    Surgical Assistant: Surg Asst-1: Varsha Blind    Anesthesia: General     Estimated Blood Loss (mL): Minimal    Complications: None    Specimens: * No specimens in log *     Implants: * No implants in log *    Drains: * No LDAs found *    Findings: As above    Electronically Signed by Irene Woodson MD on 11/19/2021 at 1:35 PM

## 2021-11-29 ENCOUNTER — TELEPHONE (OUTPATIENT)
Dept: ORTHOPEDIC SURGERY | Age: 51
End: 2021-11-29

## 2021-11-29 DIAGNOSIS — R21 RASH AND NONSPECIFIC SKIN ERUPTION: ICD-10-CM

## 2021-11-29 DIAGNOSIS — F32.A ANXIETY AND DEPRESSION: ICD-10-CM

## 2021-11-29 DIAGNOSIS — M79.10 MUSCLE PAIN: ICD-10-CM

## 2021-11-29 DIAGNOSIS — F41.9 ANXIETY AND DEPRESSION: ICD-10-CM

## 2021-11-29 DIAGNOSIS — L50.1 IDIOPATHIC URTICARIA: ICD-10-CM

## 2021-11-29 RX ORDER — CLONAZEPAM 2 MG/1
TABLET ORAL
Qty: 30 TABLET | Refills: 0 | Status: CANCELLED | OUTPATIENT
Start: 2021-11-29

## 2021-11-29 RX ORDER — HYDROXYCHLOROQUINE SULFATE 200 MG/1
TABLET, FILM COATED ORAL
Qty: 60 TABLET | Refills: 0 | Status: CANCELLED | OUTPATIENT
Start: 2021-11-29

## 2021-11-29 NOTE — TELEPHONE ENCOUNTER
Patient had to reschedule her postop appt for today due to car trouble. She is status post left knee arthroscopy on 11/19/21 and this was her first post op. She is scheduled for next Thursday 12/09. She reports she is doing very well and having no issues whatsoever. If this is too far out is there someplace she can be fit in if need be?       Patient [phone]

## 2021-11-30 NOTE — TELEPHONE ENCOUNTER
If she is doing well then her post op appt on the 9th is ok. They bury their sutures so she shouldn't have any to take out.

## 2021-11-30 NOTE — TELEPHONE ENCOUNTER
[address] notifying patient of DANIS Spence's message. Also instructed patient to call back if she notices any redness, warmth, fever or chills prior to appointment.

## 2021-12-08 ENCOUNTER — VIRTUAL VISIT (OUTPATIENT)
Dept: FAMILY MEDICINE CLINIC | Age: 51
End: 2021-12-08
Payer: MEDICAID

## 2021-12-08 DIAGNOSIS — M79.7 FIBROMYALGIA: ICD-10-CM

## 2021-12-08 DIAGNOSIS — R21 RASH AND NONSPECIFIC SKIN ERUPTION: Primary | ICD-10-CM

## 2021-12-08 DIAGNOSIS — L50.1 IDIOPATHIC URTICARIA: ICD-10-CM

## 2021-12-08 DIAGNOSIS — Z91.038 HISTORY OF ANAPHYLACTIC SHOCK DUE TO INSECT STING: ICD-10-CM

## 2021-12-08 DIAGNOSIS — M79.10 MUSCLE PAIN: ICD-10-CM

## 2021-12-08 DIAGNOSIS — E78.00 PURE HYPERCHOLESTEROLEMIA: ICD-10-CM

## 2021-12-08 PROCEDURE — 99214 OFFICE O/P EST MOD 30 MIN: CPT | Performed by: NURSE PRACTITIONER

## 2021-12-08 RX ORDER — GABAPENTIN 800 MG/1
TABLET ORAL
Qty: 270 TABLET | Refills: 3 | Status: SHIPPED | OUTPATIENT
Start: 2021-12-08 | End: 2022-04-03 | Stop reason: SDUPTHER

## 2021-12-08 RX ORDER — HYDROXYCHLOROQUINE SULFATE 200 MG/1
TABLET, FILM COATED ORAL
Qty: 60 TABLET | Refills: 3 | Status: SHIPPED | OUTPATIENT
Start: 2021-12-08 | End: 2022-08-17 | Stop reason: SDUPTHER

## 2021-12-08 RX ORDER — EPINEPHRINE 0.3 MG/.3ML
0.3 INJECTION SUBCUTANEOUS
Qty: 1 EACH | Refills: 3 | Status: SHIPPED | OUTPATIENT
Start: 2021-12-08 | End: 2021-12-08

## 2021-12-08 RX ORDER — ATORVASTATIN CALCIUM 40 MG/1
40 TABLET, FILM COATED ORAL EVERY EVENING
Qty: 90 TABLET | Refills: 0 | Status: SHIPPED | OUTPATIENT
Start: 2021-12-08 | End: 2022-08-17 | Stop reason: SDUPTHER

## 2021-12-08 NOTE — PROGRESS NOTES
Chela Leiva is a 53 y.o. female who was seen by synchronous (real-time) audio-video technology on 12/8/2021 for Follow-up    Patient is following up after surgery. She is status post left knee arthroscopy on 11/19/21 for a meniscus tear. Pt saw Rheumatology who stated that [facility]. Patient states that she is not able to go due to it being at [facility] and she has no means of getting there at this time. Patient states that if there is a another foundation in the area that can handle her needs she will be willing to go there. Patient was informed that I could continue her hydroxychloroquine until she finds someone to take over. Pt is seeing Nimesh Durbin psychiatric NP who is managing her Suboxone treatment and she will also be taking over her Clonazepam as well    Assessment & Plan:     Diagnoses and all orders for this visit:    1. Rash and nonspecific skin eruption  -     hydrOXYchloroQUINE (PLAQUENIL) 200 mg tablet; TAKE 1 TABLET BY MOUTH TWO TIMES A DAY    2. Idiopathic urticaria  -     hydrOXYchloroQUINE (PLAQUENIL) 200 mg tablet; TAKE 1 TABLET BY MOUTH TWO TIMES A DAY    3. Fibromyalgia  -     gabapentin (NEURONTIN) 800 mg tablet; TAKE 1 TABLET BY MOUTH THREE TIMES DAILY MAX DAILY AMOUNT 2,400MG AS DIRECTED    4. Muscle pain  -     hydrOXYchloroQUINE (PLAQUENIL) 200 mg tablet; TAKE 1 TABLET BY MOUTH TWO TIMES A DAY    5. Pure hypercholesterolemia  -     LIPID PANEL; Future  -     atorvastatin (LIPITOR) 40 mg tablet; Take 1 Tablet by mouth every evening. 6. History of anaphylactic shock due to insect sting  -     EPINEPHrine (EPIPEN) 0.3 mg/0.3 mL injection; 0.3 mL by IntraMUSCular route once as needed for Anaphylaxis or Allergic Response for up to 1 dose.  Indications: a significant type of allergic reaction called anaphylaxis      Follow-up and Dispositions    · Return in about 6 months (around 6/8/2022) for [facility] (No PAP), (In Office), Labs 1 Week Prior. Routing History     [id number]  Subjective:       Prior to Admission medications    Medication Sig Start Date End Date Taking? Authorizing Provider   atorvastatin (LIPITOR) 40 mg tablet Take 1 Tablet by mouth every evening. 12/8/21  Yes Pina Martinez NP   gabapentin (NEURONTIN) 800 mg tablet TAKE 1 TABLET BY MOUTH THREE TIMES DAILY MAX DAILY AMOUNT 2,400MG AS DIRECTED 12/8/21  Yes Pina Martinez NP   hydrOXYchloroQUINE (PLAQUENIL) 200 mg tablet TAKE 1 TABLET BY MOUTH TWO TIMES A DAY 12/8/21  Yes Pina Martinez NP   EPINEPHrine (EPIPEN) 0.3 mg/0.3 mL injection 0.3 mL by IntraMUSCular route once as needed for Anaphylaxis or Allergic Response for up to 1 dose. Indications: a significant type of allergic reaction called anaphylaxis 12/8/21 12/8/21 Yes Pina Martinez NP   clonazePAM (KlonoPIN) 2 mg tablet Take 1 Tablet by mouth nightly as needed (anxiety). Max Daily Amount: 2 mg. 11/29/21  Yes Pina Martinez NP   dexlansoprazole (Dexilant) 60 mg CpDB capsule (delayed release)  8/14/21  Yes Provider, Historical   buPROPion SR (WELLBUTRIN, ZYBAN) 200 mg SR tablet Take 200 mg by mouth two (2) times a day. Yes Provider, Historical   hydrocortisone (HYTONE) 2.5 % topical cream APPLY A THIN LAYER TO AFFECTED AREA TWO TIMES A DAY 10/28/21  Yes Clark Beck NP   buprenorphine-naloxone (SUBOXONE) 8-2 mg film sublingaul film 1 Film by SubLINGual route daily. Yes Sheng Ramirez NP   sertraline (ZOLOFT) 100 mg tablet Take 2 Tablets by mouth daily. 7/9/21  Yes Pina Martinez NP   hydrOXYchloroQUINE (PLAQUENIL) 200 mg tablet TAKE 1 TABLET BY MOUTH TWO TIMES A DAY 10/26/21 12/8/21  Harrison NEGRETE NP   gabapentin (NEURONTIN) 800 mg tablet TAKE 1 TABLET BY MOUTH THREE TIMES DAILY MAX DAILY AMOUNT 2,400MG AS DIRECTED 7/9/21 12/8/21  Pina Martinez NP   atorvastatin (LIPITOR) 40 mg tablet Take 1 Tab by mouth every evening.  1/12/21 12/8/21  Pina Martinez NP   EPINEPHrine (EPIPEN) 0.3 mg/0.3 mL injection 0.3 mL by IntraMUSCular route once as needed for up to 1 dose. 1/7/19 12/8/21  HEENA Wilcox    Objective:     Patient-Reported Vitals 10/19/2020   Patient-Reported Weight 179   Patient-Reported Height 5'2   Patient-Reported Pulse 20   Patient-Reported Temperature 98.8   Patient-Reported SpO2 95   Patient-Reported Systolic  151   Patient-Reported Diastolic 84      General: alert, cooperative, no distress   Mental  status: normal mood, behavior, speech, dress, motor activity, and thought processes, able to follow commands   HENT: NCAT   Neck: no visualized mass   Resp: no respiratory distress   Neuro: no gross deficits   Skin: no discoloration or lesions of concern on visible areas   Psychiatric: normal affect, consistent with stated mood, no evidence of hallucinations     Additional exam findings: N/A      We discussed the expected course, resolution and complications of the diagnosis(es) in detail. Medication risks, benefits, costs, interactions, and alternatives were discussed as indicated. I advised her to contact the office if her condition worsens, changes or fails to improve as anticipated. She expressed understanding with the diagnosis(es) and plan. Umesh Houston, who was evaluated through a patient-initiated, synchronous (real-time) audio-video encounter, and/or her healthcare decision maker, is aware that it is a billable service, with coverage as determined by her insurance carrier. She provided verbal consent to proceed: Yes, and patient identification was verified. It was conducted pursuant to the emergency declaration under the [address] [place] [address] and the [employer] General Act. A caregiver was present when appropriate. Ability to conduct physical exam was limited. I was in the office. The patient was at home.       Margot Collins NP

## 2021-12-09 ENCOUNTER — OFFICE VISIT (OUTPATIENT)
Dept: ORTHOPEDIC SURGERY | Age: 51
End: 2021-12-09
Payer: MEDICAID

## 2021-12-09 VITALS — TEMPERATURE: 97.5 F

## 2021-12-09 DIAGNOSIS — T81.49XA INFECTED INCISION: ICD-10-CM

## 2021-12-09 DIAGNOSIS — S83.242A TEAR OF MEDIAL MENISCUS OF LEFT KNEE, CURRENT, UNSPECIFIED TEAR TYPE, INITIAL ENCOUNTER: Primary | ICD-10-CM

## 2021-12-09 PROCEDURE — 99024 POSTOP FOLLOW-UP VISIT: CPT | Performed by: ORTHOPAEDIC SURGERY

## 2021-12-09 RX ORDER — FLUCONAZOLE 50 MG/1
50 TABLET ORAL DAILY
Qty: 7 TABLET | Refills: 0 | Status: SHIPPED | OUTPATIENT
Start: 2021-12-09 | End: 2021-12-16

## 2021-12-09 RX ORDER — AMOXICILLIN AND CLAVULANATE POTASSIUM 562.5; 437.5; 62.5 MG/1; MG/1; MG/1
1 TABLET, FILM COATED, EXTENDED RELEASE ORAL 2 TIMES DAILY
Qty: 20 TABLET | Refills: 0 | Status: SHIPPED | OUTPATIENT
Start: 2021-12-09 | End: 2022-08-17 | Stop reason: ALTCHOICE

## 2021-12-09 RX ORDER — SULFAMETHOXAZOLE AND TRIMETHOPRIM 800; 160 MG/1; MG/1
1 TABLET ORAL 2 TIMES DAILY
Qty: 20 TABLET | Refills: 0 | Status: SHIPPED | OUTPATIENT
Start: 2021-12-09 | End: 2022-08-17 | Stop reason: ALTCHOICE

## 2021-12-09 NOTE — PROGRESS NOTES
Patient: Wicho Naranjo  YOB: 1970       HISTORY:  The patient presents for reevaluation of her left knee status post arthroscopic partial medial meniscectomy on 11/19/2021. Patient is improved, states pain is a 6 out of 10. She notes a puss around her incision site and has been squeezing the puss out. she has not gone to physical therapy. Patient denies any fever, chills, chest pain, shortness of breath or calf pain. There are no new illness or injuries to report since last seen in the office. PHYSICAL EXAMINATION:    Visit Vitals  Temp 97.5 °F (36.4 °C) (Temporal)     The patient is a well-developed, well-nourished female in no acute distress. The patient is alert and oriented times three. The patient appears to be well groomed. Mood and affect are normal.   ORTHOPEDIC EXAM of Left knee: Inspection: Effusion present,  incisions lateral portal serous drainage with slight redness surrounding  TTP: diffusely  Range of motion: 90 flexion  Stability: Stable  Strength: 5/5  2+ distal pulses    IMPRESSION:  Status post Left knee arthroscopic partial medial meniscectomy. PLAN: Incisions cleaned and Steri-Strips were applied. Patient is weight bearing as tolerated. OK to d/c use of crutches/walker. Patient did not refill of pain medication. Prescribed bactrium to help with the infection that is developing on the incision site. Clean site with h2o2 Patient will follow up on Monday. Scribed by Zuri Carbone ([address]) as dictated by Rosita Priest MD    I, Dr. Rosita Priest, confirm that all documentation is accurate.     Rosita Priest M.D.   [facility]

## 2021-12-29 DIAGNOSIS — F32.A ANXIETY AND DEPRESSION: ICD-10-CM

## 2021-12-29 DIAGNOSIS — F41.9 ANXIETY AND DEPRESSION: ICD-10-CM

## 2021-12-29 RX ORDER — CLONAZEPAM 2 MG/1
2 TABLET ORAL
Qty: 30 TABLET | Refills: 0 | OUTPATIENT
Start: 2021-12-29

## 2021-12-29 NOTE — TELEPHONE ENCOUNTER
Last Visit: 12/8/21 with HEENA Noland  Next Appointment: 6/10/22 with HEENA Noland  Previous Refill Encounter(s): 11/29/21 #30    Requested Prescriptions     Pending Prescriptions Disp Refills    clonazePAM (KlonoPIN) 2 mg tablet 30 Tablet 0     Sig: Take 1 Tablet by mouth nightly as needed (anxiety). Max Daily Amount: 2 mg.

## 2021-12-31 DIAGNOSIS — F41.9 ANXIETY AND DEPRESSION: ICD-10-CM

## 2021-12-31 DIAGNOSIS — F32.A ANXIETY AND DEPRESSION: ICD-10-CM

## 2021-12-31 RX ORDER — CLONAZEPAM 2 MG/1
2 TABLET ORAL
Qty: 30 TABLET | Refills: 0 | Status: CANCELLED | OUTPATIENT
Start: 2021-12-31

## 2022-01-03 DIAGNOSIS — F41.9 ANXIETY AND DEPRESSION: ICD-10-CM

## 2022-01-03 DIAGNOSIS — F32.A ANXIETY AND DEPRESSION: ICD-10-CM

## 2022-01-03 RX ORDER — CLONAZEPAM 2 MG/1
2 TABLET ORAL
Qty: 30 TABLET | Refills: 0 | Status: CANCELLED | OUTPATIENT
Start: 2022-01-03

## 2022-01-03 NOTE — TELEPHONE ENCOUNTER
Last Visit: 12/8/21 with HEENA Carbone  Next Appointment: 6/10/22 with HEENA Carbone  Previous Refill Encounter(s): 11/29/21 #30    Requested Prescriptions     Pending Prescriptions Disp Refills    clonazePAM (KlonoPIN) 2 mg tablet [Pharmacy Med Name: CLONAZEPAM 2 MG TABLET] 30 Tablet 0     Sig: Take 1 Tablet by mouth nightly as needed (anxiety).  Max Daily Amount of 1 tablet

## 2022-01-04 ENCOUNTER — PATIENT MESSAGE (OUTPATIENT)
Dept: FAMILY MEDICINE CLINIC | Age: 52
End: 2022-01-04

## 2022-01-04 RX ORDER — CLONAZEPAM 2 MG/1
TABLET ORAL
Qty: 30 TABLET | Refills: 0 | OUTPATIENT
Start: 2022-01-04

## 2022-03-19 PROBLEM — M48.062 SPINAL STENOSIS, LUMBAR REGION WITH NEUROGENIC CLAUDICATION: Status: ACTIVE | Noted: 2018-08-07

## 2022-03-19 PROBLEM — M48.061 SPINAL STENOSIS OF LUMBAR REGION: Status: ACTIVE | Noted: 2018-12-17

## 2022-03-19 PROBLEM — M51.26 HNP (HERNIATED NUCLEUS PULPOSUS), LUMBAR: Status: ACTIVE | Noted: 2018-12-17

## 2022-03-20 PROBLEM — Z86.19 HISTORY OF SHINGLES: Status: ACTIVE | Noted: 2018-08-07

## 2022-04-03 DIAGNOSIS — M79.7 FIBROMYALGIA: ICD-10-CM

## 2022-04-04 NOTE — TELEPHONE ENCOUNTER
[facility] for [facility]ontin as 3/6/22 for a 30 d/s.      Last Visit: 12/8/21 with HEENA Shepard  Next Appointment: 6/10/22 with HEENA Shepard  Previous Refill Encounter(s): 12/8/21 #270 with 3 refills    Requested Prescriptions     Pending Prescriptions Disp Refills    gabapentin (NEURONTIN) 800 mg tablet 270 Tablet 1     Sig: TAKE 1 TABLET BY MOUTH THREE TIMES DAILY MAX DAILY AMOUNT 2,400MG AS DIRECTED

## 2022-04-08 RX ORDER — GABAPENTIN 800 MG/1
TABLET ORAL
Qty: 270 TABLET | Refills: 1 | Status: SHIPPED | OUTPATIENT
Start: 2022-04-08 | End: 2022-08-17 | Stop reason: SDUPTHER

## 2022-05-15 DIAGNOSIS — M79.10 MUSCLE PAIN: ICD-10-CM

## 2022-05-15 DIAGNOSIS — L50.1 IDIOPATHIC URTICARIA: ICD-10-CM

## 2022-05-15 DIAGNOSIS — R21 RASH AND NONSPECIFIC SKIN ERUPTION: ICD-10-CM

## 2022-05-16 RX ORDER — HYDROCORTISONE 25 MG/G
CREAM TOPICAL 2 TIMES DAILY
Qty: 28 G | Refills: 0 | Status: SHIPPED | OUTPATIENT
Start: 2022-05-16 | End: 2022-08-17 | Stop reason: SDUPTHER

## 2022-05-16 RX ORDER — HYDROXYCHLOROQUINE SULFATE 200 MG/1
TABLET, FILM COATED ORAL
Qty: 60 TABLET | Refills: 3 | OUTPATIENT
Start: 2022-05-16

## 2022-05-16 NOTE — TELEPHONE ENCOUNTER
Last Visit: 12/8/21 with HEENA Sun  Next Appointment: 6/10/22 with HEENA Sun  Previous Refill Encounter(s): 12/8/21 Plaquenil #60 with 3 refills, 10/28/21 Hytone #28g    Requested Prescriptions     Pending Prescriptions Disp Refills    hydrocortisone (HYTONE) 2.5 % topical cream 28 g 0     Sig: Apply  to affected area two (2) times a day.  use thin layer    hydrOXYchloroQUINE (PLAQUENIL) 200 mg tablet 60 Tablet 3     Sig: TAKE 1 TABLET BY MOUTH TWO TIMES A DAY

## 2022-05-27 NOTE — PROGRESS NOTES
HISTORY OF PRESENT ILLNESS  Star Baron is a 56 y.o. female. HPI Comments: Patient states pain has been getting worse. Reports gabapentin is working for the fibromyalgia. Reports lower back has been getting worse radiating to right groin. Reports she is unable to stand to wash dishes without having to sit down. Reports she has been shaking a lot unsure if this due to the pain. Comments she has been on valium and norco in the past for back pain with improvement  Further states she has been taking acyclovir 400mg bid to suppress her recurrent herpes zoster, has done that in the past with improvement. Requesting refill today. Requesting a refill on epipen given she has an allergy to wasps    Allergies   Allergen Reactions    Apple Swelling    Cephalexin Hives    Wasps [Hymenoptera Allergenic Extract] Hives     Current Outpatient Prescriptions   Medication Sig Dispense Refill    sertraline (ZOLOFT) 100 mg tablet Take 2 Tabs by mouth daily. 60 Tab 3    gabapentin (NEURONTIN) 800 mg tablet Take 1 Tab by mouth three (3) times daily.  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    Pain, upper back     Psychiatric disorder     depression     Tattoo     Unspecified deficiency anemia 1999     Lab Results   Component Value Date/Time    Cholesterol, total 252 (H) 05/18/2018 12:08 PM    HDL Cholesterol 46 05/18/2018 12:08 PM    LDL, calculated 168.8 (H) 05/18/2018 12:08 PM    VLDL, calculated 37.2 05/18/2018 12:08 PM    Triglyceride 186 (H) 05/18/2018 12:08 PM    CHOL/HDL Ratio 5.5 (H) 05/18/2018 12:08 PM     Lab Results   Component Value Date/Time    Sodium 139 05/18/2018 12:08 PM    Potassium 4.7 05/18/2018 12:08 PM    Chloride 104 05/18/2018 12:08 PM    CO2 26 05/18/2018 12:08 PM    Anion gap 9 05/18/2018 12:08 PM    Glucose 88 05/18/2018 12:08 PM    BUN 13 05/18/2018 12:08 PM    Creatinine 0.86 05/18/2018 12:08 PM    BUN/Creatinine ratio 15 05/18/2018 12:08 PM    GFR est AA >60 05/18/2018 12:08 PM    GFR est non-AA >60 05/18/2018 12:08 PM    Calcium 9.7 05/18/2018 12:08 PM    Bilirubin, total 0.3 05/18/2018 12:08 PM    AST (SGOT) 20 05/18/2018 12:08 PM    Alk. phosphatase 62 05/18/2018 12:08 PM    Protein, total 8.4 (H) 05/18/2018 12:08 PM    Albumin 4.4 05/18/2018 12:08 PM    Globulin 4.0 05/18/2018 12:08 PM    A-G Ratio 1.1 05/18/2018 12:08 PM    ALT (SGPT) 37 05/18/2018 12:08 PM     Last Point of Care HGB A1C  Hemoglobin A1c (POC)   Date Value Ref Range Status   05/30/2018 5.6 % Final     Review of Systems   Constitutional: Negative for chills and fever. Respiratory: Negative for shortness of breath. Cardiovascular: Negative for chest pain. Gastrointestinal: Positive for abdominal pain. Negative for nausea and vomiting. Musculoskeletal: Positive for back pain (lower back) and joint pain (multiple joints ). Neurological: Negative for dizziness and headaches. Visit Vitals    /78 (BP 1 Location: Left arm, BP Patient Position: Sitting)    Pulse 83    Temp 98.9 °F (37.2 °C) (Oral)    Resp 18    Ht 5' 2\" (1.575 m)    Wt 192 lb 3.2 oz (87.2 kg)    SpO2 99%    BMI 35.15 kg/m2     Physical Exam   Constitutional: She appears well-developed and well-nourished. HENT:   Head: Normocephalic and atraumatic. Neck: Normal range of motion. Neck supple. Cardiovascular: Normal rate, regular rhythm and normal heart sounds. Exam reveals no gallop and no friction rub. No murmur heard. Pulmonary/Chest: Effort normal and breath sounds normal. She has no wheezes. She has no rhonchi. She has no rales. Abdominal: Soft. Bowel sounds are normal. There is generalized tenderness (mild). Musculoskeletal:        Lumbar back: She exhibits tenderness (right lumbar paravertebral muscles).  She exhibits normal range of motion, no swelling and no edema. ASSESSMENT and PLAN    ICD-10-CM ICD-9-CM    1. Abdominal bloating R14.0 787.3 REFERRAL TO GASTROENTEROLOGY   2. Chronic bilateral low back pain with right-sided sciatica M54.41 724.2 MRI LUMB SPINE W CONT    G89.29 724.3 REFERRAL TO ORTHOPEDICS     338.29 methocarbamol (ROBAXIN) 500 mg tablet      acetaminophen-codeine (TYLENOL #3) 300-30 mg per tablet   3. Pure hypercholesterolemia E78.00 272.0 lovastatin (MEVACOR) 20 mg tablet   4. Family history of diabetes mellitus in grandmother Z83.3 V18.0 AMB POC HEMOGLOBIN A1C   5. Obesity, Class II, BMI 35-39.9 E66.9 278.00 AMB POC HEMOGLOBIN A1C   6. History of anaphylactic shock due to insect sting Z91.038 V15.06 EPINEPHrine (EPIPEN) 0.3 mg/0.3 mL injection   I have discussed the diagnosis with the patient and the intended plan as seen in the above orders. The patient has received an after-visit summary and questions were answered concerning future plans. I have discussed medication side effects and warnings with the patient as well. Patient agreeable with above plan and verbalizes understanding. Follow-up Disposition:  Return if symptoms worsen or fail to improve, for keep scheduled appt on 6/15/18. acetaminophen 325 mg oral tablet: 2 tab(s) orally every 6 hours, As needed, Mild Pain (1 - 3)  ascorbic acid 500 mg oral tablet: 1 tab(s) orally once a day  enoxaparin: 40 milligram(s) subcutaneous once a day  ferrous sulfate 300 mg/5 mL (60 mg/5 mL elemental iron) oral liquid: 5 milliliter(s) orally once a day  levETIRAcetam 100 mg/mL oral solution: 10 milliliter(s) orally 2 times a day  melatonin 5 mg oral tablet: 1 tab(s) orally once a day (at bedtime)  memantine 5 mg oral tablet: 1 tab(s) orally every 12 hours  Multiple Vitamins oral tablet: 1 tab(s) orally once a day  polyethylene glycol 3350 oral powder for reconstitution: 17 gram(s) orally 2 times a day  senna leaf extract oral tablet: 1 tab(s) orally once a day  sodium chloride 1 g oral tablet: 1 tab(s) orally every 12 hours

## 2022-06-01 ENCOUNTER — TELEPHONE (OUTPATIENT)
Dept: FAMILY MEDICINE CLINIC | Age: 52
End: 2022-06-01

## 2022-06-10 ENCOUNTER — PATIENT MESSAGE (OUTPATIENT)
Dept: FAMILY MEDICINE CLINIC | Age: 52
End: 2022-06-10

## 2022-06-10 NOTE — TELEPHONE ENCOUNTER
Attempted call to schedule in office appt required before further prescriptions can be placed. Left vm to contact office.

## 2022-08-01 ENCOUNTER — TELEPHONE (OUTPATIENT)
Dept: FAMILY MEDICINE CLINIC | Age: 52
End: 2022-08-01

## 2022-08-01 NOTE — TELEPHONE ENCOUNTER
Lm for pt to call office to schedule appt.   Hasnt been seen since 11/21    [facility] (No PAP), (In Office),     Labs 1 wk prior

## 2022-08-17 ENCOUNTER — OFFICE VISIT (OUTPATIENT)
Dept: FAMILY MEDICINE CLINIC | Age: 52
End: 2022-08-17
Payer: MEDICAID

## 2022-08-17 VITALS
HEIGHT: 60 IN | HEART RATE: 63 BPM | DIASTOLIC BLOOD PRESSURE: 77 MMHG | OXYGEN SATURATION: 93 % | TEMPERATURE: 98.1 F | BODY MASS INDEX: 34.75 KG/M2 | RESPIRATION RATE: 18 BRPM | SYSTOLIC BLOOD PRESSURE: 115 MMHG | WEIGHT: 177 LBS

## 2022-08-17 DIAGNOSIS — M54.2 CERVICALGIA: ICD-10-CM

## 2022-08-17 DIAGNOSIS — G89.29 CHRONIC PAIN OF RIGHT KNEE: ICD-10-CM

## 2022-08-17 DIAGNOSIS — M79.7 FIBROMYALGIA: ICD-10-CM

## 2022-08-17 DIAGNOSIS — M79.10 MUSCLE PAIN: ICD-10-CM

## 2022-08-17 DIAGNOSIS — M48.062 SPINAL STENOSIS, LUMBAR REGION WITH NEUROGENIC CLAUDICATION: ICD-10-CM

## 2022-08-17 DIAGNOSIS — Z12.4 SCREENING FOR MALIGNANT NEOPLASM OF CERVIX: ICD-10-CM

## 2022-08-17 DIAGNOSIS — M25.561 CHRONIC PAIN OF RIGHT KNEE: ICD-10-CM

## 2022-08-17 DIAGNOSIS — R21 RASH AND NONSPECIFIC SKIN ERUPTION: ICD-10-CM

## 2022-08-17 DIAGNOSIS — M25.40 SWELLING OF MULTIPLE JOINTS: ICD-10-CM

## 2022-08-17 DIAGNOSIS — E78.00 PURE HYPERCHOLESTEROLEMIA: ICD-10-CM

## 2022-08-17 DIAGNOSIS — Z01.419 WELL WOMAN EXAM WITH ROUTINE GYNECOLOGICAL EXAM: ICD-10-CM

## 2022-08-17 DIAGNOSIS — L50.1 IDIOPATHIC URTICARIA: ICD-10-CM

## 2022-08-17 DIAGNOSIS — Z00.00 WELL WOMAN EXAM (NO GYNECOLOGICAL EXAM): Primary | ICD-10-CM

## 2022-08-17 PROBLEM — I10 ESSENTIAL HYPERTENSION: Status: ACTIVE | Noted: 2022-08-17

## 2022-08-17 PROBLEM — J33.9 NASAL POLYP: Status: ACTIVE | Noted: 2022-08-17

## 2022-08-17 PROBLEM — K21.9 GERD (GASTROESOPHAGEAL REFLUX DISEASE): Status: ACTIVE | Noted: 2022-08-17

## 2022-08-17 PROBLEM — S83.241A ACUTE MEDIAL MENISCUS TEAR OF RIGHT KNEE: Status: ACTIVE | Noted: 2021-08-30

## 2022-08-17 PROBLEM — F32.2 MODERATELY SEVERE MAJOR DEPRESSION (HCC): Status: ACTIVE | Noted: 2022-08-17

## 2022-08-17 PROCEDURE — 99396 PREV VISIT EST AGE 40-64: CPT | Performed by: NURSE PRACTITIONER

## 2022-08-17 RX ORDER — HYDROXYCHLOROQUINE SULFATE 200 MG/1
TABLET, FILM COATED ORAL
Qty: 60 TABLET | Refills: 3 | Status: CANCELLED | OUTPATIENT
Start: 2022-08-17

## 2022-08-17 RX ORDER — HYDROXYCHLOROQUINE SULFATE 200 MG/1
200 TABLET, FILM COATED ORAL 2 TIMES DAILY
Qty: 180 TABLET | Refills: 0 | Status: SHIPPED | OUTPATIENT
Start: 2022-08-17

## 2022-08-17 RX ORDER — ATORVASTATIN CALCIUM 40 MG/1
40 TABLET, FILM COATED ORAL EVERY EVENING
Qty: 90 TABLET | Refills: 3 | Status: SHIPPED | OUTPATIENT
Start: 2022-08-17

## 2022-08-17 RX ORDER — GABAPENTIN 800 MG/1
TABLET ORAL
Qty: 270 TABLET | Refills: 1 | Status: SHIPPED | OUTPATIENT
Start: 2022-08-17

## 2022-08-17 RX ORDER — TRAZODONE HYDROCHLORIDE 100 MG/1
100 TABLET ORAL
COMMUNITY
Start: 2022-08-11

## 2022-08-17 RX ORDER — CLONAZEPAM 0.5 MG/1
TABLET ORAL AS NEEDED
COMMUNITY

## 2022-08-17 RX ORDER — HYDROCORTISONE 25 MG/G
CREAM TOPICAL 2 TIMES DAILY
Qty: 28 G | Refills: 5 | Status: SHIPPED | OUTPATIENT
Start: 2022-08-17

## 2022-08-17 RX ORDER — HYDROCORTISONE 25 MG/G
CREAM TOPICAL 2 TIMES DAILY
Qty: 28 G | Refills: 0 | Status: SHIPPED | OUTPATIENT
Start: 2022-08-17 | End: 2022-08-17 | Stop reason: SDUPTHER

## 2022-08-17 NOTE — PATIENT INSTRUCTIONS
Pap Test: Care Instructions  Overview     The Pap test (also called a Pap smear) is a screening test for cancer of the cervix, which is the lower part of the uterus that opens into the vagina. The test can help your doctor find early changes in the cells that could lead to cancer. The sample of cells taken during your test has been sent to a lab so that an expert can look at the cells. It usually takes a week or two to get the results back. Follow-up care is a key part of your treatment and safety. Be sure to make and go to all appointments, and call your doctor if you are having problems. It's also a good idea to know your test results and keep a list of the medicines you take. What do the results mean? A normal result means that the test did not find any abnormal cells in the sample. An abnormal result can mean many things. Most of these are not cancer. The results of your test may be abnormal because: You have an infection of the vagina or cervix, such as a yeast infection. You have an IUD (intrauterine device for birth control). You have low estrogen levels after menopause that are causing the cells to change. You have cell changes that may be a sign of precancer or cancer. The results are ranked based on how serious the changes might be. There are many other reasons why you might not get a normal result. If the results were abnormal, you may need to get another test within a few weeks or months. If the results show changes that could be a sign of cancer, you may need a test called a colposcopy, which provides a more complete view of the cervix. Sometimes the lab cannot use the sample because it does not contain enough cells or was not preserved well. If so, you may need to have the test again. This is not common, but it does happen from time to time. When should you call for help?   Watch closely for changes in your health, and be sure to contact your doctor if:    You have vaginal bleeding or pain for more than 2 days after the test. It is normal to have a small amount of bleeding for a day or two after the test.   Where can you learn more? Go to http://www.gray.com/  Enter P860 in the search box to learn more about \"Pap Test: Care Instructions. \"  Current as of: September 8, 2021               Content Version: 13.2  © 2006-2022 "[employer]". Care instructions adapted under license by [employer] [place] (which disclaims liability or warranty for this information). If you have questions about a medical condition or this instruction, always ask your healthcare professional. [place] [address] any warranty or liability for your use of this information. Learning About Cervical Cancer Screening  What is a cervical cancer screening test?     The cervix is the lower part of the uterus that opens into the vagina. Cervical cancer screening tests check the cells on the cervix for changes that could lead to cancer. Two tests can be used to screen for cervical cancer. They may be used alone or together. A Pap test.  This test looks for changes in the cells of the cervix. Some of these cell changes could lead to cancer. A human papillomavirus (HPV) test.  This test looks for the HPV virus. Some high-risk types of HPV can cause cell changes that could lead to cervical cancer. When should you have a screening test?  If you have a cervix, you may need cervical cancer screening. Screening will depend on many things. Ages 24 to 34  Screening options for these ages include: A Pap test. If your results are normal, you can wait 3 years to have another test.  An HPV test beginning at age 22. If your results are negative, you can wait 5 years to have another test.  Ages 27 to 59  Screening options for these ages include:   A Pap test. If your results are normal, you can wait 3 years to have another test.  An HPV test. If your results are negative, you can wait 5 years to have another test.  A Pap test and an HPV test. If your results are normal, you can wait 5 years to be tested again. Ages 72 and older  If you are age 72 or older and you've always had normal screening results, you may not need screening. Talk to your doctor. What do the results of cervical cancer screening mean? Your test results may be normal. Or the results may show minor or serious changes to the cells on your cervix. Minor changes may go away on their own, especially if you are younger than 30. You may have an abnormal test because you have an infection of the vagina or cervix or because you have low estrogen levels after menopause that are causing the cells to change. If you have a high-risk type of human papillomavirus (HPV) or cell changes that could turn into cancer, you may need more tests. Your doctor may suggest that you wait to be retested. Or you may need to have a colposcopy or treatment right away. Your doctor will recommend a follow-up plan based on your results and your age. Follow-up care is a key part of your treatment and safety. Be sure to make and go to all appointments, and call your doctor if you are having problems. It's also a good idea to know your test results and keep a list of the medicines you take. Where can you learn more? Go to http://steven-alexandru.info/  Enter P919 in the search box to learn more about \"Learning About Cervical Cancer Screening. \"  Current as of: September 8, 2021               Content Version: 13.2  © 2006-2022 [employer]. Care instructions adapted under license by [employer] (which disclaims liability or warranty for this information). If you have questions about a medical condition or this instruction, always ask your healthcare professional. [address] any warranty or liability for your use of this information.

## 2022-08-17 NOTE — PROGRESS NOTES
1. \"Have you been to the ER, urgent care clinic since your last visit? Hospitalized since your last visit? \" No    2. \"Have you seen or consulted any other health care providers outside of the [address] [place] [address] since your last visit? \" No     3. For patients aged 39-70: Has the patient had a colonoscopy / FIT/ Cologuard? Yes - no Care Gap present      If the patient is female:    4. For patients aged 41-77: Has the patient had a mammogram within the past 2 years? Yes - no Care Gap present      5. For patients aged 21-65: Has the patient had a pap smear?  Yes - no Care Gap present

## 2022-08-17 NOTE — PROGRESS NOTES
Subjective:   49 y.o. female for Well Woman Check. Mammogram referral placed, PAP is due in 2023. Patient Active Problem List   Diagnosis Code    Fibromyalgia M79.7    HSV (herpes simplex virus) infection B00.9    Cervical pain M54.2    Nerve pain M79.2    Myofascial pain M79.18    Migraine G43.909    Allergic rhinitis due to pollen J30.1    Abnormal liver function R94.5    Depression F32. A    Anxiety state F41.1    Irritable bowel syndrome K58.9    Abnormal glandular Papanicolaou smear of cervix R87.619    History of shingles Z86.19    Spinal stenosis, lumbar region with neurogenic claudication M48.062    HNP (herniated nucleus pulposus), lumbar M51.26    Spinal stenosis of lumbar region M48.061    Swelling of multiple joints M25.40    Cellulitis L03.90    Chronic narcotic dependence (formerly [facility]) F11.20    DVT of upper extremity (deep vein thrombosis) (formerly [facility]) I82.629    Essential hypertension I10    Headache(784.0) R51    Moderately severe major depression (formerly [facility]) F32.2    Nasal polyp J33.9    Neuralgia, post-herpetic B02.29    Cervicalgia M54.2    Chronic low back pain M54.50, G89.29    Irritable bowel syndrome with diarrhea K58.0    Acute medial meniscus tear of right knee S83.241A    GERD (gastroesophageal reflux disease) K21.9     Current Outpatient Medications   Medication Sig Dispense Refill    traZODone (DESYREL) 100 mg tablet Take 100 mg by mouth nightly. clonazePAM (KlonoPIN) 0.5 mg tablet Take  by mouth as needed for Anxiety. atorvastatin (LIPITOR) 40 mg tablet Take 1 Tablet by mouth every evening. 90 Tablet 3    gabapentin (NEURONTIN) 800 mg tablet TAKE 1 TABLET BY MOUTH THREE TIMES DAILY MAX DAILY AMOUNT 2,400MG AS DIRECTED 270 Tablet 1    hydrOXYchloroQUINE (PLAQUENIL) 200 mg tablet Take 1 Tablet by mouth two (2) times a day. TAKE 1 TABLET BY MOUTH TWO TIMES A  Tablet 0    hydrocortisone (HYTONE) 2.5 % topical cream Apply  to affected area two (2) times a day.  use thin layer 28 g 5 buprenorphine-naloxone (SUBOXONE) 8-2 mg film sublingaul film 1 Film by SubLINGual route daily. sertraline (ZOLOFT) 100 mg tablet Take 2 Tablets by mouth daily. 180 Tablet 3     Allergies   Allergen Reactions    Apple Anaphylaxis    Cephalexin Hives    Strawberry Anaphylaxis    Wasp Venom Anaphylaxis    Wasps [Hymenoptera Allergenic Extract] Anaphylaxis     Past Medical History:   Diagnosis Date    Abnormal Papanicolaou smear of cervix     LEEPs    Anxiety     Anxiety     Arthritis     Bruises easily     Chest pain     Clostridium difficile colitis 2/2007    Diarrhea     Dry mouth     Endometriosis     resolved with surgery    Esophageal reflux     Essential hypertension 8/17/2022    Fatigue     Fibromyalgia     GERD (gastroesophageal reflux disease)     High cholesterol     History of shingles     not current    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    IBS (irritable bowel syndrome)     Ill-defined condition 2011    h/o of \"blood clot in my lung, after a picc line\"    Ill-defined condition     painless rectal bleeding    MRSA infection     h/o, not current    Ovarian cyst     Pain, upper back     Psychiatric disorder     depression     Stress incontinence     Tattoo     Thromboembolus ([address] 2011    PE    Unspecified deficiency anemia 1999     Past Surgical History:   Procedure Laterality Date    COLONOSCOPY N/A 6/28/2019    DIAGNOSTIC COLONOSCOPY with random bxs performed by Cuauhtemoc Maya MD at [address] [place] [address]  6/22/2020         [address] [place]    HX CHOLECYSTECTOMY  2002    HX GYN  2018    Abalation    HX GYN      rt ovary removed    HX GYN      left ovary clipped.     HX ORTHOPAEDIC      arthoscopic rt knee    HX ORTHOPAEDIC      left knee meniscis tear    HX ORTHOPAEDIC Right     meniscus repair    NEUROLOGICAL PROCEDURE UNLISTED  12/2018    laminectomy        Specific concerns today: Pt is seeing [facility] with Yang Conley NP, and sees her every 2 weeks for depression, and opioid addiction and pain treated with Suboxone, and Trazodone which was increased to 100 mg daily. Pt's next visit is Thursday. Pt is still having all of the symptoms that she was complaining of via her last visit. Pt c/o joint pain, and muscle pain, with swelling and lesion to multiple sites in/on her body. Pt researched a disease process caused \"Dermatomyositis\", Which she states that her symptoms match. Pt saw Rheumatology who stated that she should go to [facility] for her issues. Patient states that she is not able to go due to it being at [facility] and she has no means of getting there at this time. Pt was informed that I do not treat this disease, as previous, and there is no positive dx. Pt will be given one 90 day supply of medication with no refills. Referral to rheumatology has been placed. Review of Systems  Pertinent items are noted in HPI. Objective:   Blood pressure 115/77, pulse 63, temperature 98.1 °F (36.7 °C), temperature source Temporal, resp. rate 18, height 5' (1.524 m), weight 177 lb (80.3 kg), SpO2 93 %. Physical Examination:   General appearance - oriented to person, place, and time, in mild to moderate distress, and crying  Mental status - alert, oriented to person, place, and time  Ears - bilateral TM's and external ear canals normal  Mouth - mucous membranes moist, pharynx normal without lesions  Neck - supple, no significant adenopathy  Chest - clear to auscultation, no wheezes, rales or rhonchi, symmetric air entry  Heart - normal rate, regular rhythm, normal S1, S2, no murmurs, rubs, clicks or gallops     Assessment/Plan:     lose weight, increase physical activity, routine labs ordered, have labs drawn prior to ROV    ICD-10-CM ICD-9-CM    1. Well woman exam (no gynecological exam)  Z00.00 V70.0 CBC WITH AUTOMATED DIFF      CBC WITH AUTOMATED DIFF    [V70.0]      2.  Well woman exam with routine gynecological exam  Z01.419 V72.31 HEMOGLOBIN A1C WITH EAG      METABOLIC PANEL, BASIC      LIPID PANEL      TSH 3RD GENERATION      HEPATIC FUNCTION PANEL      HEPATIC FUNCTION PANEL      TSH 3RD GENERATION      LIPID PANEL      METABOLIC PANEL, BASIC      HEMOGLOBIN A1C WITH EAG    [V72.31]      3. Screening for malignant neoplasm of cervix  Z12.4 V76.2 [facility] MAMMO BI SCREENING INCL CAD      4. Muscle pain  M79.10 729.1 hydrOXYchloroQUINE (PLAQUENIL) 200 mg tablet      REFERRAL TO RHEUMATOLOGY      5. Idiopathic urticaria  L50.1 708.1 hydrOXYchloroQUINE (PLAQUENIL) 200 mg tablet      REFERRAL TO RHEUMATOLOGY      6. Rash and nonspecific skin eruption  R21 782.1 hydrOXYchloroQUINE (PLAQUENIL) 200 mg tablet      REFERRAL TO RHEUMATOLOGY      7. Pure hypercholesterolemia  E78.00 272.0 atorvastatin (LIPITOR) 40 mg tablet      8. Fibromyalgia  M79.7 729.1 gabapentin (NEURONTIN) 800 mg tablet      REFERRAL TO RHEUMATOLOGY      9. Swelling of multiple joints  M25.40 719.09 REFERRAL TO RHEUMATOLOGY      10. Cervicalgia  M54.2 723.1 REFERRAL TO ORTHOPEDICS      11. Spinal stenosis, lumbar region with neurogenic claudication  M48.062 724.03 REFERRAL TO ORTHOPEDICS      12. Chronic pain of right knee  M25.561 719.46 REFERRAL TO ORTHOPEDICS    G89.29 338.29         Follow-up and Dispositions    Return in about 1 year (around 8/17/2023) for Well Woman W/ PAP, Labs 1 Week Prior.

## 2022-08-17 NOTE — PROGRESS NOTES
Subjective:   49 y.o. female for Well Woman Check. She is postmenopausal.  Social History: {Sexual Hx:5163}. Pertinent past medical hstory: {contraception pert hx:56363::\"no history of HTN, DVT, CAD, DM, liver disease, migraines or smoking\"}. {Choose one or more SmartLinks; press DELETE if none desired:5420946}     ROS:  Feeling well. No dyspnea or chest pain on exertion. No abdominal pain, change in bowel habits, black or bloody stools. No urinary tract symptoms. GYN ROS: {gyn ros:226155::\"no breast pain or new or enlarging lumps on self exam\",\"no vaginal bleeding\",\"no discharge or pelvic pain\"}. Menopausal symptoms: {Gyn cyclic XT:19042::\"QMZD\"}. No neurological complaints. Last DEXA scan and T-score: ***none  Last Colonoscopy: ***none  Last Mammogram: ***none    Objective:   Visit Vitals  Temp 98.1 °F (36.7 °C) (Temporal)   Resp 18   Ht 5' (1.524 m)   Wt 177 lb (80.3 kg)   BMI 34.57 kg/m²     The patient appears well, alert, oriented x 3, in no distress. ENT normal.  Neck supple. No adenopathy or thyromegaly. RICK. Lungs are clear, good air entry, no wheezes, rhonchi or rales. S1 and S2 normal, no murmurs, regular rate and rhythm. Abdomen soft without tenderness, guarding, mass or organomegaly. Extremities show no edema, normal peripheral pulses. Neurological is normal, no focal findings. BREAST EXAM: {pe breast exam:517253::\"breasts appear normal, no suspicious masses, no skin or nipple changes or axillary nodes\"}    PELVIC EXAM: {pelvic exam:364546::\"normal external genitalia, vulva, vagina, cervix, uterus and adnexa\"}    Assessment/Plan:   {gyn assessment:106890:p:\"well woman\",\"postmenopausal\"}  {gyn plan:178067:p:\"mammogram\",\"pap smear\",\"bone density screening ordered\",\"screening colonoscopy referral written\",\"return annually or prn\"}  {Assessment and Plan:04274}{}.

## 2022-08-18 LAB
A-G RATIO,AGRAT: 2 RATIO (ref 1.1–2.6)
ABSOLUTE LYMPHOCYTE COUNT, 10803: 2 K/UL (ref 1–4.8)
ALBUMIN SERPL-MCNC: 4.5 G/DL (ref 3.5–5)
ALP SERPL-CCNC: 67 U/L (ref 25–115)
ALT SERPL-CCNC: 18 U/L (ref 5–40)
ANION GAP SERPL CALC-SCNC: 13 MMOL/L (ref 3–15)
AST SERPL W P-5'-P-CCNC: 16 U/L (ref 10–37)
AVG GLU, 10930: 113 MG/DL (ref 91–123)
BASOPHILS # BLD: 0 K/UL (ref 0–0.2)
BASOPHILS NFR BLD: 1 % (ref 0–2)
BILIRUB SERPL-MCNC: 0.2 MG/DL (ref 0.2–1.2)
BILIRUBIN, DIRECT,CBIL: <0.2 MG/DL (ref 0–0.3)
BUN SERPL-MCNC: 15 MG/DL (ref 6–22)
CALCIUM SERPL-MCNC: 8.8 MG/DL (ref 8.4–10.5)
CHLORIDE SERPL-SCNC: 100 MMOL/L (ref 98–110)
CHOLEST SERPL-MCNC: 162 MG/DL (ref 110–200)
CO2 SERPL-SCNC: 27 MMOL/L (ref 20–32)
CREAT SERPL-MCNC: 0.7 MG/DL (ref 0.5–1.2)
EOSINOPHIL # BLD: 0.2 K/UL (ref 0–0.5)
EOSINOPHIL NFR BLD: 3 % (ref 0–6)
ERYTHROCYTE [DISTWIDTH] IN BLOOD BY AUTOMATED COUNT: 12.4 % (ref 10–15.5)
GLOBULIN,GLOB: 2.2 G/DL (ref 2–4)
GLOMERULAR FILTRATION RATE: >60 ML/MIN/1.73 SQ.M.
GLUCOSE SERPL-MCNC: 106 MG/DL (ref 70–99)
GRANULOCYTES,GRANS: 56 % (ref 40–75)
HBA1C MFR BLD HPLC: 5.6 % (ref 4.8–5.6)
HCT VFR BLD AUTO: 38.5 % (ref 35.1–48)
HDLC SERPL-MCNC: 4 MG/DL (ref 0–5)
HDLC SERPL-MCNC: 41 MG/DL
HGB BLD-MCNC: 12 G/DL (ref 11.7–16)
LDL/HDL RATIO,LDHD: 2.1
LDLC SERPL CALC-MCNC: 86 MG/DL (ref 50–99)
LYMPHOCYTES, LYMLT: 32 % (ref 20–45)
MCH RBC QN AUTO: 30 PG (ref 26–34)
MCHC RBC AUTO-ENTMCNC: 31 G/DL (ref 31–36)
MCV RBC AUTO: 96 FL (ref 80–99)
MONOCYTES # BLD: 0.5 K/UL (ref 0.1–1)
MONOCYTES NFR BLD: 8 % (ref 3–12)
NEUTROPHILS # BLD AUTO: 3.5 K/UL (ref 1.8–7.7)
NON-HDL CHOLESTEROL, 011976: 121 MG/DL
PLATELET # BLD AUTO: 228 K/UL (ref 140–440)
PMV BLD AUTO: 10.6 FL (ref 9–13)
POTASSIUM SERPL-SCNC: 4.4 MMOL/L (ref 3.5–5.5)
PROT SERPL-MCNC: 6.7 G/DL (ref 6.4–8.3)
RBC # BLD AUTO: 4.01 M/UL (ref 3.8–5.2)
SODIUM SERPL-SCNC: 140 MMOL/L (ref 133–145)
TRIGL SERPL-MCNC: 175 MG/DL (ref 40–149)
TSH SERPL DL<=0.005 MIU/L-ACNC: 2 MCU/ML (ref 0.27–4.2)
VLDLC SERPL CALC-MCNC: 35 MG/DL (ref 8–30)
WBC # BLD AUTO: 6.2 K/UL (ref 4–11)

## 2023-01-15 DIAGNOSIS — L50.1 IDIOPATHIC URTICARIA: ICD-10-CM

## 2023-01-15 DIAGNOSIS — R21 RASH AND NONSPECIFIC SKIN ERUPTION: ICD-10-CM

## 2023-01-15 DIAGNOSIS — M79.10 MUSCLE PAIN: ICD-10-CM

## 2023-01-16 RX ORDER — HYDROXYCHLOROQUINE SULFATE 200 MG/1
TABLET, FILM COATED ORAL
Qty: 60 TABLET | OUTPATIENT
Start: 2023-01-16

## 2023-03-03 NOTE — TELEPHONE ENCOUNTER
03/03/23 1800   Encounter Summary   Encounter Overview/Reason  Volunteer Encounter   Service Provided For: Patient   Referral/Consult From: Nhi   Last Encounter  03/03/23  (V)   Complexity of Encounter Low   Spiritual/Emotional needs   Type Spiritual Support   Assessment/Intervention/Outcome   Intervention Prayer (assurance of)/[place]      Left non detailed message to have patient return provider's call. Calling regarding PAP results.

## 2023-03-27 RX ORDER — GABAPENTIN 800 MG/1
TABLET ORAL
Qty: 90 TABLET | OUTPATIENT
Start: 2023-03-27

## 2023-04-19 ENCOUNTER — OFFICE VISIT (OUTPATIENT)
Age: 53
End: 2023-04-19

## 2023-04-19 VITALS
HEIGHT: 60 IN | TEMPERATURE: 97.5 F | BODY MASS INDEX: 33.73 KG/M2 | RESPIRATION RATE: 18 BRPM | HEART RATE: 64 BPM | WEIGHT: 171.8 LBS | DIASTOLIC BLOOD PRESSURE: 75 MMHG | SYSTOLIC BLOOD PRESSURE: 115 MMHG | OXYGEN SATURATION: 95 %

## 2023-04-19 DIAGNOSIS — Z11.59 ENCOUNTER FOR HEPATITIS C SCREENING TEST FOR LOW RISK PATIENT: ICD-10-CM

## 2023-04-19 DIAGNOSIS — M79.10 MYALGIA, UNSPECIFIED SITE: ICD-10-CM

## 2023-04-19 DIAGNOSIS — M79.7 FIBROMYALGIA: Primary | ICD-10-CM

## 2023-04-19 DIAGNOSIS — Z13.228 SCREENING FOR METABOLIC DISORDER: ICD-10-CM

## 2023-04-19 DIAGNOSIS — Z13.29 SCREENING FOR THYROID DISORDER: ICD-10-CM

## 2023-04-19 DIAGNOSIS — E78.00 PURE HYPERCHOLESTEROLEMIA, UNSPECIFIED: ICD-10-CM

## 2023-04-19 RX ORDER — ALPRAZOLAM 1 MG/1
1 TABLET ORAL 2 TIMES DAILY PRN
COMMUNITY
Start: 2016-09-06 | End: 2023-04-19 | Stop reason: CLARIF

## 2023-04-19 RX ORDER — VORTIOXETINE 20 MG/1
20 TABLET, FILM COATED ORAL DAILY
COMMUNITY
Start: 2023-04-17

## 2023-04-19 RX ORDER — VORTIOXETINE 10 MG/1
10 TABLET, FILM COATED ORAL DAILY
COMMUNITY
Start: 2023-03-01 | End: 2023-04-19 | Stop reason: SDUPTHER

## 2023-04-19 RX ORDER — HYDROMORPHONE HYDROCHLORIDE 2 MG/1
TABLET ORAL
COMMUNITY
Start: 2016-09-13 | End: 2023-04-19 | Stop reason: CLARIF

## 2023-04-19 RX ORDER — METHYLPREDNISOLONE 4 MG/1
TABLET ORAL
COMMUNITY
Start: 2021-08-27 | End: 2023-04-19 | Stop reason: CLARIF

## 2023-04-19 RX ORDER — OMEPRAZOLE 40 MG/1
CAPSULE, DELAYED RELEASE ORAL
COMMUNITY
Start: 2021-08-19

## 2023-04-19 RX ORDER — ONDANSETRON 4 MG/1
4 TABLET, ORALLY DISINTEGRATING ORAL
COMMUNITY
Start: 2016-04-04 | End: 2023-04-19 | Stop reason: CLARIF

## 2023-04-19 RX ORDER — RANITIDINE 150 MG/1
150 TABLET ORAL 2 TIMES DAILY
COMMUNITY
End: 2023-04-19 | Stop reason: CLARIF

## 2023-04-19 RX ORDER — TRAZODONE HYDROCHLORIDE 150 MG/1
150 TABLET ORAL NIGHTLY
COMMUNITY
Start: 2023-04-12

## 2023-04-19 RX ORDER — OXYCODONE HYDROCHLORIDE 15 MG/1
15 TABLET ORAL 2 TIMES DAILY
COMMUNITY
Start: 2016-09-29 | End: 2023-04-19 | Stop reason: CLARIF

## 2023-04-19 RX ORDER — EPINEPHRINE 0.3 MG/.3ML
0.3 INJECTION SUBCUTANEOUS PRN
COMMUNITY
Start: 2016-08-04

## 2023-04-19 RX ORDER — DIPHENOXYLATE HYDROCHLORIDE AND ATROPINE SULFATE 2.5; .025 MG/1; MG/1
2 TABLET ORAL EVERY 6 HOURS PRN
COMMUNITY
Start: 2016-08-31

## 2023-04-19 RX ORDER — DEXLANSOPRAZOLE 60 MG/1
CAPSULE, DELAYED RELEASE ORAL
COMMUNITY
Start: 2021-08-14 | End: 2023-04-19 | Stop reason: CLARIF

## 2023-04-19 RX ORDER — TIZANIDINE 4 MG/1
4 TABLET ORAL EVERY 6 HOURS PRN
COMMUNITY
Start: 2016-09-12 | End: 2023-04-19 | Stop reason: CLARIF

## 2023-04-19 RX ORDER — BUPROPION HYDROCHLORIDE 200 MG/1
TABLET, EXTENDED RELEASE ORAL
COMMUNITY
Start: 2021-06-28 | End: 2023-04-19 | Stop reason: SDUPTHER

## 2023-04-19 RX ORDER — VALACYCLOVIR HYDROCHLORIDE 1 G/1
1 TABLET, FILM COATED ORAL DAILY
COMMUNITY
Start: 2016-08-29 | End: 2023-04-19 | Stop reason: CLARIF

## 2023-04-19 RX ORDER — ACYCLOVIR 400 MG/1
400 TABLET ORAL 2 TIMES DAILY
COMMUNITY
Start: 2016-03-01 | End: 2023-04-19 | Stop reason: CLARIF

## 2023-04-19 RX ORDER — CLOBETASOL PROPIONATE 0.5 MG/G
OINTMENT TOPICAL
COMMUNITY
Start: 2021-07-09 | End: 2023-04-19 | Stop reason: CLARIF

## 2023-04-19 SDOH — ECONOMIC STABILITY: FOOD INSECURITY: WITHIN THE PAST 12 MONTHS, YOU WORRIED THAT YOUR FOOD WOULD RUN OUT BEFORE YOU GOT MONEY TO BUY MORE.: OFTEN TRUE

## 2023-04-19 SDOH — ECONOMIC STABILITY: FOOD INSECURITY: WITHIN THE PAST 12 MONTHS, THE FOOD YOU BOUGHT JUST DIDN'T LAST AND YOU DIDN'T HAVE MONEY TO GET MORE.: OFTEN TRUE

## 2023-04-19 SDOH — ECONOMIC STABILITY: INCOME INSECURITY: HOW HARD IS IT FOR YOU TO PAY FOR THE VERY BASICS LIKE FOOD, HOUSING, MEDICAL CARE, AND HEATING?: HARD

## 2023-04-19 SDOH — ECONOMIC STABILITY: HOUSING INSECURITY
IN THE LAST 12 MONTHS, WAS THERE A TIME WHEN YOU DID NOT HAVE A STEADY PLACE TO SLEEP OR SLEPT IN A SHELTER (INCLUDING NOW)?: YES

## 2023-04-19 NOTE — PROGRESS NOTES
1. \"Have you been to the ER, urgent care clinic since your last visit? Hospitalized since your last visit? \" No    2. \"Have you seen or consulted any other health care providers outside of the [address] [place] [address] since your last visit? \" No     3. For patients aged 39-70: Has the patient had a colonoscopy / FIT/ Cologuard? Yes - no Care Gap present      If the patient is female:    4. For patients aged 41-77: Has the patient had a mammogram within the past 2 years? Yes - Care Gap present. Most recent result on file      5. For patients aged 21-65: Has the patient had a pap smear?  Yes - no Care Gap present
tear    ORTHOPEDIC SURGERY      arthoscopic rt knee    ORTHOPEDIC SURGERY Right     meniscus repair      Social History     Socioeconomic History    Marital status:      Spouse name: None    Number of children: None    Years of education: None    Highest education level: None   Tobacco Use    Smoking status: Former     Packs/day: 0.50     Types: Cigarettes     Quit date: 2016     Years since quittin.9    Smokeless tobacco: Never   Substance and Sexual Activity    Alcohol use: Yes     Alcohol/week: 0.8 standard drinks    Drug use: No     Social Determinants of Health     Financial Resource Strain: High Risk    Difficulty of Paying Living Expenses: Hard   Food Insecurity: Food Insecurity Present    Worried About Running Out of Food in the Last Year: Often true    Ran Out of Food in the Last Year: Often true   Transportation Needs: Unknown    Lack of Transportation (Non-Medical): No   Housing Stability: High Risk    Unstable Housing in the Last Year: Yes      Current Outpatient Medications   Medication Sig Dispense Refill    EPINEPHrine (EPIPEN) 0.3 MG/0.3ML SOAJ injection Inject 0.3 mLs into the muscle as needed      omeprazole (PRILOSEC) 40 MG delayed release capsule       traZODone (DESYREL) 150 MG tablet Take 1 tablet by mouth nightly at bedtime. atorvastatin (LIPITOR) 40 MG tablet Take 1 tablet by mouth every evening      buprenorphine-naloxone (SUBOXONE) 8-2 MG FILM SL film Place 1 Film under the tongue daily. clonazePAM (KLONOPIN) 0.5 MG tablet Take by mouth as needed. hydrocortisone 2.5 % cream Apply topically 2 times daily      sertraline (ZOLOFT) 100 MG tablet Take 2 tablets by mouth daily      diphenoxylate-atropine (LOMOTIL) 2.5-0.025 MG per tablet Take 2 tablets by mouth every 6 hours as needed.  Max Daily Amount: 8 tablets (Patient not taking: Reported on 2023)      TRINTELLIX 20 MG TABS tablet Take 1 tablet by mouth daily       No current facility-administered

## 2023-04-19 NOTE — PATIENT INSTRUCTIONS
that is right for you. Phone toll-free: [phone]  Website:  www. pparx. [address]    What they offer: The [address] [place] [address] gives you and your family access to a free Prescription Drug Card program and savings of up to 75% at more than 50,000 national and [employer]. Phone toll-free: [phone]  Website: www.Stitch.es    additional resources? Call 211 or Find Help: https://www. [employer].Relify/ FOOD RESOURCES    Isela Bermudez [facility]  What they offer: Assistance with finding a food pantry, soup kitchen or resource near you. Website: https://Havs[employer] Delikatesser.org/get-help/community-resources/  Provide instructions for assistance with Rocky Alarconuder (Food [place]) application on this site. SNAP ([address])  What they offer: SNAP is used like cash to buy eligible food items from authorized retailers. Apply for benefits online: Elvin.  Apply for benefits by telephone: [phone]    Meals on Wheels  What they offer: Meals on Wheels is a program that delivers meals to individuals at home who are unable to purchase or prepare their own meals. Website: https://Infobloxmore. org/how-we-help/meals-on-wheels/  Requirements can vary by program and areas served. To apply:  [address] [place] [address] Avenue: [phone] (IAW -Fri 8:30 a.m. to 4:30 p.m.)  Coverage Area:  [place], [place], [place], [place], [address] [place] [address] [address] [place] [address],[address]  Website: www.ssseva.org/contact-us/  [phone]   Coverage Areas: [address], [address] [place], [place] [address], [place]. [facility] [address]  What they offer:  [facility] provides comprehensive services to the disadvantaged/low-income community, homebound seniors and homeless in [facility], and [facility].   Phone

## 2023-04-20 LAB
ALT SERPL-CCNC: 23 U/L (ref 5–40)
ANION GAP SERPL CALCULATED.3IONS-SCNC: 10 MMOL/L (ref 3–15)
AST SERPL-CCNC: 21 U/L (ref 10–37)
BUN BLDV-MCNC: 14 MG/DL (ref 6–22)
CALCIUM SERPL-MCNC: 9.6 MG/DL (ref 8.4–10.5)
CHLORIDE BLD-SCNC: 100 MMOL/L (ref 98–110)
CHOLESTEROL/HDL RATIO: 3.7 (ref 0–5)
CHOLESTEROL: 194 MG/DL (ref 110–200)
CO2: 28 MMOL/L (ref 20–32)
CREAT SERPL-MCNC: 0.7 MG/DL (ref 0.5–1.2)
GLOMERULAR FILTRATION RATE: >60 ML/MIN/1.73 SQ.M.
GLUCOSE: 100 MG/DL (ref 70–99)
HDLC SERPL-MCNC: 53 MG/DL
HEPATITIS C ANTIBODY: NORMAL
LDL CHOLESTEROL CALCULATED: 112 MG/DL (ref 50–99)
LDL/HDL RATIO: 2.1
NON-HDL CHOLESTEROL: 141 MG/DL
POTASSIUM SERPL-SCNC: 4.8 MMOL/L (ref 3.5–5.5)
SODIUM BLD-SCNC: 138 MMOL/L (ref 133–145)
TRIGL SERPL-MCNC: 143 MG/DL (ref 40–149)
TSH SERPL DL<=0.05 MIU/L-ACNC: 2.51 MCU/ML (ref 0.27–4.2)
VLDLC SERPL CALC-MCNC: 29 MG/DL (ref 8–30)

## 2024-03-01 NOTE — TELEPHONE ENCOUNTER
[facility] Tylenol w/Codeine as 5/7/20 for a 15 d/s. Last Visit: 3/3/20 with NP David Geiger  Next Appointment: Advised to follow-up in 4 months  Previous Refill Encounter(s): 5/7/20 Zofran #30 & Tylenol w/Codeine #45    Requested Prescriptions     Pending Prescriptions Disp Refills    ondansetron (ZOFRAN ODT) 8 mg disintegrating tablet 30 Tab 0     Sig: Take 1 Tab by mouth every twelve (12) hours as needed for Nausea for up to 15 days.  acetaminophen-codeine (TYLENOL #3) 300-30 mg per tablet 45 Tab 0     Sig: Take 1 Tab by mouth every eight (8) hours as needed for Pain for up to 15 days. Max Daily Amount: 3 Tabs. operating room

## 2024-03-29 NOTE — TELEPHONE ENCOUNTER
-called patient and  Ball Motts (on HIPAA) answered. Informed him that prescription was sent to pharmacy per message below. Ball Motts verbalized understanding. 26.6

## 2024-11-01 ENCOUNTER — TELEPHONE (OUTPATIENT)
Facility: CLINIC | Age: 54
End: 2024-11-01

## 2024-11-01 NOTE — TELEPHONE ENCOUNTER
Patient transferred from [facility] nurse triage with complaints of breaking out in rashes on and off states has been feeling awful for the past couple of months and seems like symptoms have been getting worse states glands is swollen voice has been hoarse not been able to use the bathroom for over a week states feel like she feels like constantly having the flu states have been homeless for 7 months due to  losing his job states went to ER in July and diagnosed with pulmonary edema.

## 2024-11-04 NOTE — TELEPHONE ENCOUNTER
Called patient back at hotel that lives in, verified name and . Patient stated having an autoimmune disease that has been worsening and now is having constant flu like symptoms that are on and off, waking up some days with eyes that are closed shut, swollen joints on and off, hoarseness, rash that is on and off and swollen lymph nodes in neck but does not have a sore throat. States was to see endocrinology last year but has been without a house and  with out a job for about 7 months now but  finally has a job now and is able to make appointments but has not been able to find a provider that takes medicaid insurance. Tried to schedule an appointment for this week but patient denied stating needing 1 week notice as has to call medicaid transportation. Scheduled appointment with PCP NP Alan Bernstein for next week and advised if symptoms worsen or has any chest pain of SOB will need to go to urgent care or ER. Patient verbalized understanding and had no further questions at this time.

## 2024-11-12 ENCOUNTER — TELEPHONE (OUTPATIENT)
Facility: CLINIC | Age: 54
End: 2024-11-12

## 2024-11-12 NOTE — TELEPHONE ENCOUNTER
Here is a documented note to the request for the patient to be terminated from the practice by the provider. I have declined.     Policy Title: Patient Dismissal - Medical Group  Approved by: [facility] Senior Leadership Team  Approval Date: 3/21/2023  Next Review Date: 3/21/2025  Effective Date: 3/21/2023  Policy Number: 1.3      This should be an unusual and infrequent  occurrence and the Physician/APC and clinical team should make all reasonable attempts to  identify and address any barriers that exist to a patient’s adherence to a recommended care  plan. Non-adherence to a care plan and/or missed appointments per se are not considered  valid reasons to dismiss a patient. When these factors are involved in the reason for  dismissal, steps taken to attempt to remedy these barriers and the outcome should be  documented in the medical record. When a patient is a participant in a value-based  arrangement, [facility] will remain accountable for quality and cost of care  related to that patient.        Other then missed appointments due to the patients social status there is no reason to terminate the relationship with the patient.There are no documented attempts to overcome the patient social issues for the betterment of her care. This has been expressed to the provider on multiple occasions. Patients last office visit was 04/19/23 and the patient left a documented review that the provider really hurt her feeling but never stated that she did not want to see her anymore. Provider has been made aware by myself and administration to still see the patient as needed.

## (undated) DEVICE — KIT CLN UP BON SECOURS MARYV

## (undated) DEVICE — WAX SURG 2.5GM HEMSTAT BNE BEESWAX PARAFFIN ISO PALMITATE

## (undated) DEVICE — GAUZE,SPONGE,4"X4",16PLY,STRL,LF,10/TRAY: Brand: MEDLINE

## (undated) DEVICE — FLUFF AND POLYMER UNDERPAD,EXTRA HEAVY: Brand: WINGS

## (undated) DEVICE — Z CONVERTED USE 2274618 CRUTCH ALU ADLT 5.2IN-5.10IN PREM

## (undated) DEVICE — SUTURE MCRYL SZ 4-0 L18IN ABSRB UD L19MM PS-2 3/8 CIR PRIM Y496G

## (undated) DEVICE — GAUZE SPONGES,16 PLY: Brand: CURITY

## (undated) DEVICE — NEEDLE HYPO 22GA L1.5IN BLK S STL HUB POLYPR SHLD REG BVL

## (undated) DEVICE — SOLUTION IV 1000ML 0.9% SOD CHL

## (undated) DEVICE — (D)PREP SKN CHLRAPRP APPL 26ML -- CONVERT TO ITEM 371833

## (undated) DEVICE — 3M™ BAIR PAWS FLEX™ WARMING GOWN, STANDARD, 20 PER CASE 81003: Brand: BAIR PAWS™

## (undated) DEVICE — SYR 10ML LUER LOK 1/5ML GRAD --

## (undated) DEVICE — FLEX ADVANTAGE 3000CC: Brand: FLEX ADVANTAGE

## (undated) DEVICE — KENDALL SCD EXPRESS SLEEVES, KNEE LENGTH, MEDIUM: Brand: KENDALL SCD

## (undated) DEVICE — KNEE ARTHROSCOPY III-LF: Brand: MEDLINE INDUSTRIES, INC.

## (undated) DEVICE — GARMENT,MEDLINE,DVT,INT,CALF,FOAM,MED: Brand: MEDLINE

## (undated) DEVICE — INFLOW CASSETTE TUBING, DO NOT USE IF PACKAGE IS DAMAGED: Brand: CROSSFLOW

## (undated) DEVICE — INTENDED FOR TISSUE SEPARATION, AND OTHER PROCEDURES THAT REQUIRE A SHARP SURGICAL BLADE TO PUNCTURE OR CUT.: Brand: BARD-PARKER SAFETY BLADES SIZE 20, STERILE

## (undated) DEVICE — SUTURE MCRYL SZ 3-0 L27IN ABSRB UD L24MM PS-1 3/8 CIR PRIM Y936H

## (undated) DEVICE — APPLICATOR BNDG 1MM ADH PREMIERPRO EXOFIN

## (undated) DEVICE — KIT POS W/ FOAM ARM CRADL SHEARGUARD CHST PD CVR FOR SPNL

## (undated) DEVICE — BLANKET WRM W40.2XL55.9IN IORT LO BODY + MISTRAL AIR

## (undated) DEVICE — ENDOSCOPY PUMP TUBING/ CAP SET: Brand: ERBE

## (undated) DEVICE — STERILE POLYISOPRENE POWDER-FREE SURGICAL GLOVES: Brand: PROTEXIS

## (undated) DEVICE — SYR 50ML LR LCK 1ML GRAD NSAF --

## (undated) DEVICE — INTENDED FOR TISSUE SEPARATION, AND OTHER PROCEDURES THAT REQUIRE A SHARP SURGICAL BLADE TO PUNCTURE OR CUT.: Brand: BARD-PARKER SAFETY BLADES SIZE 11, STERILE

## (undated) DEVICE — AIRLIFE™ NASAL OXYGEN CANNULA CURVED, FLARED TIP WITH 14 FOOT (4.3 M) CRUSH-RESISTANT TUBING, OVER-THE-EAR STYLE: Brand: AIRLIFE™

## (undated) DEVICE — SOLUTION IRRIG 1000ML H2O STRL BLT

## (undated) DEVICE — PREP SKN CHLRAPRP APL 26ML STR --

## (undated) DEVICE — SOLUTION IRRIG 3000ML 0.9% SOD CHL FLX CONT 0797208] ICU MEDICAL INC]

## (undated) DEVICE — 3.0MM PRECISION NEURO (MATCH HEAD)

## (undated) DEVICE — MEDI-VAC NON-CONDUCTIVE SUCTION TUBING: Brand: CARDINAL HEALTH

## (undated) DEVICE — BASIN EMESIS 500CC ROSE 250/CS 60/PLT: Brand: MEDEGEN MEDICAL PRODUCTS, LLC

## (undated) DEVICE — ZIMMER® STERILE DISPOSABLE TOURNIQUET CUFF WITH PROTECTIVE SLEEVE AND PLC, DUAL PORT, SINGLE BLADDER, 34 IN. (86 CM)

## (undated) DEVICE — 4-PORT MANIFOLD: Brand: NEPTUNE 2

## (undated) DEVICE — WATERPROOF, BACTERIA PROOF DRESSING WITH ABSORBENT SEE THROUGH PAD: Brand: OPSITE POST-OP VISIBLE 10X8CM CTN 20

## (undated) DEVICE — STOCKING COMPR L L29-31IN REG 19MMHG ANK 9-10IN CALF

## (undated) DEVICE — CATHETER SUCT TR FL TIP 14FR W/ O CTRL

## (undated) DEVICE — REM POLYHESIVE ADULT PATIENT RETURN ELECTRODE: Brand: VALLEYLAB

## (undated) DEVICE — SYR 50ML SLIP TIP NSAF LF STRL --

## (undated) DEVICE — SUTURE VCRL SZ 2-0 L18IN ABSRB VLT CT-1 L36MM 1/2 CIR J739D

## (undated) DEVICE — POSITIONER ARTHSCP KNEE HLDR WHT W/O CVR

## (undated) DEVICE — SYRINGE MED 3ML NDL 22GA L1IN PLAS N CTRL LUERLOCK TIP REG

## (undated) DEVICE — INTENDED FOR TISSUE SEPARATION, AND OTHER PROCEDURES THAT REQUIRE A SHARP SURGICAL BLADE TO PUNCTURE OR CUT.: Brand: BARD-PARKER SAFETY BLADES SIZE 15, STERILE

## (undated) DEVICE — BITE BLOCK ENDOSCP UNIV AD 6 TO 9.4 MM

## (undated) DEVICE — Device

## (undated) DEVICE — SYRINGE MED 25GA 3ML L5/8IN SUBQ PLAS W/ DETACH NDL SFTY

## (undated) DEVICE — SUTURE VCRL SZ 1 L18IN ABSRB VLT CT-1 L36MM 1/2 CIR J741D

## (undated) DEVICE — WATERPROOF, BACTERIA PROOF DRESSING WITH ABSORBENT SEE THROUGH PAD: Brand: OPSITE POST-OP VISIBLE 15X10CM CTN 20

## (undated) DEVICE — (D)SYR 10ML 1/5ML GRAD NSAF -- PKGING CHANGE USE ITEM 338027

## (undated) DEVICE — MEDI-VAC SUCTION HIGH CAPACITY: Brand: CARDINAL HEALTH

## (undated) DEVICE — FCPS RAD JAW 4LC 240CM W/NDL -- BX/20 RADIAL JAW 4